# Patient Record
Sex: FEMALE | Race: WHITE | Employment: UNEMPLOYED | URBAN - METROPOLITAN AREA
[De-identification: names, ages, dates, MRNs, and addresses within clinical notes are randomized per-mention and may not be internally consistent; named-entity substitution may affect disease eponyms.]

---

## 2018-05-01 ENCOUNTER — HOSPITAL ENCOUNTER (OUTPATIENT)
Age: 58
Discharge: HOME | End: 2018-05-01
Payer: COMMERCIAL

## 2018-05-01 LAB
ALBUMIN SERPL-MCNC: 4.1 G/DL (ref 3.5–5.2)
ALP BLD-CCNC: 51 U/L (ref 35–104)
ALT SERPL-CCNC: 9 U/L (ref 0–32)
ANION GAP SERPL CALCULATED.3IONS-SCNC: 11 MMOL/L (ref 7–16)
AST SERPL-CCNC: 13 U/L (ref 0–31)
BASOPHILS ABSOLUTE: 0.05 E9/L (ref 0–0.2)
BASOPHILS RELATIVE PERCENT: 0.9 % (ref 0–2)
BILIRUB SERPL-MCNC: 0.4 MG/DL (ref 0–1.2)
BUN BLDV-MCNC: 16 MG/DL (ref 6–20)
C-REACTIVE PROTEIN: 0.1 MG/DL (ref 0–0.4)
CALCIUM SERPL-MCNC: 9.1 MG/DL (ref 8.6–10.2)
CHLORIDE BLD-SCNC: 103 MMOL/L (ref 98–107)
CHOLESTEROL, FASTING: 197 MG/DL (ref 0–199)
CO2: 27 MMOL/L (ref 22–29)
CREAT SERPL-MCNC: 0.8 MG/DL (ref 0.5–1)
EOSINOPHILS ABSOLUTE: 0.23 E9/L (ref 0.05–0.5)
EOSINOPHILS RELATIVE PERCENT: 4.1 % (ref 0–6)
GFR AFRICAN AMERICAN: >60
GFR NON-AFRICAN AMERICAN: >60 ML/MIN/1.73
GLUCOSE FASTING: 112 MG/DL (ref 74–109)
HBA1C MFR BLD: 5.9 % (ref 4.8–5.9)
HCT VFR BLD CALC: 39.9 % (ref 34–48)
HDLC SERPL-MCNC: 71 MG/DL
HEMOGLOBIN: 13.3 G/DL (ref 11.5–15.5)
IMMATURE GRANULOCYTES #: 0.02 E9/L
IMMATURE GRANULOCYTES %: 0.4 % (ref 0–5)
LDL CHOLESTEROL CALCULATED: 109 MG/DL (ref 0–99)
LYMPHOCYTES ABSOLUTE: 1.97 E9/L (ref 1.5–4)
LYMPHOCYTES RELATIVE PERCENT: 35.1 % (ref 20–42)
MCH RBC QN AUTO: 30.7 PG (ref 26–35)
MCHC RBC AUTO-ENTMCNC: 33.3 % (ref 32–34.5)
MCV RBC AUTO: 92.1 FL (ref 80–99.9)
MONOCYTES ABSOLUTE: 0.47 E9/L (ref 0.1–0.95)
MONOCYTES RELATIVE PERCENT: 8.4 % (ref 2–12)
NEUTROPHILS ABSOLUTE: 2.87 E9/L (ref 1.8–7.3)
NEUTROPHILS RELATIVE PERCENT: 51.1 % (ref 43–80)
PDW BLD-RTO: 11.5 FL (ref 11.5–15)
PLATELET # BLD: 337 E9/L (ref 130–450)
PMV BLD AUTO: 9.5 FL (ref 7–12)
POTASSIUM SERPL-SCNC: 4.3 MMOL/L (ref 3.5–5)
RBC # BLD: 4.33 E12/L (ref 3.5–5.5)
SODIUM BLD-SCNC: 141 MMOL/L (ref 132–146)
T3 TOTAL: 104 NG/DL (ref 80–200)
T4 TOTAL: 8.3 MCG/DL (ref 4.5–11.7)
TOTAL PROTEIN: 6.9 G/DL (ref 6.4–8.3)
TRIGLYCERIDE, FASTING: 86 MG/DL (ref 0–149)
TSH SERPL DL<=0.05 MIU/L-ACNC: 1.4 UIU/ML (ref 0.27–4.2)
VITAMIN D 25-HYDROXY: 29 NG/ML (ref 30–100)
VLDLC SERPL CALC-MCNC: 17 MG/DL
WBC # BLD: 5.6 E9/L (ref 4.5–11.5)

## 2018-05-01 PROCEDURE — 86140 C-REACTIVE PROTEIN: CPT

## 2018-05-01 PROCEDURE — 84480 ASSAY TRIIODOTHYRONINE (T3): CPT

## 2018-05-01 PROCEDURE — 80053 COMPREHEN METABOLIC PANEL: CPT

## 2018-05-01 PROCEDURE — 84443 ASSAY THYROID STIM HORMONE: CPT

## 2018-05-01 PROCEDURE — 84436 ASSAY OF TOTAL THYROXINE: CPT

## 2018-05-01 PROCEDURE — 36415 COLL VENOUS BLD VENIPUNCTURE: CPT

## 2018-05-01 PROCEDURE — 85025 COMPLETE CBC W/AUTO DIFF WBC: CPT

## 2018-05-01 PROCEDURE — 83036 HEMOGLOBIN GLYCOSYLATED A1C: CPT

## 2018-05-01 PROCEDURE — 80061 LIPID PANEL: CPT

## 2018-05-01 PROCEDURE — 82306 VITAMIN D 25 HYDROXY: CPT

## 2018-08-03 ENCOUNTER — OFFICE VISIT (OUTPATIENT)
Dept: FAMILY MEDICINE CLINIC | Age: 58
End: 2018-08-03
Payer: COMMERCIAL

## 2018-08-03 VITALS
BODY MASS INDEX: 24.33 KG/M2 | WEIGHT: 155 LBS | SYSTOLIC BLOOD PRESSURE: 118 MMHG | HEIGHT: 67 IN | HEART RATE: 62 BPM | OXYGEN SATURATION: 97 % | RESPIRATION RATE: 18 BRPM | DIASTOLIC BLOOD PRESSURE: 78 MMHG

## 2018-08-03 DIAGNOSIS — Z00.00 PHYSICAL EXAM: ICD-10-CM

## 2018-08-03 DIAGNOSIS — Z23 IMMUNIZATION DUE: ICD-10-CM

## 2018-08-03 DIAGNOSIS — N39.0 URINARY TRACT INFECTION WITHOUT HEMATURIA, SITE UNSPECIFIED: ICD-10-CM

## 2018-08-03 DIAGNOSIS — E11.9 TYPE 2 DIABETES MELLITUS WITHOUT COMPLICATION, WITHOUT LONG-TERM CURRENT USE OF INSULIN (HCC): Primary | ICD-10-CM

## 2018-08-03 DIAGNOSIS — E03.9 ACQUIRED HYPOTHYROIDISM: ICD-10-CM

## 2018-08-03 DIAGNOSIS — R53.83 FATIGUE, UNSPECIFIED TYPE: ICD-10-CM

## 2018-08-03 LAB
BILIRUBIN, POC: NEGATIVE
BLOOD URINE, POC: ABNORMAL
CLARITY, POC: ABNORMAL
COLOR, POC: YELLOW
CREATININE URINE POCT: ABNORMAL
GLUCOSE BLD-MCNC: 102 MG/DL
GLUCOSE URINE, POC: NEGATIVE
HBA1C MFR BLD: 5.6 %
KETONES, POC: NEGATIVE
LEUKOCYTE EST, POC: ABNORMAL
MICROALBUMIN/CREAT 24H UR: ABNORMAL MG/G{CREAT}
MICROALBUMIN/CREAT UR-RTO: ABNORMAL
NITRITE, POC: POSITIVE
PH, POC: 6
PROTEIN, POC: ABNORMAL
SPECIFIC GRAVITY, POC: 1.02
UROBILINOGEN, POC: 0.2

## 2018-08-03 PROCEDURE — 90471 IMMUNIZATION ADMIN: CPT | Performed by: FAMILY MEDICINE

## 2018-08-03 PROCEDURE — 82962 GLUCOSE BLOOD TEST: CPT | Performed by: FAMILY MEDICINE

## 2018-08-03 PROCEDURE — 83036 HEMOGLOBIN GLYCOSYLATED A1C: CPT | Performed by: FAMILY MEDICINE

## 2018-08-03 PROCEDURE — 90732 PPSV23 VACC 2 YRS+ SUBQ/IM: CPT | Performed by: FAMILY MEDICINE

## 2018-08-03 PROCEDURE — 81003 URINALYSIS AUTO W/O SCOPE: CPT | Performed by: FAMILY MEDICINE

## 2018-08-03 PROCEDURE — 82044 UR ALBUMIN SEMIQUANTITATIVE: CPT | Performed by: FAMILY MEDICINE

## 2018-08-03 PROCEDURE — 99204 OFFICE O/P NEW MOD 45 MIN: CPT | Performed by: FAMILY MEDICINE

## 2018-08-03 RX ORDER — NITROFURANTOIN 25; 75 MG/1; MG/1
100 CAPSULE ORAL 2 TIMES DAILY
Qty: 20 CAPSULE | Refills: 0 | Status: SHIPPED | OUTPATIENT
Start: 2018-08-03 | End: 2018-08-13

## 2018-08-03 RX ORDER — LEVOTHYROXINE SODIUM 0.05 MG/1
1 TABLET ORAL DAILY
Refills: 0 | COMMUNITY
Start: 2018-07-27 | End: 2018-09-04 | Stop reason: SDUPTHER

## 2018-08-03 RX ORDER — ALLOPURINOL 300 MG/1
1 TABLET ORAL DAILY
COMMUNITY
Start: 2003-02-17 | End: 2018-09-04 | Stop reason: SDUPTHER

## 2018-08-03 ASSESSMENT — PATIENT HEALTH QUESTIONNAIRE - PHQ9
1. LITTLE INTEREST OR PLEASURE IN DOING THINGS: 0
SUM OF ALL RESPONSES TO PHQ9 QUESTIONS 1 & 2: 0
2. FEELING DOWN, DEPRESSED OR HOPELESS: 0
SUM OF ALL RESPONSES TO PHQ QUESTIONS 1-9: 0

## 2018-08-07 PROBLEM — E11.9 TYPE 2 DIABETES MELLITUS WITHOUT COMPLICATION, WITHOUT LONG-TERM CURRENT USE OF INSULIN (HCC): Status: ACTIVE | Noted: 2018-08-07

## 2018-08-07 PROBLEM — R53.83 FATIGUE: Status: ACTIVE | Noted: 2018-08-07

## 2018-08-07 PROBLEM — N39.0 URINARY TRACT INFECTION WITHOUT HEMATURIA: Status: ACTIVE | Noted: 2018-08-07

## 2018-08-07 PROBLEM — E03.9 ACQUIRED HYPOTHYROIDISM: Status: ACTIVE | Noted: 2018-08-07

## 2018-08-07 ASSESSMENT — ENCOUNTER SYMPTOMS
RESPIRATORY NEGATIVE: 1
HEARTBURN: 0
VOMITING: 0
ABDOMINAL PAIN: 0
EYE PAIN: 0
WHEEZING: 0
SORE THROAT: 0
ORTHOPNEA: 0
PHOTOPHOBIA: 0
DOUBLE VISION: 0
SINUS PAIN: 0
BLOOD IN STOOL: 0
STRIDOR: 0
DIARRHEA: 0
SPUTUM PRODUCTION: 0
COUGH: 0
GASTROINTESTINAL NEGATIVE: 1
EYE REDNESS: 0
ABDOMINAL PAIN: 1
SHORTNESS OF BREATH: 0
NAUSEA: 0
BACK PAIN: 0
EYES NEGATIVE: 1
EYE DISCHARGE: 0
CONSTIPATION: 0
BLURRED VISION: 0
HEMOPTYSIS: 0

## 2018-08-08 NOTE — PROGRESS NOTES
SUBJECTIVE  Vasquez Mariano is a 59 y.o. female. HPI/Chief C/O:  Chief Complaint   Patient presents with    Diabetes     Pt here to establish care with PCP- Hx of diabetes    Urinary Tract Infection     Pt also c/o abd pain, dysuria, burning, itching    Discuss Medications     MA reviewed med list with pt- pharmacy added per pt, denies needing refills at this time   [id number] [facility] with pt-      Allergies   Allergen Reactions    Flagyl [Metronidazole] Rash   this 59year old female new pt presents with dysuria, urinary frequency, and abdominal pain. Pt has type 2 DM, A1C is 5.9, hypothyroid, and fatigue. ROS:  Review of Systems   Constitutional: Positive for malaise/fatigue. Negative for chills, diaphoresis, fever and weight loss. HENT: Negative. Negative for congestion, ear discharge, ear pain, hearing loss, nosebleeds, sinus pain, sore throat and tinnitus. Eyes: Negative. Negative for blurred vision, double vision, photophobia, pain, discharge and redness. Respiratory: Negative. Negative for cough, hemoptysis, sputum production, shortness of breath, wheezing and stridor. Cardiovascular: Negative. Negative for chest pain, palpitations, orthopnea, claudication, leg swelling and PND. Gastrointestinal: Positive for abdominal pain. Negative for blood in stool, constipation, diarrhea, heartburn, melena, nausea and vomiting. Genitourinary: Positive for dysuria, frequency and urgency. Negative for flank pain and hematuria. Musculoskeletal: Positive for myalgias. Negative for back pain, falls, joint pain and neck pain. Skin: Negative. Negative for itching and rash. Neurological: Negative. Negative for dizziness, tingling, tremors, sensory change, speech change, focal weakness, seizures, loss of consciousness, weakness and headaches. Endo/Heme/Allergies: Negative for environmental allergies and polydipsia. Does not bruise/bleed easily.         Pt has type 2 DM, and hypothyroid. Psychiatric/Behavioral: Negative. Negative for depression, hallucinations, memory loss, substance abuse and suicidal ideas. The patient is not nervous/anxious and does not have insomnia. Past Medical/Surgical Hx;  Reviewed with patient      Diagnosis Date    Diabetes mellitus ([address]     Hypertension     Kidney stones      Past Surgical History:   Procedure Laterality Date    ECHO COMPL W DOP COLOR FLOW  8/15/2013         HYSTERECTOMY      RECTAL PROLAPSE REPAIR         Past Family Hx:  Reviewed with patient  History reviewed. No pertinent family history. Social Hx:  Reviewed with patient  Social History   Substance Use Topics    Smoking status: Never Smoker    Smokeless tobacco: Never Used    Alcohol use No       OBJECTIVE  /78   Pulse 62   Resp 18   Ht 5' 7\" (1.702 m)   Wt 155 lb (70.3 kg)   LMP  (LMP Unknown)   SpO2 97%   Breastfeeding? No   BMI 24.28 kg/m²     Problem List:  [place]  does not have any pertinent problems on file. PHYS EX:  Physical Exam   Constitutional: She is oriented to person, place, and time. She appears well-developed and well-nourished. No distress. HENT:   Head: Normocephalic and atraumatic. Right Ear: External ear normal.   Left Ear: External ear normal.   Nose: Nose normal.   Mouth/Throat: Oropharynx is clear and moist. No oropharyngeal exudate. Eyes: Conjunctivae and EOM are normal. Pupils are equal, round, and reactive to light. Right eye exhibits no discharge. Left eye exhibits no discharge. No scleral icterus. Neck: Normal range of motion. Neck supple. No JVD present. No tracheal deviation present. No thyromegaly present. Cardiovascular: Normal rate, regular rhythm, normal heart sounds and intact distal pulses. Exam reveals no gallop and no friction rub. No murmur heard. Pulmonary/Chest: Effort normal and breath sounds normal. No stridor. No respiratory distress. She has no wheezes. She has no rales.  She exhibits no tenderness. Abdominal: Soft. Bowel sounds are normal. She exhibits no distension and no mass. There is no tenderness. There is no rebound and no guarding. No hernia. Musculoskeletal: She exhibits tenderness. She exhibits no edema or deformity. Lymphadenopathy:     She has no cervical adenopathy. Neurological: She is alert and oriented to person, place, and time. She has normal reflexes. She displays normal reflexes. No cranial nerve deficit or sensory deficit. She exhibits normal muscle tone. Coordination normal.   Skin: Skin is warm. No rash noted. She is not diaphoretic. No erythema. No pallor. Psychiatric: She has a normal mood and affect. Her behavior is normal. Judgment and thought content normal.   Nursing note and vitals reviewed. ASSESSMENT/PLAN  Lilia Gallardo was seen today for diabetes, urinary tract infection, discuss medications and health maintenance. Diagnoses and all orders for this visit:    Type 2 diabetes mellitus without complication, without long-term current use of insulin (HCC)  -     POCT Glucose  -     POCT glycosylated hemoglobin (Hb A1C)  -     POCT microalbumin  Stable. Metformin, aspirin, ADA diet. Urinary tract infection without hematuria, site unspecified  -     POCT Urinalysis No Micro (Auto)  -     nitrofurantoin, macrocrystal-monohydrate, (MACROBID) 100 MG capsule; Take 1 capsule by mouth 2 times daily for 10 days  Not controlled. UA, macrobid. Pt given instructions on treatment and prevention UTI. Physical exam  Physical exam, ffasting lab, in 3 months. Immunization due  -     Pneumococcal polysaccharide vaccine 23-valent greater than or equal to 1yo subcutaneous/IM  Instructed on immunization. Fatigue, unspecified type  Not controlled. Take medications. Acquired hypothyroidism  Controlled. Synthroid. Pt instructed if any worse go ED ASAP.       Outpatient Encounter Prescriptions as of 8/3/2018   Medication Sig Dispense Refill    levothyroxine (SYNTHROID) 50 MCG

## 2018-08-08 NOTE — PROGRESS NOTES
range of motion. Neck supple. No JVD present. No tracheal deviation present. No thyromegaly present. Cardiovascular: Normal rate and regular rhythm. Exam reveals no gallop and no friction rub. No murmur heard. Pulmonary/Chest: Effort normal and breath sounds normal. No stridor. No respiratory distress. She has no wheezes. She has no rales. She exhibits no tenderness. Abdominal: Soft. She exhibits no distension and no mass. There is no tenderness. There is no rebound and no guarding. Musculoskeletal: Normal range of motion. She exhibits tenderness. Lymphadenopathy:     She has no cervical adenopathy. Neurological: She is alert and oriented to person, place, and time. She has normal reflexes. No cranial nerve deficit. She exhibits abnormal muscle tone. Coordination normal.   Skin: Skin is warm. No rash noted. She is not diaphoretic. No erythema. No pallor. Psychiatric: She has a normal mood and affect. Her behavior is normal. Judgment and thought content normal.   Nursing note and vitals reviewed. ASSESSMENT/PLAN:  1. Type 2 diabetes mellitus without complication, without long-term current use of insulin (HCC)    - POCT Glucose  - POCT glycosylated hemoglobin (Hb A1C)  - POCT microalbumin  Stable. Metformin. 2. Urinary tract infection without hematuria, site unspecified  - POCT Urinalysis No Micro (Auto)  - nitrofurantoin, macrocrystal-monohydrate, (MACROBID) 100 MG capsule; Take 1 capsule by mouth 2 times daily for 10 days  Dispense: 20 capsule; Refill: 0  UA, macrobid. 3. Physical exam    Physical exam.  4. Immunization due    - Pneumococcal polysaccharide vaccine 23-valent greater than or equal to 1yo subcutaneous/IM  Instructed in immunization. 5. Fatigue, unspecified type    Controlled. 6. Acquired hypothyroidism    Controlled. synthroid    Return in about 3 months (around 11/3/2018) for recheck UA in 10 days only.     An  electronic signature was used to authenticate this note.    --Adolfo Du DO on 8/7/2018 at 11:18 PM

## 2018-08-14 ENCOUNTER — NURSE ONLY (OUTPATIENT)
Dept: FAMILY MEDICINE CLINIC | Age: 58
End: 2018-08-14
Payer: COMMERCIAL

## 2018-08-14 DIAGNOSIS — N39.0 URINARY TRACT INFECTION WITHOUT HEMATURIA, SITE UNSPECIFIED: Primary | ICD-10-CM

## 2018-08-14 LAB
BILIRUBIN, POC: NEGATIVE
BLOOD URINE, POC: NEGATIVE
CLARITY, POC: CLEAR
COLOR, POC: YELLOW
GLUCOSE URINE, POC: NEGATIVE
KETONES, POC: NEGATIVE
LEUKOCYTE EST, POC: NORMAL
NITRITE, POC: NEGATIVE
PH, POC: 5.5
PROTEIN, POC: NEGATIVE
SPECIFIC GRAVITY, POC: 1.02
UROBILINOGEN, POC: 0.2

## 2018-08-14 PROCEDURE — 81003 URINALYSIS AUTO W/O SCOPE: CPT | Performed by: FAMILY MEDICINE

## 2018-08-20 ENCOUNTER — TELEPHONE (OUTPATIENT)
Dept: FAMILY MEDICINE CLINIC | Age: 58
End: 2018-08-20
Payer: COMMERCIAL

## 2018-08-20 DIAGNOSIS — N39.0 URINARY TRACT INFECTION WITH HEMATURIA, SITE UNSPECIFIED: Primary | ICD-10-CM

## 2018-08-20 DIAGNOSIS — E11.9 TYPE 2 DIABETES MELLITUS WITHOUT COMPLICATION, WITHOUT LONG-TERM CURRENT USE OF INSULIN (HCC): ICD-10-CM

## 2018-08-20 DIAGNOSIS — R31.9 URINARY TRACT INFECTION WITH HEMATURIA, SITE UNSPECIFIED: Primary | ICD-10-CM

## 2018-08-20 PROCEDURE — 81003 URINALYSIS AUTO W/O SCOPE: CPT | Performed by: FAMILY MEDICINE

## 2018-08-20 NOTE — TELEPHONE ENCOUNTER
Set up a urine for C and S  This may not be a UTI but rather a GYN issue  When did she last see her GYN ?   May I set up a urologist ?

## 2018-08-21 NOTE — TELEPHONE ENCOUNTER
MA spoke with pt regarding Dr. Felix Marin advisement. Pt agreed to have C&S done, and will come to the office today to leave sample. Pt states that she is continuing to experience burning and pain with urination at this time. Pt also states that she has not had a GYN in a \"few years\" due to her's retiring. Pt also states that she had previously seen Dr. Selina Adhikari regarding the recurrent UTI's. Per pt, Dr. Selina Adhikari told pt that four to five UTI's per year is normal for her due to her DM and elevated sugars. Pt was agreeable to seeing GYN or Urology depending what Dr. Felix Marin advisement is.

## 2018-08-21 NOTE — TELEPHONE ENCOUNTER
Pt unable to void at office. Pt given clean catch urine kit and will bring in sample first thing tomorrow. Electronically signed by Mychal Moyer MA on 8/21/18 at 1:49 PM    Pt dropped off clean catch urine to office. POCT urinalysis ran in office. Results in Epic. Urine C&S ordered as well.     Electronically signed by Mychal Moyer MA on 8/22/18 at 10:11 AM

## 2018-08-22 ENCOUNTER — HOSPITAL ENCOUNTER (OUTPATIENT)
Age: 58
Discharge: HOME | End: 2018-08-24
Payer: COMMERCIAL

## 2018-08-22 DIAGNOSIS — E11.9 TYPE 2 DIABETES MELLITUS WITHOUT COMPLICATION, WITHOUT LONG-TERM CURRENT USE OF INSULIN (HCC): ICD-10-CM

## 2018-08-22 DIAGNOSIS — N39.0 URINARY TRACT INFECTION WITH HEMATURIA, SITE UNSPECIFIED: ICD-10-CM

## 2018-08-22 DIAGNOSIS — R31.9 URINARY TRACT INFECTION WITH HEMATURIA, SITE UNSPECIFIED: ICD-10-CM

## 2018-08-22 LAB
BILIRUBIN, POC: NEGATIVE
BLOOD URINE, POC: NORMAL
CLARITY, POC: CLEAR
COLOR, POC: YELLOW
GLUCOSE URINE, POC: NEGATIVE
KETONES, POC: NEGATIVE
LEUKOCYTE EST, POC: NORMAL
NITRITE, POC: NEGATIVE
PH, POC: 6
PROTEIN, POC: NEGATIVE
SPECIFIC GRAVITY, POC: 1.01
UROBILINOGEN, POC: NORMAL

## 2018-08-22 PROCEDURE — 87088 URINE BACTERIA CULTURE: CPT

## 2018-08-25 LAB — URINE CULTURE, ROUTINE: NORMAL

## 2018-08-29 ENCOUNTER — HOSPITAL ENCOUNTER (EMERGENCY)
Age: 58
Discharge: HOME | End: 2018-08-29
Payer: COMMERCIAL

## 2018-08-29 VITALS
RESPIRATION RATE: 16 BRPM | WEIGHT: 154 LBS | TEMPERATURE: 97.7 F | DIASTOLIC BLOOD PRESSURE: 77 MMHG | OXYGEN SATURATION: 98 % | BODY MASS INDEX: 24.12 KG/M2 | SYSTOLIC BLOOD PRESSURE: 126 MMHG | HEART RATE: 82 BPM

## 2018-08-29 DIAGNOSIS — N30.00 ACUTE CYSTITIS WITHOUT HEMATURIA: Primary | ICD-10-CM

## 2018-08-29 LAB
BACTERIA: ABNORMAL /HPF
BILIRUBIN URINE: NEGATIVE
BLOOD, URINE: ABNORMAL
CLARITY: ABNORMAL
COLOR: YELLOW
GLUCOSE URINE: NEGATIVE MG/DL
KETONES, URINE: NEGATIVE MG/DL
LEUKOCYTE ESTERASE, URINE: ABNORMAL
NITRITE, URINE: NEGATIVE
PH UA: 7 (ref 5–9)
PROTEIN UA: ABNORMAL MG/DL
RBC UA: ABNORMAL /HPF (ref 0–2)
SPECIFIC GRAVITY UA: 1.02 (ref 1–1.03)
UROBILINOGEN, URINE: 0.2 E.U./DL
WBC UA: ABNORMAL /HPF (ref 0–5)

## 2018-08-29 PROCEDURE — 87088 URINE BACTERIA CULTURE: CPT

## 2018-08-29 PROCEDURE — 81001 URINALYSIS AUTO W/SCOPE: CPT

## 2018-08-29 PROCEDURE — 87186 SC STD MICRODIL/AGAR DIL: CPT

## 2018-08-29 PROCEDURE — 99212 OFFICE O/P EST SF 10 MIN: CPT

## 2018-08-29 RX ORDER — ONDANSETRON 4 MG/1
4 TABLET, FILM COATED ORAL EVERY 8 HOURS PRN
Qty: 12 TABLET | Refills: 0 | Status: SHIPPED | OUTPATIENT
Start: 2018-08-29 | End: 2018-09-03

## 2018-08-29 RX ORDER — SULFAMETHOXAZOLE AND TRIMETHOPRIM 800; 160 MG/1; MG/1
1 TABLET ORAL 2 TIMES DAILY
Qty: 14 TABLET | Refills: 0 | Status: SHIPPED | OUTPATIENT
Start: 2018-08-29 | End: 2018-09-05

## 2018-08-29 RX ORDER — PHENAZOPYRIDINE HYDROCHLORIDE 100 MG/1
100 TABLET, FILM COATED ORAL 3 TIMES DAILY PRN
Qty: 9 TABLET | Refills: 0 | Status: SHIPPED | OUTPATIENT
Start: 2018-08-29 | End: 2018-09-01

## 2018-08-29 ASSESSMENT — PAIN SCALES - GENERAL: PAINLEVEL_OUTOF10: 5

## 2018-08-29 ASSESSMENT — PAIN DESCRIPTION - LOCATION: LOCATION: PERINEUM

## 2018-08-29 ASSESSMENT — PAIN DESCRIPTION - PAIN TYPE: TYPE: ACUTE PAIN

## 2018-08-29 ASSESSMENT — PAIN DESCRIPTION - DESCRIPTORS: DESCRIPTORS: ACHING

## 2018-08-29 NOTE — ED PROVIDER NOTES
syndrome. Counseling: I discussed the differential, results and discharge plan with the patient and/or family/friend/caregiver if present. I emphasized the importance of follow-up with the physician I referred them to in the timeframe recommended. I explained reasons for the patient to return to the Emergency Department. Additional verbal discharge instructions were also given and discussed with the patient to supplement those generated by the EMR. We also discussed medications that were prescribed (if any) including common side effects and interactions. The patient was advised to abstain from driving, operating heavy machinery or making significant decisions while taking medications such as opiates and muscle relaxers that may impair this. All questions were addressed. They understand return precautions and discharge instructions. The patient and/or family/friend/caregiver expressed understanding. Assessment      1. Acute cystitis without hematuria      Plan   Discharge to home and advised to contact Zora Cabezas DO  [address] [place] [address] Drive  [phone]    In 2 days  As needed   Patient condition is good    New Medications     New Prescriptions    ONDANSETRON (ZOFRAN) 4 MG TABLET    Take 1 tablet by mouth every 8 hours as needed for Nausea or Vomiting    PHENAZOPYRIDINE (PYRIDIUM) 100 MG TABLET    Take 1 tablet by mouth 3 times daily as needed for Pain    SULFAMETHOXAZOLE-TRIMETHOPRIM (BACTRIM DS) 800-160 MG PER TABLET    Take 1 tablet by mouth 2 times daily for 7 days     Electronically signed by FLORENTINO Stephens   DD: 8/29/18  **This report was transcribed using voice recognition software. Every effort was made to ensure accuracy; however, inadvertent computerized transcription errors may be present.   END OF ED PROVIDER NOTE         Merlin Codding [address] [place], [place]  08/29/18 [address] MOSS [address] [place], [place]  08/29/18 1052

## 2018-08-31 LAB
ORGANISM: ABNORMAL
URINE CULTURE, ROUTINE: ABNORMAL
URINE CULTURE, ROUTINE: ABNORMAL

## 2018-09-04 RX ORDER — ALLOPURINOL 300 MG/1
300 TABLET ORAL DAILY
Qty: 90 TABLET | Refills: 1 | Status: SHIPPED | OUTPATIENT
Start: 2018-09-04 | End: 2019-10-14 | Stop reason: SDUPTHER

## 2018-09-04 RX ORDER — LEVOTHYROXINE SODIUM 0.05 MG/1
50 TABLET ORAL DAILY
Qty: 30 TABLET | Refills: 3 | Status: SHIPPED | OUTPATIENT
Start: 2018-09-04 | End: 2019-01-25 | Stop reason: SDUPTHER

## 2018-09-11 ENCOUNTER — TELEPHONE (OUTPATIENT)
Dept: FAMILY MEDICINE CLINIC | Age: 58
End: 2018-09-11

## 2018-09-11 DIAGNOSIS — N39.0 URINARY TRACT INFECTION WITHOUT HEMATURIA, SITE UNSPECIFIED: Primary | ICD-10-CM

## 2018-09-11 RX ORDER — PHENAZOPYRIDINE HYDROCHLORIDE 200 MG/1
200 TABLET, FILM COATED ORAL 3 TIMES DAILY PRN
Qty: 15 TABLET | Refills: 0 | Status: SHIPPED | OUTPATIENT
Start: 2018-09-11 | End: 2018-09-14

## 2018-09-11 NOTE — TELEPHONE ENCOUNTER
Patient contacted office stating she was in Urgent care 8/29 for bladder infection and was given 7 days of Amoxicillin Clavulanate 875/125 which she finished.  Patient states she still has bladder infection patient wants to know what else can be prescribed

## 2018-09-19 ENCOUNTER — OFFICE VISIT (OUTPATIENT)
Dept: FAMILY MEDICINE CLINIC | Age: 58
End: 2018-09-19
Payer: COMMERCIAL

## 2018-09-19 VITALS
WEIGHT: 158 LBS | DIASTOLIC BLOOD PRESSURE: 80 MMHG | OXYGEN SATURATION: 98 % | RESPIRATION RATE: 18 BRPM | HEART RATE: 66 BPM | SYSTOLIC BLOOD PRESSURE: 122 MMHG | HEIGHT: 67 IN | BODY MASS INDEX: 24.8 KG/M2

## 2018-09-19 DIAGNOSIS — R53.83 FATIGUE, UNSPECIFIED TYPE: ICD-10-CM

## 2018-09-19 DIAGNOSIS — K21.9 GASTROESOPHAGEAL REFLUX DISEASE WITHOUT ESOPHAGITIS: ICD-10-CM

## 2018-09-19 DIAGNOSIS — K58.9 IRRITABLE BOWEL SYNDROME WITHOUT DIARRHEA: ICD-10-CM

## 2018-09-19 DIAGNOSIS — N39.0 RECURRENT UTI: ICD-10-CM

## 2018-09-19 DIAGNOSIS — E03.9 ACQUIRED HYPOTHYROIDISM: ICD-10-CM

## 2018-09-19 DIAGNOSIS — E11.9 TYPE 2 DIABETES MELLITUS WITHOUT COMPLICATION, WITHOUT LONG-TERM CURRENT USE OF INSULIN (HCC): Primary | ICD-10-CM

## 2018-09-19 DIAGNOSIS — R10.84 GENERALIZED ABDOMINAL PAIN: ICD-10-CM

## 2018-09-19 DIAGNOSIS — E78.5 DYSLIPIDEMIA: ICD-10-CM

## 2018-09-19 DIAGNOSIS — Z90.710 H/O: HYSTERECTOMY: ICD-10-CM

## 2018-09-19 PROCEDURE — 99214 OFFICE O/P EST MOD 30 MIN: CPT | Performed by: FAMILY MEDICINE

## 2018-09-19 ASSESSMENT — ENCOUNTER SYMPTOMS
RESPIRATORY NEGATIVE: 1
WHEEZING: 0
ABDOMINAL PAIN: 1
STRIDOR: 0
BLOOD IN STOOL: 0
HEMOPTYSIS: 0
PHOTOPHOBIA: 0
NAUSEA: 0
DIARRHEA: 0
DOUBLE VISION: 0
BLURRED VISION: 0
EYE REDNESS: 0
EYES NEGATIVE: 1
EYE PAIN: 0
SORE THROAT: 0
CONSTIPATION: 0
COUGH: 0
SHORTNESS OF BREATH: 0
ORTHOPNEA: 0
SINUS PAIN: 0
SPUTUM PRODUCTION: 0
BACK PAIN: 0
VOMITING: 0
HEARTBURN: 0
EYE DISCHARGE: 0

## 2018-09-19 NOTE — PROGRESS NOTES
SUBJECTIVE  Desiree Kirkpatrick is a 59 y.o. female. HPI/Chief C/O:  Chief Complaint   Patient presents with    Referral - General     Pt here to discuss referral to Urology, frequent UTI's, was seen at Urgent Care     Allergies   Allergen Reactions    Flagyl [Metronidazole] Rash   She is here with recurring UTI's       Diabetes   She presents for her follow-up diabetic visit. She has type 2 diabetes mellitus. Pertinent negatives for hypoglycemia include no dizziness, headaches, seizures, sweats or tremors. Associated symptoms include fatigue and weakness. Pertinent negatives for diabetes include no blurred vision, no chest pain, no foot paresthesias, no foot ulcerations, no polydipsia, no polyphagia, no polyuria, no visual change and no weight loss. There are no hypoglycemic complications. Pertinent negatives for diabetic complications include no autonomic neuropathy, CVA, heart disease, nephropathy, peripheral neuropathy, PVD or retinopathy. Risk factors for coronary artery disease include obesity, dyslipidemia, diabetes mellitus and family history. Current diabetic treatment includes diet and oral agent (monotherapy). She is compliant with treatment some of the time. An ACE inhibitor/angiotensin II receptor blocker is not being taken. Hypertension   Associated symptoms include malaise/fatigue. Pertinent negatives include no anxiety, blurred vision, chest pain, headaches, neck pain, orthopnea, palpitations, peripheral edema, PND, shortness of breath or sweats. Risk factors for coronary artery disease include diabetes mellitus, dyslipidemia, family history and post-menopausal state. Past treatments include lifestyle changes. The current treatment provides significant improvement. Compliance problems include exercise and diet. There is no history of angina, kidney disease, CAD/MI, CVA, heart failure, left ventricular hypertrophy, PVD or retinopathy. Identifiable causes of hypertension include a thyroid problem. There is no history of chronic renal disease, coarctation of the aorta, hyperaldosteronism, hypercortisolism, hyperparathyroidism, a hypertension causing med, pheochromocytoma, renovascular disease or sleep apnea. ROS:  Review of Systems   Constitutional: Positive for fatigue and malaise/fatigue. Negative for chills, diaphoresis, fever and weight loss. HENT: Negative. Negative for congestion, ear discharge, ear pain, hearing loss, nosebleeds, sinus pain, sore throat and tinnitus. Eyes: Negative. Negative for blurred vision, double vision, photophobia, pain, discharge and redness. Respiratory: Negative. Negative for cough, hemoptysis, sputum production, shortness of breath, wheezing and stridor. Cardiovascular: Negative. Negative for chest pain, palpitations, orthopnea, claudication, leg swelling and PND. Gastrointestinal: Positive for abdominal pain. Negative for blood in stool, constipation, diarrhea, heartburn, melena, nausea and vomiting. Genitourinary: Positive for dysuria, frequency and urgency. Negative for flank pain and hematuria. Musculoskeletal: Positive for myalgias. Negative for back pain, falls, joint pain and neck pain. Skin: Negative. Negative for itching and rash. Neurological: Positive for weakness. Negative for dizziness, tingling, tremors, sensory change, speech change, focal weakness, seizures, loss of consciousness and headaches. Endo/Heme/Allergies: Negative for environmental allergies, polydipsia and polyphagia. Does not bruise/bleed easily. Pt has type 2 DM, and hypothyroid. Psychiatric/Behavioral: Negative.       Past Medical/Surgical Hx;  Reviewed with patient      Diagnosis Date    Diabetes mellitus ([facility] [address]     Hypertension     Kidney stones      Past Surgical History:   Procedure Laterality Date    ECHO COMPL W DOP COLOR FLOW  8/15/2013         HYSTERECTOMY      RECTAL PROLAPSE REPAIR         Past Family Hx:  Reviewed with patient  No family Hepatitis C screen     Diabetic foot exam     Diabetic retinal exam     HIV screen     DTaP/Tdap/Td vaccine (1 - Tdap)    Shingles Vaccine (1 of 2 - 2 Dose Series)    Colon cancer screen colonoscopy     Flu vaccine (1)    Lipid screen     TSH testing     A1C test (Diabetic or Prediabetic)     Diabetic microalbuminuria test     Breast cancer screen     Pneumococcal med risk

## 2018-09-20 ASSESSMENT — ENCOUNTER SYMPTOMS: VISUAL CHANGE: 0

## 2018-09-26 ENCOUNTER — TELEPHONE (OUTPATIENT)
Dept: FAMILY MEDICINE CLINIC | Age: 58
End: 2018-09-26

## 2018-09-26 DIAGNOSIS — N39.0 URINARY TRACT INFECTION WITHOUT HEMATURIA, SITE UNSPECIFIED: Primary | ICD-10-CM

## 2018-09-26 RX ORDER — NITROFURANTOIN 25; 75 MG/1; MG/1
100 CAPSULE ORAL 2 TIMES DAILY
Qty: 20 CAPSULE | Refills: 0 | Status: SHIPPED | OUTPATIENT
Start: 2018-09-26 | End: 2018-10-06

## 2018-10-29 ENCOUNTER — TELEPHONE (OUTPATIENT)
Dept: FAMILY MEDICINE CLINIC | Age: 58
End: 2018-10-29

## 2018-12-04 ENCOUNTER — HOSPITAL ENCOUNTER (OUTPATIENT)
Age: 58
Discharge: HOME | End: 2018-12-04
Payer: COMMERCIAL

## 2018-12-04 ENCOUNTER — HOSPITAL ENCOUNTER (OUTPATIENT)
Dept: CT IMAGING | Age: 58
Discharge: HOME | End: 2018-12-06
Payer: COMMERCIAL

## 2018-12-04 DIAGNOSIS — R53.83 FATIGUE, UNSPECIFIED TYPE: ICD-10-CM

## 2018-12-04 DIAGNOSIS — E78.5 DYSLIPIDEMIA: ICD-10-CM

## 2018-12-04 DIAGNOSIS — E03.9 ACQUIRED HYPOTHYROIDISM: ICD-10-CM

## 2018-12-04 DIAGNOSIS — E11.9 TYPE 2 DIABETES MELLITUS WITHOUT COMPLICATION, WITHOUT LONG-TERM CURRENT USE OF INSULIN (HCC): ICD-10-CM

## 2018-12-04 DIAGNOSIS — R10.84 GENERALIZED ABDOMINAL PAIN: ICD-10-CM

## 2018-12-04 LAB
ALBUMIN SERPL-MCNC: 4 G/DL (ref 3.5–5.2)
ALP BLD-CCNC: 62 U/L (ref 35–104)
ALT SERPL-CCNC: 10 U/L (ref 0–32)
ANION GAP SERPL CALCULATED.3IONS-SCNC: 8 MMOL/L (ref 7–16)
AST SERPL-CCNC: 11 U/L (ref 0–31)
BASOPHILS ABSOLUTE: 0.05 E9/L (ref 0–0.2)
BASOPHILS RELATIVE PERCENT: 0.8 % (ref 0–2)
BILIRUB SERPL-MCNC: 0.3 MG/DL (ref 0–1.2)
BUN BLDV-MCNC: 20 MG/DL (ref 8–23)
CALCIUM SERPL-MCNC: 8.8 MG/DL (ref 8.6–10.2)
CHLORIDE BLD-SCNC: 106 MMOL/L (ref 98–107)
CHOLESTEROL, TOTAL: 175 MG/DL (ref 0–199)
CO2: 28 MMOL/L (ref 22–29)
CREAT SERPL-MCNC: 0.8 MG/DL (ref 0.5–1)
EOSINOPHILS ABSOLUTE: 0.23 E9/L (ref 0.05–0.5)
EOSINOPHILS RELATIVE PERCENT: 3.8 % (ref 0–6)
GFR AFRICAN AMERICAN: >60
GFR NON-AFRICAN AMERICAN: >60 ML/MIN/1.73
GLUCOSE BLD-MCNC: 124 MG/DL (ref 74–99)
HBA1C MFR BLD: 5.4 % (ref 4–5.6)
HCT VFR BLD CALC: 38.9 % (ref 34–48)
HDLC SERPL-MCNC: 61 MG/DL
HEMOGLOBIN: 12.5 G/DL (ref 11.5–15.5)
IMMATURE GRANULOCYTES #: 0.02 E9/L
IMMATURE GRANULOCYTES %: 0.3 % (ref 0–5)
LDL CHOLESTEROL CALCULATED: 97 MG/DL (ref 0–99)
LYMPHOCYTES ABSOLUTE: 2.17 E9/L (ref 1.5–4)
LYMPHOCYTES RELATIVE PERCENT: 35.4 % (ref 20–42)
MCH RBC QN AUTO: 30 PG (ref 26–35)
MCHC RBC AUTO-ENTMCNC: 32.1 % (ref 32–34.5)
MCV RBC AUTO: 93.5 FL (ref 80–99.9)
MONOCYTES ABSOLUTE: 0.51 E9/L (ref 0.1–0.95)
MONOCYTES RELATIVE PERCENT: 8.3 % (ref 2–12)
NEUTROPHILS ABSOLUTE: 3.15 E9/L (ref 1.8–7.3)
NEUTROPHILS RELATIVE PERCENT: 51.4 % (ref 43–80)
PDW BLD-RTO: 11.1 FL (ref 11.5–15)
PLATELET # BLD: 303 E9/L (ref 130–450)
PMV BLD AUTO: 9.5 FL (ref 7–12)
POTASSIUM SERPL-SCNC: 4.3 MMOL/L (ref 3.5–5)
RBC # BLD: 4.16 E12/L (ref 3.5–5.5)
SODIUM BLD-SCNC: 142 MMOL/L (ref 132–146)
TOTAL PROTEIN: 6.5 G/DL (ref 6.4–8.3)
TRIGL SERPL-MCNC: 87 MG/DL (ref 0–149)
TSH SERPL DL<=0.05 MIU/L-ACNC: 1.71 UIU/ML (ref 0.27–4.2)
VLDLC SERPL CALC-MCNC: 17 MG/DL
WBC # BLD: 6.1 E9/L (ref 4.5–11.5)

## 2018-12-04 PROCEDURE — 74178 CT ABD&PLV WO CNTR FLWD CNTR: CPT

## 2018-12-04 PROCEDURE — 2580000003 HC RX 258: Performed by: FAMILY MEDICINE

## 2018-12-04 PROCEDURE — 80053 COMPREHEN METABOLIC PANEL: CPT

## 2018-12-04 PROCEDURE — 85025 COMPLETE CBC W/AUTO DIFF WBC: CPT

## 2018-12-04 PROCEDURE — 36415 COLL VENOUS BLD VENIPUNCTURE: CPT

## 2018-12-04 PROCEDURE — 80061 LIPID PANEL: CPT

## 2018-12-04 PROCEDURE — 84443 ASSAY THYROID STIM HORMONE: CPT

## 2018-12-04 PROCEDURE — 83036 HEMOGLOBIN GLYCOSYLATED A1C: CPT

## 2018-12-04 PROCEDURE — 6360000004 HC RX CONTRAST MEDICATION: Performed by: PHYSICIAN ASSISTANT

## 2018-12-04 RX ORDER — SODIUM CHLORIDE 0.9 % (FLUSH) 0.9 %
10 SYRINGE (ML) INJECTION PRN
Status: DISCONTINUED | OUTPATIENT
Start: 2018-12-04 | End: 2018-12-07 | Stop reason: HOSPADM

## 2018-12-04 RX ADMIN — IOPAMIDOL 100 ML: 755 INJECTION, SOLUTION INTRAVENOUS at 09:48

## 2018-12-04 RX ADMIN — Medication 10 ML: at 09:48

## 2018-12-07 ENCOUNTER — TELEPHONE (OUTPATIENT)
Dept: FAMILY MEDICINE CLINIC | Age: 58
End: 2018-12-07

## 2018-12-07 DIAGNOSIS — T78.40XA ALLERGIC REACTION, INITIAL ENCOUNTER: Primary | ICD-10-CM

## 2018-12-07 RX ORDER — DIPHENHYDRAMINE HCL 25 MG
25 TABLET ORAL EVERY 6 HOURS PRN
Qty: 90 TABLET | Refills: 0 | Status: SHIPPED | OUTPATIENT
Start: 2018-12-07 | End: 2018-12-17 | Stop reason: CLARIF

## 2018-12-07 RX ORDER — METHYLPREDNISOLONE 4 MG/1
TABLET ORAL
Qty: 1 KIT | Refills: 0 | Status: SHIPPED | OUTPATIENT
Start: 2018-12-07 | End: 2018-12-10

## 2018-12-10 ENCOUNTER — TELEPHONE (OUTPATIENT)
Dept: FAMILY MEDICINE CLINIC | Age: 58
End: 2018-12-10

## 2018-12-10 ENCOUNTER — OFFICE VISIT (OUTPATIENT)
Dept: FAMILY MEDICINE CLINIC | Age: 58
End: 2018-12-10
Payer: COMMERCIAL

## 2018-12-10 VITALS
OXYGEN SATURATION: 97 % | WEIGHT: 153.2 LBS | TEMPERATURE: 98.3 F | RESPIRATION RATE: 18 BRPM | HEIGHT: 67 IN | DIASTOLIC BLOOD PRESSURE: 68 MMHG | SYSTOLIC BLOOD PRESSURE: 126 MMHG | HEART RATE: 77 BPM | BODY MASS INDEX: 24.04 KG/M2

## 2018-12-10 DIAGNOSIS — J34.89 SINUS PAIN: ICD-10-CM

## 2018-12-10 DIAGNOSIS — J01.90 ACUTE BACTERIAL SINUSITIS: Primary | ICD-10-CM

## 2018-12-10 DIAGNOSIS — B96.89 ACUTE BACTERIAL SINUSITIS: Primary | ICD-10-CM

## 2018-12-10 DIAGNOSIS — R05.9 COUGH: ICD-10-CM

## 2018-12-10 LAB — S PYO AG THROAT QL: NORMAL

## 2018-12-10 PROCEDURE — 87880 STREP A ASSAY W/OPTIC: CPT | Performed by: FAMILY MEDICINE

## 2018-12-10 PROCEDURE — 99213 OFFICE O/P EST LOW 20 MIN: CPT | Performed by: FAMILY MEDICINE

## 2018-12-10 RX ORDER — CEFDINIR 300 MG/1
300 CAPSULE ORAL 2 TIMES DAILY
Qty: 14 CAPSULE | Refills: 0 | Status: SHIPPED | OUTPATIENT
Start: 2018-12-10 | End: 2018-12-17 | Stop reason: CLARIF

## 2018-12-10 RX ORDER — PANTOPRAZOLE SODIUM 40 MG/1
TABLET, DELAYED RELEASE ORAL
Refills: 2 | COMMUNITY
Start: 2018-11-14 | End: 2019-07-01 | Stop reason: ALTCHOICE

## 2018-12-10 RX ORDER — AZITHROMYCIN 250 MG/1
250 TABLET, FILM COATED ORAL SEE ADMIN INSTRUCTIONS
Qty: 6 TABLET | Refills: 0 | Status: SHIPPED | OUTPATIENT
Start: 2018-12-10 | End: 2018-12-10

## 2018-12-11 ENCOUNTER — TELEPHONE (OUTPATIENT)
Dept: FAMILY MEDICINE CLINIC | Age: 58
End: 2018-12-11

## 2018-12-11 NOTE — TELEPHONE ENCOUNTER
Debbrah Goltz  See her in January for recheck  We will repeat the 2D echo on her heart at that time  We will go over urology consult as well

## 2018-12-13 PROBLEM — B96.89 ACUTE BACTERIAL SINUSITIS: Status: ACTIVE | Noted: 2018-12-13

## 2018-12-13 PROBLEM — J01.90 ACUTE BACTERIAL SINUSITIS: Status: ACTIVE | Noted: 2018-12-13

## 2018-12-13 ASSESSMENT — ENCOUNTER SYMPTOMS
SORE THROAT: 1
NAUSEA: 0
RHINORRHEA: 0
FACIAL SWELLING: 0
VOMITING: 0
TROUBLE SWALLOWING: 0
EYE ITCHING: 0
VOICE CHANGE: 0
PHOTOPHOBIA: 0
BLOOD IN STOOL: 0
ANAL BLEEDING: 0
ALLERGIC/IMMUNOLOGIC NEGATIVE: 1
EYES NEGATIVE: 1
SINUS PRESSURE: 1
CHEST TIGHTNESS: 0
CHOKING: 1
CONSTIPATION: 0
COLOR CHANGE: 0
WHEEZING: 0
EYE PAIN: 0
RECTAL PAIN: 0
ABDOMINAL PAIN: 0
COUGH: 0
DIARRHEA: 0
STRIDOR: 0
SHORTNESS OF BREATH: 0
SINUS PAIN: 1
ABDOMINAL DISTENTION: 0
EYE REDNESS: 0
BACK PAIN: 0
EYE DISCHARGE: 0

## 2018-12-13 NOTE — PROGRESS NOTES
facial asymmetry, speech difficulty, weakness, light-headedness, numbness and headaches. Hematological: Negative. Negative for adenopathy. Does not bruise/bleed easily. Psychiatric/Behavioral: Negative. Negative for agitation, behavioral problems, confusion, decreased concentration, dysphoric mood, hallucinations, self-injury, sleep disturbance and suicidal ideas. The patient is not nervous/anxious and is not hyperactive. Past Medical/Surgical Hx;  Reviewed with patient      Diagnosis Date    Diabetes mellitus ([address]     Hypertension     Kidney stones      Past Surgical History:   Procedure Laterality Date    ECHO COMPL W DOP COLOR FLOW  8/15/2013         HYSTERECTOMY      RECTAL PROLAPSE REPAIR         Past Family Hx:  Reviewed with patient  History reviewed. No pertinent family history. Social Hx:  Reviewed with patient  Social History   Substance Use Topics    Smoking status: Never Smoker    Smokeless tobacco: Never Used    Alcohol use No       OBJECTIVE  /68   Pulse 77   Temp 98.3 °F (36.8 °C)   Resp 18   Ht 5' 7.01\" (1.702 m)   Wt 153 lb 3.2 oz (69.5 kg)   LMP  (LMP Unknown)   SpO2 97%   Breastfeeding? No   BMI 23.99 kg/m²     Problem List:  [place]  does not have any pertinent problems on file. PHYS EX:  Physical Exam   Constitutional: She is oriented to person, place, and time. She appears well-developed and well-nourished. No distress. HENT:   Head: Normocephalic and atraumatic. Right Ear: External ear normal.   Left Ear: External ear normal.   Nose: Nose normal.   Mouth/Throat: No oropharyngeal exudate. Pt has nasal congestion. Pharynx- erythema. Lesion on upper gum. Eyes: Pupils are equal, round, and reactive to light. Conjunctivae and EOM are normal. Right eye exhibits no discharge. Left eye exhibits no discharge. No scleral icterus. Neck: Normal range of motion. Neck supple. No JVD present. No tracheal deviation present. No thyromegaly present.

## 2018-12-17 ENCOUNTER — OFFICE VISIT (OUTPATIENT)
Dept: FAMILY MEDICINE CLINIC | Age: 58
End: 2018-12-17
Payer: COMMERCIAL

## 2018-12-17 VITALS
RESPIRATION RATE: 18 BRPM | TEMPERATURE: 98.4 F | BODY MASS INDEX: 24.48 KG/M2 | DIASTOLIC BLOOD PRESSURE: 78 MMHG | WEIGHT: 156 LBS | HEART RATE: 63 BPM | OXYGEN SATURATION: 97 % | SYSTOLIC BLOOD PRESSURE: 118 MMHG | HEIGHT: 67 IN

## 2018-12-17 DIAGNOSIS — R10.32 LEFT LOWER QUADRANT PAIN: Primary | ICD-10-CM

## 2018-12-17 DIAGNOSIS — I31.39 PERICARDIAL EFFUSION: ICD-10-CM

## 2018-12-17 DIAGNOSIS — R19.7 DIARRHEA, UNSPECIFIED TYPE: ICD-10-CM

## 2018-12-17 DIAGNOSIS — N28.1 RENAL CYSTS, ACQUIRED, BILATERAL: ICD-10-CM

## 2018-12-17 DIAGNOSIS — E11.9 TYPE 2 DIABETES MELLITUS WITHOUT COMPLICATION, WITHOUT LONG-TERM CURRENT USE OF INSULIN (HCC): ICD-10-CM

## 2018-12-17 DIAGNOSIS — K57.92 DIVERTICULITIS: ICD-10-CM

## 2018-12-17 PROCEDURE — 99214 OFFICE O/P EST MOD 30 MIN: CPT | Performed by: FAMILY MEDICINE

## 2018-12-17 RX ORDER — CIPROFLOXACIN 500 MG/1
500 TABLET, FILM COATED ORAL 2 TIMES DAILY
Qty: 20 TABLET | Refills: 0 | Status: SHIPPED | OUTPATIENT
Start: 2018-12-17 | End: 2018-12-27

## 2018-12-23 PROBLEM — R10.32 LEFT LOWER QUADRANT PAIN: Status: ACTIVE | Noted: 2018-12-23

## 2018-12-23 PROBLEM — N28.1 RENAL CYSTS, ACQUIRED, BILATERAL: Status: ACTIVE | Noted: 2018-12-23

## 2018-12-23 PROBLEM — I31.39 PERICARDIAL EFFUSION: Status: ACTIVE | Noted: 2018-12-23

## 2018-12-23 PROBLEM — K57.92 DIVERTICULITIS: Status: ACTIVE | Noted: 2018-12-23

## 2018-12-23 ASSESSMENT — ENCOUNTER SYMPTOMS
FACIAL SWELLING: 0
STRIDOR: 0
ALLERGIC/IMMUNOLOGIC NEGATIVE: 1
VOMITING: 0
ABDOMINAL DISTENTION: 0
EYE REDNESS: 0
COLOR CHANGE: 0
SINUS PAIN: 1
EYE DISCHARGE: 0
RECTAL PAIN: 0
WHEEZING: 0
NAUSEA: 0
VOICE CHANGE: 0
COUGH: 1
DIARRHEA: 1
BACK PAIN: 0
EYE PAIN: 0
BLOOD IN STOOL: 0
RHINORRHEA: 0
ABDOMINAL PAIN: 1
ANAL BLEEDING: 0
SHORTNESS OF BREATH: 0
EYE ITCHING: 0
EYES NEGATIVE: 1
SINUS PRESSURE: 1
TROUBLE SWALLOWING: 0
CHOKING: 0
PHOTOPHOBIA: 0
CHEST TIGHTNESS: 0
CONSTIPATION: 0
SORE THROAT: 1

## 2018-12-23 NOTE — PROGRESS NOTES
normal. Right eye exhibits no discharge. Left eye exhibits no discharge. No scleral icterus. Neck: Normal range of motion. Neck supple. No JVD present. No tracheal deviation present. No thyromegaly present. Cardiovascular: Normal rate, regular rhythm, normal heart sounds and intact distal pulses. Exam reveals no gallop and no friction rub. No murmur heard. Pulmonary/Chest: Effort normal and breath sounds normal. No stridor. No respiratory distress. She has no wheezes. She has no rales. She exhibits no tenderness. Abdominal: Soft. Bowel sounds are normal. She exhibits no distension and no mass. There is tenderness. There is no rebound and no guarding. No hernia. Pt has slight LLQ pain. Musculoskeletal: Normal range of motion. She exhibits no edema, tenderness or deformity. Lymphadenopathy:     She has no cervical adenopathy. Neurological: She is alert and oriented to person, place, and time. She has normal reflexes. She displays normal reflexes. No cranial nerve deficit or sensory deficit. She exhibits normal muscle tone. Coordination normal.   Skin: Skin is warm. No rash noted. She is not diaphoretic. No erythema. No pallor. Psychiatric: She has a normal mood and affect. Her behavior is normal. Judgment and thought content normal.   Nursing note and vitals reviewed. ASSESSMENT/PLAN  Khoi Brandon was seen today for uri and diarrhea. Diagnoses and all orders for this visit:    Left lower quadrant pain  -     [address] [facility] [address] Trav Snider MD  Stable. General surgeon. Diarrhea, unspecified type  -     [address] [facility] Waldemar Smith MD  Stable. General surgeon. Pericardial effusion  -     External Referral To Cardiology  Stable. Cardiology. Renal cysts, acquired, bilateral  Stable. Urology. Diverticulitis  -     [address] [facility] [address], MD  -     ciprofloxacin (CIPRO) 500 MG tablet;  Take 1 tablet by mouth 2 times daily for 10 days  Stable. General surgeon, cipro. Type 2 diabetes mellitus without complication, without long-term current use of insulin (HCC)  Controlled. Metformin, ADA diet. Pt instructed if any worse go ED ASAP. Outpatient Encounter Prescriptions as of 12/17/2018   Medication Sig Dispense Refill    ciprofloxacin (CIPRO) 500 MG tablet Take 1 tablet by mouth 2 times daily for 10 days 20 tablet 0    pantoprazole (PROTONIX) 40 MG tablet TAKE ONE TABLET BY MOUTH EVERY DAY  2    metFORMIN (GLUCOPHAGE) 1000 MG tablet Take 1 tablet by mouth 2 times daily (with meals) 180 tablet 1    levothyroxine (SYNTHROID) 50 MCG tablet Take 1 tablet by mouth daily 30 tablet 3    allopurinol (ZYLOPRIM) 300 MG tablet Take 1 tablet by mouth daily 90 tablet 1    aspirin 81 MG tablet Take 81 mg by mouth daily      [DISCONTINUED] cefdinir (OMNICEF) 300 MG capsule Take 1 capsule by mouth 2 times daily for 7 days 14 capsule 0    [DISCONTINUED] diphenhydrAMINE (BENADRYL ALLERGY) 25 MG tablet Take 1 tablet by mouth every 6 hours as needed for Itching 90 tablet 0     No facility-administered encounter medications on file as of 12/17/2018. Return in about 4 weeks (around 1/14/2019) for if any worse go ED ASAP.         Reviewed recent labs related to Yahir's current problems      Discussed importance of regular Health Maintenance follow up  Health Maintenance   Topic    Hepatitis C screen     Diabetic foot exam     Diabetic retinal exam     HIV screen     DTaP/Tdap/Td vaccine (1 - Tdap)    Shingles Vaccine (1 of 2 - 2 Dose Series)    Colon cancer screen colonoscopy     Flu vaccine (1)    Diabetic microalbuminuria test     Breast cancer screen     A1C test (Diabetic or Prediabetic)     Lipid screen     TSH testing     Pneumococcal med risk

## 2019-01-02 ENCOUNTER — INITIAL CONSULT (OUTPATIENT)
Dept: SURGERY | Age: 59
End: 2019-01-02
Payer: COMMERCIAL

## 2019-01-02 VITALS
SYSTOLIC BLOOD PRESSURE: 130 MMHG | HEIGHT: 67 IN | BODY MASS INDEX: 24.33 KG/M2 | HEART RATE: 64 BPM | RESPIRATION RATE: 18 BRPM | TEMPERATURE: 98.7 F | WEIGHT: 155 LBS | DIASTOLIC BLOOD PRESSURE: 72 MMHG

## 2019-01-02 DIAGNOSIS — R10.13 EPIGASTRIC PAIN: Primary | ICD-10-CM

## 2019-01-02 DIAGNOSIS — R11.0 NAUSEA: ICD-10-CM

## 2019-01-02 PROCEDURE — 99204 OFFICE O/P NEW MOD 45 MIN: CPT | Performed by: SURGERY

## 2019-01-02 RX ORDER — GREEN TEA/HOODIA GORDONII 315-12.5MG
1 CAPSULE ORAL DAILY
Qty: 30 TABLET | Refills: 3 | Status: SHIPPED | OUTPATIENT
Start: 2019-01-02 | End: 2019-02-01

## 2019-01-04 ENCOUNTER — TELEPHONE (OUTPATIENT)
Dept: SURGERY | Age: 59
End: 2019-01-04

## 2019-01-07 ENCOUNTER — HOSPITAL ENCOUNTER (OUTPATIENT)
Age: 59
Discharge: HOME | End: 2019-01-09
Payer: COMMERCIAL

## 2019-01-07 PROCEDURE — 87186 SC STD MICRODIL/AGAR DIL: CPT

## 2019-01-07 PROCEDURE — 87088 URINE BACTERIA CULTURE: CPT

## 2019-01-09 LAB
ORGANISM: ABNORMAL
URINE CULTURE, ROUTINE: ABNORMAL
URINE CULTURE, ROUTINE: ABNORMAL

## 2019-01-14 ENCOUNTER — HOSPITAL ENCOUNTER (OUTPATIENT)
Dept: ULTRASOUND IMAGING | Age: 59
Discharge: HOME | End: 2019-01-14
Payer: COMMERCIAL

## 2019-01-14 DIAGNOSIS — R11.0 NAUSEA: ICD-10-CM

## 2019-01-14 DIAGNOSIS — R10.13 EPIGASTRIC PAIN: ICD-10-CM

## 2019-01-14 DIAGNOSIS — N28.1 BILATERAL RENAL CYSTS: ICD-10-CM

## 2019-01-14 PROCEDURE — 76775 US EXAM ABDO BACK WALL LIM: CPT

## 2019-01-14 PROCEDURE — 76705 ECHO EXAM OF ABDOMEN: CPT

## 2019-01-25 RX ORDER — LEVOTHYROXINE SODIUM 0.05 MG/1
50 TABLET ORAL DAILY
Qty: 90 TABLET | Refills: 1 | Status: SHIPPED | OUTPATIENT
Start: 2019-01-25 | End: 2019-08-16 | Stop reason: SDUPTHER

## 2019-01-29 ENCOUNTER — HOSPITAL ENCOUNTER (OUTPATIENT)
Age: 59
Discharge: HOME | End: 2019-01-31
Payer: COMMERCIAL

## 2019-01-29 PROCEDURE — 87088 URINE BACTERIA CULTURE: CPT

## 2019-01-31 LAB — URINE CULTURE, ROUTINE: NORMAL

## 2019-02-25 ENCOUNTER — OFFICE VISIT (OUTPATIENT)
Dept: FAMILY MEDICINE CLINIC | Age: 59
End: 2019-02-25
Payer: COMMERCIAL

## 2019-02-25 VITALS
OXYGEN SATURATION: 97 % | HEART RATE: 62 BPM | SYSTOLIC BLOOD PRESSURE: 118 MMHG | HEIGHT: 67 IN | TEMPERATURE: 98.4 F | RESPIRATION RATE: 18 BRPM | DIASTOLIC BLOOD PRESSURE: 80 MMHG | BODY MASS INDEX: 24.33 KG/M2 | WEIGHT: 155 LBS

## 2019-02-25 DIAGNOSIS — H66.91 RIGHT OTITIS MEDIA, UNSPECIFIED OTITIS MEDIA TYPE: Primary | ICD-10-CM

## 2019-02-25 DIAGNOSIS — J02.9 ACUTE PHARYNGITIS, UNSPECIFIED ETIOLOGY: ICD-10-CM

## 2019-02-25 LAB — S PYO AG THROAT QL: NORMAL

## 2019-02-25 PROCEDURE — 99213 OFFICE O/P EST LOW 20 MIN: CPT | Performed by: FAMILY MEDICINE

## 2019-02-25 PROCEDURE — 96372 THER/PROPH/DIAG INJ SC/IM: CPT | Performed by: FAMILY MEDICINE

## 2019-02-25 PROCEDURE — 87880 STREP A ASSAY W/OPTIC: CPT | Performed by: FAMILY MEDICINE

## 2019-02-25 RX ORDER — METHYLPREDNISOLONE 4 MG/1
TABLET ORAL
Qty: 1 KIT | Refills: 0 | Status: SHIPPED | OUTPATIENT
Start: 2019-02-25 | End: 2019-03-03

## 2019-02-25 RX ORDER — CEFDINIR 300 MG/1
300 CAPSULE ORAL 2 TIMES DAILY
Qty: 14 CAPSULE | Refills: 0 | Status: SHIPPED | OUTPATIENT
Start: 2019-02-25 | End: 2019-03-04

## 2019-02-25 RX ORDER — DEXAMETHASONE SODIUM PHOSPHATE 4 MG/ML
4 INJECTION, SOLUTION INTRA-ARTICULAR; INTRALESIONAL; INTRAMUSCULAR; INTRAVENOUS; SOFT TISSUE ONCE
Status: COMPLETED | OUTPATIENT
Start: 2019-02-25 | End: 2019-02-25

## 2019-02-25 RX ADMIN — DEXAMETHASONE SODIUM PHOSPHATE 4 MG: 4 INJECTION, SOLUTION INTRA-ARTICULAR; INTRALESIONAL; INTRAMUSCULAR; INTRAVENOUS; SOFT TISSUE at 12:57

## 2019-02-25 ASSESSMENT — PATIENT HEALTH QUESTIONNAIRE - PHQ9
1. LITTLE INTEREST OR PLEASURE IN DOING THINGS: 0
SUM OF ALL RESPONSES TO PHQ QUESTIONS 1-9: 0
SUM OF ALL RESPONSES TO PHQ9 QUESTIONS 1 & 2: 0
2. FEELING DOWN, DEPRESSED OR HOPELESS: 0
SUM OF ALL RESPONSES TO PHQ QUESTIONS 1-9: 0

## 2019-02-28 PROBLEM — H66.91 RIGHT OTITIS MEDIA: Status: ACTIVE | Noted: 2019-02-28

## 2019-02-28 ASSESSMENT — ENCOUNTER SYMPTOMS
VOICE CHANGE: 0
SORE THROAT: 1
TROUBLE SWALLOWING: 0
DIARRHEA: 0
EYE DISCHARGE: 0
FACIAL SWELLING: 0
COUGH: 0
EYE REDNESS: 0
BLOOD IN STOOL: 0
ABDOMINAL PAIN: 0
WHEEZING: 0
COLOR CHANGE: 0
NAUSEA: 1
ALLERGIC/IMMUNOLOGIC NEGATIVE: 1
SINUS PRESSURE: 1
EYE ITCHING: 0
EYES NEGATIVE: 1
RESPIRATORY NEGATIVE: 1
ABDOMINAL DISTENTION: 0
SINUS PAIN: 1
CHOKING: 0
RECTAL PAIN: 0
ANAL BLEEDING: 0
EYE PAIN: 0
PHOTOPHOBIA: 0
VOMITING: 0
CONSTIPATION: 0
RHINORRHEA: 0
CHEST TIGHTNESS: 0
BACK PAIN: 0
SHORTNESS OF BREATH: 0
STRIDOR: 0

## 2019-03-25 ENCOUNTER — OFFICE VISIT (OUTPATIENT)
Dept: FAMILY MEDICINE CLINIC | Age: 59
End: 2019-03-25
Payer: COMMERCIAL

## 2019-03-25 VITALS
SYSTOLIC BLOOD PRESSURE: 120 MMHG | HEART RATE: 89 BPM | RESPIRATION RATE: 18 BRPM | TEMPERATURE: 98.4 F | WEIGHT: 159 LBS | DIASTOLIC BLOOD PRESSURE: 78 MMHG | HEIGHT: 67 IN | BODY MASS INDEX: 24.96 KG/M2 | OXYGEN SATURATION: 96 %

## 2019-03-25 DIAGNOSIS — B96.89 ACUTE BACTERIAL SINUSITIS: ICD-10-CM

## 2019-03-25 DIAGNOSIS — J01.90 ACUTE BACTERIAL SINUSITIS: ICD-10-CM

## 2019-03-25 DIAGNOSIS — K08.89 TOOTH PAIN: ICD-10-CM

## 2019-03-25 DIAGNOSIS — R51.9 FACIAL PAIN: Primary | ICD-10-CM

## 2019-03-25 PROCEDURE — 99213 OFFICE O/P EST LOW 20 MIN: CPT | Performed by: FAMILY MEDICINE

## 2019-03-25 RX ORDER — AMOXICILLIN AND CLAVULANATE POTASSIUM 875; 125 MG/1; MG/1
1 TABLET, FILM COATED ORAL 2 TIMES DAILY
Qty: 14 TABLET | Refills: 0 | Status: SHIPPED | OUTPATIENT
Start: 2019-03-25 | End: 2019-04-01

## 2019-03-27 ASSESSMENT — ENCOUNTER SYMPTOMS
WHEEZING: 0
EYE REDNESS: 0
BACK PAIN: 0
STRIDOR: 0
EYE DISCHARGE: 0
ANAL BLEEDING: 0
SINUS PAIN: 1
NAUSEA: 1
TROUBLE SWALLOWING: 0
SORE THROAT: 0
RECTAL PAIN: 0
ALLERGIC/IMMUNOLOGIC NEGATIVE: 1
BLOOD IN STOOL: 0
CONSTIPATION: 0
CHOKING: 0
SHORTNESS OF BREATH: 0
SINUS PRESSURE: 1
RHINORRHEA: 0
CHEST TIGHTNESS: 0
COLOR CHANGE: 0
EYE PAIN: 0
PHOTOPHOBIA: 0
RESPIRATORY NEGATIVE: 1
VOICE CHANGE: 0
ABDOMINAL PAIN: 0
COUGH: 0
EYES NEGATIVE: 1
FACIAL SWELLING: 1
ABDOMINAL DISTENTION: 0
EYE ITCHING: 0
VOMITING: 0
DIARRHEA: 0

## 2019-04-22 ENCOUNTER — HOSPITAL ENCOUNTER (OUTPATIENT)
Dept: NUCLEAR MEDICINE | Age: 59
Discharge: HOME | End: 2019-04-22
Payer: COMMERCIAL

## 2019-04-22 VITALS — BODY MASS INDEX: 23.96 KG/M2 | WEIGHT: 153 LBS

## 2019-04-22 DIAGNOSIS — R10.84 ABDOMINAL PAIN, GENERALIZED: ICD-10-CM

## 2019-04-22 PROCEDURE — 3430000000 HC RX DIAGNOSTIC RADIOPHARMACEUTICAL: Performed by: RADIOLOGY

## 2019-04-22 PROCEDURE — 78226 HEPATOBILIARY SYSTEM IMAGING: CPT

## 2019-04-22 PROCEDURE — A9537 TC99M MEBROFENIN: HCPCS | Performed by: RADIOLOGY

## 2019-04-22 RX ADMIN — Medication 6 MILLICURIE: at 10:00

## 2019-05-02 ENCOUNTER — HOSPITAL ENCOUNTER (OUTPATIENT)
Age: 59
Discharge: HOME | End: 2019-05-04
Payer: COMMERCIAL

## 2019-05-02 ENCOUNTER — OFFICE VISIT (OUTPATIENT)
Dept: FAMILY MEDICINE CLINIC | Age: 59
End: 2019-05-02
Payer: COMMERCIAL

## 2019-05-02 VITALS
HEIGHT: 67 IN | DIASTOLIC BLOOD PRESSURE: 60 MMHG | SYSTOLIC BLOOD PRESSURE: 118 MMHG | HEART RATE: 76 BPM | BODY MASS INDEX: 25.49 KG/M2 | WEIGHT: 162.4 LBS | RESPIRATION RATE: 18 BRPM | TEMPERATURE: 97.6 F | OXYGEN SATURATION: 97 %

## 2019-05-02 DIAGNOSIS — N30.01 ACUTE CYSTITIS WITH HEMATURIA: Primary | ICD-10-CM

## 2019-05-02 DIAGNOSIS — B96.89 ACUTE BACTERIAL SINUSITIS: ICD-10-CM

## 2019-05-02 DIAGNOSIS — J01.90 ACUTE BACTERIAL SINUSITIS: ICD-10-CM

## 2019-05-02 LAB
BILIRUBIN, POC: NEGATIVE
BLOOD URINE, POC: NORMAL
CLARITY, POC: NORMAL
COLOR, POC: YELLOW
GLUCOSE URINE, POC: NEGATIVE
KETONES, POC: NEGATIVE
LEUKOCYTE EST, POC: NORMAL
NITRITE, POC: POSITIVE
PH, POC: 6
PROTEIN, POC: 30
SPECIFIC GRAVITY, POC: 1.02
UROBILINOGEN, POC: 0.2

## 2019-05-02 PROCEDURE — 87077 CULTURE AEROBIC IDENTIFY: CPT

## 2019-05-02 PROCEDURE — 99213 OFFICE O/P EST LOW 20 MIN: CPT | Performed by: FAMILY MEDICINE

## 2019-05-02 PROCEDURE — 81003 URINALYSIS AUTO W/O SCOPE: CPT | Performed by: FAMILY MEDICINE

## 2019-05-02 PROCEDURE — 87186 SC STD MICRODIL/AGAR DIL: CPT

## 2019-05-02 PROCEDURE — 87088 URINE BACTERIA CULTURE: CPT

## 2019-05-02 RX ORDER — RANITIDINE 300 MG/1
TABLET ORAL
COMMUNITY
Start: 2019-04-10 | End: 2019-12-27 | Stop reason: ALTCHOICE

## 2019-05-02 RX ORDER — SULFAMETHOXAZOLE AND TRIMETHOPRIM 800; 160 MG/1; MG/1
1 TABLET ORAL 2 TIMES DAILY
Qty: 6 TABLET | Refills: 0 | Status: SHIPPED | OUTPATIENT
Start: 2019-05-02 | End: 2019-05-02

## 2019-05-02 RX ORDER — AMOXICILLIN AND CLAVULANATE POTASSIUM 875; 125 MG/1; MG/1
1 TABLET, FILM COATED ORAL 2 TIMES DAILY
Qty: 14 TABLET | Refills: 0 | Status: SHIPPED | OUTPATIENT
Start: 2019-05-02 | End: 2019-05-09

## 2019-05-02 NOTE — PROGRESS NOTES
[facility])  Family Medicine Outpatient        SUBJECTIVE:  CC: had concerns including Urinary Tract Infection (Pt c/o burning with urination, abdominal pain, lower back pain, bladder spasms). HPI:Yahir Abbasi presented to the clinic for a routine visit. Juli Hughes is a 59year old female presenting to the office today reporting lower abdominal pain associated with dysuria and frequency. She denies any fever or chills. In addition the patient reports cough and congestion which has been going on for the past couple of weeks. She has trialed a few otc medications without any relief. Review of Systems  See hpi  Outpatient Medications Marked as Taking for the 5/2/19 encounter (Office Visit) with Negin Garcia MD   Medication Sig Dispense Refill    ranitidine (ZANTAC) 300 MG tablet       amoxicillin-clavulanate (AUGMENTIN) 875-125 MG per tablet Take 1 tablet by mouth 2 times daily for 7 days 14 tablet 0    levothyroxine (SYNTHROID) 50 MCG tablet Take 1 tablet by mouth daily 90 tablet 1    metFORMIN (GLUCOPHAGE) 1000 MG tablet Take 1 tablet by mouth 2 times daily (with meals) 180 tablet 1    allopurinol (ZYLOPRIM) 300 MG tablet Take 1 tablet by mouth daily 90 tablet 1    aspirin 81 MG tablet Take 81 mg by mouth daily         I have reviewed all pertinent PMHx, PSHx, FamHx, SocialHx, medications, and allergies and updated history as appropriate. OBJECTIVE    VS: /60   Pulse 76   Temp 97.6 °F (36.4 °C)   Resp 18   Ht 5' 7.01\" (1.702 m)   Wt 162 lb 6.4 oz (73.7 kg)   LMP  (LMP Unknown)   SpO2 97%   BMI 25.43 kg/m²   Physical Exam   Constitutional: She appears well-developed. No distress. HENT:   Head: Normocephalic and atraumatic. Right Ear: External ear normal.   Left Ear: External ear normal.   Mouth/Throat: Oropharynx is clear and moist. No oropharyngeal exudate. Frontal sinus tenderness   Eyes: Pupils are equal, round, and reactive to light.  EOM are normal.   Neck: Normal range of

## 2019-05-04 LAB
ORGANISM: ABNORMAL
URINE CULTURE, ROUTINE: ABNORMAL
URINE CULTURE, ROUTINE: ABNORMAL

## 2019-05-05 ENCOUNTER — HOSPITAL ENCOUNTER (EMERGENCY)
Age: 59
Discharge: HOME | End: 2019-05-06
Attending: EMERGENCY MEDICINE
Payer: COMMERCIAL

## 2019-05-05 DIAGNOSIS — R10.11 RIGHT UPPER QUADRANT ABDOMINAL PAIN: ICD-10-CM

## 2019-05-05 DIAGNOSIS — R31.9 URINARY TRACT INFECTION WITH HEMATURIA, SITE UNSPECIFIED: ICD-10-CM

## 2019-05-05 DIAGNOSIS — N39.0 URINARY TRACT INFECTION WITH HEMATURIA, SITE UNSPECIFIED: ICD-10-CM

## 2019-05-05 DIAGNOSIS — R11.15 NON-INTRACTABLE CYCLICAL VOMITING WITH NAUSEA: Primary | ICD-10-CM

## 2019-05-05 LAB
BASOPHILS ABSOLUTE: 0.04 E9/L (ref 0–0.2)
BASOPHILS RELATIVE PERCENT: 0.5 % (ref 0–2)
BILIRUBIN URINE: NEGATIVE
BLOOD, URINE: ABNORMAL
CLARITY: ABNORMAL
COLOR: YELLOW
EOSINOPHILS ABSOLUTE: 0.26 E9/L (ref 0.05–0.5)
EOSINOPHILS RELATIVE PERCENT: 2.9 % (ref 0–6)
GLUCOSE URINE: NEGATIVE MG/DL
HCT VFR BLD CALC: 38.2 % (ref 34–48)
HEMOGLOBIN: 12.7 G/DL (ref 11.5–15.5)
IMMATURE GRANULOCYTES #: 0.03 E9/L
IMMATURE GRANULOCYTES %: 0.3 % (ref 0–5)
KETONES, URINE: NEGATIVE MG/DL
LEUKOCYTE ESTERASE, URINE: ABNORMAL
LYMPHOCYTES ABSOLUTE: 2.58 E9/L (ref 1.5–4)
LYMPHOCYTES RELATIVE PERCENT: 29.2 % (ref 20–42)
MCH RBC QN AUTO: 30.2 PG (ref 26–35)
MCHC RBC AUTO-ENTMCNC: 33.2 % (ref 32–34.5)
MCV RBC AUTO: 91 FL (ref 80–99.9)
MONOCYTES ABSOLUTE: 0.68 E9/L (ref 0.1–0.95)
MONOCYTES RELATIVE PERCENT: 7.7 % (ref 2–12)
NEUTROPHILS ABSOLUTE: 5.26 E9/L (ref 1.8–7.3)
NEUTROPHILS RELATIVE PERCENT: 59.4 % (ref 43–80)
NITRITE, URINE: NEGATIVE
PDW BLD-RTO: 11.6 FL (ref 11.5–15)
PH UA: 6 (ref 5–9)
PLATELET # BLD: 339 E9/L (ref 130–450)
PMV BLD AUTO: 9.4 FL (ref 7–12)
PROTEIN UA: NEGATIVE MG/DL
RBC # BLD: 4.2 E12/L (ref 3.5–5.5)
SPECIFIC GRAVITY UA: 1.02 (ref 1–1.03)
UROBILINOGEN, URINE: 0.2 E.U./DL
WBC # BLD: 8.9 E9/L (ref 4.5–11.5)

## 2019-05-05 PROCEDURE — 83690 ASSAY OF LIPASE: CPT

## 2019-05-05 PROCEDURE — 93005 ELECTROCARDIOGRAM TRACING: CPT | Performed by: PHYSICIAN ASSISTANT

## 2019-05-05 PROCEDURE — 6360000002 HC RX W HCPCS: Performed by: PHYSICIAN ASSISTANT

## 2019-05-05 PROCEDURE — 81001 URINALYSIS AUTO W/SCOPE: CPT

## 2019-05-05 PROCEDURE — 85025 COMPLETE CBC W/AUTO DIFF WBC: CPT

## 2019-05-05 PROCEDURE — 2580000003 HC RX 258: Performed by: PHYSICIAN ASSISTANT

## 2019-05-05 PROCEDURE — 84484 ASSAY OF TROPONIN QUANT: CPT

## 2019-05-05 PROCEDURE — 99284 EMERGENCY DEPT VISIT MOD MDM: CPT

## 2019-05-05 PROCEDURE — 83605 ASSAY OF LACTIC ACID: CPT

## 2019-05-05 PROCEDURE — 96375 TX/PRO/DX INJ NEW DRUG ADDON: CPT

## 2019-05-05 PROCEDURE — 96374 THER/PROPH/DIAG INJ IV PUSH: CPT

## 2019-05-05 PROCEDURE — 80053 COMPREHEN METABOLIC PANEL: CPT

## 2019-05-05 PROCEDURE — 36415 COLL VENOUS BLD VENIPUNCTURE: CPT

## 2019-05-05 RX ORDER — 0.9 % SODIUM CHLORIDE 0.9 %
1000 INTRAVENOUS SOLUTION INTRAVENOUS ONCE
Status: COMPLETED | OUTPATIENT
Start: 2019-05-05 | End: 2019-05-06

## 2019-05-05 RX ORDER — ONDANSETRON 2 MG/ML
4 INJECTION INTRAMUSCULAR; INTRAVENOUS ONCE
Status: COMPLETED | OUTPATIENT
Start: 2019-05-05 | End: 2019-05-05

## 2019-05-05 RX ORDER — KETOROLAC TROMETHAMINE 30 MG/ML
30 INJECTION, SOLUTION INTRAMUSCULAR; INTRAVENOUS ONCE
Status: COMPLETED | OUTPATIENT
Start: 2019-05-05 | End: 2019-05-05

## 2019-05-05 RX ADMIN — KETOROLAC TROMETHAMINE 30 MG: 30 INJECTION, SOLUTION INTRAMUSCULAR; INTRAVENOUS at 23:42

## 2019-05-05 RX ADMIN — SODIUM CHLORIDE 1000 ML: 9 INJECTION, SOLUTION INTRAVENOUS at 23:42

## 2019-05-05 RX ADMIN — ONDANSETRON 4 MG: 2 INJECTION INTRAMUSCULAR; INTRAVENOUS at 23:41

## 2019-05-05 ASSESSMENT — PAIN SCALES - GENERAL
PAINLEVEL_OUTOF10: 5
PAINLEVEL_OUTOF10: 6

## 2019-05-05 ASSESSMENT — PAIN DESCRIPTION - ORIENTATION: ORIENTATION: RIGHT

## 2019-05-05 ASSESSMENT — PAIN DESCRIPTION - PAIN TYPE: TYPE: ACUTE PAIN

## 2019-05-05 ASSESSMENT — PAIN DESCRIPTION - LOCATION: LOCATION: ABDOMEN

## 2019-05-06 ENCOUNTER — APPOINTMENT (OUTPATIENT)
Dept: ULTRASOUND IMAGING | Age: 59
End: 2019-05-06
Payer: COMMERCIAL

## 2019-05-06 VITALS
RESPIRATION RATE: 16 BRPM | DIASTOLIC BLOOD PRESSURE: 76 MMHG | HEIGHT: 68 IN | BODY MASS INDEX: 23.04 KG/M2 | SYSTOLIC BLOOD PRESSURE: 127 MMHG | WEIGHT: 152 LBS | TEMPERATURE: 98.5 F | HEART RATE: 78 BPM | OXYGEN SATURATION: 97 %

## 2019-05-06 LAB
ALBUMIN SERPL-MCNC: 4.7 G/DL (ref 3.5–5.2)
ALP BLD-CCNC: 61 U/L (ref 35–104)
ALT SERPL-CCNC: 11 U/L (ref 0–32)
ANION GAP SERPL CALCULATED.3IONS-SCNC: 11 MMOL/L (ref 7–16)
AST SERPL-CCNC: 16 U/L (ref 0–31)
BACTERIA: ABNORMAL /HPF
BILIRUB SERPL-MCNC: <0.2 MG/DL (ref 0–1.2)
BUN BLDV-MCNC: 23 MG/DL (ref 8–23)
CALCIUM SERPL-MCNC: 9.4 MG/DL (ref 8.6–10.2)
CHLORIDE BLD-SCNC: 101 MMOL/L (ref 98–107)
CO2: 24 MMOL/L (ref 22–29)
CREAT SERPL-MCNC: 1.1 MG/DL (ref 0.5–1)
EKG ATRIAL RATE: 67 BPM
EKG P AXIS: 56 DEGREES
EKG P-R INTERVAL: 160 MS
EKG Q-T INTERVAL: 438 MS
EKG QRS DURATION: 86 MS
EKG QTC CALCULATION (BAZETT): 462 MS
EKG R AXIS: 5 DEGREES
EKG T AXIS: 47 DEGREES
EKG VENTRICULAR RATE: 67 BPM
EPITHELIAL CELLS, UA: ABNORMAL /HPF
GFR AFRICAN AMERICAN: >60
GFR NON-AFRICAN AMERICAN: 51 ML/MIN/1.73
GLUCOSE BLD-MCNC: 110 MG/DL (ref 74–99)
LACTIC ACID: 0.7 MMOL/L (ref 0.5–2.2)
LIPASE: 51 U/L (ref 13–60)
POTASSIUM SERPL-SCNC: 4.2 MMOL/L (ref 3.5–5)
RBC UA: ABNORMAL /HPF (ref 0–2)
SODIUM BLD-SCNC: 136 MMOL/L (ref 132–146)
TOTAL PROTEIN: 7.6 G/DL (ref 6.4–8.3)
TROPONIN: <0.01 NG/ML (ref 0–0.03)
WBC UA: ABNORMAL /HPF (ref 0–5)

## 2019-05-06 PROCEDURE — 93010 ELECTROCARDIOGRAM REPORT: CPT | Performed by: INTERNAL MEDICINE

## 2019-05-06 PROCEDURE — 76705 ECHO EXAM OF ABDOMEN: CPT

## 2019-05-06 PROCEDURE — 96376 TX/PRO/DX INJ SAME DRUG ADON: CPT

## 2019-05-06 PROCEDURE — 6360000002 HC RX W HCPCS: Performed by: PHYSICIAN ASSISTANT

## 2019-05-06 PROCEDURE — 6370000000 HC RX 637 (ALT 250 FOR IP): Performed by: PHYSICIAN ASSISTANT

## 2019-05-06 RX ORDER — METOCLOPRAMIDE 10 MG/1
10 TABLET ORAL 4 TIMES DAILY
Qty: 120 TABLET | Refills: 0 | Status: SHIPPED | OUTPATIENT
Start: 2019-05-06 | End: 2019-09-19 | Stop reason: CLARIF

## 2019-05-06 RX ORDER — MORPHINE SULFATE 4 MG/ML
4 INJECTION, SOLUTION INTRAMUSCULAR; INTRAVENOUS ONCE
Status: DISCONTINUED | OUTPATIENT
Start: 2019-05-06 | End: 2019-05-06 | Stop reason: HOSPADM

## 2019-05-06 RX ORDER — HYDROCODONE BITARTRATE AND ACETAMINOPHEN 5; 325 MG/1; MG/1
1 TABLET ORAL EVERY 6 HOURS PRN
Qty: 12 TABLET | Refills: 0 | Status: SHIPPED | OUTPATIENT
Start: 2019-05-06 | End: 2019-05-09

## 2019-05-06 RX ORDER — HYDROMORPHONE HYDROCHLORIDE 1 MG/ML
0.5 INJECTION, SOLUTION INTRAMUSCULAR; INTRAVENOUS; SUBCUTANEOUS ONCE
Status: DISCONTINUED | OUTPATIENT
Start: 2019-05-06 | End: 2019-05-06 | Stop reason: HOSPADM

## 2019-05-06 RX ORDER — ONDANSETRON 2 MG/ML
4 INJECTION INTRAMUSCULAR; INTRAVENOUS ONCE
Status: COMPLETED | OUTPATIENT
Start: 2019-05-06 | End: 2019-05-06

## 2019-05-06 RX ORDER — SUCRALFATE 1 G/1
1 TABLET ORAL 4 TIMES DAILY
Qty: 120 TABLET | Refills: 3 | Status: SHIPPED | OUTPATIENT
Start: 2019-05-06 | End: 2019-09-19 | Stop reason: CLARIF

## 2019-05-06 RX ORDER — SUCRALFATE 1 G/1
1 TABLET ORAL ONCE
Status: COMPLETED | OUTPATIENT
Start: 2019-05-06 | End: 2019-05-06

## 2019-05-06 RX ADMIN — SUCRALFATE 1 G: 1 TABLET ORAL at 01:29

## 2019-05-06 RX ADMIN — ONDANSETRON 4 MG: 2 INJECTION INTRAMUSCULAR; INTRAVENOUS at 00:36

## 2019-05-06 ASSESSMENT — PAIN SCALES - GENERAL: PAINLEVEL_OUTOF10: 3

## 2019-05-06 ASSESSMENT — PAIN DESCRIPTION - PAIN TYPE: TYPE: ACUTE PAIN

## 2019-05-06 NOTE — ED NOTES
Discharge instructions, prescriptions and follow up explained, patient verbalizes understanding      Wei Nugent RN  05/06/19 [id number]

## 2019-05-06 NOTE — ED PROVIDER NOTES
ED Attending  CC: No  HPI:  5/5/19, Time: 11:06 PM         Susannah Anaya is a 59 y.o. female presenting to the ED for  Abdominal pain beginning chronic worse this evening  ago. The complaint has been persistent, moderate in severity, and worsened by eating . ** patient comes in with complaint of upper abdominal pain. She states she's been having chronic pain following with GI, Dr. Hortencia Burgess and [place]  She had attempted outpatient HIDA scan which she states they had to discontinue when she became ill during the procedure. She states this evening she has worsening pain and she's been only able to eat yogurt and  Pudding. She has lost 6 pounds due to the inability to eat. Pain does radiate towards the back is intermittently sharp. She denies any fever. She's had nausea with dry heaves denies any hematemesis no constipation states she's had intermittent small amount of diarrhea. Patient is presently being treated for a UTI, she is presently taking amoxicillin. uti symptoms improving Chest pain palpitations diaphoresis. Review of Systems:   Pertinent positives and negatives are stated within HPI, all other systems reviewed and are negative.          --------------------------------------------- PAST HISTORY ---------------------------------------------  Past Medical History:  has a past medical history of Diabetes mellitus ([facility] [address] Hypertension, and Kidney stones. Past Surgical History:  has a past surgical history that includes Hysterectomy; Rectal prolapse repair; and ECHO Compl W Dop Color Flow (8/15/2013). Social History:  reports that she has never smoked. She has never used smokeless tobacco. She reports that she does not drink alcohol. Family History: family history is not on file. The patients home medications have been reviewed.     Allergies: Flagyl [metronidazole]    -------------------------------------------------- RESULTS -------------------------------------------------  All laboratory and radiology results have been personally reviewed by myself   LABS:  Results for orders placed or performed during the hospital encounter of 05/05/19   CBC Auto Differential   Result Value Ref Range    WBC 8.9 4.5 - 11.5 E9/L    RBC 4.20 3.50 - 5.50 E12/L    Hemoglobin 12.7 11.5 - 15.5 g/dL    Hematocrit 38.2 34.0 - 48.0 %    MCV 91.0 80.0 - 99.9 fL    MCH 30.2 26.0 - 35.0 pg    MCHC 33.2 32.0 - 34.5 %    RDW 11.6 11.5 - 15.0 fL    Platelets 003 412 - 801 E9/L    MPV 9.4 7.0 - 12.0 fL    Neutrophils % 59.4 43.0 - 80.0 %    Immature Granulocytes % 0.3 0.0 - 5.0 %    Lymphocytes % 29.2 20.0 - 42.0 %    Monocytes % 7.7 2.0 - 12.0 %    Eosinophils % 2.9 0.0 - 6.0 %    Basophils % 0.5 0.0 - 2.0 %    Neutrophils # 5.26 1.80 - 7.30 E9/L    Immature Granulocytes # 0.03 E9/L    Lymphocytes # 2.58 1.50 - 4.00 E9/L    Monocytes # 0.68 0.10 - 0.95 E9/L    Eosinophils # 0.26 0.05 - 0.50 E9/L    Basophils # 0.04 0.00 - 0.20 E9/L   Comprehensive Metabolic Panel   Result Value Ref Range    Sodium 136 132 - 146 mmol/L    Potassium 4.2 3.5 - 5.0 mmol/L    Chloride 101 98 - 107 mmol/L    CO2 24 22 - 29 mmol/L    Anion Gap 11 7 - 16 mmol/L    Glucose 110 (H) 74 - 99 mg/dL    BUN 23 8 - 23 mg/dL    CREATININE 1.1 (H) 0.5 - 1.0 mg/dL    GFR Non-African American 51 >=60 mL/min/1.73    GFR African American >60     Calcium 9.4 8.6 - 10.2 mg/dL    Total Protein 7.6 6.4 - 8.3 g/dL    Alb 4.7 3.5 - 5.2 g/dL    Total Bilirubin <0.2 0.0 - 1.2 mg/dL    Alkaline Phosphatase 61 35 - 104 U/L    ALT 11 0 - 32 U/L    AST 16 0 - 31 U/L   Lactic Acid, Plasma   Result Value Ref Range    Lactic Acid 0.7 0.5 - 2.2 mmol/L   Lipase   Result Value Ref Range    Lipase 51 13 - 60 U/L   Urinalysis   Result Value Ref Range    Color, UA Yellow Straw/Yellow    Clarity, UA SLCLOUDY Clear    Glucose, Ur Negative Negative mg/dL    Bilirubin Urine Negative Negative    Ketones, Urine Negative Negative mg/dL    Specific Gravity, UA 1.020 1.005 - 1.030    Blood, Urine TRACE (A) Negative    pH, UA 6.0 5.0 - 9.0    Protein, UA Negative Negative mg/dL    Urobilinogen, Urine 0.2 <2.0 E.U./dL    Nitrite, Urine Negative Negative    Leukocyte Esterase, Urine SMALL (A) Negative   Troponin   Result Value Ref Range    Troponin <0.01 0.00 - 0.03 ng/mL   Microscopic Urinalysis   Result Value Ref Range    WBC, UA 5-10 0 - 5 /HPF    RBC, UA NONE 0 - 2 /HPF    Epi Cells FEW /HPF    Bacteria, UA RARE (A) /HPF   EKG 12 Lead   Result Value Ref Range    Ventricular Rate 67 BPM    Atrial Rate 67 BPM    P-R Interval 160 ms    QRS Duration 86 ms    Q-T Interval 438 ms    QTc Calculation (Bazett) 462 ms    P Axis 56 degrees    R Axis 5 degrees    T Axis 47 degrees       RADIOLOGY:  Interpreted by Radiologist.  US GALLBLADDER RUQ    (Results Pending)       ------------------------- NURSING NOTES AND VITALS REVIEWED ---------------------------   The nursing notes within the ED encounter and vital signs as below have been reviewed. /76   Pulse 78   Temp 98.5 °F (36.9 °C) (Oral)   Resp 16   Ht 5' 8\" (1.727 m)   Wt 152 lb (68.9 kg)   LMP  (LMP Unknown)   SpO2 97%   BMI 23.11 kg/m²   Oxygen Saturation Interpretation: Normal      ---------------------------------------------------PHYSICAL EXAM--------------------------------------      Constitutional/General: Alert and oriented x3, well appearing, non toxic in NAD  Head: Normocephalic and atraumatic  Eyes: PERRL, EOMI  Mouth: Oropharynx clear, handling secretions, no trismus  Neck: Supple, full ROM,   Pulmonary: Lungs clear to auscultation bilaterally, no wheezes, rales, or rhonchi. Not in respiratory distress  Cardiovascular:  Regular rate and rhythm, no murmurs, gallops, or rubs. 2+ distal pulses  Abdomen: Soft, tender right upper quadrant midepigastric mild left upper quadrant tenderness non distended, guarding to right upper quadrant palpitation  Extremities: Moves all extremities x 4.  Warm and well perfused  Skin: warm and dry without rash  Neurologic: GCS 15,  Psych: Normal Affect      ------------------------------ ED COURSE/MEDICAL DECISION MAKING----------------------  Medications   morphine injection 4 mg (4 mg Intravenous Not Given 5/6/19 0036)   HYDROmorphone HCl PF (DILAUDID) injection 0.5 mg (0.5 mg Intravenous Not Given 5/6/19 0129)   0.9 % sodium chloride bolus (0 mLs Intravenous Stopped 5/6/19 0036)   ketorolac (TORADOL) injection 30 mg (30 mg Intravenous Given 5/5/19 2342)   ondansetron (ZOFRAN) injection 4 mg (4 mg Intravenous Given 5/5/19 2341)   ondansetron (ZOFRAN) injection 4 mg (4 mg Intravenous Given 5/6/19 0036)   sucralfate (CARAFATE) tablet 1 g (1 g Oral Given 5/6/19 0129)         ED COURSE:    EKG # 1 Interpreted by emergency department physician unless otherwise noted. Time:  2350  Rate: 67  Rhythm: Sinus. Interpretation: normal sinus rhythm. Ultrasounds there is reported positive sonographic Bolaños's sign. Gallbladder is otherwise unremarkable. There are 2 right renal cyst.  0030 Patient continues with pain and nausea     0105 discussed lab work and ultrasound findings. Patient states she's not having any improvement in her pain. Patient declines CT of the abdomen at this time. She states she is scheduled for outpatient endoscopy on Tuesday. Patient has had previous referral to general surgery as well as nephrology. Medical Decision Making:    Patient comes in with complaint of upper abdominal pain over the last month. She is been worked up outpatient following with GI and has seen Gen. surgery as well as nephrology. She is scheduled outpatient endoscopy on Tuesday with Dr. Sim Handy. Lab works normal ultrasound of the gallbladder findings to account Outpatient HIDA scan was unable to complete the testing. Patient declined CT abdomen at this time. She is placed on Carafate Reglan Norco. Continue with Zofran and Protonix as previously. Patient to continue with Augmentin for UTI. Counseling:    The emergency provider has spoken with the patient and discussed todays results, in addition to providing specific details for the plan of care and counseling regarding the diagnosis and prognosis. Questions are answered at this time and they are agreeable with the plan.      --------------------------------- IMPRESSION AND DISPOSITION ---------------------------------    IMPRESSION  1. Non-intractable cyclical vomiting with nausea    2. Right upper quadrant abdominal pain    3. Urinary tract infection with hematuria, site unspecified        DISPOSITION  Disposition: Discharge to home  Patient condition is good      NOTE: This report was transcribed using voice recognition software. Every effort was made to ensure accuracy; however, inadvertent computerized transcription errors may be present     Stacy Santizoma  05/06/19 0141      ATTENDING PROVIDER ATTESTATION:     Supervising Physician, on-site, available for consultation, non-participatory in the evaluation or care of this patient.          Chantel Torres DO  05/06/19 [id number]

## 2019-05-09 DIAGNOSIS — N30.01 ACUTE CYSTITIS WITH HEMATURIA: Primary | ICD-10-CM

## 2019-05-09 RX ORDER — NITROFURANTOIN 25; 75 MG/1; MG/1
100 CAPSULE ORAL 2 TIMES DAILY
Qty: 10 CAPSULE | Refills: 0 | Status: SHIPPED | OUTPATIENT
Start: 2019-05-09 | End: 2019-05-14

## 2019-05-30 ENCOUNTER — TELEPHONE (OUTPATIENT)
Dept: SURGERY | Age: 59
End: 2019-05-30

## 2019-05-30 NOTE — TELEPHONE ENCOUNTER
VM left with call back information to scheduled patient for consultation with Dr. Bassam Zhu. Referral from Dr. Valdovinos Seen for RUQ pain. Will follow up.     Electronically signed by Kacie Costa RN on 5/30/2019 at 10:14 AM

## 2019-06-10 ENCOUNTER — OFFICE VISIT (OUTPATIENT)
Dept: SURGERY | Age: 59
End: 2019-06-10
Payer: COMMERCIAL

## 2019-06-10 ENCOUNTER — TELEPHONE (OUTPATIENT)
Dept: SURGERY | Age: 59
End: 2019-06-10

## 2019-06-10 VITALS
WEIGHT: 152 LBS | SYSTOLIC BLOOD PRESSURE: 120 MMHG | TEMPERATURE: 97.5 F | HEART RATE: 67 BPM | HEIGHT: 68 IN | BODY MASS INDEX: 23.04 KG/M2 | DIASTOLIC BLOOD PRESSURE: 77 MMHG

## 2019-06-10 DIAGNOSIS — K82.8 BILIARY DYSKINESIA: Primary | ICD-10-CM

## 2019-06-10 PROCEDURE — 99214 OFFICE O/P EST MOD 30 MIN: CPT | Performed by: SURGERY

## 2019-06-10 NOTE — PROGRESS NOTES
General Surgery History and Physical  Mervin Angelo MD    Patient's Name/Date of Birth: Temitope Morales / 1960    Date: Beronica 10, 2019     Surgeon: Suri Lopez M.D., M.S.    PCP: Glory Cooks, DO     Chief Complaint: Right upper quadrant pain    HPI:   Temitope Morales is a 62 y.o. female who presents for evaluation of right upper quadrant pain, symptomatic gallstones. Timing is intermittent, radiation to back, alleviated by npo and started several weeks ago. Denies SOB, chest pain, fever, chills, diarrhea, constipation. Had EGD and cscope with hiatal hernia, and polyps. RUQ US was unremarkable. HIDA had pain with CCK injection and aborted the remainder of the procedure. Still with RUQ pain now. Past Medical History:   Diagnosis Date    Diabetes mellitus ([address]     Hypertension     Kidney stones        Past Surgical History:   Procedure Laterality Date    ECHO COMPL W DOP COLOR FLOW  8/15/2013         HYSTERECTOMY      RECTAL PROLAPSE REPAIR         Current Outpatient Medications   Medication Sig Dispense Refill    sucralfate (CARAFATE) 1 GM tablet Take 1 tablet by mouth 4 times daily 120 tablet 3    metoclopramide (REGLAN) 10 MG tablet Take 1 tablet by mouth 4 times daily 120 tablet 0    ranitidine (ZANTAC) 300 MG tablet       levothyroxine (SYNTHROID) 50 MCG tablet Take 1 tablet by mouth daily 90 tablet 1    pantoprazole (PROTONIX) 40 MG tablet TAKE ONE TABLET BY MOUTH EVERY DAY  2    metFORMIN (GLUCOPHAGE) 1000 MG tablet Take 1 tablet by mouth 2 times daily (with meals) 180 tablet 1    allopurinol (ZYLOPRIM) 300 MG tablet Take 1 tablet by mouth daily 90 tablet 1    aspirin 81 MG tablet Take 81 mg by mouth daily       No current facility-administered medications for this visit.         Allergies   Allergen Reactions    Flagyl [Metronidazole] Rash       The patient has a family history that is negative for severe cardiovascular or respiratory issues, negative for reaction to anesthesia. Social History     Socioeconomic History    Marital status:      Spouse name: Not on file    Number of children: Not on file    Years of education: Not on file    Highest education level: Not on file   Occupational History    Not on file   Social Needs    Financial resource strain: Not on file    Food insecurity:     Worry: Not on file     Inability: Not on file    Transportation needs:     Medical: Not on file     Non-medical: Not on file   Tobacco Use    Smoking status: Never Smoker    Smokeless tobacco: Never Used   Substance and Sexual Activity    Alcohol use: No    Drug use: Not on file    Sexual activity: Not on file   Lifestyle    Physical activity:     Days per week: Not on file     Minutes per session: Not on file    Stress: Not on file   Relationships    Social connections:     Talks on phone: Not on file     Gets together: Not on file     Attends [employer] service: Not on file     Active member of club or organization: Not on file     Attends meetings of clubs or organizations: Not on file     Relationship status: Not on file    Intimate partner violence:     Fear of current or ex partner: Not on file     Emotionally abused: Not on file     Physically abused: Not on file     Forced sexual activity: Not on file   Other Topics Concern    Not on file   Social History Narrative    Not on file         Review of Systems    A complete 10 system review was performed and are otherwise negative unless mentioned in the above HPI. Specific negatives are listed below but may not include all those reviewed.     General ROS: negative obtundation, AMS  ENT ROS: negative rhinorrhea, epistaxis  Allergy and Immunology ROS: negative itchy/watery eyes or nasal congestion  Hematological and Lymphatic ROS: negative spontaneous bleeding or bruising  Endocrine ROS: negative  lethargy, mood swings, palpitations or polydipsia/polyuria  Respiratory ROS: negative sputum changes, stridor, tachypnea or wheezing  Cardiovascular ROS: negative for - loss of consciousness, murmur or orthopnea  Gastrointestinal ROS: negative for - hematochezia or hematemesis  Genito-Urinary ROS: negative for -  genital discharge or hematuria  Musculoskeletal ROS: negative for - focal weakness, gangrene  Psych/Neuro ROS: negative for - visual or auditory hallucinations, suicidal ideation    Physical exam:   /77 (Site: Right Upper Arm, Position: Sitting, Cuff Size: Large Adult)   Pulse 67   Temp 97.5 °F (36.4 °C) (Oral)   Ht 5' 8\" (1.727 m)   Wt 152 lb (68.9 kg)   LMP  (LMP Unknown)   BMI 23.11 kg/m²   General appearance:  NAD, appears stated age  Head: NCAT, PERRLA, EOMI, red conjunctiva  Neck: supple, no masses, trachea midline  Lungs: Equal chest rise bilateral, no retractions, no wheezing  Heart: Reg rate  Abdomen: soft, tender RUQ  Skin; warm and dry, no cyanosis  Gu: no cva tenderness  Extremities: atraumatic, no focal motor deficits, no open wounds  Psych: No tremor, visual hallucinations        Radiology: I reviewed relevant abdominal imaging from this admission and that available in the EMR including RUQ US and HIDA. HIDA with pain with CCK injection      Assessment:  62 y.o. female with biliary dyskinesia, hiatal hernia    Plan: To OR for laparoscopic possible robotic cholecystectomy with intraoperative cholangiogram  Discussed risks of injury to liver, common bile duct, hepatic duct, surrounding vascular structures, small bowel, stomach. Risk for further surgery to correct complications.   Plan for laparoscopic, possible open cholecystectomy with possible intraoperative cholangiogram. Patient agrees and all questions answered to their and family's satisfaction      Huyen Gutierrez MD  10:57 AM  6/10/2019

## 2019-06-10 NOTE — TELEPHONE ENCOUNTER
Per the order of Dr. Dayton Pimentel, patient has been scheduled for Lap john on 6.20.19. Patient provided with surgery information during office visit and scheduled for post op follow up appointment on 7. 1. 19. Patient instructed to please contact our office with any questions. Surgery scheduling form faxed to [address] [place] [address] surgery scheduling and fax confirmation received. Dr. Dayton Pimentel to enter orders. Chart forwarded to Rn to check if pre cert is required .

## 2019-06-11 NOTE — TELEPHONE ENCOUNTER
Per Jeannette Castro rep, no prior authorization is required for scheduled outpatient procedure - cpt 65392. Call ref# [id number].      Electronically signed by Wm Arora RN on 6/11/2019 at 8:57 AM

## 2019-06-19 NOTE — PROGRESS NOTES
[address] [facility]                                                                                                                    PRE OP INSTRUCTIONS FOR  Janice Harrington        Date: 6/19/2019    Date of surgery: 6/20/2019  Arrival Time: Hospital will call you between 5pm and 7pm with your final arrival time for surgery    1. Do not eat or drink anything after midnight prior to surgery. This includes no water, chewing gum, mints or ice chips. 2. Take the following medications with a small sip of water on the morning of Surgery: levothyroxine, protonix, carafate, reglan     3. Diabetics may take evening dose of insulin but none after midnight. If you feel symptomatic or low blood sugar morning of surgery drink 1-2 ounces of apple juice only. 4. Aspirin, Ibuprofen, Advil, Naproxen, Vitamin E and other Anti-inflammatory products should be stopped  before surgery  as directed by your physician. Take Tylenol only unless instructed otherwise by your surgeon. 5. Check with your Doctor regarding stopping Plavix, Coumadin, Lovenox, Eliquis, Effient, or other blood thinners. 6. Do not smoke,use illicit drugs and do not drink any alcoholic beverages 24 hours prior to surgery. 7. You may brush your teeth the morning of surgery. DO NOT SWALLOW WATER    8. You MUST make arrangements for a responsible adult to take you home after your surgery. You will not be allowed to leave alone or drive yourself home. It is strongly suggested someone stay with you the first 24 hrs. Your surgery will be cancelled if you do not have a ride home. 9. PEDIATRIC PATIENTS ONLY:  A parent/legal guardian must accompany a child scheduled for surgery and plan to stay at the hospital until the child is discharged. Please do not bring other children with you.     10. Please wear simple, loose fitting clothing to the hospital.  Do not bring valuables (money, credit cards, checkbooks, etc.) Do not wear any makeup (including no eye makeup) or nail polish on your fingers or toes. 11. DO NOT wear any jewelry or piercings on day of surgery. All body piercing jewelry must be removed. 12. Shower the night before surgery with ___xAntibacterial soap. 13. TOTAL JOINT REPLACEMENT/HYSTERECTOMY PATIENTS ONLY---Remember to bring Blood Bank bracelet to the hospital on the day of surgery. 14. If you have a Living Will and Durable Power of  for Healthcare, please bring in a copy. 15. If appropriate bring crutches, inspirex, WALKER, CANE etc... 12. Notify your Surgeon if you develop any illness between now and surgery time, cough, cold, fever, sore throat, nausea, vomiting, etc.  Please notify your surgeon if you experience dizziness, shortness of breath or blurred vision between now & the time of your surgery. 17. If you have ___dentures, they will be removed before going to the OR; we will provide you a container. If you wear ___contact lenses or ___glasses, they will be removed; please bring a case for them. 18. To provide excellent care visitors will be limited to 2 in the room at any given time. 19. Please bring picture ID and insurance card. 20. Sleep apnea patients need to bring CPAP AND SETTINGS to hospital on day of surgery. 21. During flu season no children under the age of 15 are permitted in the hospital for the safety of all patients. 22. Other check in at main lobby/information desk. Please call AMBULATORY CARE if you have any further questions.    [address] [facility]     [address]

## 2019-06-20 ENCOUNTER — HOSPITAL ENCOUNTER (OUTPATIENT)
Dept: GENERAL RADIOLOGY | Age: 59
Discharge: HOME | End: 2019-06-22
Attending: SURGERY
Payer: COMMERCIAL

## 2019-06-20 ENCOUNTER — ANESTHESIA EVENT (OUTPATIENT)
Dept: OPERATING ROOM | Age: 59
End: 2019-06-20
Payer: COMMERCIAL

## 2019-06-20 ENCOUNTER — ANESTHESIA (OUTPATIENT)
Dept: OPERATING ROOM | Age: 59
End: 2019-06-20
Payer: COMMERCIAL

## 2019-06-20 ENCOUNTER — HOSPITAL ENCOUNTER (OUTPATIENT)
Age: 59
Setting detail: OUTPATIENT SURGERY
Discharge: HOME | End: 2019-06-20
Attending: SURGERY | Admitting: SURGERY
Payer: COMMERCIAL

## 2019-06-20 VITALS
BODY MASS INDEX: 22.43 KG/M2 | RESPIRATION RATE: 16 BRPM | DIASTOLIC BLOOD PRESSURE: 67 MMHG | OXYGEN SATURATION: 98 % | SYSTOLIC BLOOD PRESSURE: 138 MMHG | WEIGHT: 148 LBS | HEIGHT: 68 IN | TEMPERATURE: 97.6 F | HEART RATE: 64 BPM

## 2019-06-20 VITALS
RESPIRATION RATE: 8 BRPM | DIASTOLIC BLOOD PRESSURE: 70 MMHG | OXYGEN SATURATION: 99 % | SYSTOLIC BLOOD PRESSURE: 107 MMHG

## 2019-06-20 DIAGNOSIS — G89.18 POSTOPERATIVE PAIN: Primary | ICD-10-CM

## 2019-06-20 DIAGNOSIS — K82.9 GALLBLADDER DISEASE: ICD-10-CM

## 2019-06-20 LAB
ANION GAP SERPL CALCULATED.3IONS-SCNC: 11 MMOL/L (ref 7–16)
BUN BLDV-MCNC: 15 MG/DL (ref 8–23)
CALCIUM SERPL-MCNC: 9.5 MG/DL (ref 8.6–10.2)
CHLORIDE BLD-SCNC: 103 MMOL/L (ref 98–107)
CO2: 27 MMOL/L (ref 22–29)
CREAT SERPL-MCNC: 0.8 MG/DL (ref 0.5–1)
GFR AFRICAN AMERICAN: >60
GFR NON-AFRICAN AMERICAN: >60 ML/MIN/1.73
GLUCOSE BLD-MCNC: 123 MG/DL (ref 74–99)
HCT VFR BLD CALC: 41.6 % (ref 34–48)
HEMOGLOBIN: 14 G/DL (ref 11.5–15.5)
MCH RBC QN AUTO: 30.7 PG (ref 26–35)
MCHC RBC AUTO-ENTMCNC: 33.7 % (ref 32–34.5)
MCV RBC AUTO: 91.2 FL (ref 80–99.9)
PDW BLD-RTO: 11.4 FL (ref 11.5–15)
PLATELET # BLD: 350 E9/L (ref 130–450)
PMV BLD AUTO: 9.2 FL (ref 7–12)
POTASSIUM REFLEX MAGNESIUM: 4.5 MMOL/L (ref 3.5–5)
RBC # BLD: 4.56 E12/L (ref 3.5–5.5)
SODIUM BLD-SCNC: 141 MMOL/L (ref 132–146)
WBC # BLD: 6.4 E9/L (ref 4.5–11.5)

## 2019-06-20 PROCEDURE — 85027 COMPLETE CBC AUTOMATED: CPT

## 2019-06-20 PROCEDURE — 47562 LAPAROSCOPIC CHOLECYSTECTOMY: CPT | Performed by: SURGERY

## 2019-06-20 PROCEDURE — 2500000003 HC RX 250 WO HCPCS: Performed by: NURSE ANESTHETIST, CERTIFIED REGISTERED

## 2019-06-20 PROCEDURE — 2580000003 HC RX 258: Performed by: NURSE ANESTHETIST, CERTIFIED REGISTERED

## 2019-06-20 PROCEDURE — 3700000001 HC ADD 15 MINUTES (ANESTHESIA): Performed by: SURGERY

## 2019-06-20 PROCEDURE — 7100000010 HC PHASE II RECOVERY - FIRST 15 MIN: Performed by: SURGERY

## 2019-06-20 PROCEDURE — 6360000002 HC RX W HCPCS: Performed by: SURGERY

## 2019-06-20 PROCEDURE — 6360000002 HC RX W HCPCS

## 2019-06-20 PROCEDURE — C1894 INTRO/SHEATH, NON-LASER: HCPCS | Performed by: SURGERY

## 2019-06-20 PROCEDURE — 3600000014 HC SURGERY LEVEL 4 ADDTL 15MIN: Performed by: SURGERY

## 2019-06-20 PROCEDURE — 36415 COLL VENOUS BLD VENIPUNCTURE: CPT

## 2019-06-20 PROCEDURE — 3700000000 HC ANESTHESIA ATTENDED CARE: Performed by: SURGERY

## 2019-06-20 PROCEDURE — 2580000003 HC RX 258: Performed by: SURGERY

## 2019-06-20 PROCEDURE — 7100000011 HC PHASE II RECOVERY - ADDTL 15 MIN: Performed by: SURGERY

## 2019-06-20 PROCEDURE — 88304 TISSUE EXAM BY PATHOLOGIST: CPT

## 2019-06-20 PROCEDURE — 2709999900 HC NON-CHARGEABLE SUPPLY: Performed by: SURGERY

## 2019-06-20 PROCEDURE — 2500000003 HC RX 250 WO HCPCS: Performed by: SURGERY

## 2019-06-20 PROCEDURE — 7100000000 HC PACU RECOVERY - FIRST 15 MIN: Performed by: SURGERY

## 2019-06-20 PROCEDURE — 6360000002 HC RX W HCPCS: Performed by: NURSE ANESTHETIST, CERTIFIED REGISTERED

## 2019-06-20 PROCEDURE — 7100000001 HC PACU RECOVERY - ADDTL 15 MIN: Performed by: SURGERY

## 2019-06-20 PROCEDURE — 3600000004 HC SURGERY LEVEL 4 BASE: Performed by: SURGERY

## 2019-06-20 PROCEDURE — 80048 BASIC METABOLIC PNL TOTAL CA: CPT

## 2019-06-20 RX ORDER — DEXAMETHASONE SODIUM PHOSPHATE 10 MG/ML
INJECTION, SOLUTION INTRAMUSCULAR; INTRAVENOUS PRN
Status: DISCONTINUED | OUTPATIENT
Start: 2019-06-20 | End: 2019-06-20 | Stop reason: SDUPTHER

## 2019-06-20 RX ORDER — ONDANSETRON 4 MG/1
4 TABLET, ORALLY DISINTEGRATING ORAL EVERY 8 HOURS PRN
Qty: 20 TABLET | Refills: 1 | Status: SHIPPED | OUTPATIENT
Start: 2019-06-20 | End: 2019-09-19

## 2019-06-20 RX ORDER — MIDAZOLAM HYDROCHLORIDE 1 MG/ML
INJECTION INTRAMUSCULAR; INTRAVENOUS PRN
Status: DISCONTINUED | OUTPATIENT
Start: 2019-06-20 | End: 2019-06-20 | Stop reason: SDUPTHER

## 2019-06-20 RX ORDER — SODIUM CHLORIDE 0.9 % (FLUSH) 0.9 %
10 SYRINGE (ML) INJECTION PRN
Status: DISCONTINUED | OUTPATIENT
Start: 2019-06-20 | End: 2019-06-20 | Stop reason: HOSPADM

## 2019-06-20 RX ORDER — SODIUM CHLORIDE 9 MG/ML
INJECTION, SOLUTION INTRAVENOUS CONTINUOUS PRN
Status: DISCONTINUED | OUTPATIENT
Start: 2019-06-20 | End: 2019-06-20 | Stop reason: SDUPTHER

## 2019-06-20 RX ORDER — DOCUSATE SODIUM 100 MG/1
100 CAPSULE, LIQUID FILLED ORAL 2 TIMES DAILY
Qty: 30 CAPSULE | Refills: 0 | Status: SHIPPED | OUTPATIENT
Start: 2019-06-20 | End: 2019-07-05

## 2019-06-20 RX ORDER — ROCURONIUM BROMIDE 10 MG/ML
INJECTION, SOLUTION INTRAVENOUS PRN
Status: DISCONTINUED | OUTPATIENT
Start: 2019-06-20 | End: 2019-06-20 | Stop reason: SDUPTHER

## 2019-06-20 RX ORDER — HYDROMORPHONE HYDROCHLORIDE 1 MG/ML
0.5 INJECTION, SOLUTION INTRAMUSCULAR; INTRAVENOUS; SUBCUTANEOUS EVERY 5 MIN PRN
Status: DISCONTINUED | OUTPATIENT
Start: 2019-06-20 | End: 2019-06-20 | Stop reason: HOSPADM

## 2019-06-20 RX ORDER — KETOROLAC TROMETHAMINE 30 MG/ML
INJECTION, SOLUTION INTRAMUSCULAR; INTRAVENOUS PRN
Status: DISCONTINUED | OUTPATIENT
Start: 2019-06-20 | End: 2019-06-20 | Stop reason: SDUPTHER

## 2019-06-20 RX ORDER — PROMETHAZINE HYDROCHLORIDE 25 MG/ML
25 INJECTION, SOLUTION INTRAMUSCULAR; INTRAVENOUS PRN
Status: DISCONTINUED | OUTPATIENT
Start: 2019-06-20 | End: 2019-06-20 | Stop reason: HOSPADM

## 2019-06-20 RX ORDER — HYDROMORPHONE HYDROCHLORIDE 1 MG/ML
0.25 INJECTION, SOLUTION INTRAMUSCULAR; INTRAVENOUS; SUBCUTANEOUS EVERY 5 MIN PRN
Status: DISCONTINUED | OUTPATIENT
Start: 2019-06-20 | End: 2019-06-20 | Stop reason: HOSPADM

## 2019-06-20 RX ORDER — SODIUM CHLORIDE 0.9 % (FLUSH) 0.9 %
10 SYRINGE (ML) INJECTION EVERY 12 HOURS SCHEDULED
Status: DISCONTINUED | OUTPATIENT
Start: 2019-06-20 | End: 2019-06-20 | Stop reason: HOSPADM

## 2019-06-20 RX ORDER — ONDANSETRON 2 MG/ML
INJECTION INTRAMUSCULAR; INTRAVENOUS PRN
Status: DISCONTINUED | OUTPATIENT
Start: 2019-06-20 | End: 2019-06-20 | Stop reason: SDUPTHER

## 2019-06-20 RX ORDER — NEOSTIGMINE METHYLSULFATE 1 MG/ML
INJECTION, SOLUTION INTRAVENOUS PRN
Status: DISCONTINUED | OUTPATIENT
Start: 2019-06-20 | End: 2019-06-20 | Stop reason: SDUPTHER

## 2019-06-20 RX ORDER — MEPERIDINE HYDROCHLORIDE 25 MG/ML
INJECTION INTRAMUSCULAR; INTRAVENOUS; SUBCUTANEOUS
Status: COMPLETED
Start: 2019-06-20 | End: 2019-06-20

## 2019-06-20 RX ORDER — IBUPROFEN 800 MG/1
800 TABLET ORAL EVERY 6 HOURS PRN
Qty: 90 TABLET | Refills: 1 | Status: SHIPPED | OUTPATIENT
Start: 2019-06-20 | End: 2019-09-19

## 2019-06-20 RX ORDER — PROPOFOL 10 MG/ML
INJECTION, EMULSION INTRAVENOUS PRN
Status: DISCONTINUED | OUTPATIENT
Start: 2019-06-20 | End: 2019-06-20 | Stop reason: SDUPTHER

## 2019-06-20 RX ORDER — LABETALOL HYDROCHLORIDE 5 MG/ML
5 INJECTION, SOLUTION INTRAVENOUS EVERY 10 MIN PRN
Status: DISCONTINUED | OUTPATIENT
Start: 2019-06-20 | End: 2019-06-20 | Stop reason: HOSPADM

## 2019-06-20 RX ORDER — OXYCODONE HYDROCHLORIDE AND ACETAMINOPHEN 5; 325 MG/1; MG/1
1 TABLET ORAL EVERY 6 HOURS PRN
Qty: 20 TABLET | Refills: 0 | Status: SHIPPED | OUTPATIENT
Start: 2019-06-20 | End: 2019-06-25

## 2019-06-20 RX ORDER — BUPIVACAINE HYDROCHLORIDE AND EPINEPHRINE 2.5; 5 MG/ML; UG/ML
INJECTION, SOLUTION EPIDURAL; INFILTRATION; INTRACAUDAL; PERINEURAL PRN
Status: DISCONTINUED | OUTPATIENT
Start: 2019-06-20 | End: 2019-06-20 | Stop reason: ALTCHOICE

## 2019-06-20 RX ORDER — FENTANYL CITRATE 50 UG/ML
INJECTION, SOLUTION INTRAMUSCULAR; INTRAVENOUS PRN
Status: DISCONTINUED | OUTPATIENT
Start: 2019-06-20 | End: 2019-06-20 | Stop reason: SDUPTHER

## 2019-06-20 RX ORDER — SODIUM CHLORIDE 9 MG/ML
INJECTION, SOLUTION INTRAVENOUS CONTINUOUS
Status: DISCONTINUED | OUTPATIENT
Start: 2019-06-20 | End: 2019-06-20 | Stop reason: HOSPADM

## 2019-06-20 RX ORDER — MEPERIDINE HYDROCHLORIDE 25 MG/ML
12.5 INJECTION INTRAMUSCULAR; INTRAVENOUS; SUBCUTANEOUS EVERY 5 MIN PRN
Status: DISCONTINUED | OUTPATIENT
Start: 2019-06-20 | End: 2019-06-20 | Stop reason: HOSPADM

## 2019-06-20 RX ORDER — LIDOCAINE HYDROCHLORIDE 20 MG/ML
INJECTION, SOLUTION INTRAVENOUS PRN
Status: DISCONTINUED | OUTPATIENT
Start: 2019-06-20 | End: 2019-06-20 | Stop reason: SDUPTHER

## 2019-06-20 RX ORDER — GLYCOPYRROLATE 1 MG/5 ML
SYRINGE (ML) INTRAVENOUS PRN
Status: DISCONTINUED | OUTPATIENT
Start: 2019-06-20 | End: 2019-06-20 | Stop reason: SDUPTHER

## 2019-06-20 RX ADMIN — ONDANSETRON HYDROCHLORIDE 4 MG: 2 INJECTION, SOLUTION INTRAMUSCULAR; INTRAVENOUS at 13:13

## 2019-06-20 RX ADMIN — Medication 0.2 MG: at 13:20

## 2019-06-20 RX ADMIN — FENTANYL CITRATE 50 MCG: 50 INJECTION, SOLUTION INTRAMUSCULAR; INTRAVENOUS at 12:47

## 2019-06-20 RX ADMIN — MIDAZOLAM 2 MG: 1 INJECTION INTRAMUSCULAR; INTRAVENOUS at 12:45

## 2019-06-20 RX ADMIN — LIDOCAINE HYDROCHLORIDE 100 MG: 20 INJECTION, SOLUTION INTRAVENOUS at 12:47

## 2019-06-20 RX ADMIN — Medication 2 G: at 12:56

## 2019-06-20 RX ADMIN — Medication 3 MG: at 13:20

## 2019-06-20 RX ADMIN — MEPERIDINE HYDROCHLORIDE 12.5 MG: 25 INJECTION INTRAMUSCULAR; INTRAVENOUS; SUBCUTANEOUS at 13:53

## 2019-06-20 RX ADMIN — SODIUM CHLORIDE: 9 INJECTION, SOLUTION INTRAVENOUS at 13:20

## 2019-06-20 RX ADMIN — PROPOFOL 180 MG: 10 INJECTION, EMULSION INTRAVENOUS at 12:47

## 2019-06-20 RX ADMIN — SODIUM CHLORIDE: 9 INJECTION, SOLUTION INTRAVENOUS at 12:45

## 2019-06-20 RX ADMIN — SODIUM CHLORIDE: 9 INJECTION, SOLUTION INTRAVENOUS at 11:02

## 2019-06-20 RX ADMIN — DEXAMETHASONE SODIUM PHOSPHATE 10 MG: 10 INJECTION, SOLUTION INTRAMUSCULAR; INTRAVENOUS at 12:59

## 2019-06-20 RX ADMIN — Medication 40 MG: at 12:47

## 2019-06-20 RX ADMIN — KETOROLAC TROMETHAMINE 30 MG: 30 INJECTION, SOLUTION INTRAMUSCULAR; INTRAVENOUS at 13:19

## 2019-06-20 RX ADMIN — FENTANYL CITRATE 50 MCG: 50 INJECTION, SOLUTION INTRAMUSCULAR; INTRAVENOUS at 13:12

## 2019-06-20 ASSESSMENT — PULMONARY FUNCTION TESTS
PIF_VALUE: 15
PIF_VALUE: 14
PIF_VALUE: 15
PIF_VALUE: 14
PIF_VALUE: 1
PIF_VALUE: 14
PIF_VALUE: 12
PIF_VALUE: 18
PIF_VALUE: 14
PIF_VALUE: 15
PIF_VALUE: 12
PIF_VALUE: 20
PIF_VALUE: 20
PIF_VALUE: 14
PIF_VALUE: 13
PIF_VALUE: 4
PIF_VALUE: 18
PIF_VALUE: 14
PIF_VALUE: 17
PIF_VALUE: 13
PIF_VALUE: 14
PIF_VALUE: 19
PIF_VALUE: 14
PIF_VALUE: 22
PIF_VALUE: 13
PIF_VALUE: 15
PIF_VALUE: 14
PIF_VALUE: 14
PIF_VALUE: 12
PIF_VALUE: 14
PIF_VALUE: 18
PIF_VALUE: 13
PIF_VALUE: 13
PIF_VALUE: 21
PIF_VALUE: 15
PIF_VALUE: 18
PIF_VALUE: 14
PIF_VALUE: 12
PIF_VALUE: 17
PIF_VALUE: 14
PIF_VALUE: 14
PIF_VALUE: 15
PIF_VALUE: 17
PIF_VALUE: 25
PIF_VALUE: 24
PIF_VALUE: 14
PIF_VALUE: 0

## 2019-06-20 ASSESSMENT — PAIN DESCRIPTION - PAIN TYPE
TYPE: SURGICAL PAIN

## 2019-06-20 ASSESSMENT — PAIN DESCRIPTION - DESCRIPTORS
DESCRIPTORS: BURNING
DESCRIPTORS: BURNING;DISCOMFORT
DESCRIPTORS: BURNING;THROBBING
DESCRIPTORS: DISCOMFORT

## 2019-06-20 ASSESSMENT — PAIN DESCRIPTION - LOCATION
LOCATION: ABDOMEN

## 2019-06-20 ASSESSMENT — PAIN SCALES - GENERAL
PAINLEVEL_OUTOF10: 6
PAINLEVEL_OUTOF10: 2
PAINLEVEL_OUTOF10: 4
PAINLEVEL_OUTOF10: 3
PAINLEVEL_OUTOF10: 0

## 2019-06-20 ASSESSMENT — PAIN - FUNCTIONAL ASSESSMENT
PAIN_FUNCTIONAL_ASSESSMENT: 0-10
PAIN_FUNCTIONAL_ASSESSMENT: PREVENTS OR INTERFERES SOME ACTIVE ACTIVITIES AND ADLS

## 2019-06-20 ASSESSMENT — PAIN DESCRIPTION - ONSET: ONSET: ON-GOING

## 2019-06-20 ASSESSMENT — PAIN DESCRIPTION - FREQUENCY
FREQUENCY: CONTINUOUS
FREQUENCY: CONTINUOUS

## 2019-06-20 ASSESSMENT — PAIN DESCRIPTION - PROGRESSION
CLINICAL_PROGRESSION: GRADUALLY IMPROVING
CLINICAL_PROGRESSION: NOT CHANGED
CLINICAL_PROGRESSION: NOT CHANGED

## 2019-06-20 ASSESSMENT — PAIN DESCRIPTION - ORIENTATION
ORIENTATION: MID
ORIENTATION: MID

## 2019-06-20 NOTE — OP NOTE
[facility] [address] [facility]  Operative Note      DATE OF PROCEDURE: 6/20/2019    SURGEON: Breezy Watson MD    ASSISTANT: None    PREOPERATIVE DIAGNOSIS: Biliary dyskinesia    POSTOPERATIVE DIAGNOSIS: Biliary dyskinesia    OPERATION: Laparoscopic cholecystectomy   ANESTHESIA: General endotracheal.    ESTIMATED BLOOD LOSS: Less than 10 mL. COMPLICATIONS: None. FLUIDS: Crystalloid. SPECIMEN: Gallbladder. DESCRIPTION OF PROCEDURE: This is a 62 y.o. female increasingly symptomatic right upper quadrant epigastric pain. After being explained the risks, benefits, and alternatives of the procedure, the patient agreed to proceed. The patient was taken to the operating room and placed supine on the operating room table, administered general anesthesia and intubated. Once the airway was secured and the patient was adequately sedated a time-out was performed to confirm the surgical site and the patient's name. We initially made a 5-mm incision superior to the umbilicus, inserted a Veress needle, confirmed needle placement with a saline drip test, and insufflated to 15 mmHg. We then removed the Veress needle and inserted a 5-mm trocar and inserted a camera to follow, and inspected the abdomen. Upon inspection of the abdomen, there was some inflammation around the right upper quadrant near the gallbladder. A 12-mm trocar was inserted subxiphoid just right and lateral to the falciform ligament and two more 5-mm trocars in the right upper quadrant. Atraumatic graspers were used grasp the gallbladder and retract cephalad. We then again retracted the gallbladder cephalad and exposed the infundibulum identifying the infundibulum and we dissected the peritoneal off the infundibulum identifying the cystic duct. The cystic duct was skeletonized. The critical view was obtained. The titanium clip applier was used to place a single clip on the gallbladder side of the cystic duct.   2 clips proximally on the patient side and one distal on the gallbladder side of the cystic artery. Two clips were placed on the patient side, 1 distal on the gallbladder side of the cystic duct and it was transected with laparoscopic scissors and electrocautery. Electrocautery was then used to dissect the gallbladder from the gallbladder fossa. Traction-countertraction technique was used to expose the medial and lateral aspects of the gallbladder. Gallbladder was completely dissected off the gallbladder fossa,  and retracted through the 12-mm port site. We then inspected our clip sites which were intact. We then removed the trocars under direct visualization. The abdomen was decompressed. The subxiphoid fascial incision was closed using 0 Vicryl suture and the suture closure device. Then we used 4-0 Monocryl suture to reapproximate the skin. Surgical glue was placed over the skin. The patient was awoken and extubated in the operating room without any difficulty, and transferred to the postoperative care unit in stable condition. All instrument counts, lap counts, and needle counts were correct at the completion of the procedure.     Thony Lord MD  Minimally Invasive and Bariatric Surgery  6/20/2019  1:19 PM

## 2019-06-20 NOTE — ANESTHESIA PRE PROCEDURE
PROTIME 12.3 07/07/2011    INR 1.0 07/07/2011    APTT 29.2 07/07/2011       HCG (If Applicable): No results found for: PREGTESTUR, PREGSERUM, HCG, HCGQUANT     ABGs: No results found for: PHART, PO2ART, NXA0ELR, GUZ7TLG, BEART, G1QUZEZC     Type & Screen (If Applicable):  No results found for: LABABO, LABRH       ECHO  Summary   There is a small circumferential pericardial effusion noted. clinical   correlation rec and repeat echo 6 mos to assess if better or worse. Measured ejection fraction is 64 %. Right ventricular systolic pressure of 99.14 mm Hg consistent with mild   pulmonary hypertension. Physiologic and/or trace mitral regurgitation is present. Physiologic and/or trace pulmonic regurgitation present. Physiologic and/or trace tricuspid regurgitation      Signature      ----------------------------------------------------------------   Electronically signed by Ziyad Velez DO(Interpreting   physician) on 08/15/2013 04:05 PM      Anesthesia Evaluation  Patient summary reviewed   history of anesthetic complications: PONV. Airway: Mallampati: II  TM distance: >3 FB   Neck ROM: full  Mouth opening: > = 3 FB Dental: normal exam         Pulmonary:Negative Pulmonary ROS breath sounds clear to auscultation                             Cardiovascular:          ECG reviewed  Rhythm: regular  Rate: normal  Echocardiogram reviewed                  Neuro/Psych:   Negative Neuro/Psych ROS              GI/Hepatic/Renal:   (+) renal disease: kidney stones,           Endo/Other:    (+) DiabetesType II DM, , hypothyroidism::., .                 Abdominal:           Vascular: negative vascular ROS. Anesthesia Plan      general     ASA 3       Induction: intravenous. MIPS: Postoperative opioids intended and Prophylactic antiemetics administered. Anesthetic plan and risks discussed with patient. Plan discussed with CRNA.                   Leti Herrera MD 6/20/2019

## 2019-06-20 NOTE — H&P
General Surgery History and Physical  Annella Goltz, MD    Patient's Name/Date of Birth: Omar Bang / 1960    Date: June 20, 2019     Surgeon: Karina Chris M.D., M.S.    PCP: Roberto Braden DO     Chief Complaint: Right upper quadrant pain    HPI:   Omar Bang is a 62 y.o. female who presents for evaluation of right upper quadrant pain, symptomatic gallstones. Timing is intermittent, radiation to back, alleviated by npo and started several weeks ago. Denies SOB, chest pain, fever, chills, diarrhea, constipation. Had EGD and cscope with hiatal hernia, and polyps. RUQ US was unremarkable. HIDA had pain with CCK injection and aborted the remainder of the procedure. Still with RUQ pain now.       Past Medical History:   Diagnosis Date    Diabetes mellitus ([address]     Frequent UTI     Hypertension     Kidney stones     PONV (postoperative nausea and vomiting)        Past Surgical History:   Procedure Laterality Date    COLONOSCOPY      ECHO COMPL W DOP COLOR FLOW  8/15/2013         ENDOSCOPY, COLON, DIAGNOSTIC      HYSTERECTOMY      RECTAL PROLAPSE REPAIR         Current Facility-Administered Medications   Medication Dose Route Frequency Provider Last Rate Last Dose    0.9 % sodium chloride infusion   Intravenous Continuous Karina Chris MD        sodium chloride flush 0.9 % injection 10 mL  10 mL Intravenous 2 times per day Karina Chris MD        sodium chloride flush 0.9 % injection 10 mL  10 mL Intravenous PRN Karina Chris MD        ceFAZolin (ANCEF) 2 g in dextrose 5 % 100 mL IVPB  2 g Intravenous On Call to Tacho Ortiz MD        promethazine (PHENERGAN) injection 25 mg  25 mg Intravenous PRN Mckenzie Springer MD        labetalol (NORMODYNE;TRANDATE) injection 5 mg  5 mg Intravenous Q10 Min PRN Mckenzie Springer MD        meperidine (DEMEROL) injection 12.5 mg  12.5 mg Intravenous Q5 Min PRN MD Bryan Denney HYDROmorphone HCl PF (DILAUDID) injection 0.25 mg  0.25 mg Intravenous Q5 Min PRN Venkat Shaver MD        HYDROmorphone HCl PF (DILAUDID) injection 0.5 mg  0.5 mg Intravenous Q5 Min PRN Venkat Shaver MD           Allergies   Allergen Reactions    Flagyl [Metronidazole] Rash    Vicodin [Hydrocodone-Acetaminophen] Nausea And Vomiting       The patient has a family history that is negative for severe cardiovascular or respiratory issues, negative for reaction to anesthesia.     Social History     Socioeconomic History    Marital status:      Spouse name: Not on file    Number of children: Not on file    Years of education: Not on file    Highest education level: Not on file   Occupational History    Not on file   Social Needs    Financial resource strain: Not on file    Food insecurity:     Worry: Not on file     Inability: Not on file    Transportation needs:     Medical: Not on file     Non-medical: Not on file   Tobacco Use    Smoking status: Never Smoker    Smokeless tobacco: Never Used   Substance and Sexual Activity    Alcohol use: No    Drug use: Not on file    Sexual activity: Not on file   Lifestyle    Physical activity:     Days per week: Not on file     Minutes per session: Not on file    Stress: Not on file   Relationships    Social connections:     Talks on phone: Not on file     Gets together: Not on file     Attends Yarsani service: Not on file     Active member of club or organization: Not on file     Attends meetings of clubs or organizations: Not on file     Relationship status: Not on file    Intimate partner violence:     Fear of current or ex partner: Not on file     Emotionally abused: Not on file     Physically abused: Not on file     Forced sexual activity: Not on file   Other Topics Concern    Not on file   Social History Narrative    Not on file         Review of Systems    A complete 10 system review was performed and are otherwise negative unless mentioned in the above HPI. Specific negatives are listed below but may not include all those reviewed. General ROS: negative obtundation, AMS  ENT ROS: negative rhinorrhea, epistaxis  Allergy and Immunology ROS: negative itchy/watery eyes or nasal congestion  Hematological and Lymphatic ROS: negative spontaneous bleeding or bruising  Endocrine ROS: negative  lethargy, mood swings, palpitations or polydipsia/polyuria  Respiratory ROS: negative sputum changes, stridor, tachypnea or wheezing  Cardiovascular ROS: negative for - loss of consciousness, murmur or orthopnea  Gastrointestinal ROS: negative for - hematochezia or hematemesis  Genito-Urinary ROS: negative for -  genital discharge or hematuria  Musculoskeletal ROS: negative for - focal weakness, gangrene  Psych/Neuro ROS: negative for - visual or auditory hallucinations, suicidal ideation    Physical exam:   LMP  (LMP Unknown)   General appearance:  NAD, appears stated age  Head: NCAT, PERRLA, EOMI, red conjunctiva  Neck: supple, no masses, trachea midline  Lungs: Equal chest rise bilateral, no retractions, no wheezing  Heart: Reg rate  Abdomen: soft, tender RUQ  Skin; warm and dry, no cyanosis  Gu: no cva tenderness  Extremities: atraumatic, no focal motor deficits, no open wounds  Psych: No tremor, visual hallucinations        Radiology: I reviewed relevant abdominal imaging from this admission and that available in the EMR including RUQ US and HIDA. HIDA with pain with CCK injection      Assessment:  62 y.o. female with biliary dyskinesia, hiatal hernia    Plan: To OR for laparoscopic possible robotic cholecystectomy with intraoperative cholangiogram  Discussed risks of injury to liver, common bile duct, hepatic duct, surrounding vascular structures, small bowel, stomach. Risk for further surgery to correct complications.   Plan for laparoscopic, possible open cholecystectomy with possible intraoperative cholangiogram. Patient agrees and all questions answered to their and family's satisfaction      cT Garcia MD  10:41 AM  6/20/2019

## 2019-07-01 ENCOUNTER — OFFICE VISIT (OUTPATIENT)
Dept: SURGERY | Age: 59
End: 2019-07-01

## 2019-07-01 VITALS
SYSTOLIC BLOOD PRESSURE: 135 MMHG | WEIGHT: 140 LBS | HEART RATE: 80 BPM | HEIGHT: 68 IN | DIASTOLIC BLOOD PRESSURE: 86 MMHG | BODY MASS INDEX: 21.22 KG/M2 | TEMPERATURE: 97.7 F

## 2019-07-01 DIAGNOSIS — K82.8 BILIARY DYSKINESIA: Primary | ICD-10-CM

## 2019-07-01 PROCEDURE — 99024 POSTOP FOLLOW-UP VISIT: CPT | Performed by: SURGERY

## 2019-07-01 RX ORDER — CEPHALEXIN 250 MG/1
CAPSULE ORAL
Refills: 1 | COMMUNITY
Start: 2019-06-12 | End: 2019-09-19 | Stop reason: CLARIF

## 2019-07-01 RX ORDER — SULFAMETHOXAZOLE AND TRIMETHOPRIM 800; 160 MG/1; MG/1
TABLET ORAL
Refills: 0 | COMMUNITY
Start: 2019-05-02 | End: 2019-07-01 | Stop reason: ALTCHOICE

## 2019-07-10 ENCOUNTER — TELEPHONE (OUTPATIENT)
Dept: FAMILY MEDICINE CLINIC | Age: 59
End: 2019-07-10

## 2019-07-10 RX ORDER — OMEPRAZOLE 20 MG/1
20 CAPSULE, DELAYED RELEASE ORAL
Qty: 30 CAPSULE | Refills: 5 | Status: SHIPPED | OUTPATIENT
Start: 2019-07-10 | End: 2019-09-19 | Stop reason: SINTOL

## 2019-08-16 RX ORDER — LEVOTHYROXINE SODIUM 0.05 MG/1
50 TABLET ORAL DAILY
Qty: 90 TABLET | Refills: 0 | Status: SHIPPED | OUTPATIENT
Start: 2019-08-16 | End: 2019-11-26 | Stop reason: SDUPTHER

## 2019-09-19 ENCOUNTER — OFFICE VISIT (OUTPATIENT)
Dept: FAMILY MEDICINE CLINIC | Age: 59
End: 2019-09-19
Payer: COMMERCIAL

## 2019-09-19 ENCOUNTER — HOSPITAL ENCOUNTER (OUTPATIENT)
Age: 59
Discharge: HOME | End: 2019-09-21
Payer: COMMERCIAL

## 2019-09-19 ENCOUNTER — TELEPHONE (OUTPATIENT)
Dept: FAMILY MEDICINE CLINIC | Age: 59
End: 2019-09-19

## 2019-09-19 VITALS
RESPIRATION RATE: 18 BRPM | BODY MASS INDEX: 22.13 KG/M2 | OXYGEN SATURATION: 98 % | HEIGHT: 68 IN | HEART RATE: 81 BPM | TEMPERATURE: 98.3 F | DIASTOLIC BLOOD PRESSURE: 74 MMHG | SYSTOLIC BLOOD PRESSURE: 120 MMHG | WEIGHT: 146 LBS

## 2019-09-19 DIAGNOSIS — Z90.49 HISTORY OF CHOLECYSTECTOMY: ICD-10-CM

## 2019-09-19 DIAGNOSIS — E11.9 TYPE 2 DIABETES MELLITUS WITHOUT COMPLICATION, WITHOUT LONG-TERM CURRENT USE OF INSULIN (HCC): ICD-10-CM

## 2019-09-19 DIAGNOSIS — K52.9 CHRONIC DIARRHEA: ICD-10-CM

## 2019-09-19 DIAGNOSIS — Z87.442 PERSONAL HISTORY OF KIDNEY STONES: ICD-10-CM

## 2019-09-19 DIAGNOSIS — K21.9 GASTROESOPHAGEAL REFLUX DISEASE WITHOUT ESOPHAGITIS: ICD-10-CM

## 2019-09-19 DIAGNOSIS — N30.00 ACUTE CYSTITIS WITHOUT HEMATURIA: Primary | ICD-10-CM

## 2019-09-19 DIAGNOSIS — N30.00 ACUTE CYSTITIS WITHOUT HEMATURIA: ICD-10-CM

## 2019-09-19 LAB
BASOPHILS ABSOLUTE: 0.05 E9/L (ref 0–0.2)
BASOPHILS RELATIVE PERCENT: 0.5 % (ref 0–2)
BILIRUBIN, POC: NEGATIVE
BLOOD URINE, POC: NORMAL
CLARITY, POC: NORMAL
COLOR, POC: YELLOW
EOSINOPHILS ABSOLUTE: 0.11 E9/L (ref 0.05–0.5)
EOSINOPHILS RELATIVE PERCENT: 1 % (ref 0–6)
GLUCOSE URINE, POC: NEGATIVE
HBA1C MFR BLD: 5.3 %
HCT VFR BLD CALC: 40.8 % (ref 34–48)
HEMOGLOBIN: 13 G/DL (ref 11.5–15.5)
IMMATURE GRANULOCYTES #: 0.04 E9/L
IMMATURE GRANULOCYTES %: 0.4 % (ref 0–5)
KETONES, POC: NEGATIVE
LEUKOCYTE EST, POC: NORMAL
LYMPHOCYTES ABSOLUTE: 2.01 E9/L (ref 1.5–4)
LYMPHOCYTES RELATIVE PERCENT: 18.6 % (ref 20–42)
MCH RBC QN AUTO: 30.6 PG (ref 26–35)
MCHC RBC AUTO-ENTMCNC: 31.9 % (ref 32–34.5)
MCV RBC AUTO: 96 FL (ref 80–99.9)
MICROALBUMIN UR-MCNC: 64.5 MG/L
MONOCYTES ABSOLUTE: 0.64 E9/L (ref 0.1–0.95)
MONOCYTES RELATIVE PERCENT: 5.9 % (ref 2–12)
NEUTROPHILS ABSOLUTE: 7.94 E9/L (ref 1.8–7.3)
NEUTROPHILS RELATIVE PERCENT: 73.6 % (ref 43–80)
NITRITE, POC: POSITIVE
PDW BLD-RTO: 11.7 FL (ref 11.5–15)
PH, POC: 5.5
PLATELET # BLD: 350 E9/L (ref 130–450)
PMV BLD AUTO: 10 FL (ref 7–12)
PROTEIN, POC: NORMAL
RBC # BLD: 4.25 E12/L (ref 3.5–5.5)
SPECIFIC GRAVITY, POC: 1.02
UROBILINOGEN, POC: 0.2
WBC # BLD: 10.8 E9/L (ref 4.5–11.5)

## 2019-09-19 PROCEDURE — 84550 ASSAY OF BLOOD/URIC ACID: CPT

## 2019-09-19 PROCEDURE — 84443 ASSAY THYROID STIM HORMONE: CPT

## 2019-09-19 PROCEDURE — 82044 UR ALBUMIN SEMIQUANTITATIVE: CPT

## 2019-09-19 PROCEDURE — 82150 ASSAY OF AMYLASE: CPT

## 2019-09-19 PROCEDURE — 87186 SC STD MICRODIL/AGAR DIL: CPT

## 2019-09-19 PROCEDURE — 81003 URINALYSIS AUTO W/O SCOPE: CPT | Performed by: FAMILY MEDICINE

## 2019-09-19 PROCEDURE — 83036 HEMOGLOBIN GLYCOSYLATED A1C: CPT | Performed by: FAMILY MEDICINE

## 2019-09-19 PROCEDURE — 83690 ASSAY OF LIPASE: CPT

## 2019-09-19 PROCEDURE — 80053 COMPREHEN METABOLIC PANEL: CPT

## 2019-09-19 PROCEDURE — 85025 COMPLETE CBC W/AUTO DIFF WBC: CPT

## 2019-09-19 PROCEDURE — 99214 OFFICE O/P EST MOD 30 MIN: CPT | Performed by: FAMILY MEDICINE

## 2019-09-19 PROCEDURE — 87077 CULTURE AEROBIC IDENTIFY: CPT

## 2019-09-19 PROCEDURE — 80061 LIPID PANEL: CPT

## 2019-09-19 PROCEDURE — 87088 URINE BACTERIA CULTURE: CPT

## 2019-09-19 RX ORDER — SULFAMETHOXAZOLE AND TRIMETHOPRIM 800; 160 MG/1; MG/1
1 TABLET ORAL 2 TIMES DAILY
Qty: 6 TABLET | Refills: 0 | Status: SHIPPED | OUTPATIENT
Start: 2019-09-19 | End: 2019-09-22

## 2019-09-19 RX ORDER — LANSOPRAZOLE 30 MG/1
30 CAPSULE, DELAYED RELEASE ORAL DAILY
Qty: 30 CAPSULE | Refills: 1 | Status: SHIPPED | OUTPATIENT
Start: 2019-09-19 | End: 2019-11-26 | Stop reason: SDUPTHER

## 2019-09-19 RX ORDER — FAMOTIDINE 20 MG/1
20 TABLET, FILM COATED ORAL 2 TIMES DAILY
Qty: 180 TABLET | Refills: 0 | Status: SHIPPED | OUTPATIENT
Start: 2019-09-19 | End: 2019-12-27 | Stop reason: ALTCHOICE

## 2019-09-19 NOTE — PROGRESS NOTES
[facility])  Family Medicine Outpatient        SUBJECTIVE:  CC: had concerns including Abdominal Pain (Pt is post lap john (19) and is still experiencing abdominal pain, nausea, and diarrhea - no appetite, has followed up with surgeon and GI - was told by GI to change diet and symptoms will improve, however she is not able to eat, and when she does it goes right through her, also having pain with urination and BM's). HPI:Yahir Abbasi presented to the clinic for a routine visit. Rula Braden is a 62year old female presenting to the office with concerns of chronic diarrhea with decreased appetite with acid reflux. She had a lap cholecystomy 2019 and reports seeing the surgeon and GI for follow up for this. She notes Protonix rx was not covered, but Lanoprazole would be. Vitals show that the patient has gained 6lb since 2019. She also describes Dysuria, but denies any f/c. Review of Systems   Constitutional: Negative for appetite change, fatigue and fever. Respiratory: Negative for cough, shortness of breath and wheezing. Cardiovascular: Negative for chest pain and palpitations. Gastrointestinal: Positive for diarrhea. Negative for abdominal pain, blood in stool, constipation, nausea and vomiting. Gerd   Genitourinary: Positive for dysuria. Negative for flank pain. Musculoskeletal: Negative for arthralgias and back pain.        Outpatient Medications Marked as Taking for the 19 encounter (Office Visit) with Crow Keyes MD   Medication Sig Dispense Refill    metFORMIN (GLUCOPHAGE) 500 MG tablet Take 1 tablet by mouth 2 times daily (with meals) 60 tablet 1    [] sulfamethoxazole-trimethoprim (BACTRIM DS;SEPTRA DS) 800-160 MG per tablet Take 1 tablet by mouth 2 times daily for 3 days 6 tablet 0    lansoprazole (PREVACID) 30 MG delayed release capsule Take 1 capsule by mouth daily 30 capsule 1    famotidine (PEPCID) 20 MG tablet Take 1 tablet by mouth 2 times

## 2019-09-20 LAB
ALBUMIN SERPL-MCNC: 4.2 G/DL (ref 3.5–5.2)
ALP BLD-CCNC: 54 U/L (ref 35–104)
ALT SERPL-CCNC: <5 U/L (ref 0–32)
AMYLASE: 72 U/L (ref 20–100)
ANION GAP SERPL CALCULATED.3IONS-SCNC: 15 MMOL/L (ref 7–16)
AST SERPL-CCNC: 12 U/L (ref 0–31)
BILIRUB SERPL-MCNC: 0.7 MG/DL (ref 0–1.2)
BUN BLDV-MCNC: 13 MG/DL (ref 8–23)
CALCIUM SERPL-MCNC: 9.6 MG/DL (ref 8.6–10.2)
CHLORIDE BLD-SCNC: 103 MMOL/L (ref 98–107)
CHOLESTEROL, TOTAL: 175 MG/DL (ref 0–199)
CO2: 23 MMOL/L (ref 22–29)
CREAT SERPL-MCNC: 0.8 MG/DL (ref 0.5–1)
GFR AFRICAN AMERICAN: >60
GFR NON-AFRICAN AMERICAN: >60 ML/MIN/1.73
GLUCOSE BLD-MCNC: 97 MG/DL (ref 74–99)
HDLC SERPL-MCNC: 66 MG/DL
LDL CHOLESTEROL CALCULATED: 92 MG/DL (ref 0–99)
LIPASE: 38 U/L (ref 13–60)
POTASSIUM SERPL-SCNC: 4.6 MMOL/L (ref 3.5–5)
SODIUM BLD-SCNC: 141 MMOL/L (ref 132–146)
TOTAL PROTEIN: 7.5 G/DL (ref 6.4–8.3)
TRIGL SERPL-MCNC: 84 MG/DL (ref 0–149)
TSH SERPL DL<=0.05 MIU/L-ACNC: 1.22 UIU/ML (ref 0.27–4.2)
URIC ACID, SERUM: 3.9 MG/DL (ref 2.4–5.7)
VLDLC SERPL CALC-MCNC: 17 MG/DL

## 2019-09-21 ENCOUNTER — HOSPITAL ENCOUNTER (EMERGENCY)
Age: 59
Discharge: HOME | End: 2019-09-21
Attending: EMERGENCY MEDICINE
Payer: COMMERCIAL

## 2019-09-21 ENCOUNTER — APPOINTMENT (OUTPATIENT)
Dept: CT IMAGING | Age: 59
End: 2019-09-21
Payer: COMMERCIAL

## 2019-09-21 VITALS
RESPIRATION RATE: 16 BRPM | WEIGHT: 146 LBS | HEIGHT: 68 IN | DIASTOLIC BLOOD PRESSURE: 59 MMHG | SYSTOLIC BLOOD PRESSURE: 107 MMHG | HEART RATE: 70 BPM | BODY MASS INDEX: 22.13 KG/M2 | OXYGEN SATURATION: 99 % | TEMPERATURE: 97.6 F

## 2019-09-21 DIAGNOSIS — K57.32 DIVERTICULITIS OF COLON: Primary | ICD-10-CM

## 2019-09-21 LAB
ALBUMIN SERPL-MCNC: 4.6 G/DL (ref 3.5–5.2)
ALP BLD-CCNC: 60 U/L (ref 35–104)
ALT SERPL-CCNC: 7 U/L (ref 0–32)
ANION GAP SERPL CALCULATED.3IONS-SCNC: 13 MMOL/L (ref 7–16)
AST SERPL-CCNC: 11 U/L (ref 0–31)
BACTERIA: ABNORMAL /HPF
BASOPHILS ABSOLUTE: 0.04 E9/L (ref 0–0.2)
BASOPHILS RELATIVE PERCENT: 0.3 % (ref 0–2)
BILIRUB SERPL-MCNC: 0.6 MG/DL (ref 0–1.2)
BILIRUBIN URINE: NEGATIVE
BLOOD, URINE: ABNORMAL
BUN BLDV-MCNC: 10 MG/DL (ref 8–23)
CALCIUM SERPL-MCNC: 9.4 MG/DL (ref 8.6–10.2)
CHLORIDE BLD-SCNC: 101 MMOL/L (ref 98–107)
CLARITY: CLEAR
CO2: 25 MMOL/L (ref 22–29)
COLOR: YELLOW
CREAT SERPL-MCNC: 1 MG/DL (ref 0.5–1)
EOSINOPHILS ABSOLUTE: 0.08 E9/L (ref 0.05–0.5)
EOSINOPHILS RELATIVE PERCENT: 0.6 % (ref 0–6)
EPITHELIAL CELLS, UA: ABNORMAL /HPF
GFR AFRICAN AMERICAN: >60
GFR NON-AFRICAN AMERICAN: 56 ML/MIN/1.73
GLUCOSE BLD-MCNC: 103 MG/DL (ref 74–99)
GLUCOSE URINE: NEGATIVE MG/DL
HCT VFR BLD CALC: 39.1 % (ref 34–48)
HEMOGLOBIN: 13.1 G/DL (ref 11.5–15.5)
IMMATURE GRANULOCYTES #: 0.03 E9/L
IMMATURE GRANULOCYTES %: 0.2 % (ref 0–5)
KETONES, URINE: NEGATIVE MG/DL
LEUKOCYTE ESTERASE, URINE: ABNORMAL
LIPASE: 37 U/L (ref 13–60)
LYMPHOCYTES ABSOLUTE: 1.46 E9/L (ref 1.5–4)
LYMPHOCYTES RELATIVE PERCENT: 11.7 % (ref 20–42)
MCH RBC QN AUTO: 31.1 PG (ref 26–35)
MCHC RBC AUTO-ENTMCNC: 33.5 % (ref 32–34.5)
MCV RBC AUTO: 92.9 FL (ref 80–99.9)
MONOCYTES ABSOLUTE: 0.81 E9/L (ref 0.1–0.95)
MONOCYTES RELATIVE PERCENT: 6.5 % (ref 2–12)
NEUTROPHILS ABSOLUTE: 10.11 E9/L (ref 1.8–7.3)
NEUTROPHILS RELATIVE PERCENT: 80.7 % (ref 43–80)
NITRITE, URINE: NEGATIVE
PDW BLD-RTO: 11.3 FL (ref 11.5–15)
PH UA: 6 (ref 5–9)
PLATELET # BLD: 313 E9/L (ref 130–450)
PMV BLD AUTO: 9.4 FL (ref 7–12)
POTASSIUM REFLEX MAGNESIUM: 4.4 MMOL/L (ref 3.5–5)
PROTEIN UA: NEGATIVE MG/DL
RBC # BLD: 4.21 E12/L (ref 3.5–5.5)
RBC UA: ABNORMAL /HPF (ref 0–2)
SODIUM BLD-SCNC: 139 MMOL/L (ref 132–146)
SPECIFIC GRAVITY UA: 1.01 (ref 1–1.03)
TOTAL PROTEIN: 7.5 G/DL (ref 6.4–8.3)
UROBILINOGEN, URINE: 0.2 E.U./DL
WBC # BLD: 12.5 E9/L (ref 4.5–11.5)
WBC UA: ABNORMAL /HPF (ref 0–5)

## 2019-09-21 PROCEDURE — 80053 COMPREHEN METABOLIC PANEL: CPT

## 2019-09-21 PROCEDURE — 85025 COMPLETE CBC W/AUTO DIFF WBC: CPT

## 2019-09-21 PROCEDURE — 6360000002 HC RX W HCPCS: Performed by: EMERGENCY MEDICINE

## 2019-09-21 PROCEDURE — 6370000000 HC RX 637 (ALT 250 FOR IP): Performed by: EMERGENCY MEDICINE

## 2019-09-21 PROCEDURE — 87088 URINE BACTERIA CULTURE: CPT

## 2019-09-21 PROCEDURE — 6360000002 HC RX W HCPCS

## 2019-09-21 PROCEDURE — 2580000003 HC RX 258: Performed by: EMERGENCY MEDICINE

## 2019-09-21 PROCEDURE — 6360000004 HC RX CONTRAST MEDICATION: Performed by: RADIOLOGY

## 2019-09-21 PROCEDURE — 74177 CT ABD & PELVIS W/CONTRAST: CPT

## 2019-09-21 PROCEDURE — 99284 EMERGENCY DEPT VISIT MOD MDM: CPT

## 2019-09-21 PROCEDURE — 83690 ASSAY OF LIPASE: CPT

## 2019-09-21 PROCEDURE — 81001 URINALYSIS AUTO W/SCOPE: CPT

## 2019-09-21 RX ORDER — MORPHINE SULFATE 8 MG/ML
6 INJECTION, SOLUTION INTRAMUSCULAR; INTRAVENOUS ONCE
Status: DISCONTINUED | OUTPATIENT
Start: 2019-09-21 | End: 2019-09-21 | Stop reason: HOSPADM

## 2019-09-21 RX ORDER — AMOXICILLIN AND CLAVULANATE POTASSIUM 875; 125 MG/1; MG/1
1 TABLET, FILM COATED ORAL ONCE
Status: COMPLETED | OUTPATIENT
Start: 2019-09-21 | End: 2019-09-21

## 2019-09-21 RX ORDER — MORPHINE SULFATE 2 MG/ML
INJECTION, SOLUTION INTRAMUSCULAR; INTRAVENOUS
Status: COMPLETED
Start: 2019-09-21 | End: 2019-09-21

## 2019-09-21 RX ORDER — ONDANSETRON 2 MG/ML
4 INJECTION INTRAMUSCULAR; INTRAVENOUS ONCE
Status: COMPLETED | OUTPATIENT
Start: 2019-09-21 | End: 2019-09-21

## 2019-09-21 RX ORDER — SODIUM CHLORIDE 0.9 % (FLUSH) 0.9 %
10 SYRINGE (ML) INJECTION ONCE
Status: DISCONTINUED | OUTPATIENT
Start: 2019-09-21 | End: 2019-09-21 | Stop reason: HOSPADM

## 2019-09-21 RX ORDER — 0.9 % SODIUM CHLORIDE 0.9 %
1000 INTRAVENOUS SOLUTION INTRAVENOUS ONCE
Status: COMPLETED | OUTPATIENT
Start: 2019-09-21 | End: 2019-09-21

## 2019-09-21 RX ORDER — AMOXICILLIN AND CLAVULANATE POTASSIUM 875; 125 MG/1; MG/1
1 TABLET, FILM COATED ORAL 3 TIMES DAILY
Qty: 14 TABLET | Refills: 0 | Status: SHIPPED | OUTPATIENT
Start: 2019-09-21 | End: 2019-09-27 | Stop reason: SDUPTHER

## 2019-09-21 RX ORDER — GREEN TEA/HOODIA GORDONII 315-12.5MG
1 CAPSULE ORAL 2 TIMES DAILY
Qty: 60 TABLET | Refills: 0 | Status: SHIPPED | OUTPATIENT
Start: 2019-09-21 | End: 2019-10-21

## 2019-09-21 RX ORDER — AMOXICILLIN AND CLAVULANATE POTASSIUM 875; 125 MG/1; MG/1
1 TABLET, FILM COATED ORAL 2 TIMES DAILY
Qty: 14 TABLET | Refills: 0 | Status: SHIPPED | OUTPATIENT
Start: 2019-09-21 | End: 2019-09-21 | Stop reason: SDUPTHER

## 2019-09-21 RX ORDER — ONDANSETRON 4 MG/1
8 TABLET, ORALLY DISINTEGRATING ORAL EVERY 8 HOURS PRN
Qty: 20 TABLET | Refills: 0 | Status: SHIPPED | OUTPATIENT
Start: 2019-09-21 | End: 2020-02-10

## 2019-09-21 RX ADMIN — ONDANSETRON 4 MG: 2 INJECTION INTRAMUSCULAR; INTRAVENOUS at 13:48

## 2019-09-21 RX ADMIN — IOPAMIDOL 110 ML: 755 INJECTION, SOLUTION INTRAVENOUS at 15:03

## 2019-09-21 RX ADMIN — AMOXICILLIN AND CLAVULANATE POTASSIUM 1 TABLET: 875; 125 TABLET, FILM COATED ORAL at 16:01

## 2019-09-21 RX ADMIN — MORPHINE SULFATE 6 MG: 2 INJECTION, SOLUTION INTRAMUSCULAR; INTRAVENOUS at 13:48

## 2019-09-21 RX ADMIN — SODIUM CHLORIDE 1000 ML: 9 INJECTION, SOLUTION INTRAVENOUS at 13:49

## 2019-09-21 ASSESSMENT — PAIN SCALES - GENERAL: PAINLEVEL_OUTOF10: 6

## 2019-09-21 NOTE — ED PROVIDER NOTES
34.5 %    RDW 11.3 (L) 11.5 - 15.0 fL    Platelets 237 865 - 542 E9/L    MPV 9.4 7.0 - 12.0 fL    Neutrophils % 80.7 (H) 43.0 - 80.0 %    Immature Granulocytes % 0.2 0.0 - 5.0 %    Lymphocytes % 11.7 (L) 20.0 - 42.0 %    Monocytes % 6.5 2.0 - 12.0 %    Eosinophils % 0.6 0.0 - 6.0 %    Basophils % 0.3 0.0 - 2.0 %    Neutrophils Absolute 10.11 (H) 1.80 - 7.30 E9/L    Immature Granulocytes # 0.03 E9/L    Lymphocytes Absolute 1.46 (L) 1.50 - 4.00 E9/L    Monocytes Absolute 0.81 0.10 - 0.95 E9/L    Eosinophils Absolute 0.08 0.05 - 0.50 E9/L    Basophils Absolute 0.04 0.00 - 0.20 E9/L   Comprehensive Metabolic Panel w/ Reflex to MG   Result Value Ref Range    Sodium 139 132 - 146 mmol/L    Potassium reflex Magnesium 4.4 3.5 - 5.0 mmol/L    Chloride 101 98 - 107 mmol/L    CO2 25 22 - 29 mmol/L    Anion Gap 13 7 - 16 mmol/L    Glucose 103 (H) 74 - 99 mg/dL    BUN 10 8 - 23 mg/dL    CREATININE 1.0 0.5 - 1.0 mg/dL    GFR Non-African American 56 >=60 mL/min/1.73    GFR African American >60     Calcium 9.4 8.6 - 10.2 mg/dL    Total Protein 7.5 6.4 - 8.3 g/dL    Alb 4.6 3.5 - 5.2 g/dL    Total Bilirubin 0.6 0.0 - 1.2 mg/dL    Alkaline Phosphatase 60 35 - 104 U/L    ALT 7 0 - 32 U/L    AST 11 0 - 31 U/L   Lipase   Result Value Ref Range    Lipase 37 13 - 60 U/L   Urinalysis, reflex to microscopic   Result Value Ref Range    Color, UA Yellow Straw/Yellow    Clarity, UA Clear Clear    Glucose, Ur Negative Negative mg/dL    Bilirubin Urine Negative Negative    Ketones, Urine Negative Negative mg/dL    Specific Gravity, UA 1.010 1.005 - 1.030    Blood, Urine TRACE-INTACT Negative    pH, UA 6.0 5.0 - 9.0    Protein, UA Negative Negative mg/dL    Urobilinogen, Urine 0.2 <2.0 E.U./dL    Nitrite, Urine Negative Negative    Leukocyte Esterase, Urine SMALL (A) Negative   Microscopic Urinalysis   Result Value Ref Range    WBC, UA 5-10 0 - 5 /HPF    RBC, UA 0-1 0 - 2 /HPF    Epi Cells FEW /HPF    Bacteria, UA RARE (A) /HPF RADIOLOGY:  Interpreted by Radiologist unless otherwise specified  CT ABDOMEN PELVIS W IV CONTRAST Additional Contrast? None   Final Result   1. Findings compatible with acute diverticulitis at the distal   descending colon/proximal sigmoid colon as above commented. 2. A short segment of the bowel demonstrate edema of the bowel wall   where a inflamed diverticula seen with the stranding of fat planes   towards the root of the mesentery of the proximal sigmoid colon as   commented. 3. There is no free intraperitoneal air. 4. There is no bowel obstruction.                          ------------------------- NURSING NOTES AND VITALS REVIEWED ---------------------------   The nursing notes within the ED encounter and vital signs as below have been reviewed by myself. BP (!) 107/59   Pulse 70   Temp 97.6 °F (36.4 °C) (Oral)   Resp 16   Ht 5' 8\" (1.727 m)   Wt 146 lb (66.2 kg)   LMP  (LMP Unknown)   SpO2 99%   BMI 22.20 kg/m²   Oxygen Saturation Interpretation: Normal    The patients available past medical records and past encounters were reviewed.         ------------------------------ ED COURSE/MEDICAL DECISION MAKING----------------------  Medications   morphine sulfate (PF) injection 6 mg (has no administration in time range)   sodium chloride flush 0.9 % injection 10 mL (has no administration in time range)   amoxicillin-clavulanate (AUGMENTIN) 875-125 MG per tablet 1 tablet (has no administration in time range)   0.9 % sodium chloride bolus (0 mLs Intravenous Stopped 19 1528)   ondansetron (ZOFRAN) injection 4 mg (4 mg Intravenous Given 19 1348)   morphine 2 MG/ML injection (6 mg  Given 19 1348)   iopamidol (ISOVUE-370) 76 % injection 110 mL (110 mLs Intravenous Given 19 1503)              Medical Decision Makin-year-old female type II diabetic presenting to the emergency department of 1 year of progressively worsening, sharp waxing and waning abdominal pain,

## 2019-09-22 LAB
ORGANISM: ABNORMAL
URINE CULTURE, ROUTINE: ABNORMAL

## 2019-09-23 LAB — URINE CULTURE, ROUTINE: NORMAL

## 2019-09-27 ENCOUNTER — TELEPHONE (OUTPATIENT)
Dept: FAMILY MEDICINE CLINIC | Age: 59
End: 2019-09-27

## 2019-09-27 RX ORDER — AMOXICILLIN AND CLAVULANATE POTASSIUM 875; 125 MG/1; MG/1
1 TABLET, FILM COATED ORAL 3 TIMES DAILY
Qty: 21 TABLET | Refills: 0 | Status: SHIPPED | OUTPATIENT
Start: 2019-09-27 | End: 2019-10-04

## 2019-09-29 ASSESSMENT — ENCOUNTER SYMPTOMS
SHORTNESS OF BREATH: 0
BACK PAIN: 0
DIARRHEA: 1
ABDOMINAL PAIN: 0
BLOOD IN STOOL: 0
COUGH: 0
WHEEZING: 0
NAUSEA: 0
VOMITING: 0
CONSTIPATION: 0

## 2019-10-14 RX ORDER — ALLOPURINOL 300 MG/1
300 TABLET ORAL DAILY
Qty: 90 TABLET | Refills: 1 | Status: SHIPPED
Start: 2019-10-14 | End: 2020-09-30 | Stop reason: SDUPTHER

## 2019-10-29 ENCOUNTER — HOSPITAL ENCOUNTER (OUTPATIENT)
Dept: MAMMOGRAPHY | Age: 59
Discharge: HOME | End: 2019-10-31
Payer: COMMERCIAL

## 2019-10-29 DIAGNOSIS — Z12.39 SCREENING FOR BREAST CANCER: ICD-10-CM

## 2019-10-29 PROCEDURE — 77067 SCR MAMMO BI INCL CAD: CPT

## 2019-11-26 DIAGNOSIS — K21.9 GASTROESOPHAGEAL REFLUX DISEASE WITHOUT ESOPHAGITIS: ICD-10-CM

## 2019-11-26 RX ORDER — LANSOPRAZOLE 30 MG/1
30 CAPSULE, DELAYED RELEASE ORAL DAILY
Qty: 90 CAPSULE | Refills: 1 | Status: SHIPPED
Start: 2019-11-26 | End: 2020-02-10

## 2019-11-26 RX ORDER — LEVOTHYROXINE SODIUM 0.05 MG/1
50 TABLET ORAL DAILY
Qty: 90 TABLET | Refills: 1 | Status: SHIPPED
Start: 2019-11-26 | End: 2020-06-02 | Stop reason: SDUPTHER

## 2019-12-27 ENCOUNTER — HOSPITAL ENCOUNTER (EMERGENCY)
Age: 59
Discharge: HOME | End: 2019-12-27
Payer: COMMERCIAL

## 2019-12-27 VITALS
WEIGHT: 143 LBS | TEMPERATURE: 97.6 F | OXYGEN SATURATION: 99 % | HEIGHT: 68 IN | RESPIRATION RATE: 20 BRPM | SYSTOLIC BLOOD PRESSURE: 134 MMHG | BODY MASS INDEX: 21.67 KG/M2 | HEART RATE: 71 BPM | DIASTOLIC BLOOD PRESSURE: 79 MMHG

## 2019-12-27 DIAGNOSIS — J01.90 ACUTE SINUSITIS, RECURRENCE NOT SPECIFIED, UNSPECIFIED LOCATION: Primary | ICD-10-CM

## 2019-12-27 PROCEDURE — 99212 OFFICE O/P EST SF 10 MIN: CPT

## 2019-12-27 RX ORDER — AMOXICILLIN AND CLAVULANATE POTASSIUM 875; 125 MG/1; MG/1
1 TABLET, FILM COATED ORAL 2 TIMES DAILY
Qty: 20 TABLET | Refills: 0 | Status: SHIPPED | OUTPATIENT
Start: 2019-12-27 | End: 2020-01-06

## 2019-12-27 ASSESSMENT — PAIN SCALES - GENERAL: PAINLEVEL_OUTOF10: 4

## 2019-12-27 ASSESSMENT — PAIN DESCRIPTION - DESCRIPTORS: DESCRIPTORS: SORE;HEADACHE;ACHING

## 2019-12-27 ASSESSMENT — PAIN DESCRIPTION - ONSET: ONSET: GRADUAL

## 2019-12-27 ASSESSMENT — PAIN DESCRIPTION - ORIENTATION: ORIENTATION: RIGHT;LEFT

## 2019-12-27 ASSESSMENT — PAIN DESCRIPTION - FREQUENCY: FREQUENCY: CONTINUOUS

## 2019-12-27 ASSESSMENT — PAIN DESCRIPTION - PROGRESSION: CLINICAL_PROGRESSION: GRADUALLY WORSENING

## 2019-12-27 ASSESSMENT — PAIN DESCRIPTION - PAIN TYPE: TYPE: ACUTE PAIN

## 2020-02-10 ENCOUNTER — OFFICE VISIT (OUTPATIENT)
Dept: FAMILY MEDICINE CLINIC | Age: 60
End: 2020-02-10
Payer: COMMERCIAL

## 2020-02-10 VITALS
HEIGHT: 68 IN | DIASTOLIC BLOOD PRESSURE: 70 MMHG | TEMPERATURE: 98 F | WEIGHT: 145.6 LBS | RESPIRATION RATE: 18 BRPM | SYSTOLIC BLOOD PRESSURE: 120 MMHG | BODY MASS INDEX: 22.07 KG/M2 | HEART RATE: 73 BPM | OXYGEN SATURATION: 97 %

## 2020-02-10 PROBLEM — Z90.49 HISTORY OF CHOLECYSTECTOMY: Status: ACTIVE | Noted: 2020-02-10

## 2020-02-10 LAB
BILIRUBIN, POC: NEGATIVE
BLOOD URINE, POC: NEGATIVE
CLARITY, POC: CLEAR
COLOR, POC: YELLOW
GLUCOSE URINE, POC: NEGATIVE
HBA1C MFR BLD: 5.1 %
KETONES, POC: NEGATIVE
LEUKOCYTE EST, POC: NORMAL
NITRITE, POC: NEGATIVE
PH, POC: 7
PROTEIN, POC: NEGATIVE
SPECIFIC GRAVITY, POC: >=1.03
UROBILINOGEN, POC: 0.2

## 2020-02-10 PROCEDURE — 83036 HEMOGLOBIN GLYCOSYLATED A1C: CPT | Performed by: FAMILY MEDICINE

## 2020-02-10 PROCEDURE — 99213 OFFICE O/P EST LOW 20 MIN: CPT | Performed by: FAMILY MEDICINE

## 2020-02-10 PROCEDURE — 81003 URINALYSIS AUTO W/O SCOPE: CPT | Performed by: FAMILY MEDICINE

## 2020-02-10 RX ORDER — CIPROFLOXACIN 500 MG/1
500 TABLET, FILM COATED ORAL 2 TIMES DAILY
Qty: 14 TABLET | Refills: 0 | Status: SHIPPED | OUTPATIENT
Start: 2020-02-10 | End: 2020-02-17

## 2020-02-10 RX ORDER — FAMOTIDINE 20 MG/1
20 TABLET, FILM COATED ORAL 2 TIMES DAILY
COMMUNITY
End: 2020-04-24 | Stop reason: CLARIF

## 2020-02-10 ASSESSMENT — ENCOUNTER SYMPTOMS
SINUS PAIN: 0
PHOTOPHOBIA: 0
ALLERGIC/IMMUNOLOGIC NEGATIVE: 1
SHORTNESS OF BREATH: 0
FACIAL SWELLING: 0
COUGH: 0
STRIDOR: 0
ABDOMINAL PAIN: 1
CHOKING: 0
TROUBLE SWALLOWING: 0
RHINORRHEA: 0
EYE PAIN: 0
SINUS PRESSURE: 0
ORTHOPNEA: 0
COLOR CHANGE: 0
CONSTIPATION: 0
ABDOMINAL DISTENTION: 0
APNEA: 0
RESPIRATORY NEGATIVE: 1
VOICE CHANGE: 0
DIARRHEA: 0
CHEST TIGHTNESS: 0
BLOOD IN STOOL: 0
EYE ITCHING: 0
EYE REDNESS: 0
EYE DISCHARGE: 0
WHEEZING: 0
BACK PAIN: 0
BLURRED VISION: 0
SORE THROAT: 0
VISUAL CHANGE: 0
ANAL BLEEDING: 0
RECTAL PAIN: 0

## 2020-02-10 ASSESSMENT — PATIENT HEALTH QUESTIONNAIRE - PHQ9
2. FEELING DOWN, DEPRESSED OR HOPELESS: 0
SUM OF ALL RESPONSES TO PHQ QUESTIONS 1-9: 0
SUM OF ALL RESPONSES TO PHQ QUESTIONS 1-9: 0
1. LITTLE INTEREST OR PLEASURE IN DOING THINGS: 0
SUM OF ALL RESPONSES TO PHQ9 QUESTIONS 1 & 2: 0

## 2020-02-10 NOTE — PATIENT INSTRUCTIONS
Patient Education        Learning About Diabetes Food Guidelines  Your Care Instructions    Meal planning is important to manage diabetes. It helps keep your blood sugar at a target level (which you set with your doctor). You don't have to eat special foods. You can eat what your family eats, including sweets once in a while. But you do have to pay attention to how often you eat and how much you eat of certain foods. You may want to work with a dietitian or a certified diabetes educator (CDE) to help you plan meals and snacks. A dietitian or CDE can also help you lose weight if that is one of your goals. What should you know about eating carbs? Managing the amount of carbohydrate (carbs) you eat is an important part of healthy meals when you have diabetes. Carbohydrate is found in many foods. · Learn which foods have carbs. And learn the amounts of carbs in different foods. ? Bread, cereal, pasta, and rice have about 15 grams of carbs in a serving. A serving is 1 slice of bread (1 ounce), ½ cup of cooked cereal, or 1/3 cup of cooked pasta or rice. ? Fruits have 15 grams of carbs in a serving. A serving is 1 small fresh fruit, such as an apple or orange; ½ of a banana; ½ cup of cooked or canned fruit; ½ cup of fruit juice; 1 cup of melon or raspberries; or 2 tablespoons of dried fruit. ? Milk and no-sugar-added yogurt have 15 grams of carbs in a serving. A serving is 1 cup of milk or 2/3 cup of no-sugar-added yogurt. ? Starchy vegetables have 15 grams of carbs in a serving. A serving is ½ cup of mashed potatoes or sweet potato; 1 cup winter squash; ½ of a small baked potato; ½ cup of cooked beans; or ½ cup cooked corn or green peas. · Learn how much carbs to eat each day and at each meal. A dietitian or CDE can teach you how to keep track of the amount of carbs you eat. This is called carbohydrate counting. · If you are not sure how to count carbohydrate grams, use the Plate Method to plan meals.  It is a when cooking. · Don't skip meals. Your blood sugar may drop too low if you skip meals and take insulin or certain medicines for diabetes. · Check with your doctor before you drink alcohol. Alcohol can cause your blood sugar to drop too low. Alcohol can also cause a bad reaction if you take certain diabetes medicines. Follow-up care is a key part of your treatment and safety. Be sure to make and go to all appointments, and call your doctor if you are having problems. It's also a good idea to know your test results and keep a list of the medicines you take. Where can you learn more? Go to https://chpepiceweb.[employer]. org and sign in to your [employer] account. Enter P993 in the [employer] box to learn more about \"Learning About Diabetes Food Guidelines. \"     If you do not have an account, please click on the \"Sign Up Now\" link. Current as of: April 16, 2019  Content Version: 12.3  © 1122-8134 [employer]. Care instructions adapted under license by [facility] [place] ([facility]). If you have questions about a medical condition or this instruction, always ask your healthcare professional. [place] [address] any warranty or liability for your use of this information. Patient Education        Urinary Tract Infection in Women: Care Instructions  Your Care Instructions    A urinary tract infection, or UTI, is a general term for an infection anywhere between the kidneys and the urethra (where urine comes out). Most UTIs are bladder infections. They often cause pain or burning when you urinate. UTIs are caused by bacteria and can be cured with antibiotics. Be sure to complete your treatment so that the infection goes away. Follow-up care is a key part of your treatment and safety. Be sure to make and go to all appointments, and call your doctor if you are having problems. It's also a good idea to know your test results and keep a list of the medicines you take.   How can you care for yourself at home? · Take your antibiotics as directed. Do not stop taking them just because you feel better. You need to take the full course of antibiotics. · Drink extra water and other fluids for the next day or two. This may help wash out the bacteria that are causing the infection. (If you have kidney, heart, or liver disease and have to limit fluids, talk with your doctor before you increase your fluid intake.)  · Avoid drinks that are carbonated or have caffeine. They can irritate the bladder. · Urinate often. Try to empty your bladder each time. · To relieve pain, take a hot bath or lay a heating pad set on low over your lower belly or genital area. Never go to sleep with a heating pad in place. To prevent UTIs  · Drink plenty of water each day. This helps you urinate often, which clears bacteria from your system. (If you have kidney, heart, or liver disease and have to limit fluids, talk with your doctor before you increase your fluid intake.)  · Urinate when you need to. · Urinate right after you have sex. · Change sanitary pads often. · Avoid douches, bubble baths, feminine hygiene sprays, and other feminine hygiene products that have deodorants. · After going to the bathroom, wipe from front to back. When should you call for help? Call your doctor now or seek immediate medical care if:    · Symptoms such as fever, chills, nausea, or vomiting get worse or appear for the first time.     · You have new pain in your back just below your rib cage. This is called flank pain.     · There is new blood or pus in your urine.     · You have any problems with your antibiotic medicine.    Watch closely for changes in your health, and be sure to contact your doctor if:    · You are not getting better after taking an antibiotic for 2 days.     · Your symptoms go away but then come back. Where can you learn more? Go to https://earnestine.[employer]. org and sign in to your [employer] account.  Enter A615 in the [facility] box to learn more about \"Urinary Tract Infection in Women: Care Instructions. \"     If you do not have an account, please click on the \"Sign Up Now\" link. Current as of: August 21, 2019  Content Version: 12.3  © 1346-5321 [employer]. Care instructions adapted under license by [facility] [place] ([facility]). If you have questions about a medical condition or this instruction, always ask your healthcare professional. [address] any warranty or liability for your use of this information.

## 2020-02-18 ENCOUNTER — TELEPHONE (OUTPATIENT)
Dept: FAMILY MEDICINE CLINIC | Age: 60
End: 2020-02-18

## 2020-02-18 RX ORDER — NITROFURANTOIN 25; 75 MG/1; MG/1
100 CAPSULE ORAL 2 TIMES DAILY
Qty: 14 CAPSULE | Refills: 0 | Status: SHIPPED | OUTPATIENT
Start: 2020-02-18 | End: 2020-02-25

## 2020-03-16 ENCOUNTER — OFFICE VISIT (OUTPATIENT)
Dept: FAMILY MEDICINE CLINIC | Age: 60
End: 2020-03-16
Payer: COMMERCIAL

## 2020-03-16 VITALS
TEMPERATURE: 98 F | BODY MASS INDEX: 22.29 KG/M2 | WEIGHT: 142 LBS | DIASTOLIC BLOOD PRESSURE: 64 MMHG | HEART RATE: 89 BPM | OXYGEN SATURATION: 99 % | HEIGHT: 67 IN | SYSTOLIC BLOOD PRESSURE: 108 MMHG

## 2020-03-16 LAB
INFLUENZA A ANTIGEN, POC: NEGATIVE
INFLUENZA B ANTIGEN, POC: NEGATIVE
S PYO AG THROAT QL: NORMAL

## 2020-03-16 PROCEDURE — 99214 OFFICE O/P EST MOD 30 MIN: CPT | Performed by: FAMILY MEDICINE

## 2020-03-16 PROCEDURE — 87804 INFLUENZA ASSAY W/OPTIC: CPT | Performed by: FAMILY MEDICINE

## 2020-03-16 PROCEDURE — 87880 STREP A ASSAY W/OPTIC: CPT | Performed by: FAMILY MEDICINE

## 2020-03-16 RX ORDER — AMOXICILLIN AND CLAVULANATE POTASSIUM 875; 125 MG/1; MG/1
1 TABLET, FILM COATED ORAL 2 TIMES DAILY
Qty: 14 TABLET | Refills: 0 | Status: SHIPPED | OUTPATIENT
Start: 2020-03-16 | End: 2020-03-23

## 2020-03-20 ASSESSMENT — ENCOUNTER SYMPTOMS
EYE REDNESS: 0
ANAL BLEEDING: 0
COLOR CHANGE: 0
DIARRHEA: 0
PHOTOPHOBIA: 0
BACK PAIN: 0
ALLERGIC/IMMUNOLOGIC NEGATIVE: 1
VOMITING: 0
SORE THROAT: 0
CONSTIPATION: 0
SINUS PRESSURE: 1
WHEEZING: 0
EYES NEGATIVE: 1
STRIDOR: 0
TROUBLE SWALLOWING: 0
FACIAL SWELLING: 0
SHORTNESS OF BREATH: 0
RECTAL PAIN: 0
VOICE CHANGE: 0
ABDOMINAL PAIN: 1
ABDOMINAL DISTENTION: 0
EYE ITCHING: 0
COUGH: 1
CHOKING: 0
NAUSEA: 0
CHEST TIGHTNESS: 0
EYE PAIN: 0
SINUS PAIN: 1
RHINORRHEA: 1
BLOOD IN STOOL: 0
EYE DISCHARGE: 0

## 2020-03-20 NOTE — PROGRESS NOTES
SHILO Xiong is a 62 y.o. female. HPI/Chief C/O:  Chief Complaint   Patient presents with    Sinus Problem     sore throat; headache; slight sinus pressure and drainage; dizzy; slight cough    Abdominal Pain     patient states she thinks she has a gastritis going on     Allergies   Allergen Reactions    Flagyl [Metronidazole] Rash    Omeprazole Diarrhea and Nausea And Vomiting     As well as dizziness    Vicodin [Hydrocodone-Acetaminophen] Nausea And Vomiting   this 62year old female presents with nasal congestion, cough, sore throat, vertigo, and fatigue for 1 week. Pt c/o RUQ pain and lower abdominal pain. Pt denies nausea, vomiting, and fever. Pt had cholecystectomy. Pt c/o sharp chest pain. Pt has type 2 DM, renal cysts, and H/O pleural effusion. ROS:  Review of Systems   Constitutional: Positive for fatigue. Negative for activity change, appetite change, chills, diaphoresis, fever and unexpected weight change. HENT: Positive for congestion, rhinorrhea, sinus pressure and sinus pain. Negative for dental problem, drooling, ear discharge, ear pain, facial swelling, hearing loss, mouth sores, nosebleeds, postnasal drip, sneezing, sore throat, tinnitus, trouble swallowing and voice change. Eyes: Negative. Negative for photophobia, pain, discharge, redness, itching and visual disturbance. Respiratory: Positive for cough. Negative for choking, chest tightness, shortness of breath, wheezing and stridor. Cardiovascular: Positive for chest pain. Negative for palpitations and leg swelling. Gastrointestinal: Positive for abdominal pain. Negative for abdominal distention, anal bleeding, blood in stool, constipation, diarrhea, nausea, rectal pain and vomiting. Endocrine: Negative. Negative for cold intolerance, heat intolerance, polydipsia, polyphagia and polyuria. Genitourinary: Negative.   Negative for decreased urine volume, difficulty urinating, dysuria, flank pain, frequency, genital sores, hematuria, menstrual problem, pelvic pain and urgency. Musculoskeletal: Negative. Negative for arthralgias, back pain, gait problem, joint swelling, myalgias, neck pain and neck stiffness. Skin: Negative. Negative for color change, pallor, rash and wound. Allergic/Immunologic: Negative. Neurological: Positive for dizziness. Negative for tremors, seizures, syncope, facial asymmetry, speech difficulty, weakness, light-headedness, numbness and headaches. Hematological: Negative. Negative for adenopathy. Does not bruise/bleed easily. Psychiatric/Behavioral: Negative. Negative for agitation, behavioral problems, confusion, decreased concentration, dysphoric mood, hallucinations, self-injury, sleep disturbance and suicidal ideas. The patient is not nervous/anxious and is not hyperactive. Past Medical/Surgical Hx;  Reviewed with patient      Diagnosis Date    Diabetes mellitus ([facility] [address]     Diverticulitis     Frequent UTI     Hiatal hernia     Hypertension     Kidney stones     PONV (postoperative nausea and vomiting)     Thyroid disease      Past Surgical History:   Procedure Laterality Date    CHOLECYSTECTOMY, LAPAROSCOPIC N/A 6/20/2019    CHOLECYSTECTOMY LAPAROSCOPIC performed by Armando Padilla MD at [address] [place] [address] ECHO COMPL W DOP COLOR FLOW  8/15/2013         ENDOSCOPY, COLON, DIAGNOSTIC      HYSTERECTOMY      RECTAL PROLAPSE REPAIR         Past Family Hx:  Reviewed with patient  History reviewed. No pertinent family history. Social Hx:  Reviewed with patient  Social History     Tobacco Use    Smoking status: Never Smoker    Smokeless tobacco: Never Used   Substance Use Topics    Alcohol use: No       OBJECTIVE  /64   Pulse 89   Temp 98 °F (36.7 °C)   Ht 5' 7\" (1.702 m)   Wt 142 lb (64.4 kg)   LMP  (LMP Unknown)   SpO2 99%   BMI 22.24 kg/m²     Problem List:  Tyrone Wiggisn does not have any pertinent problems on file.     PHYS EX:  Physical Exam  Vitals signs and nursing note reviewed. Constitutional:       General: She is not in acute distress. Appearance: Normal appearance. She is well-developed and normal weight. She is not ill-appearing, toxic-appearing or diaphoretic. HENT:      Head: Normocephalic and atraumatic. Right Ear: Tympanic membrane, ear canal and external ear normal.      Left Ear: Tympanic membrane, ear canal and external ear normal.      Nose: Congestion and rhinorrhea present. Mouth/Throat:      Mouth: Mucous membranes are moist.      Pharynx: Oropharynx is clear. Posterior oropharyngeal erythema present. No oropharyngeal exudate. Eyes:      General: No scleral icterus. Right eye: No discharge. Left eye: No discharge. Conjunctiva/sclera: Conjunctivae normal.      Pupils: Pupils are equal, round, and reactive to light. Neck:      Musculoskeletal: Normal range of motion and neck supple. No neck rigidity or muscular tenderness. Thyroid: No thyromegaly. Vascular: No carotid bruit or JVD. Trachea: No tracheal deviation. Cardiovascular:      Rate and Rhythm: Normal rate and regular rhythm. Pulses: Normal pulses. Heart sounds: Murmur present. No friction rub. No gallop. Pulmonary:      Effort: Pulmonary effort is normal. No respiratory distress. Breath sounds: Normal breath sounds. No stridor. No wheezing, rhonchi or rales. Chest:      Chest wall: No tenderness. Abdominal:      General: Bowel sounds are normal. There is no distension. Palpations: Abdomen is soft. There is no mass. Tenderness: There is abdominal tenderness. There is no guarding or rebound. Hernia: No hernia is present. Comments: Pt has RUQ and lower abdominal pain. Musculoskeletal: Normal range of motion. General: No swelling, tenderness, deformity or signs of injury. Right lower leg: No edema. Left lower leg: No edema.    Lymphadenopathy:      Cervical:

## 2020-03-23 ENCOUNTER — TELEPHONE (OUTPATIENT)
Dept: FAMILY MEDICINE CLINIC | Age: 60
End: 2020-03-23

## 2020-03-23 RX ORDER — MECLIZINE HYDROCHLORIDE 25 MG/1
TABLET ORAL
Qty: 30 TABLET | Refills: 0 | Status: SHIPPED
Start: 2020-03-23 | End: 2020-08-19 | Stop reason: SDUPTHER

## 2020-03-23 RX ORDER — AMOXICILLIN 250 MG/1
250 CAPSULE ORAL 3 TIMES DAILY
Qty: 30 CAPSULE | Refills: 0 | Status: SHIPPED | OUTPATIENT
Start: 2020-03-23 | End: 2020-04-02

## 2020-03-23 NOTE — TELEPHONE ENCOUNTER
This MA attempted to reach pt - no answer. MA left message for pt advising of additional labs ordered as well as Rx being sent to pharmacy. Advised pt to call with any questions or concerns.     Electronically signed by Osiel Sigala MA on 3/23/20 at 3:50 PM EDT

## 2020-03-23 NOTE — TELEPHONE ENCOUNTER
Pt still wants to know if she can have another round of antibiotics?       Electronically signed by Peng Jain MA on 3/23/20 at 3:23 PM EDT

## 2020-03-23 NOTE — TELEPHONE ENCOUNTER
Call in antivert 25 mg number 30   1/2 to 1 tablet every 8 hours prn NR may cause drowsiness     Add lipase and amylase to lab

## 2020-03-23 NOTE — TELEPHONE ENCOUNTER
This MA attempted to return call to pt - no answer. MA left message for pt requesting return call to office for additional information regarding symptoms.       Electronically signed by Alissa Santiago MA on 3/23/20 at 1:27 PM EDT

## 2020-03-23 NOTE — TELEPHONE ENCOUNTER
Pt returned phone call to office - pt denies fever, cough, or SOB. States that she has a lot of congestion/head fullness, drainage, some dizziness, and a sore throat. Pt also asking for additional labs to be ordered when she goes to get her bloodwork done. Pt wants her pancreas checked. Please advise.     Electronically signed by Murali Holden MA on 3/23/20 at 1:34 PM EDT

## 2020-04-24 ENCOUNTER — VIRTUAL VISIT (OUTPATIENT)
Dept: FAMILY MEDICINE CLINIC | Age: 60
End: 2020-04-24
Payer: COMMERCIAL

## 2020-04-24 ENCOUNTER — TELEPHONE (OUTPATIENT)
Dept: FAMILY MEDICINE CLINIC | Age: 60
End: 2020-04-24

## 2020-04-24 PROCEDURE — 99442 PR PHYS/QHP TELEPHONE EVALUATION 11-20 MIN: CPT | Performed by: FAMILY MEDICINE

## 2020-04-24 RX ORDER — CEFDINIR 300 MG/1
300 CAPSULE ORAL 2 TIMES DAILY
Qty: 20 CAPSULE | Refills: 0 | Status: SHIPPED | OUTPATIENT
Start: 2020-04-24 | End: 2020-05-04

## 2020-04-28 PROBLEM — K04.7 INFECTED TOOTH: Status: ACTIVE | Noted: 2020-04-28

## 2020-04-28 PROBLEM — H92.02 LEFT EAR PAIN: Status: ACTIVE | Noted: 2020-04-28

## 2020-06-02 RX ORDER — LEVOTHYROXINE SODIUM 0.05 MG/1
50 TABLET ORAL DAILY
Qty: 90 TABLET | Refills: 1 | Status: SHIPPED
Start: 2020-06-02 | End: 2020-12-14

## 2020-06-02 NOTE — TELEPHONE ENCOUNTER
Patient called for refill. Chart reviewed - Rx sent to the pharmacy.     Electronically signed by Clemente Vaughn MA on 6/2/20 at 1:04 PM EDT

## 2020-06-29 ENCOUNTER — NURSE TRIAGE (OUTPATIENT)
Dept: OTHER | Facility: CLINIC | Age: 60
End: 2020-06-29

## 2020-06-29 ENCOUNTER — TELEPHONE (OUTPATIENT)
Dept: ADMINISTRATIVE | Age: 60
End: 2020-06-29

## 2020-06-29 ENCOUNTER — VIRTUAL VISIT (OUTPATIENT)
Dept: FAMILY MEDICINE CLINIC | Age: 60
End: 2020-06-29
Payer: COMMERCIAL

## 2020-06-29 PROCEDURE — 99442 PR PHYS/QHP TELEPHONE EVALUATION 11-20 MIN: CPT | Performed by: FAMILY MEDICINE

## 2020-06-29 RX ORDER — CEPHALEXIN 500 MG/1
500 CAPSULE ORAL 3 TIMES DAILY
Qty: 21 CAPSULE | Refills: 0 | Status: SHIPPED | OUTPATIENT
Start: 2020-06-29 | End: 2020-07-06

## 2020-07-01 NOTE — PROGRESS NOTES
TELEPHONE VISIT    Consent:  He and/or health care decision maker is aware that that he may receive a bill for this telephone service, depending on his insurance coverage, and has provided verbal consent to proceed: Yes    Pt does not have access to video computer    Documentation:  I communicated with the patient and/or health care decision maker about  this 56year old female presents with sore throat, and cough for 1 week. Pt c/o dysuria, urinary frequency, and urgency for a few days. Pt denies fever, denies shortness of breath, denies nausea, denies vomiting, and denies chest pain. Details of this discussion including any medical advice provided: Please take tylenol every 6 hours as needed for temperature or aches   Hydrate with 40 to 50 oz fluids   I have sent in medication for you  Please stay in touch and let me know how you are doing  If you get worse, please call asap  You should self isolate   Pt instructed no work until after covid test. Pt instructed no work 14 days. Patient instructed on treatment and prevention UTI. Pt instructed if any worse go ED ASAP. I affirm this is a Patient Initiated Episode with a Patient who has not had a related appointment within my department in the past 7 days or scheduled within the next 24 hours.         Patient's location: {[email or web address]:[id number]::\"home address in [place]\",\"other address in [facility]   Physician  location other address in [facility]   Other people involved in call Dr. Phil Rucker          Total Time: minutes: 11-20 minutes

## 2020-07-09 ENCOUNTER — TELEPHONE (OUTPATIENT)
Dept: FAMILY MEDICINE CLINIC | Age: 60
End: 2020-07-09

## 2020-07-10 RX ORDER — NITROFURANTOIN 25; 75 MG/1; MG/1
100 CAPSULE ORAL 2 TIMES DAILY
Qty: 10 CAPSULE | Refills: 0 | Status: SHIPPED | OUTPATIENT
Start: 2020-07-10 | End: 2020-07-15

## 2020-07-10 RX ORDER — PHENAZOPYRIDINE HYDROCHLORIDE 200 MG/1
200 TABLET, FILM COATED ORAL 3 TIMES DAILY
Qty: 15 TABLET | Refills: 0 | Status: SHIPPED | OUTPATIENT
Start: 2020-07-10 | End: 2020-07-15

## 2020-07-10 NOTE — TELEPHONE ENCOUNTER
Prescriptions sent to pharmacy, patient informed.  (Patient also informed of orange urine = side effect of pyridium.)

## 2020-07-18 ENCOUNTER — APPOINTMENT (OUTPATIENT)
Dept: CT IMAGING | Age: 60
End: 2020-07-18
Payer: COMMERCIAL

## 2020-07-18 ENCOUNTER — HOSPITAL ENCOUNTER (EMERGENCY)
Age: 60
Discharge: HOME | End: 2020-07-18
Attending: FAMILY MEDICINE
Payer: COMMERCIAL

## 2020-07-18 VITALS
HEART RATE: 60 BPM | WEIGHT: 148.06 LBS | SYSTOLIC BLOOD PRESSURE: 110 MMHG | TEMPERATURE: 96.6 F | RESPIRATION RATE: 18 BRPM | BODY MASS INDEX: 21.2 KG/M2 | OXYGEN SATURATION: 99 % | HEIGHT: 70 IN | DIASTOLIC BLOOD PRESSURE: 59 MMHG

## 2020-07-18 LAB
ALBUMIN SERPL-MCNC: 4.3 G/DL (ref 3.5–5.2)
ALP BLD-CCNC: 57 U/L (ref 35–104)
ALT SERPL-CCNC: 7 U/L (ref 0–32)
AMYLASE: 92 U/L (ref 20–100)
ANION GAP SERPL CALCULATED.3IONS-SCNC: 9 MMOL/L (ref 7–16)
AST SERPL-CCNC: 12 U/L (ref 0–31)
BACTERIA: NORMAL /HPF
BASOPHILS ABSOLUTE: 0.05 E9/L (ref 0–0.2)
BASOPHILS RELATIVE PERCENT: 0.5 % (ref 0–2)
BILIRUB SERPL-MCNC: 0.8 MG/DL (ref 0–1.2)
BILIRUBIN URINE: NEGATIVE
BLOOD, URINE: NEGATIVE
BUN BLDV-MCNC: 11 MG/DL (ref 8–23)
CALCIUM SERPL-MCNC: 9 MG/DL (ref 8.6–10.2)
CHLORIDE BLD-SCNC: 104 MMOL/L (ref 98–107)
CLARITY: CLEAR
CO2: 26 MMOL/L (ref 22–29)
COLOR: YELLOW
CREAT SERPL-MCNC: 0.8 MG/DL (ref 0.5–1)
EOSINOPHILS ABSOLUTE: 0.19 E9/L (ref 0.05–0.5)
EOSINOPHILS RELATIVE PERCENT: 2.1 % (ref 0–6)
GFR AFRICAN AMERICAN: >60
GFR NON-AFRICAN AMERICAN: >60 ML/MIN/1.73
GLUCOSE BLD-MCNC: 111 MG/DL (ref 74–99)
GLUCOSE URINE: NEGATIVE MG/DL
HCT VFR BLD CALC: 35.8 % (ref 34–48)
HEMOGLOBIN: 12.3 G/DL (ref 11.5–15.5)
IMMATURE GRANULOCYTES #: 0.02 E9/L
IMMATURE GRANULOCYTES %: 0.2 % (ref 0–5)
KETONES, URINE: NEGATIVE MG/DL
LACTIC ACID: 1.1 MMOL/L (ref 0.5–2.2)
LEUKOCYTE ESTERASE, URINE: ABNORMAL
LIPASE: 51 U/L (ref 13–60)
LYMPHOCYTES ABSOLUTE: 2.15 E9/L (ref 1.5–4)
LYMPHOCYTES RELATIVE PERCENT: 23.6 % (ref 20–42)
MCH RBC QN AUTO: 32 PG (ref 26–35)
MCHC RBC AUTO-ENTMCNC: 34.4 % (ref 32–34.5)
MCV RBC AUTO: 93.2 FL (ref 80–99.9)
MONOCYTES ABSOLUTE: 0.76 E9/L (ref 0.1–0.95)
MONOCYTES RELATIVE PERCENT: 8.3 % (ref 2–12)
NEUTROPHILS ABSOLUTE: 5.95 E9/L (ref 1.8–7.3)
NEUTROPHILS RELATIVE PERCENT: 65.3 % (ref 43–80)
NITRITE, URINE: NEGATIVE
PDW BLD-RTO: 11.3 FL (ref 11.5–15)
PH UA: 6.5 (ref 5–9)
PLATELET # BLD: 300 E9/L (ref 130–450)
PMV BLD AUTO: 8.9 FL (ref 7–12)
POTASSIUM REFLEX MAGNESIUM: 4.2 MMOL/L (ref 3.5–5)
PROTEIN UA: NEGATIVE MG/DL
RBC # BLD: 3.84 E12/L (ref 3.5–5.5)
RBC UA: NORMAL /HPF (ref 0–2)
SODIUM BLD-SCNC: 139 MMOL/L (ref 132–146)
SPECIFIC GRAVITY UA: 1.01 (ref 1–1.03)
TOTAL PROTEIN: 6.8 G/DL (ref 6.4–8.3)
UROBILINOGEN, URINE: 0.2 E.U./DL
WBC # BLD: 9.1 E9/L (ref 4.5–11.5)
WBC UA: NORMAL /HPF (ref 0–5)

## 2020-07-18 PROCEDURE — 2580000003 HC RX 258: Performed by: RADIOLOGY

## 2020-07-18 PROCEDURE — 36415 COLL VENOUS BLD VENIPUNCTURE: CPT

## 2020-07-18 PROCEDURE — 82150 ASSAY OF AMYLASE: CPT

## 2020-07-18 PROCEDURE — 96375 TX/PRO/DX INJ NEW DRUG ADDON: CPT

## 2020-07-18 PROCEDURE — 83605 ASSAY OF LACTIC ACID: CPT

## 2020-07-18 PROCEDURE — 6360000002 HC RX W HCPCS: Performed by: FAMILY MEDICINE

## 2020-07-18 PROCEDURE — 83690 ASSAY OF LIPASE: CPT

## 2020-07-18 PROCEDURE — 96374 THER/PROPH/DIAG INJ IV PUSH: CPT

## 2020-07-18 PROCEDURE — 81001 URINALYSIS AUTO W/SCOPE: CPT

## 2020-07-18 PROCEDURE — 6360000004 HC RX CONTRAST MEDICATION: Performed by: RADIOLOGY

## 2020-07-18 PROCEDURE — 74177 CT ABD & PELVIS W/CONTRAST: CPT

## 2020-07-18 PROCEDURE — 85025 COMPLETE CBC W/AUTO DIFF WBC: CPT

## 2020-07-18 PROCEDURE — 99284 EMERGENCY DEPT VISIT MOD MDM: CPT

## 2020-07-18 PROCEDURE — 80053 COMPREHEN METABOLIC PANEL: CPT

## 2020-07-18 PROCEDURE — 2580000003 HC RX 258: Performed by: FAMILY MEDICINE

## 2020-07-18 RX ORDER — AMOXICILLIN AND CLAVULANATE POTASSIUM 875; 125 MG/1; MG/1
1 TABLET, FILM COATED ORAL 2 TIMES DAILY
Qty: 20 TABLET | Refills: 0 | Status: SHIPPED | OUTPATIENT
Start: 2020-07-18 | End: 2020-07-28

## 2020-07-18 RX ORDER — ONDANSETRON 2 MG/ML
4 INJECTION INTRAMUSCULAR; INTRAVENOUS ONCE
Status: COMPLETED | OUTPATIENT
Start: 2020-07-18 | End: 2020-07-18

## 2020-07-18 RX ORDER — SODIUM CHLORIDE 0.9 % (FLUSH) 0.9 %
10 SYRINGE (ML) INJECTION PRN
Status: COMPLETED | OUTPATIENT
Start: 2020-07-18 | End: 2020-07-18

## 2020-07-18 RX ORDER — KETOROLAC TROMETHAMINE 30 MG/ML
30 INJECTION, SOLUTION INTRAMUSCULAR; INTRAVENOUS ONCE
Status: COMPLETED | OUTPATIENT
Start: 2020-07-18 | End: 2020-07-18

## 2020-07-18 RX ORDER — 0.9 % SODIUM CHLORIDE 0.9 %
1000 INTRAVENOUS SOLUTION INTRAVENOUS ONCE
Status: COMPLETED | OUTPATIENT
Start: 2020-07-18 | End: 2020-07-18

## 2020-07-18 RX ORDER — SULFAMETHOXAZOLE AND TRIMETHOPRIM 800; 160 MG/1; MG/1
1 TABLET ORAL 2 TIMES DAILY
Qty: 14 TABLET | Refills: 0 | Status: SHIPPED | OUTPATIENT
Start: 2020-07-18 | End: 2020-07-25

## 2020-07-18 RX ORDER — TRAMADOL HYDROCHLORIDE 50 MG/1
50 TABLET ORAL EVERY 6 HOURS PRN
Qty: 15 TABLET | Refills: 0 | Status: SHIPPED | OUTPATIENT
Start: 2020-07-18 | End: 2020-07-28

## 2020-07-18 RX ADMIN — KETOROLAC TROMETHAMINE 30 MG: 30 INJECTION, SOLUTION INTRAMUSCULAR at 11:17

## 2020-07-18 RX ADMIN — ONDANSETRON HYDROCHLORIDE 4 MG: 2 SOLUTION INTRAMUSCULAR; INTRAVENOUS at 11:17

## 2020-07-18 RX ADMIN — Medication 10 ML: at 11:56

## 2020-07-18 RX ADMIN — SODIUM CHLORIDE 1000 ML: 9 INJECTION, SOLUTION INTRAVENOUS at 11:10

## 2020-07-18 RX ADMIN — IOPAMIDOL 100 ML: 755 INJECTION, SOLUTION INTRAVENOUS at 11:56

## 2020-07-18 ASSESSMENT — PAIN DESCRIPTION - PAIN TYPE
TYPE: ACUTE PAIN

## 2020-07-18 ASSESSMENT — PAIN DESCRIPTION - ORIENTATION
ORIENTATION: LOWER
ORIENTATION: LOWER;UPPER
ORIENTATION: RIGHT;LEFT;LOWER

## 2020-07-18 ASSESSMENT — PAIN DESCRIPTION - FREQUENCY: FREQUENCY: CONTINUOUS

## 2020-07-18 ASSESSMENT — PAIN DESCRIPTION - PROGRESSION: CLINICAL_PROGRESSION: GRADUALLY WORSENING

## 2020-07-18 ASSESSMENT — PAIN DESCRIPTION - LOCATION
LOCATION: ABDOMEN
LOCATION: ABDOMEN;FLANK;BACK
LOCATION: ABDOMEN

## 2020-07-18 ASSESSMENT — PAIN DESCRIPTION - DESCRIPTORS: DESCRIPTORS: CRAMPING

## 2020-07-18 ASSESSMENT — PAIN SCALES - GENERAL
PAINLEVEL_OUTOF10: 5
PAINLEVEL_OUTOF10: 4
PAINLEVEL_OUTOF10: 5
PAINLEVEL_OUTOF10: 3

## 2020-07-18 ASSESSMENT — PAIN DESCRIPTION - ONSET: ONSET: ON-GOING

## 2020-07-18 NOTE — ED PROVIDER NOTES
HPI:  7/18/20,   Time: 10:42 AM EDT         Patric Meneses is a 56 y.o. female presenting to the ED for   A 2 to 3-week history of intermittent abdominal pain and cramping and bloating sensation associated with nausea but no vomiting. She has been in contact with her primary care physician has been treated on 2 occasions with antibiotics for urinary tract infection. She has not had a urinalysis done at that time. This was done presumably because she has frequent urinary tract infections. She also has a history of diverticulitis. Her abdominal surgeries included a laparoscopic cholecystectomy and she has had a hysterectomy. Her ovaries are still intact. She still has an appendix. When she had the hysterectomy, mesh was placed in the lower abdomen as well. She does have a history of chronically intermittent bloating and intermittent diarrhea. Her last bowel movement was yesterday morning it was loose. She had a colonoscopy in the past, had polyps removed but other minutes mention of abnormality was given to the patient at that time. ROS:   Pertinent positives and negatives are stated within HPI, all other systems reviewed and are negative.  --------------------------------------------- PAST HISTORY ---------------------------------------------  Past Medical History:  has a past medical history of Diabetes mellitus ([facility] [address] Diverticulitis, Frequent UTI, Hiatal hernia, Hypertension, Kidney stones, PONV (postoperative nausea and vomiting), and Thyroid disease. Past Surgical History:  has a past surgical history that includes Hysterectomy; Rectal prolapse repair; ECHO Compl W Dop Color Flow (8/15/2013); Colonoscopy; Endoscopy, colon, diagnostic; and Cholecystectomy, laparoscopic (N/A, 6/20/2019). Social History:  reports that she has never smoked. She has never used smokeless tobacco. She reports that she does not drink alcohol. Family History: family history is not on file.      The patients home medications have been reviewed. Allergies: Flagyl [metronidazole];  Omeprazole; and Vicodin [hydrocodone-acetaminophen]    -------------------------------------------------- RESULTS -------------------------------------------------  All laboratory and radiology results have been personally reviewed by myself   LABS:  Results for orders placed or performed during the hospital encounter of 07/18/20   Urinalysis   Result Value Ref Range    Color, UA Yellow Straw/Yellow    Clarity, UA Clear Clear    Glucose, Ur Negative Negative mg/dL    Bilirubin Urine Negative Negative    Ketones, Urine Negative Negative mg/dL    Specific Gravity, UA 1.010 1.005 - 1.030    Blood, Urine Negative Negative    pH, UA 6.5 5.0 - 9.0    Protein, UA Negative Negative mg/dL    Urobilinogen, Urine 0.2 <2.0 E.U./dL    Nitrite, Urine Negative Negative    Leukocyte Esterase, Urine SMALL (A) Negative   Microscopic Urinalysis   Result Value Ref Range    WBC, UA 2-5 0 - 5 /HPF    RBC, UA NONE 0 - 2 /HPF    Bacteria, UA NONE SEEN None Seen /HPF   Lactic Acid, Plasma   Result Value Ref Range    Lactic Acid 1.1 0.5 - 2.2 mmol/L   CBC Auto Differential   Result Value Ref Range    WBC 9.1 4.5 - 11.5 E9/L    RBC 3.84 3.50 - 5.50 E12/L    Hemoglobin 12.3 11.5 - 15.5 g/dL    Hematocrit 35.8 34.0 - 48.0 %    MCV 93.2 80.0 - 99.9 fL    MCH 32.0 26.0 - 35.0 pg    MCHC 34.4 32.0 - 34.5 %    RDW 11.3 (L) 11.5 - 15.0 fL    Platelets 348 636 - 051 E9/L    MPV 8.9 7.0 - 12.0 fL    Neutrophils % 65.3 43.0 - 80.0 %    Immature Granulocytes % 0.2 0.0 - 5.0 %    Lymphocytes % 23.6 20.0 - 42.0 %    Monocytes % 8.3 2.0 - 12.0 %    Eosinophils % 2.1 0.0 - 6.0 %    Basophils % 0.5 0.0 - 2.0 %    Neutrophils Absolute 5.95 1.80 - 7.30 E9/L    Immature Granulocytes # 0.02 E9/L    Lymphocytes Absolute 2.15 1.50 - 4.00 E9/L    Monocytes Absolute 0.76 0.10 - 0.95 E9/L    Eosinophils Absolute 0.19 0.05 - 0.50 E9/L    Basophils Absolute 0.05 0.00 - 0.20 E9/L   Comprehensive Metabolic Panel w/ Reflex to MG   Result Value Ref Range    Sodium 139 132 - 146 mmol/L    Potassium reflex Magnesium 4.2 3.5 - 5.0 mmol/L    Chloride 104 98 - 107 mmol/L    CO2 26 22 - 29 mmol/L    Anion Gap 9 7 - 16 mmol/L    Glucose 111 (H) 74 - 99 mg/dL    BUN 11 8 - 23 mg/dL    CREATININE 0.8 0.5 - 1.0 mg/dL    GFR Non-African American >60 >=60 mL/min/1.73    GFR African American >60     Calcium 9.0 8.6 - 10.2 mg/dL    Total Protein 6.8 6.4 - 8.3 g/dL    Alb 4.3 3.5 - 5.2 g/dL    Total Bilirubin 0.8 0.0 - 1.2 mg/dL    Alkaline Phosphatase 57 35 - 104 U/L    ALT 7 0 - 32 U/L    AST 12 0 - 31 U/L   Lipase   Result Value Ref Range    Lipase 51 13 - 60 U/L   Amylase   Result Value Ref Range    Amylase 92 20 - 100 U/L       RADIOLOGY:  Interpreted by Radiologist.  CT ABDOMEN PELVIS W IV CONTRAST Additional Contrast? None   Final Result   Acute diverticulitis of the distal descending and sigmoid colon   without complications.                ------------------------- NURSING NOTES AND VITALS REVIEWED ---------------------------   The nursing notes within the ED encounter and vital signs as below have been reviewed. BP (!) 110/59   Pulse 60   Temp 96.6 °F (35.9 °C) (Infrared)   Resp 18   Ht 5' 10\" (1.778 m)   Wt 148 lb 1 oz (67.2 kg)   LMP  (LMP Unknown)   SpO2 99%   BMI 21.24 kg/m²   Oxygen Saturation Interpretation: Normal      ---------------------------------------------------PHYSICAL EXAM--------------------------------------    Constitutional/General: Alert and oriented x3, well appearing, non toxic in NAD  Head: NC/AT  Eyes: PERRL, EOMI  Mouth: Oropharynx clear, handling secretions, no trismus  Neck: Supple, full ROM, no meningeal signs  Pulmonary: Lungs clear to auscultation bilaterally, no wheezes, rales, or rhonchi. Not in respiratory distress  Cardiovascular:  Regular rate and rhythm, no murmurs, gallops, or rubs.  2+ distal pulses  Abdomen: Soft, there is diffuse abdominal tenderness predominantly

## 2020-08-19 ENCOUNTER — VIRTUAL VISIT (OUTPATIENT)
Dept: FAMILY MEDICINE CLINIC | Age: 60
End: 2020-08-19
Payer: COMMERCIAL

## 2020-08-19 ENCOUNTER — TELEPHONE (OUTPATIENT)
Dept: ADMINISTRATIVE | Age: 60
End: 2020-08-19

## 2020-08-19 PROCEDURE — 99214 OFFICE O/P EST MOD 30 MIN: CPT | Performed by: FAMILY MEDICINE

## 2020-08-19 RX ORDER — AMOXICILLIN AND CLAVULANATE POTASSIUM 875; 125 MG/1; MG/1
1 TABLET, FILM COATED ORAL 2 TIMES DAILY
Qty: 14 TABLET | Refills: 0 | Status: SHIPPED | OUTPATIENT
Start: 2020-08-19 | End: 2020-08-26

## 2020-08-19 RX ORDER — MECLIZINE HYDROCHLORIDE 25 MG/1
TABLET ORAL
Qty: 30 TABLET | Refills: 0 | Status: SHIPPED
Start: 2020-08-19 | End: 2020-09-30 | Stop reason: ALTCHOICE

## 2020-08-19 RX ORDER — GUAIFENESIN 600 MG/1
600 TABLET, EXTENDED RELEASE ORAL 2 TIMES DAILY
Qty: 30 TABLET | Refills: 0 | Status: SHIPPED | OUTPATIENT
Start: 2020-08-19 | End: 2020-09-03

## 2020-08-19 NOTE — PROGRESS NOTES
TeleMedicine Video Visit    This visit was performed as a virtual video visit using a synchronous, two-way, audio-video telehealth technology platform. Patient identification was verified at the start of the visit, including the patient's telephone number and physical location. I discussed with the patient the nature of our telehealth visits, that:     1. Due to the nature of an audio- video modality, the only components of a physical exam that could be done are the elements supported by direct observation. 2. I would evaluate the patient and recommend diagnostics and treatments based on my assessment. 3. If it was felt that the patient should be evaluated in clinic or an emergency room setting, then they would be directed there. 4. Our sessions are not being recorded and that personal health information is protected. 5. Our team would provide follow up care in person if/when the patient needs it. Patient does agree to proceed with telemedicine consultation. Patient's location: home address in [facility]  Physician  location other address in [facility] other people involved in call none. Time spent: Greater than Not billed by time    This visit was completed virtually using D[employer]. me      [address]        SUBJECTIVE:  CC: had concerns including Sinus Problem (Patient states she has a sinus infection. Head/face/teeth pressure. Worsened by EGD that was done yesteredy by Dr Sebastián Gaxiola.) and Letter for School/Work (Patient missed work today, plans on returning Monday 8/24/2020, is asking for work excuse. ). HPI:Yahir Abbasi presented to the clinic for a routine visit. [address] [facility] is a 56year old female presenting for a virtual visit today. She reports head congestion for the past 5 days that were worsened after her recent EGD yesterday. She is taking Tylenol without relief. She saw Dr. Benjamin Renner 7/1/20 for a sore throat and cough and did not get a covid test or isolate.  Patient was given conservative treatment measures and did not follow up. She reports her symptoms resolved at that time. Review of Systems   Constitutional: Positive for fatigue. Negative for activity change. HENT: Positive for congestion, rhinorrhea, sinus pressure and sore throat. Negative for ear discharge and ear pain. Eyes: Negative for pain and discharge. Respiratory: Negative for cough, shortness of breath and wheezing. Cardiovascular: Negative for chest pain and palpitations. Gastrointestinal: Negative for abdominal distention, abdominal pain, constipation and diarrhea. Skin: Negative for color change and pallor. Neurological: Negative for seizures and syncope. Outpatient Medications Marked as Taking for the 20 encounter (Virtual Visit) with Aggie Huang MD   Medication Sig Dispense Refill    metFORMIN (GLUCOPHAGE) 500 MG tablet Take 1 tablet by mouth 2 times daily (with meals) 180 tablet 1    meclizine (ANTIVERT) 25 MG tablet 1/2 to 1 tablet every 8 hours prn dizziness 30 tablet 0    [] amoxicillin-clavulanate (AUGMENTIN) 875-125 MG per tablet Take 1 tablet by mouth 2 times daily for 7 days 14 tablet 0    guaiFENesin (MUCINEX) 600 MG extended release tablet Take 1 tablet by mouth 2 times daily for 15 days 30 tablet 0    levothyroxine (SYNTHROID) 50 MCG tablet Take 1 tablet by mouth daily 90 tablet 1    LANSOPRAZOLE PO Take 50 mg by mouth daily       Probiotic Product (PROBIOTIC PO) Take by mouth      allopurinol (ZYLOPRIM) 300 MG tablet Take 1 tablet by mouth daily (Patient taking differently: Take 150 mg by mouth daily ) 90 tablet 1       I have reviewed all pertinent PMHx, PSHx, FamHx, SocialHx, medications, and allergies and updated history as appropriate. OBJECTIVE    VS: LMP  (LMP Unknown)   Physical Exam  Constitutional:       Appearance: Normal appearance. She is not ill-appearing. Neurological:      Mental Status: She is oriented to person, place, and time. Psychiatric:         Mood and Affect: Mood normal.         Behavior: Behavior normal.         ASSESSMENT/PLAN:  1. Acute bacterial sinusitis  Advised to isolate at this time and get Covid test. Anticipated Rtw as long as no fever for 24 hours and symptom improvement is 8/25 or prior if covid negative. Also recommend supplement for zinc and vitamin c. Nasal rinses recommended, salt water gargles, ceapcol spray, and honey with tea. Augmentin and Mucinex also rx. - COVID-19 Ambulatory; Future  - amoxicillin-clavulanate (AUGMENTIN) 875-125 MG per tablet; Take 1 tablet by mouth 2 times daily for 7 days  Dispense: 14 tablet; Refill: 0  - guaiFENesin (MUCINEX) 600 MG extended release tablet; Take 1 tablet by mouth 2 times daily for 15 days  Dispense: 30 tablet; Refill: 0    2. Dizziness  - meclizine (ANTIVERT) 25 MG tablet; 1/2 to 1 tablet every 8 hours prn dizziness  Dispense: 30 tablet; Refill: 0    3. Type 2 diabetes mellitus without complication, without long-term current use of insulin (HCC)  - metFORMIN (GLUCOPHAGE) 500 MG tablet; Take 1 tablet by mouth 2 times daily (with meals)  Dispense: 180 tablet; Refill: 1      I have reviewed my findings and recommendations with Tashi Valderrama MD  8/28/2020 1:33 PM     Counseled regarding above diagnosis, including possible risks and complications, especially if left uncontrolled. Patient counseled on red flag symptoms and if they occur to go to the ED. Discussed medications risk/benefits and possible side effects and alternatives to treatment. Patient and/or guardian verbalizes understanding, agrees, feels comfortable with and wishes to proceed with above treatment plan.       Advised patient regarding importance of keeping up with recommended health maintenance and to schedule as soon as possible if overdue, as this is important in assessing for undiagnosed pathology, especially cancer, as well as protecting against potentially harmful/life threatening disease. Patient and/or guardian verbalizes understanding and agrees with above counseling, assessment and plan. All questions answered. Please note this report has been partially produced using speech recognition software  and may contain errors related to that system including grammar, punctuation and spelling as well as words and phrases that may seem inappropriate. If there are questions or concerns please feel free to contact me to clarify.

## 2020-08-19 NOTE — TELEPHONE ENCOUNTER
Patient called to be seen today as she has a Sinus infection, sore throat,headache and stomach ache. She does not have a smart device to do a VV. She would like to know if she can just get a Telephone Visit? Please advise.

## 2020-08-26 ENCOUNTER — TELEPHONE (OUTPATIENT)
Dept: FAMILY MEDICINE CLINIC | Age: 60
End: 2020-08-26

## 2020-08-26 NOTE — TELEPHONE ENCOUNTER
Spoke with patient. She states she went to [address] [place] [address]. Advised she needs to bring in a copy of the negative result for her records. Then we will write the work slip.   Electronically signed by Monika Webster on 8/26/2020 at 10:42 AM

## 2020-08-26 NOTE — LETTER
[facility]  [address] [place] [id number]  Phone: [phone]  Fax: Kathy Langley MD        August 26, 2020     Patient: Doc Doshi   YOB: 1960   Date of Visit: 8/19/2020       To Whom it May Concern:    Lili Gillespie was seen in my clinic on 8/19/2020. She may return to work on 8/27/2020 with no restrictions. If you have any questions or concerns, please don't hesitate to call.     Sincerely,         Darya Vasquez MD

## 2020-08-26 NOTE — TELEPHONE ENCOUNTER
Patient brought in negative Covid result. Return to work slip provided.   Electronically signed by Amilcar Drummond on 8/26/2020 at 2:37 PM

## 2020-08-27 ENCOUNTER — TELEPHONE (OUTPATIENT)
Dept: FAMILY MEDICINE CLINIC | Age: 60
End: 2020-08-27

## 2020-08-28 ASSESSMENT — ENCOUNTER SYMPTOMS
WHEEZING: 0
COLOR CHANGE: 0
SINUS PRESSURE: 1
SORE THROAT: 1
DIARRHEA: 0
EYE PAIN: 0
SHORTNESS OF BREATH: 0
EYE DISCHARGE: 0
ABDOMINAL PAIN: 0
RHINORRHEA: 1
ABDOMINAL DISTENTION: 0
COUGH: 0
CONSTIPATION: 0

## 2020-09-29 ENCOUNTER — TELEPHONE (OUTPATIENT)
Dept: FAMILY MEDICINE CLINIC | Age: 60
End: 2020-09-29

## 2020-09-29 NOTE — TELEPHONE ENCOUNTER
She needs to come into the office for a sugar check  This could all be related to her sugar  Send in Florastor one BID               Pyridium 200 one TID for 5 days

## 2020-09-29 NOTE — TELEPHONE ENCOUNTER
Patient called stating she has a uti having pressure, frequent urination and burning.   Patient recently being treated for c-diff by Dr Amada Boswell

## 2020-09-29 NOTE — TELEPHONE ENCOUNTER
Left message on voicemail for patient to return call to the office regarding orders.   Electronically signed by Eben Chester on 9/29/2020 at 1:55 PM

## 2020-09-30 ENCOUNTER — HOSPITAL ENCOUNTER (OUTPATIENT)
Age: 60
Discharge: HOME | End: 2020-10-02
Payer: COMMERCIAL

## 2020-09-30 ENCOUNTER — OFFICE VISIT (OUTPATIENT)
Dept: FAMILY MEDICINE CLINIC | Age: 60
End: 2020-09-30
Payer: COMMERCIAL

## 2020-09-30 VITALS
SYSTOLIC BLOOD PRESSURE: 108 MMHG | TEMPERATURE: 97.9 F | HEIGHT: 67 IN | WEIGHT: 150 LBS | DIASTOLIC BLOOD PRESSURE: 64 MMHG | HEART RATE: 65 BPM | BODY MASS INDEX: 23.54 KG/M2 | RESPIRATION RATE: 18 BRPM | OXYGEN SATURATION: 98 %

## 2020-09-30 LAB
BILIRUBIN, POC: NEGATIVE
BLOOD URINE, POC: NORMAL
CHP ED QC CHECK: NORMAL
CLARITY, POC: CLEAR
COLOR, POC: YELLOW
GLUCOSE BLD-MCNC: 104 MG/DL
GLUCOSE URINE, POC: NEGATIVE
HBA1C MFR BLD: 5.4 %
KETONES, POC: NEGATIVE
LEUKOCYTE EST, POC: NORMAL
NITRITE, POC: NEGATIVE
PH, POC: 5.5
PROTEIN, POC: NEGATIVE
SPECIFIC GRAVITY, POC: 1.02
UROBILINOGEN, POC: 0.2

## 2020-09-30 PROCEDURE — 83036 HEMOGLOBIN GLYCOSYLATED A1C: CPT | Performed by: FAMILY MEDICINE

## 2020-09-30 PROCEDURE — 82962 GLUCOSE BLOOD TEST: CPT | Performed by: FAMILY MEDICINE

## 2020-09-30 PROCEDURE — 99214 OFFICE O/P EST MOD 30 MIN: CPT | Performed by: FAMILY MEDICINE

## 2020-09-30 PROCEDURE — 87077 CULTURE AEROBIC IDENTIFY: CPT

## 2020-09-30 PROCEDURE — 87088 URINE BACTERIA CULTURE: CPT

## 2020-09-30 PROCEDURE — 87186 SC STD MICRODIL/AGAR DIL: CPT

## 2020-09-30 PROCEDURE — 81002 URINALYSIS NONAUTO W/O SCOPE: CPT | Performed by: FAMILY MEDICINE

## 2020-09-30 RX ORDER — ALLOPURINOL 300 MG/1
150 TABLET ORAL DAILY
Qty: 45 TABLET | Refills: 1 | Status: SHIPPED
Start: 2020-09-30 | End: 2021-07-29

## 2020-09-30 RX ORDER — VANCOMYCIN HYDROCHLORIDE 250 MG/1
250 CAPSULE ORAL 4 TIMES DAILY
COMMUNITY
End: 2020-11-23

## 2020-09-30 RX ORDER — PHENAZOPYRIDINE HYDROCHLORIDE 200 MG/1
200 TABLET, FILM COATED ORAL 3 TIMES DAILY PRN
Qty: 21 TABLET | Refills: 0 | Status: SHIPPED | OUTPATIENT
Start: 2020-09-30 | End: 2020-10-07

## 2020-10-02 LAB
ORGANISM: ABNORMAL
URINE CULTURE, ROUTINE: ABNORMAL

## 2020-10-05 RX ORDER — SULFAMETHOXAZOLE AND TRIMETHOPRIM 800; 160 MG/1; MG/1
1 TABLET ORAL 2 TIMES DAILY
Qty: 20 TABLET | Refills: 0 | Status: SHIPPED | OUTPATIENT
Start: 2020-10-05 | End: 2020-10-15

## 2020-10-06 PROBLEM — R30.0 DYSURIA: Status: ACTIVE | Noted: 2020-10-06

## 2020-10-06 PROBLEM — A04.72 C. DIFFICILE COLITIS: Status: ACTIVE | Noted: 2020-10-06

## 2020-10-06 PROBLEM — R31.9 HEMATURIA: Status: ACTIVE | Noted: 2020-10-06

## 2020-10-06 PROBLEM — M10.9 GOUT: Status: ACTIVE | Noted: 2020-10-06

## 2020-10-06 ASSESSMENT — ENCOUNTER SYMPTOMS
VISUAL CHANGE: 0
CHEST TIGHTNESS: 0
RESPIRATORY NEGATIVE: 1
COUGH: 0
SORE THROAT: 0
STRIDOR: 0
APNEA: 0
SINUS PAIN: 0
SHORTNESS OF BREATH: 0
EYE PAIN: 0
COLOR CHANGE: 0
EYE REDNESS: 0
FACIAL SWELLING: 0
ORTHOPNEA: 0
RECTAL PAIN: 0
TROUBLE SWALLOWING: 0
PHOTOPHOBIA: 0
CHOKING: 0
RHINORRHEA: 0
BLOOD IN STOOL: 0
BLURRED VISION: 0
ALLERGIC/IMMUNOLOGIC NEGATIVE: 1
SINUS PRESSURE: 0
EYE ITCHING: 0
VOICE CHANGE: 0
ANAL BLEEDING: 0
DIARRHEA: 1
NAUSEA: 0
ABDOMINAL PAIN: 1
EYE DISCHARGE: 0
ABDOMINAL DISTENTION: 0
BACK PAIN: 0
CONSTIPATION: 0
WHEEZING: 0

## 2020-10-06 NOTE — PROGRESS NOTES
SUBJECTIVE  Amy Mcgrath is a 56 y.o. female. HPI/Chief C/O:  Chief Complaint   Patient presents with    Dysuria     Burning with urination, bladder spasms.  Other     Patient is currently being treated by Dr Marybel Buenrostro for C-Diff. (Vancomycin)     Allergies   Allergen Reactions    Flagyl [Metronidazole] Rash    Omeprazole Diarrhea and Nausea And Vomiting     As well as dizziness    Vicodin [Hydrocodone-Acetaminophen] Nausea And Vomiting   The patient is here for a medication list and treatment planning review  We will go over our care planning goals as well as take care of all refills  We will set up labs as well   C/O lower abdominal and flank pains  Burning with her urine    This 56year old female has C. Difficile colitis and follows with GI specialist, has appointment on 10/3/2020. Diabetes   She presents for her follow-up diabetic visit. She has type 2 diabetes mellitus. Hypoglycemia symptoms include nervousness/anxiousness. Pertinent negatives for hypoglycemia include no confusion, dizziness, headaches, pallor, seizures, speech difficulty, sweats or tremors. Pertinent negatives for diabetes include no blurred vision, no chest pain, no fatigue, no foot paresthesias, no foot ulcerations, no polydipsia, no polyphagia, no polyuria, no visual change, no weakness and no weight loss. There are no hypoglycemic complications. Pertinent negatives for diabetic complications include no autonomic neuropathy, CVA, heart disease, nephropathy, peripheral neuropathy, PVD or retinopathy. Risk factors for coronary artery disease include obesity, dyslipidemia, diabetes mellitus and family history. Current diabetic treatment includes diet and oral agent (monotherapy). She is compliant with treatment some of the time. An ACE inhibitor/angiotensin II receptor blocker is not being taken. Hypertension   Associated symptoms include malaise/fatigue.  Pertinent negatives include no anxiety, blurred vision, chest pain, headaches, neck pain, orthopnea, palpitations, peripheral edema, PND, shortness of breath or sweats. Risk factors for coronary artery disease include diabetes mellitus, dyslipidemia, family history and post-menopausal state. Past treatments include lifestyle changes. The current treatment provides significant improvement. Compliance problems include exercise and diet. There is no history of angina, kidney disease, CAD/MI, CVA, heart failure, left ventricular hypertrophy, PVD or retinopathy. Identifiable causes of hypertension include a thyroid problem. There is no history of chronic renal disease, coarctation of the aorta, hyperaldosteronism, hypercortisolism, hyperparathyroidism, a hypertension causing med, pheochromocytoma, renovascular disease or sleep apnea. Dysuria    Associated symptoms include frequency and urgency. Pertinent negatives include no nausea or sweats. Other   Associated symptoms include abdominal pain, arthralgias and myalgias. Pertinent negatives include no chest pain, congestion, coughing, diaphoresis, fatigue, fever, headaches, joint swelling, nausea, neck pain, numbness, rash, sore throat, visual change or weakness. ROS:  Review of Systems   Constitutional: Positive for malaise/fatigue. Negative for activity change, appetite change, diaphoresis, fatigue, fever, unexpected weight change and weight loss. HENT: Negative. Negative for congestion, dental problem, drooling, ear discharge, ear pain, facial swelling, hearing loss, mouth sores, nosebleeds, postnasal drip, rhinorrhea, sinus pressure, sinus pain, sneezing, sore throat, tinnitus, trouble swallowing and voice change. Eyes: Negative for blurred vision, photophobia, pain, discharge, redness, itching and visual disturbance. Respiratory: Negative. Negative for apnea, cough, choking, chest tightness, shortness of breath, wheezing and stridor. Cardiovascular: Negative.   Negative for chest pain, palpitations, orthopnea, leg swelling and PND. Gastrointestinal: Positive for abdominal pain and diarrhea. Negative for abdominal distention, anal bleeding, blood in stool, constipation, nausea and rectal pain. Endocrine: Negative. Negative for cold intolerance, heat intolerance, polydipsia, polyphagia and polyuria. Genitourinary: Positive for dysuria, frequency and urgency. Negative for decreased urine volume, difficulty urinating, enuresis and genital sores. Musculoskeletal: Positive for arthralgias and myalgias. Negative for back pain, gait problem, joint swelling, neck pain and neck stiffness. Skin: Negative. Negative for color change, pallor, rash and wound. Allergic/Immunologic: Negative. Negative for environmental allergies, food allergies and immunocompromised state. Neurological: Negative. Negative for dizziness, tremors, seizures, syncope, facial asymmetry, speech difficulty, weakness, light-headedness, numbness and headaches. Hematological: Negative. Negative for adenopathy. Does not bruise/bleed easily. Psychiatric/Behavioral: Negative for agitation, behavioral problems, confusion, decreased concentration, dysphoric mood, hallucinations, self-injury, sleep disturbance and suicidal ideas. The patient is nervous/anxious. The patient is not hyperactive. Past Medical/Surgical Hx;  Reviewed with patient      Diagnosis Date    Diabetes mellitus ([facility] [address]     Diverticulitis     Frequent UTI     Hiatal hernia     Hypertension     Kidney stones     PONV (postoperative nausea and vomiting)     Thyroid disease      Past Surgical History:   Procedure Laterality Date    CHOLECYSTECTOMY, LAPAROSCOPIC N/A 6/20/2019    CHOLECYSTECTOMY LAPAROSCOPIC performed by Montserrat Morrison MD at [address] [place] [address] ECHO COMPL W DOP COLOR FLOW  8/15/2013         ENDOSCOPY, COLON, DIAGNOSTIC      HYSTERECTOMY      RECTAL PROLAPSE REPAIR         Past Family Hx:  Reviewed with patient  History reviewed.  No pertinent family history. Social Hx:  Reviewed with patient  Social History     Tobacco Use    Smoking status: Never Smoker    Smokeless tobacco: Never Used   Substance Use Topics    Alcohol use: No       OBJECTIVE  /64 (Site: Left Upper Arm, Position: Sitting, Cuff Size: Large Adult)   Pulse 65   Temp 97.9 °F (36.6 °C) (Temporal)   Resp 18   Ht 5' 7\" (1.702 m)   Wt 150 lb (68 kg)   LMP  (LMP Unknown)   SpO2 98%   BMI 23.49 kg/m²     Problem List:  Shaheen Adair does not have any pertinent problems on file. PHYS EX:  Physical Exam  Vitals signs and nursing note reviewed. Constitutional:       General: She is not in acute distress. Appearance: Normal appearance. She is well-developed and normal weight. She is not ill-appearing, toxic-appearing or diaphoretic. HENT:      Head: Normocephalic and atraumatic. Right Ear: Tympanic membrane, ear canal and external ear normal.      Left Ear: Tympanic membrane, ear canal and external ear normal.      Nose: Nose normal. No congestion or rhinorrhea. Mouth/Throat:      Mouth: Mucous membranes are moist.      Pharynx: No oropharyngeal exudate or posterior oropharyngeal erythema. Eyes:      General: No scleral icterus. Right eye: No discharge. Left eye: No discharge. Extraocular Movements: Extraocular movements intact. Conjunctiva/sclera: Conjunctivae normal.      Pupils: Pupils are equal, round, and reactive to light. Neck:      Musculoskeletal: Normal range of motion and neck supple. No neck rigidity or muscular tenderness. Thyroid: No thyromegaly. Vascular: No carotid bruit or JVD. Trachea: No tracheal deviation. Cardiovascular:      Rate and Rhythm: Normal rate and regular rhythm. Pulses: Normal pulses. Heart sounds: Normal heart sounds. No murmur. No friction rub. No gallop. Pulmonary:      Effort: Pulmonary effort is normal. No respiratory distress.       Breath sounds: Normal breath Comprehensive Metabolic Panel; Future  -     Lipid Panel; Future  -     CBC Auto Differential; Future  --diabetic diet   Not controlled. Urinary tract infection without hematuria, site unspecified  Dysuria  hematuria  -     POCT Urinalysis No Micro (Auto)  Not controlled. UA, urine C&S. Pt is on vancomy  History of cholecystectomy  -     Comprehensive Metabolic Panel; Future  -     CBC Auto Differential; Future  Controlled. C. difficile colitis  Stable. GI specialist, vancomycin, probiotic    Gout  Stable. Zyloprim. Pt instructed if any worse go ED ASAP. Outpatient Encounter Medications as of 9/30/2020   Medication Sig Dispense Refill    vancomycin (VANCOCIN) 250 MG capsule Take 250 mg by mouth 4 times daily      allopurinol (ZYLOPRIM) 300 MG tablet Take 0.5 tablets by mouth daily 45 tablet 1    phenazopyridine (PYRIDIUM) 200 MG tablet Take 1 tablet by mouth 3 times daily as needed for Pain 21 tablet 0    metFORMIN (GLUCOPHAGE) 500 MG tablet Take 1 tablet by mouth 2 times daily (with meals) 180 tablet 1    levothyroxine (SYNTHROID) 50 MCG tablet Take 1 tablet by mouth daily 90 tablet 1    LANSOPRAZOLE PO Take 50 mg by mouth daily       Probiotic Product (PROBIOTIC PO) Take by mouth      [DISCONTINUED] meclizine (ANTIVERT) 25 MG tablet 1/2 to 1 tablet every 8 hours prn dizziness 30 tablet 0    [DISCONTINUED] allopurinol (ZYLOPRIM) 300 MG tablet Take 1 tablet by mouth daily (Patient taking differently: Take 150 mg by mouth daily ) 90 tablet 1     No facility-administered encounter medications on file as of 9/30/2020. Return in about 4 weeks (around 10/28/2020).         Reviewed recent labs related to Yahir's current problems      Discussed importance of regular Health Maintenance follow up  Health Maintenance   Topic    Hepatitis C screen     Diabetic foot exam     HIV screen     DTaP/Tdap/Td vaccine (1 - Tdap)    Shingles Vaccine (1 of 2)    Diabetic retinal exam     Flu vaccine (1)

## 2020-11-13 LAB
ORGANISM: ABNORMAL
URINE CULTURE, ROUTINE: ABNORMAL

## 2020-11-23 ENCOUNTER — VIRTUAL VISIT (OUTPATIENT)
Dept: FAMILY MEDICINE CLINIC | Age: 60
End: 2020-11-23
Payer: COMMERCIAL

## 2020-11-23 PROCEDURE — 99213 OFFICE O/P EST LOW 20 MIN: CPT | Performed by: FAMILY MEDICINE

## 2020-11-23 RX ORDER — METHYLPREDNISOLONE 4 MG/1
TABLET ORAL
Qty: 1 KIT | Refills: 0 | Status: SHIPPED | OUTPATIENT
Start: 2020-11-23 | End: 2020-11-29

## 2020-11-24 ENCOUNTER — TELEPHONE (OUTPATIENT)
Dept: FAMILY MEDICINE CLINIC | Age: 60
End: 2020-11-24

## 2020-11-24 NOTE — TELEPHONE ENCOUNTER
Patient's covid test is negative. Patient returned to work. Letter written and faxed.   Electronically signed by Briana Street on 11/24/2020 at 1:24 PM

## 2020-11-27 ASSESSMENT — ENCOUNTER SYMPTOMS
EYE DISCHARGE: 0
ABDOMINAL DISTENTION: 0
CHOKING: 0
EYES NEGATIVE: 1
ABDOMINAL PAIN: 0
RHINORRHEA: 0
SORE THROAT: 1
COUGH: 1
STRIDOR: 0
VOMITING: 0
COLOR CHANGE: 0
EYE PAIN: 0
SINUS PAIN: 1
RECTAL PAIN: 0
SINUS PRESSURE: 1
ANAL BLEEDING: 0
WHEEZING: 0
EYE ITCHING: 0
FACIAL SWELLING: 0
DIARRHEA: 0
PHOTOPHOBIA: 0
BLOOD IN STOOL: 0
ALLERGIC/IMMUNOLOGIC NEGATIVE: 1
TROUBLE SWALLOWING: 0
EYE REDNESS: 0
SHORTNESS OF BREATH: 0
CONSTIPATION: 0
NAUSEA: 0
CHEST TIGHTNESS: 0
BACK PAIN: 0
VOICE CHANGE: 0

## 2020-12-07 ENCOUNTER — VIRTUAL VISIT (OUTPATIENT)
Dept: FAMILY MEDICINE CLINIC | Age: 60
End: 2020-12-07
Payer: COMMERCIAL

## 2020-12-07 PROCEDURE — 99213 OFFICE O/P EST LOW 20 MIN: CPT | Performed by: FAMILY MEDICINE

## 2020-12-07 RX ORDER — DOXYCYCLINE HYCLATE 100 MG
100 TABLET ORAL 2 TIMES DAILY
Qty: 14 TABLET | Refills: 0 | Status: SHIPPED | OUTPATIENT
Start: 2020-12-07 | End: 2020-12-14

## 2020-12-07 NOTE — PROGRESS NOTES
TeleMedicine Video Visit    This visit was performed as a virtual video visit using a synchronous, two-way, audio-video telehealth technology platform. Patient identification was verified at the start of the visit, including the patient's telephone number and physical location. I discussed with the patient the nature of our telehealth visits, that:     1. Due to the nature of an audio- video modality, the only components of a physical exam that could be done are the elements supported by direct observation. 2. I would evaluate the patient and recommend diagnostics and treatments based on my assessment. 3. If it was felt that the patient should be evaluated in clinic or an emergency room setting, then they would be directed there. 4. Our sessions are not being recorded and that personal health information is protected. 5. Our team would provide follow up care in person if/when the patient needs it. Patient does agree to proceed with telemedicine consultation. Patient's location: home address in [facility]  Physician  location other address in [facility] other people involved in call none. Time spent: Greater than Not billed by time    This visit was completed virtually using D[employer]. me      [facility]        SUBJECTIVE:  CC: had concerns including URI (chest congestion; cough-slight production; sinus drainage; denies sob; denies loss of taste/smell; denies fever. Patient seen 11/23 for URI and doesn't feel like she got any better. Patient is tested for Covid weekly at work-results are negative.) and Other (patient consents to telemedicine visit and is at home in [place]). HPI:Yahir Abbasi presented to the clinic for a routine visit. Mental Status: She is oriented to person, place, and time. Psychiatric:         Mood and Affect: Mood normal.         Behavior: Behavior normal.         ASSESSMENT/PLAN:  1. Bronchitis  - XR CHEST STANDARD (2 VW); Future  - doxycycline hyclate (VIBRA-TABS) 100 MG tablet; Take 1 tablet by mouth 2 times daily for 7 days  Dispense: 14 tablet; Refill: 0  - combivent    Patient has a history of cdiff colitis and completed treatment vanc and probiotic reports being off for approximately 1m. Continue Probiotic. I have reviewed my findings and recommendations with Candida Fernandez MD  12/15/2020 4:24 PM     Counseled regarding above diagnosis, including possible risks and complications, especially if left uncontrolled. Patient counseled on red flag symptoms and if they occur to go to the ED. Discussed medications risk/benefits and possible side effects and alternatives to treatment. Patient and/or guardian verbalizes understanding, agrees, feels comfortable with and wishes to proceed with above treatment plan. Advised patient regarding importance of keeping up with recommended health maintenance and to schedule as soon as possible if overdue, as this is important in assessing for undiagnosed pathology, especially cancer, as well as protecting against potentially harmful/life threatening disease. Patient and/or guardian verbalizes understanding and agrees with above counseling, assessment and plan. All questions answered. Please note this report has been partially produced using speech recognition software  and may contain errors related to that system including grammar, punctuation and spelling as well as words and phrases that may seem inappropriate. If there are questions or concerns please feel free to contact me to clarify.

## 2020-12-14 ENCOUNTER — TELEPHONE (OUTPATIENT)
Dept: FAMILY MEDICINE CLINIC | Age: 60
End: 2020-12-14

## 2020-12-14 RX ORDER — LEVOTHYROXINE SODIUM 0.05 MG/1
TABLET ORAL
Qty: 90 TABLET | Refills: 1 | Status: SHIPPED
Start: 2020-12-14 | End: 2021-07-12 | Stop reason: SDUPTHER

## 2020-12-14 NOTE — TELEPHONE ENCOUNTER
Patient called stated she had a COVID-19 test at work on Friday, 12/11/20 and is positive. Patient c/o chest heaviness, headache and diarrhea and is asking if RX can be sent to help. Informed patient will send msg, but if symptoms increase/worsen, go to ED. Patient stated understanding.      Last seen 12/7/2020  Next appt Visit date not found  [facility]

## 2020-12-14 NOTE — TELEPHONE ENCOUNTER
Returned phone call to pt - scheduled for virtual visit tomorrow 12/13 with Dr. Niesha Hogue.     Electronically signed by Brian Mason MA on 12/14/20 at 4:31 PM EST

## 2020-12-15 ENCOUNTER — VIRTUAL VISIT (OUTPATIENT)
Dept: FAMILY MEDICINE CLINIC | Age: 60
End: 2020-12-15
Payer: COMMERCIAL

## 2020-12-15 PROCEDURE — 99213 OFFICE O/P EST LOW 20 MIN: CPT | Performed by: FAMILY MEDICINE

## 2020-12-15 RX ORDER — PREDNISONE 20 MG/1
20 TABLET ORAL 2 TIMES DAILY
Qty: 10 TABLET | Refills: 0 | Status: SHIPPED | OUTPATIENT
Start: 2020-12-15 | End: 2020-12-20

## 2020-12-15 RX ORDER — ALBUTEROL SULFATE 90 UG/1
2 AEROSOL, METERED RESPIRATORY (INHALATION) EVERY 6 HOURS PRN
Qty: 3 INHALER | Refills: 1 | Status: SHIPPED
Start: 2020-12-15 | End: 2022-10-07

## 2020-12-15 ASSESSMENT — ENCOUNTER SYMPTOMS
SHORTNESS OF BREATH: 0
SINUS PAIN: 1
CONSTIPATION: 0
WHEEZING: 1
NAUSEA: 0
COUGH: 1
VOMITING: 0
DIARRHEA: 0
ABDOMINAL PAIN: 0

## 2020-12-15 NOTE — PROGRESS NOTES
TeleMedicine Video Visit    This visit was performed as a virtual video visit using a synchronous, two-way, audio-video telehealth technology platform. Patient identification was verified at the start of the visit, including the patient's telephone number and physical location. I discussed with the patient the nature of our telehealth visits, that:     1. Due to the nature of an audio- video modality, the only components of a physical exam that could be done are the elements supported by direct observation. 2. I would evaluate the patient and recommend diagnostics and treatments based on my assessment. 3. If it was felt that the patient should be evaluated in clinic or an emergency room setting, then they would be directed there. 4. Our sessions are not being recorded and that personal health information is protected. 5. Our team would provide follow up care in person if/when the patient needs it. Patient does agree to proceed with telemedicine consultation. Patient's location: home address in [facility]  Physician  location other address in [facility] other people involved in call none. Time spent: Greater than Not billed by time    This visit was completed virtually using Doxy. me      [facility]        SUBJECTIVE:  CC: had concerns including Positive For Covid-19 (patient tested for  Covid 12/11/2020. She is Positive. C/O chest congestion; cough-productive; sinus pressure; Denies fever or sob. C/O slight loss of taste/smell) and Other (patient consents to telemedicine visit and is at home in [place]). HPI:Yahir Abbasi presented to the clinic for a routine visit. Opal Krueger is a 56year old female presenting for a virtual visit today to follow up after testing positive for covid . She reports being sick since , but kept testing negative until the other day. She was prescribed a steroid burst, Doxycyline, and Combivent in the past. She reports the Combivent was too expensive, so she never picked it up. She reports improvement in her symptoms, but notes \"not feeling all the way better yet. \" Mainly she notes muscle aches and fatigue. As well she has some residual congestion and loss of taste/smell. Review of Systems   Constitutional: Positive for fatigue. Negative for appetite change and fever. HENT: Positive for congestion and sinus pressure. Respiratory: Positive for cough. Negative for shortness of breath and wheezing. Cardiovascular: Negative for chest pain and palpitations. Gastrointestinal: Negative for abdominal pain, constipation, diarrhea, nausea and vomiting. Musculoskeletal: Positive for myalgias. Negative for back pain. Outpatient Medications Marked as Taking for the 12/15/20 encounter (Virtual Visit) with Analilia Osorio MD   Medication Sig Dispense Refill    albuterol sulfate HFA (VENTOLIN HFA) 108 (90 Base) MCG/ACT inhaler Inhale 2 puffs into the lungs every 6 hours as needed for Wheezing or Shortness of Breath 3 Inhaler 1    [] predniSONE (DELTASONE) 20 MG tablet Take 1 tablet by mouth 2 times daily for 5 days 10 tablet 0    levothyroxine (SYNTHROID) 50 MCG tablet Take 1 tablet by mouth once daily 90 tablet 1    allopurinol (ZYLOPRIM) 300 MG tablet Take 0.5 tablets by mouth daily 45 tablet 1    metFORMIN (GLUCOPHAGE) 500 MG tablet Take 1 tablet by mouth 2 times daily (with meals) 180 tablet 1    LANSOPRAZOLE PO Take 50 mg by mouth daily       Probiotic Product (PROBIOTIC PO) Take by mouth         I have reviewed all pertinent PMHx, PSHx, FamHx, SocialHx, medications, and allergies and updated history as appropriate. OBJECTIVE    VS: LMP  (LMP Unknown)   Physical Exam  Constitutional:       General: She is not in acute distress. Appearance: Normal appearance. She is not ill-appearing. Neurological:      Mental Status: She is oriented to person, place, and time. Psychiatric:         Mood and Affect: Mood normal.         Behavior: Behavior normal.         ASSESSMENT/PLAN:  1. COVID-19  Recommend quarantining per cdc guidelines. As well recommend vitamin c, d, zinc, and aspirin regimen. - albuterol sulfate HFA (VENTOLIN HFA) 108 (90 Base) MCG/ACT inhaler; Inhale 2 puffs into the lungs every 6 hours as needed for Wheezing or Shortness of Breath  Dispense: 3 Inhaler; Refill: 1  - predniSONE (DELTASONE) 20 MG tablet; Take 1 tablet by mouth 2 times daily for 5 days  Dispense: 10 tablet; Refill: 0      I have reviewed my findings and recommendations with Mena Link MD  12/28/2020 10:36 AM     Counseled regarding above diagnosis, including possible risks and complications, especially if left uncontrolled. Patient counseled on red flag symptoms and if they occur to go to the ED. Discussed medications risk/benefits and possible side effects and alternatives to treatment. Patient and/or guardian verbalizes understanding, agrees, feels comfortable with and wishes to proceed with above treatment plan. Advised patient regarding importance of keeping up with recommended health maintenance and to schedule as soon as possible if overdue, as this is important in assessing for undiagnosed pathology, especially cancer, as well as protecting against potentially harmful/life threatening disease. Patient and/or guardian verbalizes understanding and agrees with above counseling, assessment and plan. All questions answered.     Please note this report has been partially produced using speech recognition software and may contain errors related to that system including grammar, punctuation and spelling as well as words and phrases that may seem inappropriate. If there are questions or concerns please feel free to contact me to clarify.

## 2020-12-28 ASSESSMENT — ENCOUNTER SYMPTOMS
VOMITING: 0
CONSTIPATION: 0
COUGH: 1
WHEEZING: 0
ABDOMINAL PAIN: 0
SHORTNESS OF BREATH: 0
NAUSEA: 0
BACK PAIN: 0
DIARRHEA: 0
SINUS PRESSURE: 1

## 2021-02-23 DIAGNOSIS — E11.9 TYPE 2 DIABETES MELLITUS WITHOUT COMPLICATION, WITHOUT LONG-TERM CURRENT USE OF INSULIN (HCC): ICD-10-CM

## 2021-05-28 DIAGNOSIS — E11.9 TYPE 2 DIABETES MELLITUS WITHOUT COMPLICATION, WITHOUT LONG-TERM CURRENT USE OF INSULIN (HCC): ICD-10-CM

## 2021-06-16 ENCOUNTER — HOSPITAL ENCOUNTER (OUTPATIENT)
Dept: MAMMOGRAPHY | Age: 61
Discharge: HOME | End: 2021-06-18
Payer: COMMERCIAL

## 2021-06-16 ENCOUNTER — HOSPITAL ENCOUNTER (OUTPATIENT)
Dept: ULTRASOUND IMAGING | Age: 61
Discharge: HOME | End: 2021-06-16
Payer: COMMERCIAL

## 2021-06-16 VITALS — WEIGHT: 160 LBS | BODY MASS INDEX: 24.25 KG/M2 | HEIGHT: 68 IN

## 2021-06-16 DIAGNOSIS — N63.10 MASS OF RIGHT BREAST: ICD-10-CM

## 2021-06-16 DIAGNOSIS — N63.10 BREAST MASS, RIGHT: ICD-10-CM

## 2021-06-16 PROCEDURE — G0279 TOMOSYNTHESIS, MAMMO: HCPCS

## 2021-06-16 PROCEDURE — 76642 ULTRASOUND BREAST LIMITED: CPT

## 2021-06-18 DIAGNOSIS — N63.10 BREAST MASS, RIGHT: Primary | ICD-10-CM

## 2021-06-21 ENCOUNTER — TELEPHONE (OUTPATIENT)
Dept: GENERAL RADIOLOGY | Age: 61
End: 2021-06-21

## 2021-06-21 NOTE — TELEPHONE ENCOUNTER
[place] Friend left me a voicemail message. After speaking with Percy Soliman at Dr. Otilio Edward office (Pt. Was referred to Dr. Angella Jaime), I returned patient's call. She is scheduled for breast biopsy on 7/1/21 at [address] [place] [address],[address], but would like to have breast biopsy at [facility]. Appointment changed to 6/24/21 at [facility]. Instructed that we will provide an appointment with  on the day of her biopsy. She is agreeable to this.  Electronically signed by Ariadne Taylor, RN, BSN on 6/21/2021 at 1:51 PM

## 2021-06-24 ENCOUNTER — HOSPITAL ENCOUNTER (OUTPATIENT)
Dept: GENERAL RADIOLOGY | Age: 61
Discharge: HOME | End: 2021-06-26
Payer: COMMERCIAL

## 2021-06-24 DIAGNOSIS — N63.10 BREAST MASS, RIGHT: ICD-10-CM

## 2021-06-24 PROCEDURE — 88342 IMHCHEM/IMCYTCHM 1ST ANTB: CPT

## 2021-06-24 PROCEDURE — 88360 TUMOR IMMUNOHISTOCHEM/MANUAL: CPT

## 2021-06-24 PROCEDURE — A4648 IMPLANTABLE TISSUE MARKER: HCPCS

## 2021-06-24 PROCEDURE — 77065 DX MAMMO INCL CAD UNI: CPT

## 2021-06-24 PROCEDURE — 2500000003 HC RX 250 WO HCPCS

## 2021-06-24 PROCEDURE — 88305 TISSUE EXAM BY PATHOLOGIST: CPT

## 2021-06-28 DIAGNOSIS — E11.9 TYPE 2 DIABETES MELLITUS WITHOUT COMPLICATION, WITHOUT LONG-TERM CURRENT USE OF INSULIN (HCC): ICD-10-CM

## 2021-06-28 NOTE — TELEPHONE ENCOUNTER
Pt called in she is out of metformin 500mg. Can we send it to [address] in [facility]? Pt made appt for July 28 she said the is the soonest she can get it.

## 2021-07-01 ENCOUNTER — TELEPHONE (OUTPATIENT)
Dept: GENERAL RADIOLOGY | Age: 61
End: 2021-07-01

## 2021-07-01 DIAGNOSIS — Z17.0 MALIGNANT NEOPLASM OF BREAST IN FEMALE, ESTROGEN RECEPTOR POSITIVE, UNSPECIFIED LATERALITY, UNSPECIFIED SITE OF BREAST (HCC): Primary | ICD-10-CM

## 2021-07-01 DIAGNOSIS — C50.919 MALIGNANT NEOPLASM OF BREAST IN FEMALE, ESTROGEN RECEPTOR POSITIVE, UNSPECIFIED LATERALITY, UNSPECIFIED SITE OF BREAST (HCC): Primary | ICD-10-CM

## 2021-07-01 NOTE — PROGRESS NOTES
completed. See documentation in the flow sheets. Past Medical History:   Diagnosis Date    Diabetes mellitus ([address]     Diverticulitis     Frequent UTI     Hiatal hernia     Hypertension     Kidney stones     PONV (postoperative nausea and vomiting)     Thyroid disease        Past Surgical History:   Procedure Laterality Date    CHOLECYSTECTOMY, LAPAROSCOPIC N/A 6/20/2019    CHOLECYSTECTOMY LAPAROSCOPIC performed by Sandra Rodriguez MD [facility] ECHO COMPL W DOP COLOR FLOW  8/15/2013         ENDOSCOPY, COLON, DIAGNOSTIC      HYSTERECTOMY      RECTAL PROLAPSE REPAIR      US BREAST NEEDLE BIOPSY RIGHT Right 6/24/2021    US BREAST NEEDLE BIOPSY RIGHT 6/24/2021 [facility]       Current Outpatient Medications   Medication Sig Dispense Refill    metFORMIN (GLUCOPHAGE) 500 MG tablet Take 1 tablet by mouth 2 times daily (with meals) Further refill requires appointment. 60 tablet 0    albuterol sulfate HFA (VENTOLIN HFA) 108 (90 Base) MCG/ACT inhaler Inhale 2 puffs into the lungs every 6 hours as needed for Wheezing or Shortness of Breath 3 Inhaler 1    levothyroxine (SYNTHROID) 50 MCG tablet Take 1 tablet by mouth once daily 90 tablet 1    allopurinol (ZYLOPRIM) 300 MG tablet Take 0.5 tablets by mouth daily 45 tablet 1    LANSOPRAZOLE PO Take 50 mg by mouth daily       Probiotic Product (PROBIOTIC PO) Take by mouth       No current facility-administered medications for this visit.        Allergies   Allergen Reactions    Flagyl [Metronidazole] Rash    Omeprazole Diarrhea and Nausea And Vomiting     As well as dizziness    Vicodin [Hydrocodone-Acetaminophen] Nausea And Vomiting       Family History   Problem Relation Age of Onset    Breast Cancer Mother     Prostate Cancer Father     Prostate Cancer Brother     Breast Cancer Maternal Aunt     Prostate Cancer Paternal Uncle        Social History     Socioeconomic History    Marital status:      Spouse name: Not on file    Number of children: Not on file    Years of education: Not on file    Highest education level: Not on file   Occupational History    Not on file   Tobacco Use    Smoking status: Never Smoker    Smokeless tobacco: Never Used   Substance and Sexual Activity    Alcohol use: No    Drug use: Not on file    Sexual activity: Not on file   Other Topics Concern    Not on file   Social History Narrative    Not on file     Social Determinants of Health     Financial Resource Strain:     Difficulty of Paying Living Expenses:    Food Insecurity:     Worried About Running Out of Food in the Last Year:     [address] in the Last Year:    Transportation Needs:     Lack of Transportation (Medical):  Lack of Transportation (Non-Medical):    Physical Activity:     Days of Exercise per Week:     Minutes of Exercise per Session:    Stress:     Feeling of Stress :    Social Connections:     Frequency of Communication with Friends and Family:     Frequency of Social Gatherings with Friends and Family:     Attends [employer] Services:     Active Member of Clubs or Organizations:     Attends Club or Organization Meetings:     Marital Status:    Intimate Partner Violence:     Fear of Current or Ex-Partner:     Emotionally Abused:     Physically Abused:     Sexually Abused:        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 Enjoys grandchildren and dogs. Review of Systems  CONSTITUTIONAL: No fever, chills. Good appetite and energy level. ENMT: Eyes: No diplopia; Nose: No epistaxis. Mouth: No sore throat. RESPIRATORY: No hemoptysis, shortness of breath, cough. CARDIOVASCULAR: No chest pain, pressure, or palpitations. GASTROINTESTINAL: No nausea/vomiting, abdominal pain, diarrhea/constipation. Symptomatic reflux. No blood in the stools. GENITOURINARY: No dysuria, urinary frequency, hematuria. NEURO: No syncope, presyncope, headache.   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 Left calf after pregnancy on coumadin for 1 year, 1984. Review of systems as noted above completely reviewed with patient. No changes. Had Covid, severs, December 2020. Objective:   Physical Exam  Vitals and nursing note reviewed. Constitutional:       General: She is not in acute distress. Appearance: She is well-developed. She is not diaphoretic. HENT:      Head: Normocephalic and atraumatic. Eyes:      Conjunctiva/sclera: Conjunctivae normal.      Pupils: Pupils are equal, round, and reactive to light. Neck:      Thyroid: No thyromegaly. Trachea: No tracheal deviation. Cardiovascular:      Rate and Rhythm: Normal rate and regular rhythm. Heart sounds: Normal heart sounds. Pulmonary:      Effort: Pulmonary effort is normal. No respiratory distress. Breath sounds: Normal breath sounds. Chest:      Breasts: Breasts are symmetrical.         Right: Mass present. No inverted nipple, nipple discharge, skin change or tenderness. Left: No inverted nipple, mass, nipple discharge, skin change or tenderness. Comments: Breast ptosis bilaterally, significant  Abdominal:      General: There is no distension. Palpations: Abdomen is soft. Musculoskeletal:      Cervical back: Normal range of motion and neck supple. Lymphadenopathy:      Cervical: No cervical adenopathy. Upper Body:      Right upper body: No supraclavicular adenopathy. Left upper body: No supraclavicular adenopathy. Skin:     General: Skin is warm and dry. Coloration: Skin is not pale. Findings: No erythema. Neurological:      Mental Status: She is alert and oriented to person, place, and time. Psychiatric:         Behavior: Behavior normal.         Thought Content:  Thought content normal.         Judgment: Judgment normal.         Assessment:      59 y.o. woman who underwent a stereotactic/US guided right breast core biopsy at 8 o'clock position on June 24 , 2021. Pathological evaluation completed at [facility]:   This report is revised by RS on 07/06/2021 in order to correct the   specimen designation from \"left\" to \"right\". The FINAL DIAGNOSIS remains   UNCHANGED. Originally reported on 06/30/2021. Diagnosis:   Right breast at 8:00, core needle biopsy: Invasive carcinoma of no   special type (ductal), grade 2; see comment. Comment:     The carcinoma displays highly infiltrative architectural   features including single file and single cell patterns of invasion. Strong membranous E-cadherin expression is diffusely present in   association with the invasive carcinoma, supportive of ductal origin. No   in situ component is identified in the tissue sample. Benedicto histologic score for the invasive carcinoma corresponds to a   tubule score 3, nuclear score 3, mitotic score 1, total score 7, grade 2.    Intradepartmental consultation is obtained. Breast Cancer Marker Studies:     Estrogen Receptors (ER): Positive   Percentage of cells positive: 90%   Intensity: Strong     Progesterone Receptors (MT): Positive   Percentage of cells positive: 30%   Intensity: Moderate to strong     Her-2/stone (c-erb B-2) protein expression: Negative/0         6/16/2021:BILATERAL DIAGNOSTIC MAMMOGRAM    FINDINGS:   Breast tissue composition is heterogeneously dense which could obscure an   underlying mass.       In the posterior central right breast at about the 9 o'clock position there   is an irregular spiculated mass with architectural distortion measuring up to   2.9 cm. This is suspicious for malignancy.  No other spiculated mass or   suspicious microcalcification cluster is identified.           Ultrasound:       Targeted sonographic examination of the right breast was performed with   attention to the palpable abnormality, upper outer quadrant and axilla.       There is a large irregular solid hypoechoic mass at about the 8 to 9 o'clock   position measuring 2.4 x 1.4 x 1.4 cm.  It shows some internal color flow and   acoustic shadowing.  It is taller than wide and is felt to correspond to the   palpable abnormality and the mass seen on the mammogram.  Overall, these   findings are very suspicious for malignancy.       No other cyst or solid mass is seen.  No mass or lymphadenopathy is seen in   the right axilla.           Impression   1. Bilateral heterogeneously dense breast that could obscure an underlying   mass. 2. Large irregular hypoechoic solid mass seen on ultrasound and mammogram at   about the 8 to 9 o'clock position measuring 2.4 x 1.4 x 1.4 cm.  This is very   suspicious for malignancy and ultrasound-guided core biopsy is suggested.       BIRADS:   BIRADS - CATEGORY 5       Highly Suggestive for Malignancy.  Biopsy should be considered at this time.       OVERALL ASSESSMENT - HIGHLY SUGGESTIVE OF MALIGNANCY.       6/16/2021: ULTRASOUND RIGHT BREAST: [facility]  FINDINGS:   Breast tissue composition is heterogeneously dense which could obscure an   underlying mass.       In the posterior central right breast at about the 9 o'clock position there   is an irregular spiculated mass with architectural distortion measuring up to   2.9 cm. This is suspicious for malignancy.  No other spiculated mass or   suspicious microcalcification cluster is identified.           Ultrasound:       Targeted sonographic examination of the right breast was performed with   attention to the palpable abnormality, upper outer quadrant and axilla.       There is a large irregular solid hypoechoic mass at about the 8 to 9 o'clock   position measuring 2.4 x 1.4 x 1.4 cm.  It shows some internal color flow and   acoustic shadowing.  It is taller than wide and is felt to correspond to the   palpable abnormality and the mass seen on the mammogram.  Overall, these   findings are very suspicious for malignancy.       No other cyst or solid mass is seen.  No mass or lymphadenopathy is seen in   the right axilla.           Impression   1. Bilateral heterogeneously dense breast that could obscure an underlying   mass. 2. Large irregular hypoechoic solid mass seen on ultrasound and mammogram at   about the 8 to 9 o'clock position measuring 2.4 x 1.4 x 1.4 cm.  This is very   suspicious for malignancy and ultrasound-guided core biopsy is suggested.       BIRADS:   BIRADS - CATEGORY 5       Highly Suggestive for Malignancy.  Biopsy should be considered at this time.       OVERALL ASSESSMENT - HIGHLY SUGGESTIVE OF MALIGNANCY.         Clinical T2 right breast cancer lower outer quadrant. Bilateral marked breast ptosis and very dense breast tissue. Candidate for both genetic testing and an MRI. Daughter had genetic testing for microcalcifications bilaterally and reports that it was negative. Likely candidate for oncoplastic breast reduction at the time of conservative therapy to facilitate a better outcome from her radiation therapy. Referral placed to plastic surgery. Questions answered to patient, daughters, and 's satisfaction. Plan:      1. Metastatic workup to include CXR, CBC, CMP, completed with this visit. Sample drawn for genetic testing. MRI will be scheduled. 2. Patient has met with our Breast Navigator for information and to receive literature. 3. If indicated outside slides will be obtained and reviewed by Pathology at [facility]. 4. She would likely be a candidate for conservative therapy with possible oncoplastic reduction. 5. We had a discussion of the treatment options for breast cancer including risks, benefits, and recurrence rates for breast conservation therapy versus mastectomy. We discussed reconstruction options. We discussed the indications and risks of sentinel lymph node biopsy and possibility of axillary lymph node dissection.  We also discussed the possible role of systemic and radiation therapy. NCCN guidelines were utilized in our discussion. The patient was given the appropriate contact numbers and will call with any questions or concerns. Face-to-face time 55 minutes, greater than 50% in counseling, education, and coordination of care. My final recommendation will be communicated back to the referring physician by way of shared medical record and/or written letter via [phone] [facility] mail. I personally and independently saw and examined patient and reviewed all pertinent laboratory data and imaging studies. I have reviewed and agree with the CNP history and review of systems as documented in the above note. This document is generated, in part, by voice recognition software and thus syntax and grammatical errors are possible. Marina Martinez MD [facility]  July 6, 2021

## 2021-07-01 NOTE — TELEPHONE ENCOUNTER
Call to patient regarding her recent  breast biopsy results. Instructed on her breast biopsy pathology findings including cancer type (IDC) and hormone receptor status (ER+, KS+, HER2-). Instructed on next steps including breast surgery consultation with Dr. Rebeka Villalobos. Instructed to report to the breast center on 7/6/21 at [address] [place] [address]  for labwork and xray and possible meeting with nurse navigJemal. Instructed that she will meet with  around 10:00 for surgical consultation. I answered her questions to her apparent satisfaction. Packet prepared containing a guide to the breast cancer pathology report, handout on the diagnosis and treatment of invasive ductal carcinoma , exercises after breast surgery booklet,  Svetlana's Sister Support group information, clinical trials brochure, list of outpatient counseling agencies, and list of local oncology practices. Packet given to nurse navigator- Zach Lilly.  Electronically signed by Jono Liu RN, BSN on 7/1/2021 at 8:52 AM

## 2021-07-06 ENCOUNTER — HOSPITAL ENCOUNTER (OUTPATIENT)
Dept: GENERAL RADIOLOGY | Age: 61
Discharge: HOME | End: 2021-07-08
Payer: COMMERCIAL

## 2021-07-06 ENCOUNTER — TELEPHONE (OUTPATIENT)
Dept: BREAST CENTER | Age: 61
End: 2021-07-06

## 2021-07-06 ENCOUNTER — OFFICE VISIT (OUTPATIENT)
Dept: BREAST CENTER | Age: 61
End: 2021-07-06
Payer: COMMERCIAL

## 2021-07-06 VITALS
WEIGHT: 166.9 LBS | OXYGEN SATURATION: 98 % | HEART RATE: 74 BPM | DIASTOLIC BLOOD PRESSURE: 78 MMHG | HEIGHT: 67 IN | RESPIRATION RATE: 18 BRPM | SYSTOLIC BLOOD PRESSURE: 126 MMHG | BODY MASS INDEX: 26.19 KG/M2 | TEMPERATURE: 97 F

## 2021-07-06 DIAGNOSIS — Z17.0 MALIGNANT NEOPLASM OF BREAST IN FEMALE, ESTROGEN RECEPTOR POSITIVE, UNSPECIFIED LATERALITY, UNSPECIFIED SITE OF BREAST (HCC): ICD-10-CM

## 2021-07-06 DIAGNOSIS — C50.919 MALIGNANT NEOPLASM OF BREAST IN FEMALE, ESTROGEN RECEPTOR POSITIVE, UNSPECIFIED LATERALITY, UNSPECIFIED SITE OF BREAST (HCC): ICD-10-CM

## 2021-07-06 DIAGNOSIS — C50.911 MALIGNANT NEOPLASM OF RIGHT BREAST IN FEMALE, ESTROGEN RECEPTOR POSITIVE, UNSPECIFIED SITE OF BREAST (HCC): ICD-10-CM

## 2021-07-06 DIAGNOSIS — Z17.0 MALIGNANT NEOPLASM OF RIGHT BREAST IN FEMALE, ESTROGEN RECEPTOR POSITIVE, UNSPECIFIED SITE OF BREAST (HCC): ICD-10-CM

## 2021-07-06 LAB
ALBUMIN SERPL-MCNC: 4.3 G/DL (ref 3.5–5.2)
ALP BLD-CCNC: 63 U/L (ref 35–104)
ALT SERPL-CCNC: 11 U/L (ref 0–32)
ANION GAP SERPL CALCULATED.3IONS-SCNC: 13 MMOL/L (ref 7–16)
AST SERPL-CCNC: 14 U/L (ref 0–31)
BASOPHILS ABSOLUTE: 0.06 E9/L (ref 0–0.2)
BASOPHILS RELATIVE PERCENT: 0.8 % (ref 0–2)
BILIRUB SERPL-MCNC: 0.4 MG/DL (ref 0–1.2)
BUN BLDV-MCNC: 17 MG/DL (ref 6–23)
CALCIUM SERPL-MCNC: 9.9 MG/DL (ref 8.6–10.2)
CHLORIDE BLD-SCNC: 100 MMOL/L (ref 98–107)
CO2: 25 MMOL/L (ref 22–29)
CREAT SERPL-MCNC: 0.8 MG/DL (ref 0.5–1)
EOSINOPHILS ABSOLUTE: 0.32 E9/L (ref 0.05–0.5)
EOSINOPHILS RELATIVE PERCENT: 4.2 % (ref 0–6)
GFR AFRICAN AMERICAN: >60
GFR NON-AFRICAN AMERICAN: >60 ML/MIN/1.73
GLUCOSE BLD-MCNC: 166 MG/DL (ref 74–99)
HCT VFR BLD CALC: 40.9 % (ref 34–48)
HEMOGLOBIN: 13.4 G/DL (ref 11.5–15.5)
IMMATURE GRANULOCYTES #: 0.02 E9/L
IMMATURE GRANULOCYTES %: 0.3 % (ref 0–5)
LYMPHOCYTES ABSOLUTE: 2.73 E9/L (ref 1.5–4)
LYMPHOCYTES RELATIVE PERCENT: 35.6 % (ref 20–42)
MCH RBC QN AUTO: 30 PG (ref 26–35)
MCHC RBC AUTO-ENTMCNC: 32.8 % (ref 32–34.5)
MCV RBC AUTO: 91.7 FL (ref 80–99.9)
MONOCYTES ABSOLUTE: 0.58 E9/L (ref 0.1–0.95)
MONOCYTES RELATIVE PERCENT: 7.6 % (ref 2–12)
NEUTROPHILS ABSOLUTE: 3.96 E9/L (ref 1.8–7.3)
NEUTROPHILS RELATIVE PERCENT: 51.5 % (ref 43–80)
PDW BLD-RTO: 11.7 FL (ref 11.5–15)
PLATELET # BLD: 383 E9/L (ref 130–450)
PMV BLD AUTO: 9.8 FL (ref 7–12)
POTASSIUM SERPL-SCNC: 4.6 MMOL/L (ref 3.5–5)
RBC # BLD: 4.46 E12/L (ref 3.5–5.5)
SODIUM BLD-SCNC: 138 MMOL/L (ref 132–146)
TOTAL PROTEIN: 7.5 G/DL (ref 6.4–8.3)
WBC # BLD: 7.7 E9/L (ref 4.5–11.5)

## 2021-07-06 PROCEDURE — 99214 OFFICE O/P EST MOD 30 MIN: CPT | Performed by: SURGERY

## 2021-07-06 PROCEDURE — 71046 X-RAY EXAM CHEST 2 VIEWS: CPT

## 2021-07-06 PROCEDURE — 99203 OFFICE O/P NEW LOW 30 MIN: CPT | Performed by: SURGERY

## 2021-07-06 PROCEDURE — 36415 COLL VENOUS BLD VENIPUNCTURE: CPT | Performed by: SURGERY

## 2021-07-06 NOTE — TELEPHONE ENCOUNTER
Authorization obtained for breast MRI  W WO contrast.     Spoke with Olga Dalton with [employer].   Authorization number: [id number]  Valid: 7/6/21-8/4/21

## 2021-07-06 NOTE — PATIENT INSTRUCTIONS
RELEASE OF RESULTS AND RECORDS  As of October 1, 2020, all results (x-ray reports, labwork, pathology reports) will be released through [address] in real time per federal law. Once your test results are final, they will be automatically released to your electronic medical record [employer] where you will be able to see those results and any clinical notes associated with that result. In some cases, you may see those results and notes before your provider or the staff have had a chance to review. We will make every effort to contact you, especially about any abnormal or confusing results. If you view a result before we have contacted you, please wait up to one business day for us to reach you before calling with questions. If anything in your notes seems inaccurate, please message us through [employer] with potential corrections. If you see a term or language in your clinical note that doesn't make sense, please use the online health library reference tool in your MyChart (under the Health tab)  to help you interpret the note.       Referral to Dr rosenthal, office will call to schedule        MRI- WILL CALL TO SCHEDULE

## 2021-07-06 NOTE — LETTER
[facility] [address] [address]  Phone: [phone]  Fax: [phone]    Flores Burden MD    July 6, 2021     Savage Medel DO  [address] [facility] [address] [facility]    Patient: Barbara Cruz   MR Number: [id number]   YOB: 1960   Date of Visit: 7/6/2021       Dear Nasreen Narvaez:    Thank you for referring Josh Ruiz to me for evaluation/treatment. Below are the relevant portions of my assessment and plan of care. 59 y.o. woman who underwent a stereotactic/US guided right breast core biopsy at 8 o'clock position on June 24 , 2021. Pathological evaluation completed at [facility]:   This report is revised by RS on 07/06/2021 in order to correct the   specimen designation from \"left\" to \"right\". The FINAL DIAGNOSIS remains   UNCHANGED. Originally reported on 06/30/2021. Diagnosis:   Right breast at 8:00, core needle biopsy: Invasive carcinoma of no   special type (ductal), grade 2; see comment. Comment:     The carcinoma displays highly infiltrative architectural   features including single file and single cell patterns of invasion. Strong membranous E-cadherin expression is diffusely present in   association with the invasive carcinoma, supportive of ductal origin. No   in situ component is identified in the tissue sample. Benedicto histologic score for the invasive carcinoma corresponds to a   tubule score 3, nuclear score 3, mitotic score 1, total score 7, grade 2.    Intradepartmental consultation is obtained.      Breast Cancer Marker Studies:     Estrogen Receptors (ER): Positive   Percentage of cells positive: 90%   Intensity: Strong     Progesterone Receptors (IA): Positive   Percentage of cells positive: 30%   Intensity: Moderate to strong     Her-2/stone (c-erb B-2) protein expression: Negative/0         6/16/2021:BILATERAL DIAGNOSTIC MAMMOGRAM    FINDINGS:   Breast tissue composition is heterogeneously dense which could obscure an   underlying mass.       In the posterior central right breast at about the 9 o'clock position there   is an irregular spiculated mass with architectural distortion measuring up to   2.9 cm. This is suspicious for malignancy. No other spiculated mass or   suspicious microcalcification cluster is identified.           Ultrasound:       Targeted sonographic examination of the right breast was performed with   attention to the palpable abnormality, upper outer quadrant and axilla.       There is a large irregular solid hypoechoic mass at about the 8 to 9 o'clock   position measuring 2.4 x 1.4 x 1.4 cm.  It shows some internal color flow and   acoustic shadowing.  It is taller than wide and is felt to correspond to the   palpable abnormality and the mass seen on the mammogram.  Overall, these   findings are very suspicious for malignancy.       No other cyst or solid mass is seen.  No mass or lymphadenopathy is seen in   the right axilla.           Impression   1. Bilateral heterogeneously dense breast that could obscure an underlying   mass. 2. Large irregular hypoechoic solid mass seen on ultrasound and mammogram at   about the 8 to 9 o'clock position measuring 2.4 x 1.4 x 1.4 cm.  This is very   suspicious for malignancy and ultrasound-guided core biopsy is suggested.       BIRADS:   BIRADS - CATEGORY 5       Highly Suggestive for Malignancy.  Biopsy should be considered at this time.       OVERALL ASSESSMENT - HIGHLY SUGGESTIVE OF MALIGNANCY.       6/16/2021: ULTRASOUND RIGHT BREAST: [facility]  FINDINGS:   Breast tissue composition is heterogeneously dense which could obscure an   underlying mass.       In the posterior central right breast at about the 9 o'clock position there   is an irregular spiculated mass with architectural distortion measuring up to   2.9 cm. This is suspicious for malignancy.  No other spiculated mass or   suspicious microcalcification cluster is identified.           Ultrasound:       Targeted sonographic examination of the right breast was performed with   attention to the palpable abnormality, upper outer quadrant and axilla.       There is a large irregular solid hypoechoic mass at about the 8 to 9 o'clock   position measuring 2.4 x 1.4 x 1.4 cm.  It shows some internal color flow and   acoustic shadowing.  It is taller than wide and is felt to correspond to the   palpable abnormality and the mass seen on the mammogram.  Overall, these   findings are very suspicious for malignancy.       No other cyst or solid mass is seen.  No mass or lymphadenopathy is seen in   the right axilla.           Impression   1. Bilateral heterogeneously dense breast that could obscure an underlying   mass. 2. Large irregular hypoechoic solid mass seen on ultrasound and mammogram at   about the 8 to 9 o'clock position measuring 2.4 x 1.4 x 1.4 cm.  This is very   suspicious for malignancy and ultrasound-guided core biopsy is suggested.       BIRADS:   BIRADS - CATEGORY 5       Highly Suggestive for Malignancy.  Biopsy should be considered at this time.       OVERALL ASSESSMENT - HIGHLY SUGGESTIVE OF MALIGNANCY.         Clinical T2 right breast cancer lower outer quadrant. Bilateral marked breast ptosis and very dense breast tissue. Candidate for both genetic testing and an MRI. Daughter had genetic testing for microcalcifications bilaterally and reports that it was negative. Likely candidate for oncoplastic breast reduction at the time of conservative therapy to facilitate a better outcome from her radiation therapy. Referral placed to plastic surgery. Questions answered to patient, daughters, and 's satisfaction. 1. Metastatic workup to include CXR, CBC, CMP, completed with this visit. Sample drawn for genetic testing. MRI will be scheduled.   2. Patient has met with our Breast Navigator for information and to receive literature. 3. If indicated outside slides will be obtained and reviewed by Pathology at [facility]. 4. She would likely be a candidate for conservative therapy with possible oncoplastic reduction. 5. We had a discussion of the treatment options for breast cancer including risks, benefits, and recurrence rates for breast conservation therapy versus mastectomy. We discussed reconstruction options. We discussed the indications and risks of sentinel lymph node biopsy and possibility of axillary lymph node dissection. We also discussed the possible role of systemic and radiation therapy. NCCN guidelines were utilized in our discussion. The patient was given the appropriate contact numbers and will call with any questions or concerns. If you have questions, please do not hesitate to call me. I look forward to following Yahir along with you.     Sincerely,  Anya Avendaño MD

## 2021-07-08 ENCOUNTER — TELEPHONE (OUTPATIENT)
Dept: SURGERY | Age: 61
End: 2021-07-08

## 2021-07-08 ENCOUNTER — HOSPITAL ENCOUNTER (OUTPATIENT)
Dept: MRI IMAGING | Age: 61
Discharge: HOME | End: 2021-07-10
Payer: COMMERCIAL

## 2021-07-08 DIAGNOSIS — C50.911 MALIGNANT NEOPLASM OF RIGHT BREAST IN FEMALE, ESTROGEN RECEPTOR POSITIVE, UNSPECIFIED SITE OF BREAST (HCC): ICD-10-CM

## 2021-07-08 DIAGNOSIS — Z17.0 MALIGNANT NEOPLASM OF RIGHT BREAST IN FEMALE, ESTROGEN RECEPTOR POSITIVE, UNSPECIFIED SITE OF BREAST (HCC): ICD-10-CM

## 2021-07-08 PROCEDURE — A9585 GADOBUTROL INJECTION: HCPCS | Performed by: RADIOLOGY

## 2021-07-08 PROCEDURE — 6360000004 HC RX CONTRAST MEDICATION: Performed by: RADIOLOGY

## 2021-07-08 PROCEDURE — 77049 MRI BREAST C-+ W/CAD BI: CPT

## 2021-07-08 RX ADMIN — GADOBUTROL 7 ML: 604.72 INJECTION INTRAVENOUS at 12:47

## 2021-07-08 NOTE — TELEPHONE ENCOUNTER
Called to discuss MRI results. Candidate for conservative therapy. Has upcoming appt. with Plastic Surgery. Await [facility] results.

## 2021-07-12 ENCOUNTER — OFFICE VISIT (OUTPATIENT)
Dept: FAMILY MEDICINE CLINIC | Age: 61
End: 2021-07-12
Payer: COMMERCIAL

## 2021-07-12 VITALS
OXYGEN SATURATION: 95 % | HEART RATE: 66 BPM | RESPIRATION RATE: 18 BRPM | SYSTOLIC BLOOD PRESSURE: 118 MMHG | BODY MASS INDEX: 26.37 KG/M2 | HEIGHT: 67 IN | DIASTOLIC BLOOD PRESSURE: 80 MMHG | WEIGHT: 168 LBS | TEMPERATURE: 98 F

## 2021-07-12 DIAGNOSIS — R53.83 FATIGUE, UNSPECIFIED TYPE: ICD-10-CM

## 2021-07-12 DIAGNOSIS — E03.9 ACQUIRED HYPOTHYROIDISM: ICD-10-CM

## 2021-07-12 DIAGNOSIS — N39.0 RECURRENT UTI: ICD-10-CM

## 2021-07-12 DIAGNOSIS — E11.9 TYPE 2 DIABETES MELLITUS WITHOUT COMPLICATION, WITHOUT LONG-TERM CURRENT USE OF INSULIN (HCC): ICD-10-CM

## 2021-07-12 DIAGNOSIS — J01.90 ACUTE BACTERIAL SINUSITIS: Primary | ICD-10-CM

## 2021-07-12 DIAGNOSIS — R73.01 IFG (IMPAIRED FASTING GLUCOSE): ICD-10-CM

## 2021-07-12 DIAGNOSIS — K21.9 GASTROESOPHAGEAL REFLUX DISEASE WITHOUT ESOPHAGITIS: ICD-10-CM

## 2021-07-12 DIAGNOSIS — E78.2 MIXED HYPERLIPIDEMIA: ICD-10-CM

## 2021-07-12 DIAGNOSIS — B96.89 ACUTE BACTERIAL SINUSITIS: Primary | ICD-10-CM

## 2021-07-12 LAB
BILIRUBIN, POC: NEGATIVE
BLOOD URINE, POC: ABNORMAL
CHOLESTEROL, TOTAL: 241 MG/DL (ref 0–199)
CLARITY, POC: ABNORMAL
COLOR, POC: YELLOW
CREATININE URINE POCT: 50
GLUCOSE URINE, POC: NEGATIVE
HBA1C MFR BLD: 6.9 % (ref 4–5.6)
HDLC SERPL-MCNC: 53 MG/DL
KETONES, POC: NEGATIVE
LDL CHOLESTEROL CALCULATED: 154 MG/DL (ref 0–99)
LEUKOCYTE EST, POC: ABNORMAL
MICROALBUMIN/CREAT 24H UR: 80 MG/G{CREAT}
MICROALBUMIN/CREAT UR-RTO: ABNORMAL
NITRITE, POC: NEGATIVE
PH, POC: 6.5
PROTEIN, POC: 30
SPECIFIC GRAVITY, POC: 1.02
TRIGL SERPL-MCNC: 171 MG/DL (ref 0–149)
TSH SERPL DL<=0.05 MIU/L-ACNC: 2.78 UIU/ML (ref 0.27–4.2)
UROBILINOGEN, POC: 0.2
VLDLC SERPL CALC-MCNC: 34 MG/DL

## 2021-07-12 PROCEDURE — 99214 OFFICE O/P EST MOD 30 MIN: CPT | Performed by: FAMILY MEDICINE

## 2021-07-12 PROCEDURE — 81003 URINALYSIS AUTO W/O SCOPE: CPT | Performed by: FAMILY MEDICINE

## 2021-07-12 PROCEDURE — 82044 UR ALBUMIN SEMIQUANTITATIVE: CPT | Performed by: FAMILY MEDICINE

## 2021-07-12 RX ORDER — AMOXICILLIN AND CLAVULANATE POTASSIUM 875; 125 MG/1; MG/1
1 TABLET, FILM COATED ORAL 2 TIMES DAILY
Qty: 20 TABLET | Refills: 0 | Status: SHIPPED | OUTPATIENT
Start: 2021-07-12 | End: 2021-07-22

## 2021-07-12 RX ORDER — LANSOPRAZOLE 30 MG/1
30 CAPSULE, DELAYED RELEASE ORAL DAILY
Qty: 90 CAPSULE | Refills: 1 | Status: SHIPPED
Start: 2021-07-12 | End: 2022-01-26 | Stop reason: SDUPTHER

## 2021-07-12 RX ORDER — LEVOTHYROXINE SODIUM 0.05 MG/1
50 TABLET ORAL DAILY
Qty: 90 TABLET | Refills: 1 | Status: SHIPPED
Start: 2021-07-12 | End: 2022-01-26 | Stop reason: SDUPTHER

## 2021-07-12 SDOH — ECONOMIC STABILITY: FOOD INSECURITY: WITHIN THE PAST 12 MONTHS, THE FOOD YOU BOUGHT JUST DIDN'T LAST AND YOU DIDN'T HAVE MONEY TO GET MORE.: NEVER TRUE

## 2021-07-12 SDOH — ECONOMIC STABILITY: FOOD INSECURITY: WITHIN THE PAST 12 MONTHS, YOU WORRIED THAT YOUR FOOD WOULD RUN OUT BEFORE YOU GOT MONEY TO BUY MORE.: NEVER TRUE

## 2021-07-12 ASSESSMENT — PATIENT HEALTH QUESTIONNAIRE - PHQ9
SUM OF ALL RESPONSES TO PHQ QUESTIONS 1-9: 0
SUM OF ALL RESPONSES TO PHQ9 QUESTIONS 1 & 2: 0
SUM OF ALL RESPONSES TO PHQ QUESTIONS 1-9: 0
1. LITTLE INTEREST OR PLEASURE IN DOING THINGS: 0
2. FEELING DOWN, DEPRESSED OR HOPELESS: 0
SUM OF ALL RESPONSES TO PHQ QUESTIONS 1-9: 0

## 2021-07-12 ASSESSMENT — SOCIAL DETERMINANTS OF HEALTH (SDOH): HOW HARD IS IT FOR YOU TO PAY FOR THE VERY BASICS LIKE FOOD, HOUSING, MEDICAL CARE, AND HEATING?: NOT HARD AT ALL

## 2021-07-13 ENCOUNTER — CLINICAL DOCUMENTATION (OUTPATIENT)
Dept: FAMILY MEDICINE CLINIC | Age: 61
End: 2021-07-13

## 2021-07-13 NOTE — PROGRESS NOTES
The 10-year ASCVD risk score (Lillian Diaz et al., 2013) is: 8.4%    Values used to calculate the score:      Age: 59 years      Sex: Female      Is Non- : No      Diabetic: Yes      Tobacco smoker: No      Systolic Blood Pressure: 434 mmHg      Is BP treated: No      HDL Cholesterol: 53 mg/dL      Total Cholesterol: 241 mg/dL

## 2021-07-14 LAB
ORGANISM: ABNORMAL
URINE CULTURE, ROUTINE: ABNORMAL

## 2021-07-16 ENCOUNTER — HOSPITAL ENCOUNTER (OUTPATIENT)
Age: 61
Discharge: HOME | End: 2021-07-18
Payer: COMMERCIAL

## 2021-07-16 ENCOUNTER — OFFICE VISIT (OUTPATIENT)
Dept: SURGERY | Age: 61
End: 2021-07-16
Payer: COMMERCIAL

## 2021-07-16 VITALS
DIASTOLIC BLOOD PRESSURE: 80 MMHG | TEMPERATURE: 97.3 F | SYSTOLIC BLOOD PRESSURE: 122 MMHG | HEART RATE: 77 BPM | BODY MASS INDEX: 24.55 KG/M2 | WEIGHT: 162 LBS | OXYGEN SATURATION: 97 % | HEIGHT: 68 IN

## 2021-07-16 DIAGNOSIS — L98.9 SKIN LESION: Primary | ICD-10-CM

## 2021-07-16 DIAGNOSIS — Z85.3 HISTORY OF RIGHT BREAST CANCER: ICD-10-CM

## 2021-07-16 PROCEDURE — 11102 TANGNTL BX SKIN SINGLE LES: CPT | Performed by: PLASTIC SURGERY

## 2021-07-16 PROCEDURE — 99204 OFFICE O/P NEW MOD 45 MIN: CPT | Performed by: PLASTIC SURGERY

## 2021-07-16 PROCEDURE — 11103 TANGNTL BX SKIN EA SEP/ADDL: CPT | Performed by: PLASTIC SURGERY

## 2021-07-16 RX ORDER — LIDOCAINE HYDROCHLORIDE AND EPINEPHRINE 10; 10 MG/ML; UG/ML
3 INJECTION, SOLUTION INFILTRATION; PERINEURAL ONCE
Status: DISCONTINUED | OUTPATIENT
Start: 2021-07-16 | End: 2022-10-07

## 2021-07-16 NOTE — PROGRESS NOTES
Department of Plastic Surgery - Adult  Attending Consult Note          CHIEF COMPLAINT:  History of Right Breast Cancer    History Obtained From:  patient    HISTORY OF PRESENT ILLNESS:                The patient is a 59 y.o. female who presents with History of Right Breast Cancer. She is here today to discuss her options on breast reconstruction. The patient has met with Dr. Marin Poon  and has elected  to proceed with lumpectomy. She is here today to discuss the possibility of a bilateral breast reduction as her reconstructive option. She states she has had neck and back pain from from breasts for many years.  The patients Breast Cancer History is obtained from the patient and also notes from the care team.      Past Medical History:    Past Medical History:   Diagnosis Date    Diabetes mellitus ([address]     Diverticulitis     Frequent UTI     Hiatal hernia     Hypertension     Kidney stones     PONV (postoperative nausea and vomiting)     Thyroid disease      Past Surgical History:    Past Surgical History:   Procedure Laterality Date    CHOLECYSTECTOMY, LAPAROSCOPIC N/A 6/20/2019    CHOLECYSTECTOMY LAPAROSCOPIC performed by Stefani Conley MD at [address] [place] [address] ECHO COMPL W DOP COLOR FLOW  8/15/2013         ENDOSCOPY, COLON, DIAGNOSTIC      HYSTERECTOMY      RECTAL PROLAPSE REPAIR      US BREAST NEEDLE BIOPSY RIGHT Right 6/24/2021    US BREAST NEEDLE BIOPSY RIGHT 6/24/2021 CHRIS NAZARIO [facility]     Current Medications:      Current Outpatient Medications   Medication Sig Dispense Refill    metFORMIN (GLUCOPHAGE) 500 MG tablet Take 1 tablet by mouth 2 times daily (with meals) 180 tablet 1    levothyroxine (SYNTHROID) 50 MCG tablet Take 1 tablet by mouth Daily 90 tablet 1    lansoprazole (PREVACID) 30 MG delayed release capsule Take 1 capsule by mouth daily 90 capsule 1    amoxicillin-clavulanate (AUGMENTIN) 875-125 MG per tablet Take 1 tablet by mouth 2 times daily for 10 days 20 tablet 0    albuterol sulfate HFA (VENTOLIN HFA) 108 (90 Base) MCG/ACT inhaler Inhale 2 puffs into the lungs every 6 hours as needed for Wheezing or Shortness of Breath 3 Inhaler 1    allopurinol (ZYLOPRIM) 300 MG tablet Take 0.5 tablets by mouth daily 45 tablet 1    Probiotic Product (PROBIOTIC PO) Take by mouth       No current facility-administered medications for this visit. Allergies:  Flagyl [metronidazole], Omeprazole, and Vicodin [hydrocodone-acetaminophen]    Social History:   Social History     Socioeconomic History    Marital status:      Spouse name: Not on file    Number of children: Not on file    Years of education: Not on file    Highest education level: Not on file   Occupational History    Not on file   Tobacco Use    Smoking status: Never Smoker    Smokeless tobacco: Never Used   Substance and Sexual Activity    Alcohol use: No    Drug use: Not on file    Sexual activity: Not on file   Other Topics Concern    Not on file   Social History Narrative    Not on file     Social Determinants of Health     Financial Resource Strain: Low Risk     Difficulty of Paying Living Expenses: Not hard at all   Food Insecurity: No Food Insecurity    Worried About [facility] in the Last Year: Never true    [facility] in the Last Year: Never true   Transportation Needs:     Lack of Transportation (Medical):      Lack of Transportation (Non-Medical):    Physical Activity:     Days of Exercise per Week:     Minutes of Exercise per Session:    Stress:     Feeling of Stress :    Social Connections:     Frequency of Communication with Friends and Family:     Frequency of Social Gatherings with Friends and Family:     Attends Hinduism Services:     Active Member of Clubs or Organizations:     Attends Club or Organization Meetings:     Marital Status:    Intimate Partner Violence:     Fear of Current or Ex-Partner:     Emotionally Abused:     Physically Abused:     Sexually Abused:      Family History:   Family History   Problem Relation Age of Onset   [de-identified] Breast Cancer Mother 52        breast cancer    Prostate Cancer Father     Prostate Cancer Brother     Breast Cancer Maternal Aunt 48        breast cancer    Prostate Cancer Paternal Uncle     Breast Cancer Paternal Grandmother 48        breast cancer       REVIEW OF SYSTEMS:    CONSTITUTIONAL:  negative  RESPIRATORY:  negative  CARDIOVASCULAR:  negative  GASTROINTESTINAL:  negative  BEHAVIOR/PSYCH:  negative  All other review of systems negative      I have reviewed the chief complaint and history of present illness including (ROS and PFSH) and vital documentation by my staff and I agree with their documentation and have added when applicable. PHYSICAL EXAM:          VITALS:  /80 (Site: Left Upper Arm, Position: Sitting, Cuff Size: Medium Adult)   Pulse 77   Temp 97.3 °F (36.3 °C) (Temporal)   Ht 5' 8\" (1.727 m)   Wt 162 lb (73.5 kg)   LMP  (LMP Unknown)   SpO2 97%   Breastfeeding No   BMI 24.63 kg/m²   CONSTITUTIONAL:  awake, alert, cooperative, no apparent distress, and appears stated age  EYES: PERRLA, EOMI, no signs of occular infection  MUSCULOSKELETAL: negative for flaccid muscle tone or spastic movements. NEURO: Cranial nerves II-XII grossly intact. No signs of agitated mood. LUNGS:  No increased work of breathing, good air exchange, clear to auscultation bilaterally, no crackles or wheezing  CARDIOVASCULAR:  Normal apical impulse, regular rate and rhythm,   ABDOMEN:  Normal bowel sounds, soft, non-distended, non-tender,     LEFT BREAST: Rash is not noted, There are no masses palpated, no axillary lymphadenopathy, no nipple discharge. No breast pain. There are no previous scars. Pendulous breasts with grade 3 ptosis    RIGHT BREAST: Rash is not noted, There are masses palpated , no axillary lymphadenopathy, no nipple discharge. No breast pain. There are no previous scars.  Pendulous breast with grade 3 ptosis      DATA:      Breast Measurements were taken and are noted in the media section. Hemoglobin A1C 6.9High   4.0 - 5.6 % Final 07/12/2021 10:10 AM [facility]   Testing Performed By      IMPRESSION/RECOMMENDATIONS:      Diagnosis  -) right Breast Cancer    -Breast Measurements obtained and will be scanned into media. Photos were obtained. Chaperone present  -Based on the patients history, ROS, and PE, It is in my medical opinion the patients diagnosis and symptomology would benefit from a bilateral breast reduction. The risks, benefits and options were discussed with the pt. The risks included but not limited to pain, bleeding, infection, heavy scarring, damage to surrounding structures,  and need for further procedures. Other risks including but not limited to asymmetry, loss of nipple or/and areola, loss of sensation to nipple and areola, inability to breast feed, seroma, hematoma, implant failure, capsular contracture, and fat necrosis were also discussed with the patient. All of her questions were answered. .      Plastic and Reconstructive surgical procedure- Right Oncoplastic Breast Reduction with matching Left breast reduction    I informed patient that while proceeding her breast reduction there is no definitive bra size that could be obtained as the signs for breast will be indeterminate upon the amount of tissue reduction needed to perform her lumpectomy. Furthermore described with the patient that radiation therapy changes postoperatively will change the size of her breast that is affected by her cancer side. I informed the patient that we will plan tentatively to leave her breast with her lumpectomy slightly larger in anticipation that this will become smaller after radiation therapy. Patient voices understanding that there is no guarantee with the exact size or symmetry following her bilateral breast reduction.     All of her questions were answered to her satisfaction and she agrees to proceed with the operation. Face-to-face time greater than 45 minutes, greater than 50% in counseling, education, and coordination of care. Photos were obtained. Chaperone present    The perioperative course was discussed with the patient. The risks and benefits, including but not limited to, bleeding, infection, pain, scarring, asymmetry, loss of tissue, including nipple and areola, unsatisfactory appearance, decreased or increased nipple sensation, poor healing, inability to lactate as well as the need for further surgery were discussed with the patient. She would like to proceed. -We will plan to proceed and have the mid chest and left breast suspicious lesions biopsied.  -The risks, benefits and options were discussed with the pt. The risks included but not limited to pain, bleeding, infection, heavy scarring, damage to surrounding structures, fluid collections, asymmetry, and need for further procedures. All of Her questions were answered to their satisfaction and She agrees to proceed with the procedure.  -After consent was obtained, the area was cleansed with an alcohol swab. Local anesthesia consisting of 1% lidocaine with 1:100,000 epinepherine was injected  surrounding the area. The local was allowed to work. Using a 10-blade the lesion was shaved, hemostasis was obtained and a bandage was placed. The procedure was performed by Sandy Blount MD      The patient was educated to keep the bandage in place and apply bacitracin to the wound site BID. OK to shower at this time. Advised the patient that they can allow soap and water to rinse of the wound site while showering. Once they are done in the shower they are to pat dry the incision site with a clean paper towel. No baths, hot tubs or soaking of the wound site at this time. Pt voices understanding.      I have discussed with the patient that they should make every attempt to follow-up within this time interval in the event that the pathology or radiographic findings require further attention such as surgical or other medical intervention. They voiced complete understanding. Photos obtained    I informed the patient we will call her with the results of her shave biopsy and add these to her surgical planning if necessary. Call with signs of infection or fever (Increase in pain, redness, or drainage)  Follow up 1 week    This document is generated, in part, by voice recognition software and thus  syntax and grammatical errors are possible.     Rebecca Booker MD  10:02 AM  7/27/2021

## 2021-07-18 PROBLEM — K21.9 GASTROESOPHAGEAL REFLUX DISEASE WITHOUT ESOPHAGITIS: Status: ACTIVE | Noted: 2021-07-18

## 2021-07-18 ASSESSMENT — ENCOUNTER SYMPTOMS
CHOKING: 0
NAUSEA: 0
BACK PAIN: 0
PHOTOPHOBIA: 0
STRIDOR: 0
TROUBLE SWALLOWING: 0
COUGH: 1
CHEST TIGHTNESS: 0
SHORTNESS OF BREATH: 0
WHEEZING: 0
SORE THROAT: 0
RECTAL PAIN: 0
DIARRHEA: 0
BLOOD IN STOOL: 0
EYE REDNESS: 0
FACIAL SWELLING: 0
EYE PAIN: 0
RHINORRHEA: 1
EYE ITCHING: 0
COLOR CHANGE: 0
ABDOMINAL PAIN: 0
ANAL BLEEDING: 0
ABDOMINAL DISTENTION: 0
CONSTIPATION: 0
EYE DISCHARGE: 0
VOICE CHANGE: 0
SINUS PRESSURE: 1
APNEA: 0
ALLERGIC/IMMUNOLOGIC NEGATIVE: 1
SINUS PAIN: 1

## 2021-07-19 ENCOUNTER — TELEPHONE (OUTPATIENT)
Dept: BREAST CENTER | Age: 61
End: 2021-07-19

## 2021-07-19 NOTE — TELEPHONE ENCOUNTER
I spoke with John Geiger in reference to her [facility] genetic analysis. The results are Negative. No clinically significant mutation identified. I explained the meaning of the results and informed her that I will notify Dr. Fina Block and a copy will go to Medical and Radiation Oncologist when and if appropriate. I will mail her copy along with the [facility] Understanding your results booklet.

## 2021-07-19 NOTE — PROGRESS NOTES
SUBJECTIVE  Coby Harrell is a 59 y.o. female. HPI/Chief C/O:  Chief Complaint   Patient presents with    Urinary Tract Infection     Pt c/o UTI symptoms x1 week, bladder spasms, dysuria, frequency, abdominal pressure    Sinus Problem     Pt also c/o possible sinus infection x1.5-2 weeks, has been taking OTC medications with no relief, head pressure and drainage    Medication Refill     Pharmacy confirmed, meds pended     Allergies   Allergen Reactions    Flagyl [Metronidazole] Rash    Omeprazole Diarrhea and Nausea And Vomiting     As well as dizziness    Vicodin [Hydrocodone-Acetaminophen] Nausea And Vomiting     This 59year old female presents with dysuria, urinary frequency and urgency. Pt follows with [facility] for beast cancer. Pt has genetic testing set up. Pt c/o sinus pain, drainage, and cough for 2 weeks. ROS:  Review of Systems   Constitutional: Positive for fatigue. Negative for activity change, appetite change, chills, diaphoresis, fever and unexpected weight change. HENT: Positive for congestion, rhinorrhea, sinus pressure and sinus pain. Negative for dental problem, drooling, ear discharge, ear pain, facial swelling, hearing loss, mouth sores, nosebleeds, postnasal drip, sneezing, sore throat, tinnitus, trouble swallowing and voice change. Eyes: Negative for photophobia, pain, discharge, redness, itching and visual disturbance. Respiratory: Positive for cough. Negative for apnea, choking, chest tightness, shortness of breath, wheezing and stridor. Cardiovascular: Negative. Negative for chest pain, palpitations and leg swelling. Gastrointestinal: Negative for abdominal distention, abdominal pain, anal bleeding, blood in stool, constipation, diarrhea, nausea and rectal pain. Endocrine: Negative. Negative for cold intolerance, heat intolerance, polydipsia, polyphagia and polyuria. Genitourinary: Positive for dysuria, frequency and urgency.  Negative for decreased urine volume, difficulty urinating, enuresis and genital sores. Musculoskeletal: Positive for arthralgias and myalgias. Negative for back pain, gait problem, joint swelling, neck pain and neck stiffness. Skin: Negative. Negative for color change, pallor, rash and wound. Allergic/Immunologic: Negative. Negative for environmental allergies, food allergies and immunocompromised state. Neurological: Negative. Negative for dizziness, tremors, seizures, syncope, facial asymmetry, speech difficulty, weakness, light-headedness, numbness and headaches. Hematological: Negative. Negative for adenopathy. Does not bruise/bleed easily. Psychiatric/Behavioral: Positive for sleep disturbance. Negative for agitation, behavioral problems, confusion, decreased concentration, dysphoric mood, hallucinations, self-injury and suicidal ideas. The patient is nervous/anxious. The patient is not hyperactive.          Past Medical/Surgical Hx;  Reviewed with patient      Diagnosis Date    Diabetes mellitus ([facility] [address]     Diverticulitis     Frequent UTI     Hiatal hernia     Hypertension     Kidney stones     PONV (postoperative nausea and vomiting)     Thyroid disease      Past Surgical History:   Procedure Laterality Date    CHOLECYSTECTOMY, LAPAROSCOPIC N/A 6/20/2019    CHOLECYSTECTOMY LAPAROSCOPIC performed by Stefani Conley MD at [address] [place] [address] ECHO COMPL W DOP COLOR FLOW  8/15/2013         ENDOSCOPY, COLON, DIAGNOSTIC      HYSTERECTOMY      RECTAL PROLAPSE REPAIR      US BREAST NEEDLE BIOPSY RIGHT Right 6/24/2021    US BREAST NEEDLE BIOPSY RIGHT 6/24/2021 [facility]       Past Family Hx:  Reviewed with patient      Problem Relation Age of Onset    Breast Cancer Mother 52        breast cancer    Prostate Cancer Father     Prostate Cancer Brother     Breast Cancer Maternal Aunt 48        breast cancer    Prostate Cancer Paternal Uncle     Breast Cancer Paternal Grandmother 47        breast cancer Social Hx:  Reviewed with patient  Social History     Tobacco Use    Smoking status: Never Smoker    Smokeless tobacco: Never Used   Substance Use Topics    Alcohol use: No       OBJECTIVE  /80   Pulse 66   Temp 98 °F (36.7 °C) (Temporal)   Resp 18   Ht 5' 7\" (1.702 m)   Wt 168 lb (76.2 kg)   LMP  (LMP Unknown)   SpO2 95%   Breastfeeding No   BMI 26.31 kg/m²     Problem List:  Opal Krueger does not have any pertinent problems on file. PHYS EX:  Physical Exam  Vitals and nursing note reviewed. Constitutional:       General: She is not in acute distress. Appearance: Normal appearance. She is well-developed and normal weight. She is not ill-appearing, toxic-appearing or diaphoretic. HENT:      Head: Normocephalic and atraumatic. Right Ear: Tympanic membrane, ear canal and external ear normal. There is no impacted cerumen. Left Ear: Tympanic membrane, ear canal and external ear normal. There is no impacted cerumen. Nose: Congestion and rhinorrhea present. Mouth/Throat:      Mouth: Mucous membranes are moist.      Pharynx: No oropharyngeal exudate or posterior oropharyngeal erythema. Eyes:      General: No scleral icterus. Right eye: No discharge. Left eye: No discharge. Extraocular Movements: Extraocular movements intact. Conjunctiva/sclera: Conjunctivae normal.      Pupils: Pupils are equal, round, and reactive to light. Neck:      Thyroid: No thyromegaly. Vascular: No carotid bruit or JVD. Trachea: No tracheal deviation. Cardiovascular:      Rate and Rhythm: Normal rate and regular rhythm. Pulses: Normal pulses. Heart sounds: Normal heart sounds. No murmur heard. No friction rub. No gallop. Pulmonary:      Effort: Pulmonary effort is normal. No respiratory distress. Breath sounds: Normal breath sounds. No stridor. No wheezing, rhonchi or rales. Chest:      Chest wall: No tenderness.    Abdominal:      General: Bowel sounds are normal. There is no distension. Palpations: Abdomen is soft. There is no mass. Tenderness: There is no abdominal tenderness (lower quadrant pains and both flanks). There is no guarding or rebound. Hernia: No hernia is present. Genitourinary:     Vagina: Normal.   Musculoskeletal:         General: Tenderness present. No swelling, deformity or signs of injury. Cervical back: Normal range of motion and neck supple. No rigidity. No muscular tenderness. Right lower leg: No edema. Left lower leg: No edema. Comments: Pain and decreased ROM multiple joints. Lymphadenopathy:      Cervical: No cervical adenopathy. Skin:     General: Skin is warm. Coloration: Skin is not jaundiced or pale. Findings: No bruising, erythema, lesion or rash. Neurological:      General: No focal deficit present. Mental Status: She is alert and oriented to person, place, and time. Cranial Nerves: No cranial nerve deficit. Sensory: No sensory deficit. Motor: No weakness or abnormal muscle tone. Coordination: Coordination normal.      Gait: Gait normal.      Deep Tendon Reflexes: Reflexes are normal and symmetric. Reflexes normal.   Psychiatric:         Mood and Affect: Mood normal.         Behavior: Behavior normal.         Thought Content: Thought content normal.         Judgment: Judgment normal.         ASSESSMENT/PLAN  M[place]ank [place] was seen today for urinary tract infection, sinus problem and medication refill. Diagnoses and all orders for this visit:    Acute bacterial sinusitis  -     amoxicillin-clavulanate (AUGMENTIN) 875-125 MG per tablet; Take 1 tablet by mouth 2 times daily for 10 days    Type 2 diabetes mellitus without complication, without long-term current use of insulin (HCC)  -     metFORMIN (GLUCOPHAGE) 500 MG tablet;  Take 1 tablet by mouth 2 times daily (with meals)  -     POCT Microalbumin    Acquired hypothyroidism  -     levothyroxine (SYNTHROID) 50 MCG tablet; Take 1 tablet by mouth Daily  -     TSH; Future    Recurrent UTI  -     POCT Urinalysis No Micro (Auto)  -     Culture, Urine; Future  -     amoxicillin-clavulanate (AUGMENTIN) 875-125 MG per tablet; Take 1 tablet by mouth 2 times daily for 10 days    Mixed hyperlipidemia  -     LIPID PANEL; Future    IFG (impaired fasting glucose)  -     HEMOGLOBIN A1C; Future    Fatigue, unspecified type  -     TSH; Future    Gastroesophageal reflux disease without esophagitis  -     lansoprazole (PREVACID) 30 MG delayed release capsule; Take 1 capsule by mouth daily    Pt instructed if any worse go ED ASAP. Outpatient Encounter Medications as of 7/12/2021   Medication Sig Dispense Refill    metFORMIN (GLUCOPHAGE) 500 MG tablet Take 1 tablet by mouth 2 times daily (with meals) 180 tablet 1    levothyroxine (SYNTHROID) 50 MCG tablet Take 1 tablet by mouth Daily 90 tablet 1    lansoprazole (PREVACID) 30 MG delayed release capsule Take 1 capsule by mouth daily 90 capsule 1    amoxicillin-clavulanate (AUGMENTIN) 875-125 MG per tablet Take 1 tablet by mouth 2 times daily for 10 days 20 tablet 0    albuterol sulfate HFA (VENTOLIN HFA) 108 (90 Base) MCG/ACT inhaler Inhale 2 puffs into the lungs every 6 hours as needed for Wheezing or Shortness of Breath 3 Inhaler 1    allopurinol (ZYLOPRIM) 300 MG tablet Take 0.5 tablets by mouth daily 45 tablet 1    Probiotic Product (PROBIOTIC PO) Take by mouth      [DISCONTINUED] metFORMIN (GLUCOPHAGE) 500 MG tablet Take 1 tablet by mouth 2 times daily (with meals) Further refill requires appointment. 60 tablet 0    [DISCONTINUED] levothyroxine (SYNTHROID) 50 MCG tablet Take 1 tablet by mouth once daily 90 tablet 1    [DISCONTINUED] LANSOPRAZOLE PO Take 50 mg by mouth daily        No facility-administered encounter medications on file as of 7/12/2021. Return in about 3 months (around 10/12/2021).         Reviewed recent labs related to Yahir's current problems      Discussed importance of regular Health Maintenance follow up  Health Maintenance   Topic    Hepatitis C screen     Diabetic foot exam     HIV screen     DTaP/Tdap/Td vaccine (1 - Tdap)    Shingles Vaccine (1 of 2)    Diabetic retinal exam     Flu vaccine (1)    Breast cancer screen     A1C test (Diabetic or Prediabetic)     Diabetic microalbuminuria test     Lipid screen     TSH testing     Pneumococcal 0-64 years Vaccine (2 of 2 - PPSV23)    Colon cancer screen colonoscopy     COVID-19 Vaccine     Hepatitis A vaccine     Hib vaccine     Meningococcal (ACWY) vaccine

## 2021-07-19 NOTE — PROGRESS NOTES
Jasmin Nolasco is a 59 y.o. female evaluated via telephone on 7/22/2021. Consent:  She and/or health care decision maker is aware that that she may receive a bill for this telephone service, depending on her insurance coverage, and has provided verbal consent to proceed: Yes    The patient was in the state of [place] during the entirety of the phone conversation. Documentation:  I communicated with the patient and/or health care decision maker about the pathology results from their most recent biopsy. Details of this discussion including any medical advice provided:     [facility] [address] [place] [address] 27     [address]            [address] [facility]                                                   [facility] [address], [address], [address] [place] [address]               FINAL SURGICAL PATHOLOGY REPORT     NAME:            KYLE BERRY            Date of       07/16/2021                                            Collection:   Medical Record   [id number]              Date of       07/19/2021   Number:                                  Receipt:   Age:  56 Y        Sex:  F                Date          07/21/2021 14:28                                            Reported:   Date Of Birth:   1960   Financial        [id number]             Admitting     Front App   Number:                                  Physician:   Patient          HSJO                    Ordering      Front App   Location:                                Physician:     Accession Number:  [id number]                                         Additional Physicians:DIEGO RUTH       Diagnosis:   A.  Skin lesion, left breast, shave biopsy: Seborrheic keratosis.      B.  Skin lesion, midline chest, shave biopsy: Squamous cell carcinoma in   situ, incompletely excised. Comment:   Marked subepithelial lichenoid chronic lymphocytic   inflammation and parakeratosis are present in association with the   squamous cell carcinoma in situ seen in tissue sections of the midline   chest skin lesion biopsy (B).  In situ squamous cell carcinoma is present   involving epithelial perpendicular margins.                                      Thais Charles M.D.                                        (Electronic Signature)         Specimen Submitted:     A.  SKIN LESION, LEFT BREAST   B.  SKIN LESION, MIDLINE CHEST      Preoperative Dx:   Lesion         Microscopic Evaluation: Was performed. Gross Description:   Submitted in two parts in formalin. A. Labeled \"Yahir White, left breast\" consists of a 0.9 x 0.7 x 0.1 cm   tangential shave biopsy of white gray, finely granular skin. The surgical   margin is inked in black. Sectioned through its base and entirely   submitted. Block label A1.     B. Labeled \"Yahir White, midline chest\" consists of a 0.7 x 0.7 x 0.1 cm   tangential shave biopsy of white gray, finely granular-hyperkeratotic   skin. The surgical margin is inked in black. Step sectioned through its   base and entirely submitted. Block label Roger Gillis)     CODES:    [id number];     I explained to the patient their pathology results which are benign in nature. I explained to the patient that they can discontinue bacitracin to the biopsy site and allow the wound to heal by secondary intention. They can cover with a Band-Aid for continued wound covering if there is any open granulation tissue. I finally explained to the patient that they can begin scar massage once the wound is well-healed and to keep sunscreen over this when they were out in the sun as to have optimal scarring.   Lastly I informed the patient that although the biopsy results are benign this lesion can ultimately return in the future. Explained to the patient should the lesion return the future to monitor and with any changes bring these changes to our attention. I informed the patient that we will need to plan a surgical procedure in the near future to excise this lesion. I informed the patient that they will need to schedule an office visit to be seen by Dr. Krystal Guerra to discuss surgical planning. The patient voices understanding I informed them that they will need to arrange this with our front office staff to have an office visit at their convenience moving forward. The patient states that they will contact our office for scheduling of this upcoming visit. I informed the patient in the meantime to continue covering the biopsy site with a gauze pad or bandage     We will add this procedure to her breat reconstruction procedure    I affirm this is a Patient Initiated Episode with a Patient who has not had a related appointment within my department in the past 7 days or scheduled within the next 24 hours. Patient identification was verified at the start of the visit: Yes    Total Time: minutes: <5 minutes (not billable)    The visit was conducted pursuant to the emergency declaration under the [facility], [address] [place] [address] authority and the [employer] General Act. Patient identification was verified, and a caregiver was present when appropriate. The patient was located in a state where the provider was credentialed to provide care.     Note: not billable if this call serves to triage the patient into an appointment for the relevant concern      FLORENTINO Salazar

## 2021-07-22 ENCOUNTER — VIRTUAL VISIT (OUTPATIENT)
Dept: SURGERY | Age: 61
End: 2021-07-22

## 2021-07-22 DIAGNOSIS — C44.92 SCC (SQUAMOUS CELL CARCINOMA): Primary | ICD-10-CM

## 2021-07-28 ENCOUNTER — TELEPHONE (OUTPATIENT)
Dept: FAMILY MEDICINE CLINIC | Age: 61
End: 2021-07-28

## 2021-07-28 NOTE — TELEPHONE ENCOUNTER
----- Message from Fatemeh Jamil sent at 7/28/2021  2:26 PM EDT -----  Subject: Message to Provider    QUESTIONS  Information for Provider? The patient went to the doctor a few weeks ago   and was tested for a uti and it was positive but then sent it off for more   testing to find out what antibiotic she would need and she hasn't heard   back or gotten the antibiotic and is requesting a call back. ---------------------------------------------------------------------------  --------------  Gene NPMok FOSTER  What is the best way for the office to contact you? OK to leave message on   voicemail  Preferred Call Back Phone Number? [phone]  ---------------------------------------------------------------------------  --------------  SCRIPT ANSWERS  Relationship to Patient?  Self

## 2021-07-28 NOTE — TELEPHONE ENCOUNTER
Please review Urine Culture from 7/12. Patient states she had a UA and urine was sent out for culture. Says she never received an antibiotic and never received a call about results. Please advise.

## 2021-07-29 DIAGNOSIS — M10.9 GOUT, UNSPECIFIED CAUSE, UNSPECIFIED CHRONICITY, UNSPECIFIED SITE: ICD-10-CM

## 2021-07-29 DIAGNOSIS — E11.9 TYPE 2 DIABETES MELLITUS WITHOUT COMPLICATION, WITHOUT LONG-TERM CURRENT USE OF INSULIN (HCC): ICD-10-CM

## 2021-07-29 RX ORDER — ALLOPURINOL 300 MG/1
TABLET ORAL
Qty: 45 TABLET | Refills: 1 | Status: SHIPPED
Start: 2021-07-29 | End: 2022-03-16 | Stop reason: SDUPTHER

## 2021-07-30 ENCOUNTER — TELEPHONE (OUTPATIENT)
Dept: SURGERY | Age: 61
End: 2021-07-30

## 2021-07-30 NOTE — TELEPHONE ENCOUNTER
Joint case with   Right oncoplastic breast reduction matching left breast reduction &   excision squamous cell carcinoma right mid chest   Surgery has been scheduled at [address] on [email or web address]  , Pre-Admission Testing will call you prior to surgery to inform you arrival time and any other additional directions,if they are unable to reach you,please call them two days prior at [phone]. If taking Fish Oil, Vitamins, two weeks prior to surgery stop taking. If taking NSAIDS (such as Aspirin, Ibuprofen) anticoagulants please consult with your prescribing physician to get further instructions on when to stop medication prior to surgery that is scheduled, patient understood.     Pre-Auth #:authorization [id number]  CPT Codes: z85.3  ICD 14:[id number]

## 2021-08-03 DIAGNOSIS — Z17.0 MALIGNANT NEOPLASM OF RIGHT BREAST IN FEMALE, ESTROGEN RECEPTOR POSITIVE, UNSPECIFIED SITE OF BREAST (HCC): Primary | ICD-10-CM

## 2021-08-03 DIAGNOSIS — C50.911 MALIGNANT NEOPLASM OF RIGHT BREAST IN FEMALE, ESTROGEN RECEPTOR POSITIVE, UNSPECIFIED SITE OF BREAST (HCC): Primary | ICD-10-CM

## 2021-08-04 ENCOUNTER — TELEPHONE (OUTPATIENT)
Dept: FAMILY MEDICINE CLINIC | Age: 61
End: 2021-08-04

## 2021-08-04 ENCOUNTER — TELEPHONE (OUTPATIENT)
Dept: BREAST CENTER | Age: 61
End: 2021-08-04

## 2021-08-04 ENCOUNTER — PREP FOR PROCEDURE (OUTPATIENT)
Dept: SURGERY | Age: 61
End: 2021-08-04

## 2021-08-04 DIAGNOSIS — Z01.818 PRE-OP TESTING: Primary | ICD-10-CM

## 2021-08-04 RX ORDER — SODIUM CHLORIDE, SODIUM LACTATE, POTASSIUM CHLORIDE, CALCIUM CHLORIDE 600; 310; 30; 20 MG/100ML; MG/100ML; MG/100ML; MG/100ML
INJECTION, SOLUTION INTRAVENOUS CONTINUOUS
Status: CANCELLED | OUTPATIENT
Start: 2021-08-04

## 2021-08-04 RX ORDER — SODIUM CHLORIDE 0.9 % (FLUSH) 0.9 %
5-40 SYRINGE (ML) INJECTION PRN
Status: CANCELLED | OUTPATIENT
Start: 2021-08-04

## 2021-08-04 RX ORDER — SODIUM CHLORIDE 9 MG/ML
25 INJECTION, SOLUTION INTRAVENOUS PRN
Status: CANCELLED | OUTPATIENT
Start: 2021-08-04

## 2021-08-04 RX ORDER — SODIUM CHLORIDE 0.9 % (FLUSH) 0.9 %
5-40 SYRINGE (ML) INJECTION EVERY 12 HOURS SCHEDULED
Status: CANCELLED | OUTPATIENT
Start: 2021-08-04

## 2021-08-04 RX ORDER — ACETAMINOPHEN 325 MG/1
1000 TABLET ORAL ONCE
Status: CANCELLED | OUTPATIENT
Start: 2021-08-04 | End: 2021-08-04

## 2021-08-04 NOTE — TELEPHONE ENCOUNTER
This MA spoke with patient. Order placed for EKG. Patient advised that if her specialist requires medical clearance, she will need to be seen. She states he just wants us to order an EKG.   Electronically signed by Fidelia Oropeza on 8/4/2021 at 2:28 PM

## 2021-08-04 NOTE — TELEPHONE ENCOUNTER
Patient Medical leave paperwork has been completed and faxed with progress notes to [facility].

## 2021-08-04 NOTE — TELEPHONE ENCOUNTER
----- Message from Taran Walls sent at 8/4/2021  9:22 AM EDT -----  Subject: Message to Provider    QUESTIONS  Information for Provider? Patient is having surgery 8/17/2021 and her   surgeon wants to know if you can do an EKG prior to her having it.  ---------------------------------------------------------------------------  --------------  [address]  What is the best way for the office to contact you? OK to leave message on   voicemail  Preferred Call Back Phone Number? [phone]  ---------------------------------------------------------------------------  --------------  SCRIPT ANSWERS  Relationship to Patient?  Self

## 2021-08-05 ENCOUNTER — HOSPITAL ENCOUNTER (OUTPATIENT)
Age: 61
Discharge: HOME | End: 2021-08-05
Payer: COMMERCIAL

## 2021-08-05 LAB
EKG ATRIAL RATE: 55 BPM
EKG P AXIS: 25 DEGREES
EKG P-R INTERVAL: 142 MS
EKG Q-T INTERVAL: 458 MS
EKG QRS DURATION: 86 MS
EKG QTC CALCULATION (BAZETT): 438 MS
EKG R AXIS: 7 DEGREES
EKG T AXIS: 22 DEGREES
EKG VENTRICULAR RATE: 55 BPM

## 2021-08-05 PROCEDURE — 93005 ELECTROCARDIOGRAM TRACING: CPT | Performed by: FAMILY MEDICINE

## 2021-08-05 PROCEDURE — 93010 ELECTROCARDIOGRAM REPORT: CPT | Performed by: INTERNAL MEDICINE

## 2021-08-09 RX ORDER — SODIUM CHLORIDE 0.9 % (FLUSH) 0.9 %
5-40 SYRINGE (ML) INJECTION EVERY 12 HOURS SCHEDULED
Status: CANCELLED | OUTPATIENT
Start: 2021-08-09

## 2021-08-09 RX ORDER — SODIUM CHLORIDE 0.9 % (FLUSH) 0.9 %
5-40 SYRINGE (ML) INJECTION PRN
Status: CANCELLED | OUTPATIENT
Start: 2021-08-09

## 2021-08-09 RX ORDER — SODIUM CHLORIDE 9 MG/ML
25 INJECTION, SOLUTION INTRAVENOUS PRN
Status: CANCELLED | OUTPATIENT
Start: 2021-08-09

## 2021-08-09 NOTE — H&P
Department of Plastic Surgery - Adult  Attending Consult Note              CHIEF COMPLAINT:  History of Right Breast Cancer     History Obtained From:  patient     HISTORY OF PRESENT ILLNESS:                 The patient is a 59 y.o. female who presents with History of Right Breast Cancer. She is here today to discuss her options on breast reconstruction. The patient has met with Dr. Kirsten Lechuga  and has elected  to proceed with lumpectomy. She is here today to discuss the possibility of a bilateral breast reduction as her reconstructive option. She states she has had neck and back pain from from breasts for many years.  The patients Breast Cancer History is obtained from the patient and also notes from the care team.       Past Medical History:    Past Medical History        Past Medical History:   Diagnosis Date    Diabetes mellitus ([address]      Diverticulitis      Frequent UTI      Hiatal hernia      Hypertension      Kidney stones      PONV (postoperative nausea and vomiting)      Thyroid disease           Past Surgical History:    Past Surgical History         Past Surgical History:   Procedure Laterality Date    CHOLECYSTECTOMY, LAPAROSCOPIC N/A 6/20/2019     CHOLECYSTECTOMY LAPAROSCOPIC performed [facility] Low Gonzalez MD [facility] [address] ECHO COMPL W DOP COLOR FLOW   8/15/2013          ENDOSCOPY, COLON, DIAGNOSTIC        HYSTERECTOMY        RECTAL PROLAPSE REPAIR        US BREAST NEEDLE BIOPSY RIGHT Right 6/24/2021     US BREAST NEEDLE BIOPSY RIGHT 6/24/2021 [facility]         Current Medications:      Current Facility-Administered Medications          Current Outpatient Medications   Medication Sig Dispense Refill    metFORMIN (GLUCOPHAGE) 500 MG tablet Take 1 tablet by mouth 2 times daily (with meals) 180 tablet 1    levothyroxine (SYNTHROID) 50 MCG tablet Take 1 tablet by mouth Daily 90 tablet 1    lansoprazole (PREVACID) 30 MG delayed release capsule Take 1 capsule by mouth daily 90 capsule 1    amoxicillin-clavulanate (AUGMENTIN) 875-125 MG per tablet Take 1 tablet by mouth 2 times daily for 10 days 20 tablet 0    albuterol sulfate HFA (VENTOLIN HFA) 108 (90 Base) MCG/ACT inhaler Inhale 2 puffs into the lungs every 6 hours as needed for Wheezing or Shortness of Breath 3 Inhaler 1    allopurinol (ZYLOPRIM) 300 MG tablet Take 0.5 tablets by mouth daily 45 tablet 1    Probiotic Product (PROBIOTIC PO) Take by mouth          No current facility-administered medications for this visit.            Allergies:  Flagyl [metronidazole], Omeprazole, and Vicodin [hydrocodone-acetaminophen]     Social History:   Social History               Socioeconomic History    Marital status:        Spouse name: Not on file    Number of children: Not on file    Years of education: Not on file    Highest education level: Not on file   Occupational History    Not on file   Tobacco Use    Smoking status: Never Smoker    Smokeless tobacco: Never Used   Substance and Sexual Activity    Alcohol use: No    Drug use: Not on file    Sexual activity: Not on file   Other Topics Concern    Not on file   Social History Narrative    Not on file      Social Determinants of Health          Financial Resource Strain: Low Risk     Difficulty of Paying Living Expenses: Not hard at all   Food Insecurity: No Food Insecurity    Worried About Running Out of Food in the Last Year: Never true    Margie of Food in the Last Year: Never true   Transportation Needs:     Lack of Transportation (Medical):      Lack of Transportation (Non-Medical):    Physical Activity:     Days of Exercise per Week:     Minutes of Exercise per Session:    Stress:     Feeling of Stress :    Social Connections:     Frequency of Communication with Friends and Family:     Frequency of Social Gatherings with Friends and Family:     Attends Buddhist Services:     Active Member of Clubs or Organizations:     Attends Club or Organization Meetings:     Marital Status:    Intimate Partner Violence:     Fear of Current or Ex-Partner:     Emotionally Abused:     Physically Abused:     Sexually Abused:          Family History:   Family History         Family History   Problem Relation Age of Onset    Breast Cancer Mother 52         breast cancer    Prostate Cancer Father      Prostate Cancer Brother      Breast Cancer Maternal Aunt 48         breast cancer    Prostate Cancer Paternal Uncle      Breast Cancer Paternal Grandmother 48         breast cancer         [address] [facility]      [facility]     [address]            [address]                                                   [address] [facility] [facility] [address], [address], [address], [address] [facility]note 2], [address] [place] [address]                                                     [id number]               FINAL SURGICAL PATHOLOGY REPORT     NAME:            KYLE BERRY            Date of       07/16/2021                                            Collection:   Medical Record   [id number]              Date of       07/19/2021   Number:                                  Receipt:   Age:  56 Y        Sex:  F                Date          07/21/2021 14:28                                            Reported:   Date Of Birth:   1960   Financial        [id number]             Admitting     NATHAN HENDRICKSON   Number:                                  Physician:   Patient          JEFF                    Ordering      NATHAN InfiKno   Location:                                Physician:     Accession Number:  [id number]                                         Additional Physicians:DIEGO RUTH       Diagnosis:   A.  Skin lesion, left breast, shave biopsy: Seborrheic keratosis.      B.  Skin lesion, midline chest, shave biopsy: Squamous cell carcinoma in   situ, incompletely excised. Comment:   Marked subepithelial lichenoid chronic lymphocytic   inflammation and parakeratosis are present in association with the   squamous cell carcinoma in situ seen in tissue sections of the midline   chest skin lesion biopsy (B).  In situ squamous cell carcinoma is present   involving epithelial perpendicular margins.                                      Bhakti Muñoz M.D.                                        (Electronic Signature)     REVIEW OF SYSTEMS:    CONSTITUTIONAL:  negative  RESPIRATORY:  negative  CARDIOVASCULAR:  negative  GASTROINTESTINAL:  negative  BEHAVIOR/PSYCH:  negative  All other review of systems negative        I have reviewed the chief complaint and history of present illness including (ROS and PFSH) and vital documentation by my staff and I agree with their documentation and have added when applicable.     PHYSICAL EXAM:             VITALS:  /80 (Site: Left Upper Arm, Position: Sitting, Cuff Size: Medium Adult)   Pulse 77   Temp 97.3 °F (36.3 °C) (Temporal)   Ht 5' 8\" (1.727 m)   Wt 162 lb (73.5 kg)   LMP  (LMP Unknown)   SpO2 97%   Breastfeeding No   BMI 24.63 kg/m²   CONSTITUTIONAL:  awake, alert, cooperative, no apparent distress, and appears stated age  EYES: PERRLA, EOMI, no signs of occular infection  MUSCULOSKELETAL: negative for flaccid muscle tone or spastic movements. NEURO: Cranial nerves II-XII grossly intact. No signs of agitated mood. LUNGS:  No increased work of breathing, good air exchange, clear to auscultation bilaterally, no crackles or wheezing  CARDIOVASCULAR:  Normal apical impulse, regular rate and rhythm,   ABDOMEN:  Normal bowel sounds, soft, non-distended, non-tender,      LEFT BREAST: Rash is not noted, There are no masses palpated, no axillary lymphadenopathy, no nipple discharge. No breast pain. There are no previous scars.  Pendulous breasts with grade 3 ptosis     RIGHT BREAST: Rash is not noted, There are masses palpated , no axillary lymphadenopathy, no nipple discharge. No breast pain. There are no previous scars. Pendulous breast with grade 3 ptosis        DATA:       Breast Measurements were taken and are noted in the media section. Hemoglobin A1C 6.9High   4.0 - 5.6 % Final 07/12/2021 10:10 AM  -  [facility]   Testing Performed By        IMPRESSION/RECOMMENDATIONS:       Diagnosis  -) right Breast Cancer     -Breast Measurements obtained and will be scanned into media. Photos were obtained. Chaperone present  -Based on the patients history, ROS, and PE, It is in my medical opinion the patients diagnosis and symptomology would benefit from a bilateral breast reduction.         The risks, benefits and options were discussed with the pt. The risks included but not limited to pain, bleeding, infection, heavy scarring, damage to surrounding structures,  and need for further procedures. Other risks including but not limited to asymmetry, loss of nipple or/and areola, loss of sensation to nipple and areola, inability to breast feed, seroma, hematoma, implant failure, capsular contracture, and fat necrosis were also discussed with the patient. All of her questions were answered. .        Plastic and Reconstructive surgical procedure- Right Oncoplastic Breast Reduction with matching Left breast reduction, excision of right mid chest squamous cell carcinoma     I informed patient that while proceeding her breast reduction there is no definitive bra size that could be obtained as the signs for breast will be indeterminate upon the amount of tissue reduction needed to perform her lumpectomy. Furthermore described with the patient that radiation therapy changes postoperatively will change the size of her breast that is affected by her cancer side.  I informed the patient that we will plan tentatively to leave her breast with her lumpectomy slightly larger in anticipation that this will become smaller after radiation therapy. Patient voices understanding that there is no guarantee with the exact size or symmetry following her bilateral breast reduction.     All of her questions were answered to her satisfaction and she agrees to proceed with the operation.     Face-to-face time greater than 45 minutes, greater than 50% in counseling, education, and coordination of care.      Photos were obtained. Chaperone present     The perioperative course was discussed with the patient. The risks and benefits, including but not limited to, bleeding, infection, pain, scarring, asymmetry, loss of tissue, including nipple and areola, unsatisfactory appearance, decreased or increased nipple sensation, poor healing, inability to lactate as well as the need for further surgery were discussed with the patient. She would like to proceed.        Attestation    No change in patient's H & P. I have reviewed the procedure with the patient as well as the risk and benefits. They have no further questions and agree to proceed with surgery.     Sergio Sanabria MD   11:02 AM  8/17/2021

## 2021-08-12 ENCOUNTER — TELEPHONE (OUTPATIENT)
Dept: BREAST CENTER | Age: 61
End: 2021-08-12

## 2021-08-13 NOTE — PROGRESS NOTES
Jesse 34 PRE-ADMISSION TESTING GENERAL INSTRUCTIONS- [facility]-phone number:[phone]    GENERAL INSTRUCTIONS  [x] Antibacterial Soap shower Night before and/or AM of Surgery  [] Shay wipe instruction sheet and wipes given. [x] Nothing by mouth after midnight, including gum, candy, mints, or water. [x] You may brush your teeth, gargle, but do NOT swallow water. []Hibiclens shower  the night before and the morning of surgery. Do not use             Hibiclens on your face or head. [x]No smoking, chewing tobacco, illegal drugs, or alcohol within 24 hours of your surgery. [x] Jewelry, valuables or body piercing's should not be brought to the hospital. All body and/or tongue piercing's must be removed prior to arriving to hospital.  ALL hair pins must be removed. [x] Do not wear makeup, lotions, powders, deodorant. Nail polish as directed by the nurse. [x] Arrange transportation with a responsible adult  to and from the hospital. If you do not have a responsible adult  to transport you, you will need to make arrangements with a medical transportation company (i.e. [employer]. A Uber/taxi/bus is not appropriate unless you are accompanied by a responsible adult ). Arrange for someone to be with you for the remainder of the day and for 24 hours after your procedure due to having had anesthesia. Who will be your  for transportation?_____DAUGHTER_____________   Who will be staying with you for 24 hrs after your procedure?______HUSBAND____________  [x] Bring insurance card and photo ID.  [] Transfusion Bracelet: Please bring with you to hospital, day of surgery  [] Bring urine specimen day of surgery. Any small container is acceptable. [] Use inhalers the morning of surgery and bring with you to hospital.  [] Bring copy of living will or healthcare power of  papers to be placed in your electronic record.   [] CPAP/BI-PAP: Please bring your machine if you are to spend the night in the hospital.     PARKING INSTRUCTIONS:   [x] Arrival Time:__0900___________  · [x] Parking lot '\"I\"  is located on [facility] (the corner of [facility] and [facility]). To enter, press the button and the gate will lift. A free token will be provided to exit the lot. One car per patient is allowed to park in this lot. All other cars are to park on [address] [place] [address] either in the parking garage or the handicap lot. [] To reach the [facility] lobby from [address] [place] [address], upon entering the hospital, take elevator B to the 3rd floor. EDUCATION INSTRUCTIONS:      [] Knee or hip replacement booklet & exercise pamphlets given. [] Lia 77 placed in chart. [] Pre-admission Testing educational folder given  [] Incentive Spirometry,coughing & deep breathing exercises reviewed. []Medication information sheet(s)   []Fluoroscopy-Xray used in surgery reviewed with patient. Educational pamphlet placed in chart. [x]Pain: Post-op pain is normal and to be expected. You will be asked to rate your pain from 0-10(a zero is not acceptable-education is needed). Your post-op pain goal is:5[] Ask your nurse for your pain medication. [] Joint camp offered. [] Joint replacement booklets given. [] Other:___________________________    MEDICATION INSTRUCTIONS:   [x]Bring a complete list of your medications, please write the last time you took the medicine, give this list to the nurse. [x] Take the following medications the morning of surgery with 1-2 ounces of water:   [x] Stop herbal supplements and vitamins 5 days before your surgery. [x] DO NOT take any diabetic medicine the morning of surgery. Follow instructions for insulin the day before surgery. [] If you are diabetic and your blood sugar is low or you feel symptomatic, you may drink 1-2 ounces of apple juice or take a glucose tablet.   The morning of your procedure, you may call the pre-op area if you have concerns about your blood sugar [phone]. [] Use your inhalers the morning of surgery. Bring your emergency inhaler with you day of surgery. [] Follow physician instructions regarding any blood thinners you may be taking. WHAT TO EXPECT:  [x] The day of surgery you will be greeted and checked in by the [employer].  In addition, you will be registered in the [facility] by a Patient Access Representative. Please bring your photo ID and insurance card. A nurse will greet you in accordance to the time you are needed in the pre-op area to prepare you for surgery. Please do not be discouraged if you are not greeted in the order you arrive as there are many variables that are involved in patient preparation. Your patience is greatly appreciated as you wait for your nurse. Please bring in items such as: books, magazines, newspapers, electronics, or any other items  to occupy your time in the waiting area. [x]  Delays may occur with surgery and staff will make a sincere effort to keep you informed of delays. If any delays occur with your procedure, we apologize ahead of time for your inconvenience as we recognize the value of your time.

## 2021-08-16 ENCOUNTER — ANESTHESIA EVENT (OUTPATIENT)
Dept: OPERATING ROOM | Age: 61
End: 2021-08-16
Payer: COMMERCIAL

## 2021-08-16 NOTE — H&P
Patient ID: Paula Shaikh is a 59 y.o. female.     HPI  History and Physical     Patient's Name/Date of Birth: Paula Shaikh / 1960    Paula Shaikh presents for conservative therapy for  right breast invasive ductal cancer. (ER+/OK+/HER2 -)     PCP: Xuan Hayes DO, Gynecologist:  Tiburcio GUERRERO.     The lesion is located in the 8 o'clock position of the Right breast. The lesion was discovered by self/exam. The patient has noted a change in BSE since presentation. Patient denies nipple discharge. Patient denies a personal history of breast cancer. Breast cancer risk factors include mom breast cancer age 52, paternal aunt breast cancer, 48 paternal gm breast cancer age 48. Ashkenazi Restoration Ancestry: No.     OBSTETRIC RELATED HISTORY:  Age of menarche was 13. Age of menopause was 60. Patient denies hormonal therapy. Patient is . Age of first live birth was 19. Patient did not breast feed. Is patient interested in fertility information about fertility preservation? No     CANCER SURVEILLANCE HISTORY:  Mammograms: Yes  Breast MRI's: No   Breast Biopsies: Yes   Colonoscopy: Yes   GI Polyps: Yes   EGD: Yes   Pelvic Exam: Yes   Pap Smear: Yes   Dermatology: No   Lung screening: no        Have you received the flu vaccination this year? No  Have you received the Pneumococcal vaccination in the last 5 years? No  Have you received the COVID 19 vaccination this year? Yes     Estimated body mass index is 24.33 kg/m² as calculated from the following:    Height as of 21: 5' 8\" (1.727 m). Weight as of 21: 160 lb (72.6 kg). Bra Size: 40c     Because violence is so common, we ask all our patients: are you in a relationship or do you live with a person who threatens, hurts, or controls you:  denies     Patient drinks little caffeinated beverages. Patient does not smoke cigarettes. Patient does not use recreational drugs.        Lymphedema screening completed.  See documentation in the flow sheets.      Past Medical History   Past Medical History:   Diagnosis Date    Diabetes mellitus ([address]      Diverticulitis      Frequent UTI      Hiatal hernia      Hypertension      Kidney stones      PONV (postoperative nausea and vomiting)      Thyroid disease              Past Surgical History         Past Surgical History:   Procedure Laterality Date    CHOLECYSTECTOMY, LAPAROSCOPIC N/A 6/20/2019     CHOLECYSTECTOMY LAPAROSCOPIC performed by Anson Shankar MD at [facility] ECHO COMPL W DOP COLOR FLOW   8/15/2013          ENDOSCOPY, COLON, DIAGNOSTIC        HYSTERECTOMY        RECTAL PROLAPSE REPAIR        US BREAST NEEDLE BIOPSY RIGHT Right 6/24/2021     US BREAST NEEDLE BIOPSY RIGHT 6/24/2021 [facility]            Current Facility-Administered Medications          Current Outpatient Medications   Medication Sig Dispense Refill    metFORMIN (GLUCOPHAGE) 500 MG tablet Take 1 tablet by mouth 2 times daily (with meals) Further refill requires appointment.  60 tablet 0    albuterol sulfate HFA (VENTOLIN HFA) 108 (90 Base) MCG/ACT inhaler Inhale 2 puffs into the lungs every 6 hours as needed for Wheezing or Shortness of Breath 3 Inhaler 1    levothyroxine (SYNTHROID) 50 MCG tablet Take 1 tablet by mouth once daily 90 tablet 1    allopurinol (ZYLOPRIM) 300 MG tablet Take 0.5 tablets by mouth daily 45 tablet 1    LANSOPRAZOLE PO Take 50 mg by mouth daily         Probiotic Product (PROBIOTIC PO) Take by mouth          No current facility-administered medications for this visit.                  Allergies   Allergen Reactions    Flagyl [Metronidazole] Rash    Omeprazole Diarrhea and Nausea And Vomiting       As well as dizziness    Vicodin [Hydrocodone-Acetaminophen] Nausea And Vomiting         Family History         Family History   Problem Relation Age of Onset    Breast Cancer Mother      Prostate Cancer Father      Prostate Cancer Brother      Breast Cancer Maternal Aunt      Prostate Cancer Paternal Uncle              Social History               Socioeconomic History    Marital status:        Spouse name: Not on file    Number of children: Not on file    Years of education: Not on file    Highest education level: Not on file   Occupational History    Not on file   Tobacco Use    Smoking status: Never Smoker    Smokeless tobacco: Never Used   Substance and Sexual Activity    Alcohol use: No    Drug use: Not on file    Sexual activity: Not on file   Other Topics Concern    Not on file   Social History Narrative    Not on file      Social Determinants of Health          Financial Resource Strain:     Difficulty of Paying Living Expenses:    Food Insecurity:     Worried About Running Out of Food in the Last Year:     Ran Out of Food in the Last Year:    Transportation Needs:     Lack of Transportation (Medical):  Lack of Transportation (Non-Medical):    Physical Activity:     Days of Exercise per Week:     Minutes of Exercise per Session:    Stress:     Feeling of Stress :    Social Connections:     Frequency of Communication with Friends and Family:     Frequency of Social Gatherings with Friends and Family:     Attends Gnosticism Services:     Active Member of Clubs or Organizations:     Attends Club or Organization Meetings:     Marital Status:    Intimate Partner Violence:     Fear of Current or Ex-Partner:     Emotionally Abused:     Physically Abused:     Sexually Abused:             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 Enjoys grandchildren and dogs.        Review of Systems  CONSTITUTIONAL: No fever, chills. Good appetite and energy level. ENMT: Eyes: No diplopia; Nose: No epistaxis. Mouth: No sore throat. RESPIRATORY: No hemoptysis, shortness of breath, cough. CARDIOVASCULAR: No chest pain, pressure, or palpitations. GASTROINTESTINAL: No nausea/vomiting, abdominal pain, diarrhea/constipation.   Symptomatic reflux. No blood in the stools. GENITOURINARY: No dysuria, urinary frequency, hematuria. NEURO: No syncope, presyncope, headache. 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 Left calf after pregnancy on coumadin for 1 year, 1984. Review of systems as noted above completely reviewed with patient. No changes. Had Covid, severe, December 2020.     Objective:   Physical Exam  Vitals and nursing note reviewed. Constitutional:       General: She is not in acute distress. Appearance: She is well-developed. She is not diaphoretic. HENT:      Head: Normocephalic and atraumatic. Eyes:      Conjunctiva/sclera: Conjunctivae normal.      Pupils: Pupils are equal, round, and reactive to light. Neck:      Thyroid: No thyromegaly. Trachea: No tracheal deviation. Cardiovascular:      Rate and Rhythm: Normal rate and regular rhythm. Heart sounds: Normal heart sounds. Pulmonary:      Effort: Pulmonary effort is normal. No respiratory distress. Breath sounds: Normal breath sounds. Chest:      Breasts: Breasts are symmetrical.         Right: Mass present. No inverted nipple, nipple discharge, skin change or tenderness. Left: No inverted nipple, mass, nipple discharge, skin change or tenderness. Comments: Breast ptosis bilaterally, significant  Abdominal:      General: There is no distension. Palpations: Abdomen is soft. Musculoskeletal:      Cervical back: Normal range of motion and neck supple. Lymphadenopathy:      Cervical: No cervical adenopathy. Upper Body:      Right upper body: No supraclavicular adenopathy. Left upper body: No supraclavicular adenopathy. Skin:     General: Skin is warm and dry. Coloration: Skin is not pale. Findings: No erythema. Neurological:      Mental Status: She is alert and oriented to person, place, and time.    Psychiatric:         Behavior: Behavior normal.         Thought Content: correspond to the   palpable abnormality and the mass seen on the mammogram.  Overall, these   findings are very suspicious for malignancy.       No other cyst or solid mass is seen.  No mass or lymphadenopathy is seen in   the right axilla.           Impression   1. Bilateral heterogeneously dense breast that could obscure an underlying   mass. 2. Large irregular hypoechoic solid mass seen on ultrasound and mammogram at   about the 8 to 9 o'clock position measuring 2.4 x 1.4 x 1.4 cm.  This is very   suspicious for malignancy and ultrasound-guided core biopsy is suggested.       BIRADS:   BIRADS - CATEGORY 5       Highly Suggestive for Malignancy.  Biopsy should be considered at this time.       OVERALL ASSESSMENT - HIGHLY SUGGESTIVE OF MALIGNANCY.          Clinical T2 right breast cancer lower outer quadrant. Bilateral marked breast ptosis and very dense breast tissue. Candidate for both genetic testing and an MRI. MyRisk: Negative for any inherited mutations. 7/8/21 MRI:   FINDINGS:   There is heterogeneous fibroglandular tissue with minimal background   parenchymal enhancement.  An irregular, enhancing mass is again noted in the   right breast 8 o'clock which measures 2.4 x 1.8 x 2.3 cm (image 63 of the   axial T1 post contrast series).  Focal, heterogeneous non mass enhancement   measuring 1.3 x 1.2 x 1.2 cm along the lateral aspect of the mass may be due   to recent biopsy vs satellite lesion (image 65).  No suspicious mass or non   mass enhancement seen on the left.  There is no lymphadenopathy.  Bilateral   skin and nipple-areolar complexes are normal.  Visualized chest and abdominal   organs are unremarkable.           Impression   1.  Irregular mass in the right breast 8 o'clock measuring up to 2.4 cm in   size is consistent with biopsy proven neoplasm.  Focal, heterogeneous non   mass enhancement along the lateral aspect of the mass may be due to recent   biopsy vs satellite lesion.       2.  No evidence of neoplasm in the left breast.       3.  There is no lymphadenopathy.             Daughter had genetic testing for microcalcifications bilaterally and reports that it was negative. Likely candidate for oncoplastic breast reduction at the time of conservative therapy to facilitate a better outcome from her radiation therapy. Questions answered to patient, daughters, and 's satisfaction. Plan: Right breast lumpectomy, blue dye injection, right axillary sentinel lymph node excision, possible right axillary dissection with oncoplastic bilateral reduction. The risks, benefits, expected outcomes, and alternatives to this procedure have been discussed with the patient, which include but are not limited to bleeding, infection, flap necrosis and medical complications to anesthesia or surgery such as pneumonia, heart problems, blood clots, etc. Patient also was told that with minimally invasive procedures adequate margins are not always obtained with the first operation, and she has a >20% chance of requiring a second procedure to obtain clear margins around her tumor. Patient understood and desires to undergo the procedure.

## 2021-08-17 ENCOUNTER — ANESTHESIA (OUTPATIENT)
Dept: OPERATING ROOM | Age: 61
End: 2021-08-17
Payer: COMMERCIAL

## 2021-08-17 ENCOUNTER — HOSPITAL ENCOUNTER (OUTPATIENT)
Dept: NUCLEAR MEDICINE | Age: 61
Discharge: HOME | End: 2021-08-19
Payer: COMMERCIAL

## 2021-08-17 ENCOUNTER — HOSPITAL ENCOUNTER (OUTPATIENT)
Age: 61
Setting detail: OUTPATIENT SURGERY
Discharge: HOME | End: 2021-08-17
Attending: PLASTIC SURGERY | Admitting: PLASTIC SURGERY
Payer: COMMERCIAL

## 2021-08-17 ENCOUNTER — APPOINTMENT (OUTPATIENT)
Dept: GENERAL RADIOLOGY | Age: 61
End: 2021-08-17
Attending: PLASTIC SURGERY
Payer: COMMERCIAL

## 2021-08-17 ENCOUNTER — HOSPITAL ENCOUNTER (OUTPATIENT)
Dept: GENERAL RADIOLOGY | Age: 61
Discharge: HOME | End: 2021-08-19
Attending: PLASTIC SURGERY
Payer: COMMERCIAL

## 2021-08-17 VITALS
OXYGEN SATURATION: 96 % | WEIGHT: 160 LBS | HEART RATE: 65 BPM | BODY MASS INDEX: 24.25 KG/M2 | TEMPERATURE: 97.4 F | HEIGHT: 68 IN | RESPIRATION RATE: 20 BRPM | SYSTOLIC BLOOD PRESSURE: 126 MMHG | DIASTOLIC BLOOD PRESSURE: 74 MMHG

## 2021-08-17 VITALS — OXYGEN SATURATION: 100 % | DIASTOLIC BLOOD PRESSURE: 81 MMHG | SYSTOLIC BLOOD PRESSURE: 141 MMHG

## 2021-08-17 DIAGNOSIS — Z17.0 MALIGNANT NEOPLASM OF RIGHT BREAST IN FEMALE, ESTROGEN RECEPTOR POSITIVE, UNSPECIFIED SITE OF BREAST (HCC): ICD-10-CM

## 2021-08-17 DIAGNOSIS — G89.18 POST-OP PAIN: ICD-10-CM

## 2021-08-17 DIAGNOSIS — C50.911 MALIGNANT NEOPLASM OF RIGHT BREAST IN FEMALE, ESTROGEN RECEPTOR POSITIVE, UNSPECIFIED SITE OF BREAST (HCC): ICD-10-CM

## 2021-08-17 DIAGNOSIS — Z01.818 PRE-OP EXAM: Primary | ICD-10-CM

## 2021-08-17 LAB
ANION GAP SERPL CALCULATED.3IONS-SCNC: 9 MMOL/L (ref 7–16)
BUN BLDV-MCNC: 16 MG/DL (ref 6–23)
CALCIUM SERPL-MCNC: 9.2 MG/DL (ref 8.6–10.2)
CHLORIDE BLD-SCNC: 102 MMOL/L (ref 98–107)
CO2: 28 MMOL/L (ref 22–29)
CREAT SERPL-MCNC: 0.8 MG/DL (ref 0.5–1)
GFR AFRICAN AMERICAN: >60
GFR NON-AFRICAN AMERICAN: >60 ML/MIN/1.73
GLUCOSE BLD-MCNC: 168 MG/DL (ref 74–99)
HCT VFR BLD CALC: 39.7 % (ref 34–48)
HEMOGLOBIN: 13.2 G/DL (ref 11.5–15.5)
MCH RBC QN AUTO: 29.5 PG (ref 26–35)
MCHC RBC AUTO-ENTMCNC: 33.2 % (ref 32–34.5)
MCV RBC AUTO: 88.8 FL (ref 80–99.9)
METER GLUCOSE: 152 MG/DL (ref 74–99)
PDW BLD-RTO: 11.9 FL (ref 11.5–15)
PLATELET # BLD: 381 E9/L (ref 130–450)
PMV BLD AUTO: 9.6 FL (ref 7–12)
POTASSIUM SERPL-SCNC: 4.3 MMOL/L (ref 3.5–5)
RBC # BLD: 4.47 E12/L (ref 3.5–5.5)
SODIUM BLD-SCNC: 139 MMOL/L (ref 132–146)
WBC # BLD: 8.4 E9/L (ref 4.5–11.5)

## 2021-08-17 PROCEDURE — 6360000002 HC RX W HCPCS

## 2021-08-17 PROCEDURE — 6360000002 HC RX W HCPCS: Performed by: ANESTHESIOLOGY

## 2021-08-17 PROCEDURE — 2780000010 HC IMPLANT OTHER: Performed by: SURGERY

## 2021-08-17 PROCEDURE — 6360000002 HC RX W HCPCS: Performed by: SURGERY

## 2021-08-17 PROCEDURE — 88342 IMHCHEM/IMCYTCHM 1ST ANTB: CPT

## 2021-08-17 PROCEDURE — 2500000003 HC RX 250 WO HCPCS

## 2021-08-17 PROCEDURE — 3700000000 HC ANESTHESIA ATTENDED CARE: Performed by: SURGERY

## 2021-08-17 PROCEDURE — 88305 TISSUE EXAM BY PATHOLOGIST: CPT

## 2021-08-17 PROCEDURE — 7100000001 HC PACU RECOVERY - ADDTL 15 MIN: Performed by: SURGERY

## 2021-08-17 PROCEDURE — 64450 NJX AA&/STRD OTHER PN/BRANCH: CPT | Performed by: ANESTHESIOLOGY

## 2021-08-17 PROCEDURE — 38525 BIOPSY/REMOVAL LYMPH NODES: CPT | Performed by: SURGERY

## 2021-08-17 PROCEDURE — 76098 X-RAY EXAM SURGICAL SPECIMEN: CPT

## 2021-08-17 PROCEDURE — 3600000013 HC SURGERY LEVEL 3 ADDTL 15MIN: Performed by: SURGERY

## 2021-08-17 PROCEDURE — 2580000003 HC RX 258

## 2021-08-17 PROCEDURE — A9520 TC99 TILMANOCEPT DIAG 0.5MCI: HCPCS | Performed by: RADIOLOGY

## 2021-08-17 PROCEDURE — 88307 TISSUE EXAM BY PATHOLOGIST: CPT

## 2021-08-17 PROCEDURE — 80048 BASIC METABOLIC PNL TOTAL CA: CPT

## 2021-08-17 PROCEDURE — 3430000000 HC RX DIAGNOSTIC RADIOPHARMACEUTICAL: Performed by: RADIOLOGY

## 2021-08-17 PROCEDURE — 3700000001 HC ADD 15 MINUTES (ANESTHESIA): Performed by: SURGERY

## 2021-08-17 PROCEDURE — 7100000011 HC PHASE II RECOVERY - ADDTL 15 MIN: Performed by: SURGERY

## 2021-08-17 PROCEDURE — 19301 PARTIAL MASTECTOMY: CPT | Performed by: SURGERY

## 2021-08-17 PROCEDURE — 19281 PERQ DEVICE BREAST 1ST IMAG: CPT

## 2021-08-17 PROCEDURE — 38792 RA TRACER ID OF SENTINL NODE: CPT

## 2021-08-17 PROCEDURE — 88341 IMHCHEM/IMCYTCHM EA ADD ANTB: CPT

## 2021-08-17 PROCEDURE — 82962 GLUCOSE BLOOD TEST: CPT

## 2021-08-17 PROCEDURE — 2720000010 HC SURG SUPPLY STERILE: Performed by: SURGERY

## 2021-08-17 PROCEDURE — 2500000003 HC RX 250 WO HCPCS: Performed by: SURGERY

## 2021-08-17 PROCEDURE — 38900 IO MAP OF SENT LYMPH NODE: CPT | Performed by: SURGERY

## 2021-08-17 PROCEDURE — 85027 COMPLETE CBC AUTOMATED: CPT

## 2021-08-17 PROCEDURE — 7100000000 HC PACU RECOVERY - FIRST 15 MIN: Performed by: SURGERY

## 2021-08-17 PROCEDURE — 19318 BREAST REDUCTION: CPT | Performed by: PLASTIC SURGERY

## 2021-08-17 PROCEDURE — 2709999900 HC NON-CHARGEABLE SUPPLY: Performed by: SURGERY

## 2021-08-17 PROCEDURE — 7100000010 HC PHASE II RECOVERY - FIRST 15 MIN: Performed by: SURGERY

## 2021-08-17 PROCEDURE — 36415 COLL VENOUS BLD VENIPUNCTURE: CPT

## 2021-08-17 PROCEDURE — 19318 BREAST REDUCTION: CPT | Performed by: PHYSICIAN ASSISTANT

## 2021-08-17 PROCEDURE — 3600000003 HC SURGERY LEVEL 3 BASE: Performed by: SURGERY

## 2021-08-17 PROCEDURE — 6370000000 HC RX 637 (ALT 250 FOR IP): Performed by: SURGERY

## 2021-08-17 RX ORDER — ROPIVACAINE HYDROCHLORIDE 5 MG/ML
30 INJECTION, SOLUTION EPIDURAL; INFILTRATION; PERINEURAL
Status: COMPLETED | OUTPATIENT
Start: 2021-08-17 | End: 2021-08-17

## 2021-08-17 RX ORDER — FENTANYL CITRATE 50 UG/ML
25 INJECTION, SOLUTION INTRAMUSCULAR; INTRAVENOUS EVERY 5 MIN PRN
Status: DISCONTINUED | OUTPATIENT
Start: 2021-08-17 | End: 2021-08-17 | Stop reason: HOSPADM

## 2021-08-17 RX ORDER — OXYCODONE HYDROCHLORIDE AND ACETAMINOPHEN 5; 325 MG/1; MG/1
1 TABLET ORAL EVERY 6 HOURS PRN
Qty: 15 TABLET | Refills: 0 | Status: SHIPPED | OUTPATIENT
Start: 2021-08-17 | End: 2021-08-24

## 2021-08-17 RX ORDER — MEPERIDINE HYDROCHLORIDE 25 MG/ML
12.5 INJECTION INTRAMUSCULAR; INTRAVENOUS; SUBCUTANEOUS EVERY 5 MIN PRN
Status: DISCONTINUED | OUTPATIENT
Start: 2021-08-17 | End: 2021-08-17 | Stop reason: HOSPADM

## 2021-08-17 RX ORDER — SODIUM CHLORIDE 0.9 % (FLUSH) 0.9 %
5-40 SYRINGE (ML) INJECTION PRN
Status: DISCONTINUED | OUTPATIENT
Start: 2021-08-17 | End: 2021-08-17 | Stop reason: HOSPADM

## 2021-08-17 RX ORDER — SODIUM CHLORIDE 0.9 % (FLUSH) 0.9 %
5-40 SYRINGE (ML) INJECTION EVERY 12 HOURS SCHEDULED
Status: DISCONTINUED | OUTPATIENT
Start: 2021-08-17 | End: 2021-08-17 | Stop reason: HOSPADM

## 2021-08-17 RX ORDER — ONDANSETRON 4 MG/1
4 TABLET, FILM COATED ORAL DAILY PRN
Qty: 12 TABLET | Refills: 1 | Status: SHIPPED | OUTPATIENT
Start: 2021-08-17 | End: 2022-03-16 | Stop reason: ALTCHOICE

## 2021-08-17 RX ORDER — ACETAMINOPHEN 500 MG
1000 TABLET ORAL ONCE
Status: COMPLETED | OUTPATIENT
Start: 2021-08-17 | End: 2021-08-17

## 2021-08-17 RX ORDER — PROMETHAZINE HYDROCHLORIDE 25 MG/ML
25 INJECTION, SOLUTION INTRAMUSCULAR; INTRAVENOUS PRN
Status: DISCONTINUED | OUTPATIENT
Start: 2021-08-17 | End: 2021-08-17 | Stop reason: HOSPADM

## 2021-08-17 RX ORDER — BUPIVACAINE HYDROCHLORIDE AND EPINEPHRINE 2.5; 5 MG/ML; UG/ML
INJECTION, SOLUTION EPIDURAL; INFILTRATION; INTRACAUDAL; PERINEURAL PRN
Status: DISCONTINUED | OUTPATIENT
Start: 2021-08-17 | End: 2021-08-17 | Stop reason: ALTCHOICE

## 2021-08-17 RX ORDER — ROPIVACAINE HYDROCHLORIDE 5 MG/ML
INJECTION, SOLUTION EPIDURAL; INFILTRATION; PERINEURAL
Status: COMPLETED | OUTPATIENT
Start: 2021-08-17 | End: 2021-08-17

## 2021-08-17 RX ORDER — KETOROLAC TROMETHAMINE 30 MG/ML
INJECTION, SOLUTION INTRAMUSCULAR; INTRAVENOUS PRN
Status: DISCONTINUED | OUTPATIENT
Start: 2021-08-17 | End: 2021-08-17 | Stop reason: SDUPTHER

## 2021-08-17 RX ORDER — FENTANYL CITRATE 50 UG/ML
100 INJECTION, SOLUTION INTRAMUSCULAR; INTRAVENOUS ONCE
Status: COMPLETED | OUTPATIENT
Start: 2021-08-17 | End: 2021-08-17

## 2021-08-17 RX ORDER — ONDANSETRON 2 MG/ML
INJECTION INTRAMUSCULAR; INTRAVENOUS PRN
Status: DISCONTINUED | OUTPATIENT
Start: 2021-08-17 | End: 2021-08-17 | Stop reason: SDUPTHER

## 2021-08-17 RX ORDER — SODIUM CHLORIDE 9 MG/ML
25 INJECTION, SOLUTION INTRAVENOUS PRN
Status: DISCONTINUED | OUTPATIENT
Start: 2021-08-17 | End: 2021-08-17 | Stop reason: HOSPADM

## 2021-08-17 RX ORDER — ONDANSETRON 2 MG/ML
4 INJECTION INTRAMUSCULAR; INTRAVENOUS ONCE
Status: COMPLETED | OUTPATIENT
Start: 2021-08-17 | End: 2021-08-17

## 2021-08-17 RX ORDER — ONDANSETRON 2 MG/ML
INJECTION INTRAMUSCULAR; INTRAVENOUS
Status: COMPLETED
Start: 2021-08-17 | End: 2021-08-17

## 2021-08-17 RX ORDER — PROPOFOL 10 MG/ML
INJECTION, EMULSION INTRAVENOUS PRN
Status: DISCONTINUED | OUTPATIENT
Start: 2021-08-17 | End: 2021-08-17 | Stop reason: SDUPTHER

## 2021-08-17 RX ORDER — ROCURONIUM BROMIDE 10 MG/ML
INJECTION, SOLUTION INTRAVENOUS PRN
Status: DISCONTINUED | OUTPATIENT
Start: 2021-08-17 | End: 2021-08-17 | Stop reason: SDUPTHER

## 2021-08-17 RX ORDER — MIDAZOLAM HYDROCHLORIDE 2 MG/2ML
1 INJECTION, SOLUTION INTRAMUSCULAR; INTRAVENOUS EVERY 5 MIN PRN
Status: DISCONTINUED | OUTPATIENT
Start: 2021-08-17 | End: 2021-08-17 | Stop reason: HOSPADM

## 2021-08-17 RX ORDER — SODIUM CHLORIDE, SODIUM LACTATE, POTASSIUM CHLORIDE, CALCIUM CHLORIDE 600; 310; 30; 20 MG/100ML; MG/100ML; MG/100ML; MG/100ML
INJECTION, SOLUTION INTRAVENOUS CONTINUOUS
Status: DISCONTINUED | OUTPATIENT
Start: 2021-08-17 | End: 2021-08-17 | Stop reason: HOSPADM

## 2021-08-17 RX ORDER — LABETALOL HYDROCHLORIDE 5 MG/ML
5 INJECTION, SOLUTION INTRAVENOUS EVERY 10 MIN PRN
Status: DISCONTINUED | OUTPATIENT
Start: 2021-08-17 | End: 2021-08-17 | Stop reason: HOSPADM

## 2021-08-17 RX ORDER — DEXAMETHASONE SODIUM PHOSPHATE 10 MG/ML
INJECTION INTRAMUSCULAR; INTRAVENOUS PRN
Status: DISCONTINUED | OUTPATIENT
Start: 2021-08-17 | End: 2021-08-17 | Stop reason: SDUPTHER

## 2021-08-17 RX ORDER — SODIUM CHLORIDE 9 MG/ML
INJECTION, SOLUTION INTRAVENOUS CONTINUOUS PRN
Status: DISCONTINUED | OUTPATIENT
Start: 2021-08-17 | End: 2021-08-17 | Stop reason: SDUPTHER

## 2021-08-17 RX ORDER — GLYCOPYRROLATE 1 MG/5 ML
SYRINGE (ML) INTRAVENOUS PRN
Status: DISCONTINUED | OUTPATIENT
Start: 2021-08-17 | End: 2021-08-17 | Stop reason: SDUPTHER

## 2021-08-17 RX ORDER — FENTANYL CITRATE 50 UG/ML
INJECTION, SOLUTION INTRAMUSCULAR; INTRAVENOUS PRN
Status: DISCONTINUED | OUTPATIENT
Start: 2021-08-17 | End: 2021-08-17 | Stop reason: SDUPTHER

## 2021-08-17 RX ORDER — NEOSTIGMINE METHYLSULFATE 1 MG/ML
INJECTION, SOLUTION INTRAVENOUS PRN
Status: DISCONTINUED | OUTPATIENT
Start: 2021-08-17 | End: 2021-08-17 | Stop reason: SDUPTHER

## 2021-08-17 RX ORDER — METHYLENE BLUE 10 MG/ML
INJECTION INTRAVENOUS PRN
Status: DISCONTINUED | OUTPATIENT
Start: 2021-08-17 | End: 2021-08-17 | Stop reason: ALTCHOICE

## 2021-08-17 RX ORDER — CEPHALEXIN 500 MG/1
500 CAPSULE ORAL 4 TIMES DAILY
Qty: 20 CAPSULE | Refills: 0 | Status: SHIPPED | OUTPATIENT
Start: 2021-08-17 | End: 2021-08-22

## 2021-08-17 RX ORDER — LIDOCAINE HYDROCHLORIDE 20 MG/ML
INJECTION, SOLUTION INTRAVENOUS PRN
Status: DISCONTINUED | OUTPATIENT
Start: 2021-08-17 | End: 2021-08-17 | Stop reason: SDUPTHER

## 2021-08-17 RX ADMIN — ROPIVACAINE HYDROCHLORIDE 30 ML: 5 INJECTION EPIDURAL; INFILTRATION; PERINEURAL at 13:26

## 2021-08-17 RX ADMIN — SODIUM CHLORIDE: 9 INJECTION, SOLUTION INTRAVENOUS at 13:49

## 2021-08-17 RX ADMIN — ROCURONIUM BROMIDE 10 MG: 10 INJECTION, SOLUTION INTRAVENOUS at 14:45

## 2021-08-17 RX ADMIN — FENTANYL CITRATE 50 MCG: 50 INJECTION, SOLUTION INTRAMUSCULAR; INTRAVENOUS at 14:41

## 2021-08-17 RX ADMIN — LIDOCAINE HYDROCHLORIDE 100 MG: 20 INJECTION, SOLUTION INTRAVENOUS at 13:56

## 2021-08-17 RX ADMIN — MIDAZOLAM 2 MG: 1 INJECTION INTRAMUSCULAR; INTRAVENOUS at 13:16

## 2021-08-17 RX ADMIN — DEXAMETHASONE SODIUM PHOSPHATE 10 MG: 10 INJECTION INTRAMUSCULAR; INTRAVENOUS at 14:00

## 2021-08-17 RX ADMIN — ONDANSETRON 4 MG: 2 INJECTION INTRAMUSCULAR; INTRAVENOUS at 17:48

## 2021-08-17 RX ADMIN — FENTANYL CITRATE 50 MCG: 50 INJECTION, SOLUTION INTRAMUSCULAR; INTRAVENOUS at 13:56

## 2021-08-17 RX ADMIN — ONDANSETRON HYDROCHLORIDE 4 MG: 2 INJECTION, SOLUTION INTRAMUSCULAR; INTRAVENOUS at 15:42

## 2021-08-17 RX ADMIN — FENTANYL CITRATE 50 MCG: 50 INJECTION, SOLUTION INTRAMUSCULAR; INTRAVENOUS at 15:20

## 2021-08-17 RX ADMIN — KETOROLAC TROMETHAMINE 30 MG: 30 INJECTION, SOLUTION INTRAMUSCULAR; INTRAVENOUS at 15:46

## 2021-08-17 RX ADMIN — FENTANYL CITRATE 100 MCG: 50 INJECTION, SOLUTION INTRAMUSCULAR; INTRAVENOUS at 13:16

## 2021-08-17 RX ADMIN — Medication 2000 MG: at 13:57

## 2021-08-17 RX ADMIN — ROPIVACAINE HYDROCHLORIDE 30 ML: 5 INJECTION, SOLUTION EPIDURAL; INFILTRATION; PERINEURAL at 13:21

## 2021-08-17 RX ADMIN — FENTANYL CITRATE 50 MCG: 50 INJECTION, SOLUTION INTRAMUSCULAR; INTRAVENOUS at 15:07

## 2021-08-17 RX ADMIN — Medication 0.6 MG: at 15:54

## 2021-08-17 RX ADMIN — PROPOFOL 180 MG: 10 INJECTION, EMULSION INTRAVENOUS at 13:56

## 2021-08-17 RX ADMIN — Medication 3 MG: at 15:54

## 2021-08-17 RX ADMIN — TILMANOCEPT 0.5 MILLICURIE: KIT at 11:30

## 2021-08-17 RX ADMIN — ROCURONIUM BROMIDE 40 MG: 10 INJECTION, SOLUTION INTRAVENOUS at 13:56

## 2021-08-17 RX ADMIN — ACETAMINOPHEN 1000 MG: 500 TABLET ORAL at 10:13

## 2021-08-17 RX ADMIN — FENTANYL CITRATE 50 MCG: 50 INJECTION, SOLUTION INTRAMUSCULAR; INTRAVENOUS at 15:51

## 2021-08-17 ASSESSMENT — PULMONARY FUNCTION TESTS
PIF_VALUE: 14
PIF_VALUE: 17
PIF_VALUE: 15
PIF_VALUE: 17
PIF_VALUE: 0
PIF_VALUE: 18
PIF_VALUE: 17
PIF_VALUE: 17
PIF_VALUE: 3
PIF_VALUE: 17
PIF_VALUE: 15
PIF_VALUE: 18
PIF_VALUE: 14
PIF_VALUE: 17
PIF_VALUE: 6
PIF_VALUE: 16
PIF_VALUE: 17
PIF_VALUE: 0
PIF_VALUE: 18
PIF_VALUE: 17
PIF_VALUE: 17
PIF_VALUE: 19
PIF_VALUE: 17
PIF_VALUE: 18
PIF_VALUE: 0
PIF_VALUE: 18
PIF_VALUE: 9
PIF_VALUE: 14
PIF_VALUE: 16
PIF_VALUE: 6
PIF_VALUE: 13
PIF_VALUE: 17
PIF_VALUE: 17
PIF_VALUE: 0
PIF_VALUE: 17
PIF_VALUE: 22
PIF_VALUE: 17
PIF_VALUE: 17
PIF_VALUE: 16
PIF_VALUE: 17
PIF_VALUE: 18
PIF_VALUE: 1
PIF_VALUE: 14
PIF_VALUE: 9
PIF_VALUE: 18
PIF_VALUE: 17
PIF_VALUE: 19
PIF_VALUE: 17
PIF_VALUE: 18
PIF_VALUE: 17
PIF_VALUE: 15
PIF_VALUE: 0
PIF_VALUE: 17
PIF_VALUE: 16
PIF_VALUE: 18
PIF_VALUE: 17
PIF_VALUE: 15
PIF_VALUE: 17
PIF_VALUE: 0
PIF_VALUE: 14
PIF_VALUE: 17
PIF_VALUE: 19
PIF_VALUE: 17
PIF_VALUE: 18
PIF_VALUE: 1
PIF_VALUE: 2
PIF_VALUE: 17
PIF_VALUE: 23
PIF_VALUE: 18
PIF_VALUE: 18
PIF_VALUE: 17
PIF_VALUE: 14
PIF_VALUE: 23
PIF_VALUE: 18
PIF_VALUE: 17
PIF_VALUE: 0
PIF_VALUE: 18
PIF_VALUE: 17
PIF_VALUE: 18
PIF_VALUE: 17
PIF_VALUE: 15
PIF_VALUE: 17
PIF_VALUE: 2
PIF_VALUE: 17
PIF_VALUE: 18
PIF_VALUE: 19
PIF_VALUE: 18
PIF_VALUE: 18
PIF_VALUE: 16
PIF_VALUE: 17
PIF_VALUE: 17
PIF_VALUE: 15
PIF_VALUE: 17
PIF_VALUE: 17
PIF_VALUE: 1
PIF_VALUE: 18
PIF_VALUE: 17
PIF_VALUE: 17
PIF_VALUE: 18
PIF_VALUE: 1
PIF_VALUE: 17
PIF_VALUE: 18
PIF_VALUE: 17
PIF_VALUE: 17
PIF_VALUE: 18
PIF_VALUE: 18
PIF_VALUE: 17
PIF_VALUE: 17

## 2021-08-17 ASSESSMENT — PAIN DESCRIPTION - LOCATION: LOCATION: BREAST

## 2021-08-17 ASSESSMENT — PAIN SCALES - GENERAL
PAINLEVEL_OUTOF10: 0

## 2021-08-17 ASSESSMENT — LIFESTYLE VARIABLES: SMOKING_STATUS: 0

## 2021-08-17 ASSESSMENT — PAIN - FUNCTIONAL ASSESSMENT: PAIN_FUNCTIONAL_ASSESSMENT: 0-10

## 2021-08-17 ASSESSMENT — PAIN DESCRIPTION - ORIENTATION: ORIENTATION: RIGHT;LEFT

## 2021-08-17 ASSESSMENT — PAIN DESCRIPTION - PAIN TYPE: TYPE: SURGICAL PAIN

## 2021-08-17 NOTE — OP NOTE
Operative Note      Patient: Barbara Cruz  YOB: 1960  MRN: [id number]    Date of Procedure: 8/17/2021    Pre-Op Diagnosis: RIGHT BREAST CANCER    Post-Op Diagnosis: Same       Procedure(s):  RIGHT ONCOPLASTIC BREAST REDUCTION, RIGHT BREAST NEEDLE LOCALIZED LUMPECTOMY, BLUE DYE INJECTION, RIGHT AXILLARY SENTINEL NODE EXCISION BIOPSY    Surgeon(s):  MD Flores Joseph MD    Assistant:   Physician Assistant: FLORENTINO Salazar  Resident: Temitope Stone MD    Anesthesia: General    Estimated Blood Loss (mL): 10    Complications: None    Specimens:   ID Type Source Tests Collected by Time Destination   A : LEFT BREAST TISSUE 288.8 GRAMS Tissue Tissue SURGICAL PATHOLOGY Phillip Santana MD 8/17/2021 1506    B : MID CHEST SQUAMOUS CELL CARCINOMA  Tissue Tissue SURGICAL PATHOLOGY Phillip Santana MD 8/17/2021 1511    C : SENTINEL NODE #1-65 Tissue Tissue SURGICAL PATHOLOGY Flores Burden MD 8/17/2021 1516    D : SENTINEL NODE #2-52 Tissue Tissue SURGICAL PATHOLOGY Flores Burden MD 8/17/2021 1519    E : SENTINEL NODE #3-104 Tissue Tissue SURGICAL PATHOLOGY Flores Burden MD 8/17/2021 1520    F : CENTRAL BREAST LUMPECTOMY Tissue Tissue SURGICAL PATHOLOGY Flores Burden MD 8/17/2021 1524    G : SENTINEL LYMPH NODE #4-BLUE Tissue Tissue SURGICAL PATHOLOGY Flores Burden MD 8/17/2021 1527        Implants:  * No implants in log *      Drains: none:    Findings:   Right breast lumpectomy containing biopsy clip and area of interest.  [place] lymph node #1: count 61  [place] lymph node # 2: count 52  [place] lymph node #3: count 104  [place] lymph node #4: blue, no counts    History: This is a 59year old female who presented with a palpable right breast mass. Workup revealed ER+/VA+/HER2- invasive ductal cancer. Lumpectomy with sentinel lymph node biopsy was recommended.  She was also referred to Plastic Surgery for oncoplastic right breast reduction with matching left breast reduction, as well as extended to the lateral corner of the mastopexy incision. The specimen was marked for orientation with colored paint, and mammogram of the specimen confirmed the biopsy clip and the area of interest within the specimen. The cavity borders were marked with clips. Next, axillary sentinel lymph node biopsy was performed. The axillary fascia was opened with cautery through the wound, and the axilla was entered. Using the neoprobe, three sentinel lymph node specimens were identified. Each specimen was grasped with Allis clamp, and afferent and efferent lymphovascular structures were isolated, clipped, and divided. A fourth blue lymph node was identified and similarly dissected out. [place] lymph node #1 count was 61, sentinel lymph node #2 count was 52, sentinel lymph node #3 count was 104, and sentinel lymph node #4 was blue with no counts. There were no remaining sentinel lymph nodes by radioactive tracer or blue dye, and no palpably pathologic lymph nodes remaining. During the dissection, the intercostobrachial nerve was identified and preserved. The wound was irrigated with saline. The case was then turned over to the Plastic Surgery team, as dictated separately. Dr. Zhen Bowden was present for this portion of the case.  Operation performed with curative intent: Yes   Tracer(s) used to identify sentinel nodes in the upfront surgery (nonneoadjuvant) setting (select all that apply) : Dye and Radioactive tracer   Tracer(s) used to identify sentinel nodes in the neoadjuvant setting (select all that apply): Not Applicable   All nodes (colored or noncolored) present at the end of a dye-filled lymphatic channel were removed: Yes   All significantly radioactive nodes were removed: Yes   All palpably suspicious nodes were removed:  Yes   Biopsy-proven positive nodes marked with clips prior to chemotherapy were identified and removed: Yes    Electronically signed by Odell Keyes MD on 8/17/2021 at 3:35 PM

## 2021-08-17 NOTE — OP NOTE
Operative Note      Patient: Shena Doran  YOB: 1960  MRN: [id number]    Date of Procedure: 8/17/2021    Pre-Op Diagnosis: RIGHT BREAST CANCER, SQUAMOUS CELL CARCINOMA MID CHEST    Post-Op Diagnosis: Same       Procedure(s):  RIGHT ONCOPLASTIC BREAST REDUCTION, MATCHING LEFT BREAST REDUCTION   Surgeon(s):  Deninse Amos MD      Assistant:   Physician Assistant: FLORENTINO Velazquez      Anesthesia: General    Estimated Blood Loss (mL): 04BB    Complications: None    Specimens:   ID Type Source Tests Collected by Time Destination   A : LEFT BREAST TISSUE 288.8 GRAMS Tissue Tissue SURGICAL PATHOLOGY Dennise Amos MD 8/17/2021 1506    B : MID CHEST SQUAMOUS CELL CARCINOMA  Tissue Tissue SURGICAL PATHOLOGY Dennise Amos MD 8/17/2021 1511    C : SENTINEL NODE #1-65 Tissue Tissue SURGICAL PATHOLOGY Jeevan Mcguire MD 8/17/2021 1516    D : SENTINEL NODE #2-52 Tissue Tissue SURGICAL PATHOLOGY Jeevan Mcguire MD 8/17/2021 1519    E : SENTINEL NODE #3-104 Tissue Tissue SURGICAL PATHOLOGY Jeevan Mcguire MD 8/17/2021 1520    F : CENTRAL BREAST LUMPECTOMY Tissue Tissue SURGICAL PATHOLOGY Jeevan Mcguire MD 8/17/2021 1524    G : SENTINEL LYMPH NODE #4-BLUE Tissue Tissue SURGICAL PATHOLOGY Jeevan Mcguire MD 8/17/2021 1527    H : RIGHT BREAST TISSUE Tissue Tissue SURGICAL PATHOLOGY Dennise Amos MD 8/17/2021 1532        Implants:  * No implants in log *      Drains:   [REMOVED] Urethral Catheter Non-latex 16 fr (Removed)           Detailed Description of Procedure:            ASSISTANT:  Tali Uriostegui PA-C. PA was required for the case due  to lack of adequately trained assistant; was involved in prepping,  draping, retracting, dressing and suturing. SPECIMEN:  Bilateral breast skin and tissue. Resected weight on the left  side 288.8 gm, on the right side 228.9 gm.           PREOPERATIVE INDICATIONS:  The patient is a 59 y.o. female with history of right-sided breast cancer and bilateral symptomatic macromastia, refractory to conservative medical management. The patient elected to proceed with a right sided oncoplastic breast reduction with matching left breast reduction. Risks, benefits and alternatives were explained to the patient including but not limited to bleeding, scarring, infection, asymmetries, loss of nipple and areolar complex, and need for further surgery. She understood and elected to proceed. She was seen on the day of surgery, marked and all questions were answered. These markings were done in the preoperative holding area in the standing position. A Bolden-pattern skin incision was designed with an inferiorly-based 8 cm wide pedicle. The nipple and areolar complex were placed at the level of the inframammary fold which corresponded to 21 cm from the sternal notch. The nipple and areola were also resized to 38 mm. She was taken back to the operating room, placed in the supine position. SCDs were on and functioning at the time of induction of anesthesia. Preoperative antibiotics were given prior to incision. The area was prepped and draped in the usual sterile fashion with chlorhexidine prep stick following a wash with chlorhexidine Shay wipes. Attention was first turned to the cancer breast. Following blue dye injection by breast surgery, a 10-blade was used to score all incisions and the pedicle was de-epithelialized with a Brown forceps and 10-blade. This breast was handed over to the breast surgeon. This maneuver was repeated on the left breast, and dissection was then carried circumferentially around the pedicle with PEAK PlasmaBlade with care to spare the inferior vascularized base. 2 cm thick cephalically-based skin flaps were then created with the aid of mastectomy hooks, were uniformly dissected down to the chest wall. The intervening tissue was removed from the patient and weighed.   Further cephalic dissection was performed to allow for cephalic advancement of the inferior

## 2021-08-17 NOTE — H&P
Patient ID: Yahir Abbasi is a 59 y. o. female.     HPI  History and Physical     Patient's Name/Date of Birth: Yahir Abbasi / 1960     Yahir Abbasi presents for conservative therapy for  right breast invasive ductal cancer. (ER+/PA+/HER2 -)     PCP: Fatemeh Narvaez DO, Gynecologist:  Tiburcio GUERRERO.     The lesion is located in the 8 o'clock position of the Right breast. The lesion was discovered by self/exam. The patient has noted a change in BSE since presentation. Patient denies nipple discharge. Dilip Renate personal history of breast cancer.  Breast cancer risk factors include mom breast cancer age 52, paternal aunt breast cancer, 48 paternal gm breast cancer age 48.  Ashkenazi Roman Catholic Ancestry: No.     OBSTETRIC RELATED HISTORY:  Age of menarche was 12. Age of menopause was 54.    Patient denies hormonal therapy. Patient is . Age of first live birth was 17. Patient did not breast feed. Is patient interested in fertility information about fertility preservation? No     CANCER SURVEILLANCE HISTORY:  Mammograms: Yes  Breast MRI's: No   Breast Biopsies: Yes   Colonoscopy: Yes   GI Polyps: Yes   EGD: Yes   Pelvic Exam: Yes   Pap Smear: Yes   Dermatology: No   Lung screening: no        Have you received the flu vaccination this year? No  Have you received the Pneumococcal vaccination in the last 5 years? No  Have you received the COVID 19 vaccination this year? Yes     Estimated body mass index is 24.33 kg/m² as calculated from the following:    Height as of 21: 5' 8\" (1.727 m).    Weight as of 21: 160 lb (72.6 kg). Bra Size: 40c     Because violence is so common, we ask all our patients: are you in a relationship or do you live with a person who threatens, hurts, or controls you:  denies     Patient drinks little caffeinated beverages. Patient does not smoke cigarettes. Patient does not use recreational drugs.        Lymphedema screening completed.  See documentation in the flow sheets.      Past Medical History        Past Medical History:   Diagnosis Date    Diabetes mellitus ([address]      Diverticulitis      Frequent UTI      Hiatal hernia      Hypertension      Kidney stones      PONV (postoperative nausea and vomiting)      Thyroid disease              Past Surgical History             Past Surgical History:   Procedure Laterality Date    CHOLECYSTECTOMY, LAPAROSCOPIC N/A 6/20/2019     CHOLECYSTECTOMY LAPAROSCOPIC performed by Scott García MD at [facility] ECHO COMPL W DOP COLOR FLOW   8/15/2013          ENDOSCOPY, COLON, DIAGNOSTIC        HYSTERECTOMY        RECTAL PROLAPSE REPAIR        US BREAST NEEDLE BIOPSY RIGHT Right 6/24/2021     US BREAST NEEDLE BIOPSY RIGHT 6/24/2021 [facility]            Current Facility-Administered Medications               Current Outpatient Medications   Medication Sig Dispense Refill    metFORMIN (GLUCOPHAGE) 500 MG tablet Take 1 tablet by mouth 2 times daily (with meals) Further refill requires appointment.  60 tablet 0    albuterol sulfate HFA (VENTOLIN HFA) 108 (90 Base) MCG/ACT inhaler Inhale 2 puffs into the lungs every 6 hours as needed for Wheezing or Shortness of Breath 3 Inhaler 1    levothyroxine (SYNTHROID) 50 MCG tablet Take 1 tablet by mouth once daily 90 tablet 1    allopurinol (ZYLOPRIM) 300 MG tablet Take 0.5 tablets by mouth daily 45 tablet 1    LANSOPRAZOLE PO Take 50 mg by mouth daily         Probiotic Product (PROBIOTIC PO) Take by mouth          No current facility-administered medications for this visit.                      Allergies   Allergen Reactions    Flagyl [Metronidazole] Rash    Omeprazole Diarrhea and Nausea And Vomiting       As well as dizziness    Vicodin [Hydrocodone-Acetaminophen] Nausea And Vomiting         Family History             Family History   Problem Relation Age of Onset    Breast Cancer Mother      Prostate Cancer Father      Prostate Cancer Brother      Breast Cancer Maternal Aunt      Prostate Cancer Paternal Uncle                  Social History                   Socioeconomic History    Marital status:        Spouse name: Not on file    Number of children: Not on file    Years of education: Not on file    Highest education level: Not on file   Occupational History    Not on file   Tobacco Use    Smoking status: Never Smoker    Smokeless tobacco: Never Used   Substance and Sexual Activity    Alcohol use: No    Drug use: Not on file    Sexual activity: Not on file   Other Topics Concern    Not on file   Social History Narrative    Not on file      Social Determinants of Health            Financial Resource Strain:     Difficulty of Paying Living Expenses:    Food Insecurity:     Worried About Running Out of Food in the Last Year:     [address] in the Last Year:    Transportation Needs:     Lack of Transportation (Medical):  Lack of Transportation (Non-Medical):    Physical Activity:     Days of Exercise per Week:     Minutes of Exercise per Session:    Stress:     Feeling of Stress :    Social Connections:     Frequency of Communication with Friends and Family:     Frequency of Social Gatherings with Friends and Family:     Attends [employer] Services:     Active Member of Clubs or Organizations:     Attends Club or Organization Meetings:     Marital Status:    Intimate Partner Violence:     Fear of Current or Ex-Partner:     Emotionally Abused:     Physically Abused:     Sexually Abused:              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 Enjoys grandchildren and dogs.        Review of Systems  CONSTITUTIONAL: No fever, chills. Good appetite and energy level. ENMT: Eyes: No diplopia; Nose: No epistaxis. Mouth: No sore throat. RESPIRATORY: No hemoptysis, shortness of breath, cough. CARDIOVASCULAR: No chest pain, pressure, or palpitations.   GASTROINTESTINAL: No nausea/vomiting, abdominal pain, diarrhea/constipation.  Symptomatic reflux. No blood in the stools. GENITOURINARY: No dysuria, urinary frequency, hematuria. NEURO: No syncope, presyncope, headache. Remainder:  ROS NEGATIVE     Patient admits to previous history of DVT/PE. Left calf after pregnancy on coumadin for 1 year, 1984. Review of systems as noted above completely reviewed with patient.  No changes. Had Covid, severe, December 2020.     Objective:   Physical Exam  Vitals and nursing note reviewed. Constitutional:       General: She is not in acute distress.     Appearance: She is well-developed. She is not diaphoretic. HENT:      Head: Normocephalic and atraumatic. Eyes:      Conjunctiva/sclera: Conjunctivae normal.      Pupils: Pupils are equal, round, and reactive to light. Neck:      Thyroid: No thyromegaly.      Trachea: No tracheal deviation. Cardiovascular:      Rate and Rhythm: Normal rate and regular rhythm.      Heart sounds: Normal heart sounds. Pulmonary:      Effort: Pulmonary effort is normal. No respiratory distress.      Breath sounds: Normal breath sounds. Chest:      Breasts: Breasts are symmetrical.         Right: Mass present. No inverted nipple, nipple discharge, skin change or tenderness.         Left: No inverted nipple, mass, nipple discharge, skin change or tenderness.          Comments: Breast ptosis bilaterally, significant  Abdominal:      General: There is no distension.      Palpations: Abdomen is soft. Musculoskeletal:      Cervical back: Normal range of motion and neck supple. Lymphadenopathy:      Cervical: No cervical adenopathy.      Upper Body:      Right upper body: No supraclavicular adenopathy.      Left upper body: No supraclavicular adenopathy. Skin:     General: Skin is warm and dry.      Coloration: Skin is not pale.      Findings: No erythema. Neurological:      Mental Status: She is alert and oriented to person, place, and time.    Psychiatric:         Behavior: Behavior normal.         Thought Content: Thought content normal.         Judgment: Judgment normal.            Assessment:   59 y. o. woman who underwent a stereotactic/US guided right breast core biopsy at 8 o'clock position on June 24 , 2021. Pathological evaluation completed at [facility]        REASON FOR REVISION:   This report is revised by [facility] on 07/06/2021 in order to correct the   specimen designation from \"left\" to \"right\". The FINAL DIAGNOSIS remains   UNCHANGED. Originally reported on 06/30/2021. Diagnosis:   Right breast at 8:00, core needle biopsy: Invasive carcinoma of no   special type (ductal), grade 2; see comment. Comment:     The carcinoma displays highly infiltrative architectural   features including single file and single cell patterns of invasion. Strong membranous E-cadherin expression is diffusely present in   association with the invasive carcinoma, supportive of ductal origin. No   in situ component is identified in the tissue sample. Benedicto histologic score for the invasive carcinoma corresponds to a   tubule score 3, nuclear score 3, mitotic score 1, total score 7, grade 2.    Intradepartmental consultation is obtained. Breast Cancer Marker Studies:     Estrogen Receptors (ER): Positive   Percentage of cells positive: 90%   Intensity: Strong     Progesterone Receptors (OH): Positive   Percentage of cells positive: 30%   Intensity: Moderate to strong     Her-2/stone (c-erb B-2) protein expression: Negative/0           6/16/2021:BILATERAL DIAGNOSTIC MAMMOGRAM     FINDINGS:   Breast tissue composition is heterogeneously dense which could obscure an   underlying mass.       In the posterior central right breast at about the 9 o'clock position there   is an irregular spiculated mass with architectural distortion measuring up to   2.9 cm. This is suspicious for malignancy.  No other spiculated mass or   suspicious microcalcification cluster is identified.         Ultrasound:       Targeted sonographic examination of the right breast was performed with   attention to the palpable abnormality, upper outer quadrant and axilla.       There is a large irregular solid hypoechoic mass at about the 8 to 9 o'clock   position measuring 2.4 x 1.4 x 1.4 cm.  It shows some internal color flow and   acoustic shadowing.  It is taller than wide and is felt to correspond to the   palpable abnormality and the mass seen on the mammogram.  Overall, these   findings are very suspicious for malignancy.       No other cyst or solid mass is seen.  No mass or lymphadenopathy is seen in   the right axilla.           Impression   1. Bilateral heterogeneously dense breast that could obscure an underlying   mass. 2. Large irregular hypoechoic solid mass seen on ultrasound and mammogram at   about the 8 to 9 o'clock position measuring 2.4 x 1.4 x 1.4 cm.  This is very   suspicious for malignancy and ultrasound-guided core biopsy is suggested.       BIRADS:   BIRADS - CATEGORY 5       Highly Suggestive for Malignancy.  Biopsy should be considered at this time.       OVERALL ASSESSMENT - HIGHLY SUGGESTIVE OF MALIGNANCY.         6/16/2021: ULTRASOUND RIGHT BREAST: [facility]  FINDINGS:   Breast tissue composition is heterogeneously dense which could obscure an   underlying mass.       In the posterior central right breast at about the 9 o'clock position there   is an irregular spiculated mass with architectural distortion measuring up to   2.9 cm. This is suspicious for malignancy.  No other spiculated mass or   suspicious microcalcification cluster is identified.           Ultrasound:       Targeted sonographic examination of the right breast was performed with   attention to the palpable abnormality, upper outer quadrant and axilla.       There is a large irregular solid hypoechoic mass at about the 8 to 9 o'clock   position measuring 2.4 x 1.4 x 1.4 cm.  It shows some internal color flow and   acoustic shadowing.  It is taller than wide and is felt to correspond to the   palpable abnormality and the mass seen on the mammogram.  Overall, these   findings are very suspicious for malignancy.       No other cyst or solid mass is seen.  No mass or lymphadenopathy is seen in   the right axilla.           Impression   1. Bilateral heterogeneously dense breast that could obscure an underlying   mass. 2. Large irregular hypoechoic solid mass seen on ultrasound and mammogram at   about the 8 to 9 o'clock position measuring 2.4 x 1.4 x 1.4 cm.  This is very   suspicious for malignancy and ultrasound-guided core biopsy is suggested.       BIRADS:   BIRADS - CATEGORY 5       Highly Suggestive for Malignancy.  Biopsy should be considered at this time.       OVERALL ASSESSMENT - HIGHLY SUGGESTIVE OF MALIGNANCY.          Clinical T2 right breast cancer lower outer quadrant.  Bilateral marked breast ptosis and very dense breast tissue.  Candidate for both genetic testing and an MRI.       MyRisk: Negative for any inherited mutations.     7/8/21 MRI:   FINDINGS:   There is heterogeneous fibroglandular tissue with minimal background   parenchymal enhancement.  An irregular, enhancing mass is again noted in the   right breast 8 o'clock which measures 2.4 x 1.8 x 2.3 cm (image 63 of the   axial T1 post contrast series).  Focal, heterogeneous non mass enhancement   measuring 1.3 x 1.2 x 1.2 cm along the lateral aspect of the mass may be due   to recent biopsy vs satellite lesion (image 65).   No suspicious mass or non   mass enhancement seen on the left.  There is no lymphadenopathy.  Bilateral   skin and nipple-areolar complexes are normal.  Visualized chest and abdominal   organs are unremarkable.           Impression   1.  Irregular mass in the right breast 8 o'clock measuring up to 2.4 cm in   size is consistent with biopsy proven neoplasm.  Focal, heterogeneous non   mass enhancement along the lateral aspect of the mass may be due to recent   biopsy vs satellite lesion.       2.  No evidence of neoplasm in the left breast.       3.  There is no lymphadenopathy.              Daughter had genetic testing for microcalcifications bilaterally and reports that it was negative.  Likely candidate for oncoplastic breast reduction at the time of conservative therapy to facilitate a better outcome from her radiation therapy.   Questions answered to patient, daughters, and 's satisfaction.     Plan: Right breast lumpectomy, blue dye injection, right axillary sentinel lymph node excision, possible right axillary dissection with oncoplastic bilateral reduction.     The risks, benefits, expected outcomes, and alternatives to this procedure have been discussed with the patient, which include but are not limited to bleeding, infection, flap necrosis and medical complications to anesthesia or surgery such as pneumonia, heart problems, blood clots, etc. Patient also was told that with minimally invasive procedures adequate margins are not always obtained with the first operation, and she has a >20% chance of requiring a second procedure to obtain clear margins around her tumor.  Patient understood and desires to undergo the procedure.

## 2021-08-17 NOTE — ANESTHESIA PROCEDURE NOTES
Peripheral Block    Patient location during procedure: holding area  Start time: 8/17/2021 1:16 PM  End time: 8/17/2021 1:26 PM  Staffing  Performed: anesthesiologist   Anesthesiologist: Bess Osorio MD  Preanesthetic Checklist  Completed: patient identified, IV checked, site marked, risks and benefits discussed, surgical consent, monitors and equipment checked, pre-op evaluation, timeout performed, anesthesia consent given, oxygen available and patient being monitored  Peripheral Block  Patient position: supine  Prep: ChloraPrep  Patient monitoring: cardiac monitor, continuous pulse ox, frequent blood pressure checks and IV access  Block type: PECS I and PECS II  Laterality: right  Injection technique: single-shot  Guidance: ultrasound guided  Provider prep: mask and sterile gloves  Needle  Needle type: combined needle/nerve stimulator   Needle gauge: 20 G  Needle length: 10 cm  Needle localization: ultrasound guidance  Assessment  Injection assessment: negative aspiration for heme and no paresthesia on injection  Slow fractionated injection: yes  Hemodynamics: stable  Additional Notes  Ropivacaine 0.5% 15 ml PEC1 and 15 ml PEC2 (30 ml Total)  Medications Administered  Ropivacaine (NAROPIN) injection 0.5%, 30 mL  Reason for block: post-op pain management and at surgeon's request

## 2021-08-17 NOTE — PROGRESS NOTES
Patient discharged from PACU VSS. Transferred to [facility] phase of care. SRDA phase of care will be done in PACU. Patient placed on appropriate monitors. Patient assisted with liquids. Family called to be with patient.

## 2021-08-17 NOTE — ANESTHESIA PRE PROCEDURE
Department of Anesthesiology  Preprocedure Note       Name:  Shawna Neumann   Age:  59 y.o.  :  1960                                          MRN:  [id number]         Date:  2021      Surgeon: Haley Ruggiero):  MD Anya Parmar MD Adelina Fanti, MD    Procedure: RIGHT ONCOPLASTIC BREAST REDUCTION, MATCHING LEFT BREAST REDUCTION AND EXCISION SQUAMOUS CELL CARCINOMA, RIGHT MID CHEST (Right Breast)  RIGHT MID CHEST SQUAMOUS CELL CARCINOMA (Right )  RIGHT BREAST NEEDLE LOCALIZED LUMPECTOMY, BLUE DYE INJECTION RIGHT AXILLARY SENTINEL LYMPH NODE EXCISION POSSIBLE RIGHT AXILLARY DISSECTION--TRIDENT MACHINE, NEOPROBEM RIGHT PECTORIAL BLOCK. (Right )  RIGHT BREAST NEEDLE LOCALIZED LUMPECTOMY, BLUE DYE INJECTION. RIGHT AXILLARY SENTINEL LYMPH NODE EXCISION POSSIBLE RIGHT AXILLARY DISSECTION--TRIDENT MACHINE, NEOPROBE RIGHT PECTORAL BLOCK (Right )    Medications prior to admission:   Prior to Admission medications    Medication Sig Start Date End Date Taking? Authorizing Provider   allopurinol (ZYLOPRIM) 300 MG tablet Take 1/2 (one-half) tablet by mouth once daily 21   Waldo Paddy Catterlin, DO   metFORMIN (GLUCOPHAGE) 500 MG tablet Take 1 tablet by mouth 2 times daily (with meals) 21  Valeta Shady Dale P Catterlin, DO   levothyroxine (SYNTHROID) 50 MCG tablet Take 1 tablet by mouth Daily 21  Valeta Shady Dale P Catterlin, DO   lansoprazole (PREVACID) 30 MG delayed release capsule Take 1 capsule by mouth daily 21  Fatemeh P Catterlin, DO   albuterol sulfate HFA (VENTOLIN HFA) 108 (90 Base) MCG/ACT inhaler Inhale 2 puffs into the lungs every 6 hours as needed for Wheezing or Shortness of Breath 12/15/20   Nabila Cordova MD   Probiotic Product (PROBIOTIC PO) Take by mouth    Historical Provider, MD       Current medications:    No current facility-administered medications for this visit. No current outpatient medications on file.      Facility-Administered Medications Ordered in Other Visits   Medication Dose Route Frequency Provider Last Rate Last Admin    0.9 % sodium chloride infusion  25 mL Intravenous PRN Vera Gonzales MD        ceFAZolin (ANCEF) 2000 mg in sterile water 20 mL IV syringe  2,000 mg Intravenous On Call to Jarrod Laird MD        lactated ringers infusion   Intravenous Continuous Vera Gonzales MD        sodium chloride flush 0.9 % injection 5-40 mL  5-40 mL Intravenous 2 times per day Vera Gonzales MD        sodium chloride flush 0.9 % injection 5-40 mL  5-40 mL Intravenous PRN Vera Gonzales MD        technetium Tc 99m tilmanocept [facility]) injection 0.5 millicurie  008 micro curie Intradermal ONCE PRN Thurston Meckel, DO   0.5 millicurie at 32/29/30 3072    ceFAZolin 2000 mg in 20 mL SWFI IV Syringe IV syringe                Allergies:     Allergies   Allergen Reactions    Flagyl [Metronidazole] Rash    Omeprazole Diarrhea and Nausea And Vomiting     As well as dizziness    Vicodin [Hydrocodone-Acetaminophen] Nausea And Vomiting       Problem List:    Patient Active Problem List   Diagnosis Code    Type 2 diabetes mellitus without complication, without long-term current use of insulin ([facility]) E11.9    Urinary tract infection without hematuria N39.0    Fatigue R53.83    Acquired hypothyroidism E03.9    H/O: hysterectomy Z90.710    Recurrent UTI N39.0    Acute bacterial sinusitis J01.90, B96.89    Left lower quadrant pain R10.32    Renal cysts, acquired, bilateral N28.1    Diverticulitis K57.92    Pericardial effusion I31.3    Right otitis media H66.91    History of cholecystectomy Z90.49    Infected tooth K04.7    Left ear pain H92.02    Dysuria R30.0    C. difficile colitis A04.72    Hematuria R31.9    Gout M10.9    Gastroesophageal reflux disease without esophagitis K21.9       Past Medical History:        Diagnosis Date    Diabetes mellitus ([facility] [address]     Diverticulitis     Frequent UTI     Hiatal hernia     Hypertension     Kidney stones     PONV (postoperative nausea and vomiting)     Thyroid disease        Past Surgical History:        Procedure Laterality Date    CHOLECYSTECTOMY, LAPAROSCOPIC N/A 6/20/2019    CHOLECYSTECTOMY LAPAROSCOPIC [facility] Low Gonzalez MD [facility] ECHO COMPL W DOP COLOR FLOW  8/15/2013         ENDOSCOPY, COLON, DIAGNOSTIC      HYSTERECTOMY      RECTAL PROLAPSE REPAIR      US BREAST NEEDLE BIOPSY RIGHT Right 6/24/2021    US BREAST NEEDLE BIOPSY RIGHT 6/24/2021 [facility]       Social History:    Social History     Tobacco Use    Smoking status: Never Smoker    Smokeless tobacco: Never Used   Substance Use Topics    Alcohol use: No                                Counseling given: Not Answered      Vital Signs (Current): There were no vitals filed for this visit.                                            BP Readings from Last 3 Encounters:   08/17/21 (!) 151/76   07/16/21 122/80   07/12/21 118/80       NPO Status:                                                                                 BMI:   Wt Readings from Last 3 Encounters:   08/17/21 160 lb (72.6 kg)   07/16/21 162 lb (73.5 kg)   07/12/21 168 lb (76.2 kg)     There is no height or weight on file to calculate BMI.    CBC:   Lab Results   Component Value Date    WBC 8.4 08/17/2021    RBC 4.47 08/17/2021    HGB 13.2 08/17/2021    HCT 39.7 08/17/2021    MCV 88.8 08/17/2021    RDW 11.9 08/17/2021     08/17/2021       CMP:   Lab Results   Component Value Date     08/17/2021    K 4.3 08/17/2021    K 4.2 07/18/2020     08/17/2021    CO2 28 08/17/2021    BUN 16 08/17/2021    CREATININE 0.8 08/17/2021    GFRAA >60 08/17/2021    LABGLOM >60 08/17/2021    GLUCOSE 168 08/17/2021    GLUCOSE 141 07/07/2011    PROT 7.5 07/06/2021    CALCIUM 9.2 08/17/2021    BILITOT 0.4 07/06/2021    ALKPHOS 63 07/06/2021    AST 14 07/06/2021    ALT 11 07/06/2021       POC Tests: No results for input(s): POCGLU, POCNA, POCK, POCCL, intravenous. MIPS: Postoperative opioids intended and Prophylactic antiemetics administered. Anesthetic plan and risks discussed with patient. Plan discussed with CRNA.               Right PEC Block for Post-op Pain Control    Keke Dior MD   8/17/2021

## 2021-08-18 ENCOUNTER — TELEPHONE (OUTPATIENT)
Dept: SURGERY | Age: 61
End: 2021-08-18

## 2021-08-18 DIAGNOSIS — G89.18 POST-OP PAIN: Primary | ICD-10-CM

## 2021-08-18 RX ORDER — ACETAMINOPHEN AND CODEINE PHOSPHATE 300; 30 MG/1; MG/1
1 TABLET ORAL EVERY 4 HOURS PRN
Qty: 20 TABLET | Refills: 0 | Status: SHIPPED | OUTPATIENT
Start: 2021-08-18 | End: 2021-08-23

## 2021-08-18 NOTE — ANESTHESIA POSTPROCEDURE EVALUATION
Department of Anesthesiology  Postprocedure Note    Patient: Ryann Hicks  MRN: [id number]  YOB: 1960  Date of evaluation: 8/18/2021  Time:  6:52 AM     Procedure Summary     Date: 08/17/21 Room / Location: [facility] OR 09 / [facility]    Anesthesia Start: 6868 Anesthesia Stop: 4026    Procedure: RIGHT ONCOPLASTIC BREAST REDUCTION, MATCHING LEFT BREAST REDUCTION AND EXCISION SQUAMOUS CELL CARCINOMA, RIGHT MID CHEST, RIGHT BREAST NEEDLE LOCALIZED LUMPECTOMY BLUE DYE INJECTION, RIGHT AXILLARY SENTINEL NODE EXCISION, POSSIBLE RIGHT AXILLARY DISECTION (Right Breast) Diagnosis: (RIGHT BREAST CANCER, SQUAMOUS CELL CARCINOMA MID CHEST)    Surgeons: Mya Mayers MD Responsible Provider: Noe Sears MD    Anesthesia Type: general ASA Status: 3          Anesthesia Type: general    Dayan Phase I: Dayan Score: 8    Dayan Phase II: Dayan Score: 10    Last vitals: Reviewed and per EMR flowsheets.        Anesthesia Post Evaluation    Patient location during evaluation: PACU  Patient participation: complete - patient participated  Level of consciousness: awake  Airway patency: patent  Nausea & Vomiting: no nausea and no vomiting  Complications: no  Cardiovascular status: hemodynamically stable  Respiratory status: acceptable  Hydration status: stable

## 2021-08-18 NOTE — TELEPHONE ENCOUNTER
Pt daughter called stating that mom can not take percocet and asked if there is any way tylenol 3's or Norco can be given? If so, please send to [facility] on [address] in [place].

## 2021-08-19 NOTE — PROGRESS NOTES
Subjective: Follow up today from bilateral breast reduction. Denies fever, nausea, vomiting, leg pain or swelling, pain is mild to moderate. She states she is taking her ABX as directed as well as analgesia PRN. She is wearing her surgical bra as directed. She states her only complaint at this time is some difficulties with upper extremity range of motion. The patient states that she attributes this to her nerve block on her right side. She states this is gotten better over the last few days but attributes this to her only complaint at this time from a physical standpoint. She brings to my attention today concerns with having her pathology report from her surgical intervention displayed to her on her my chart prior to hearing the diagnosis and medical decision making process from her providers. She presents today with her  and daughter    Objective:    LMP  (LMP Unknown)       Left Breast Wound: Clean, dry, and intact, no drainage. NAC with capillary refill intact. Appropriate level of edema and ecchymosis noted. flaps viable with good capillary refill at the area of trifurcation. Steri strips intact    Right Breast Wound: Clean, dry, and intact, no drainage. NAC with capillary refill intact. Appropriate level of edema and ecchymosis noted. flaps viable with good capillary refill at the area of trifurcation. Steri strips intact    Left mid chest squamous cell carcinoma excision clean dry and intact no signs of infection.     Assessment:    Patient Active Problem List   Diagnosis    Type 2 diabetes mellitus without complication, without long-term current use of insulin ([address]    Urinary tract infection without hematuria    Fatigue    Acquired hypothyroidism    H/O: hysterectomy    Recurrent UTI    Acute bacterial sinusitis    Left lower quadrant pain    Renal cysts, acquired, bilateral    Diverticulitis    Pericardial effusion    Right otitis media    History of cholecystectomy    Infected tooth    Left ear pain    Dysuria    C. difficile colitis    Hematuria    Gout    Gastroesophageal reflux disease without esophagitis    Pre-op exam    Malignant neoplasm of right breast in female, estrogen receptor positive [facility])       Pathology report- [address]        [address] [facility]   [address]            [address], [address], [address] [facility], [address] [place] [address]               FINAL SURGICAL PATHOLOGY REPORT     NAME:            KYLE BERRY            Date of       08/17/2021                                            Collection:   Medical Record   [id number]              Date of       08/18/2021   Number:                                  Receipt:   Age:  56 Y        Sex:  F                Date          08/20/2021 16:26                                            Reported:   Date Of Birth:   1960   Financial        [id number]             Admitting     OnLive   Number:                                  Physician:   Patient          COELTTE GARCIA         Ordering      OnLive   Location:                                Physician:     Accession Number:  [id number]             Diagnosis:   A.  Left breast, reduction mammoplasty:    - Skin and benign breast tissue (288.8 g) showing stromal   fibrosis/hyalinosis. Dagmar Gloss, lesion of mid chest, excision:    - Focal residual squamous cell carcinoma in situ, margins free of tumor;        - Actinic keratosis;         - Dermal scar with chronic inflammation, consistent with changes of        previous biopsy site.      C.  [place] lymph node #1, biopsy:    - Two of two (2/2) nodes positive for metastatic carcinoma (micrometastasis), see comment. D.  [place] lymph node #2, biopsy:    - Benign node (1) showing fatty replacement. E.  [place] lymph node #3, biopsy:    - Benign node (1) showing fatty replacement. F.  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 G.  [place] lymph node #4, biopsy:    - Benign node showing prominent fatty replacement. H.  Right breast, reduction mammoplasty:    - Skin and benign breast tissue (79.6 g) showing stromal   fibrosis/hyalinosis. Plan:     Status post bilateral breast reduction    I explained to the patient that her limited range of motion to her right upper extremity should be transient after having her pectoralis major block. Patient voices understanding I encouraged her to begin range of motion exercises to her bilateral upper extremities including walking her hands up the wall. She voiced understanding    In regards to the patient's pathology results that were noted on her my chart regarding her lymph node biopsy I informed her that Dr. Bubba Mujica would be addressing this with her. The patient and family were very understanding of the circumstances that they have not heard from Dr. Bubba Mujica regarding the results. I informed them that as these biopsy results were just recently released Dr. Bubba Mujica has likely not gotten to them in her in basket as she is likely seeing patients or in surgery. The patient's family discussed with me their dissatisfaction for having these results without the understanding of the pathology. I informed them that I would bring this to managements attention. Continue with analgesia PRN   ABX until completed  Surgical Bra at all times with padding PRN for comfort  No driving while taking narcotic pain medication or while UE ROM is limited. OK to begin B/L UE ROM exercises  OK to shower at this time.   Advised the patient that they can allow soap and water to rinse of the incision site while showering. Once they are done in the shower they are to pat dry the incision site with a clean paper towel. No baths, hot tubs or soaking of the wound site at this time. Pt voices understanding. F/U in 2 weeks. Call office with concerns or signs of infection.     FLORENTINO Zaldivar

## 2021-08-23 ENCOUNTER — OFFICE VISIT (OUTPATIENT)
Dept: SURGERY | Age: 61
End: 2021-08-23

## 2021-08-23 VITALS — TEMPERATURE: 96.8 F

## 2021-08-23 DIAGNOSIS — C44.92 SCC (SQUAMOUS CELL CARCINOMA): ICD-10-CM

## 2021-08-23 DIAGNOSIS — Z85.3 HISTORY OF RIGHT BREAST CANCER: ICD-10-CM

## 2021-08-23 DIAGNOSIS — N62 MACROMASTIA: Primary | ICD-10-CM

## 2021-08-23 PROCEDURE — 99024 POSTOP FOLLOW-UP VISIT: CPT | Performed by: PHYSICIAN ASSISTANT

## 2021-08-23 RX ORDER — FLUCONAZOLE 150 MG/1
150 TABLET ORAL ONCE
Qty: 1 TABLET | Refills: 0 | Status: SHIPPED | OUTPATIENT
Start: 2021-08-23 | End: 2021-08-23

## 2021-08-25 NOTE — PROGRESS NOTES
Subjective:      Patient ID: Mellissa Angeles is a 59 y.o. female. HPI  History and Physical    Patient's Name/Date of Birth: Mellissa Angeles / 1960  PCP: Elle Fletcher DO, Gynecologist:  Tiburcio GUERRERO. Date: August 30, 2021    Patient is here for a post-operative visit. She underwent right oncoplastic breast reduction, matching left breast reduction and excision squamous cell carcinoma, right mid chest with Dr Francisca Matute and right breast needle localized lumpectomy, blue dye injection, right axillary sentinel node excision, possible right axillary dissection with Dr Kevin Valverde on August 17, 2021. Pathological evaluation completed at [facility]):    Pathology report discussed. Dissection proceeded posteriorly to the chest wall so posterior margin is free. Diagnosis:   A.  Left breast, reduction mammoplasty:    - Skin and benign breast tissue (288.8 g) showing stromal   fibrosis/hyalinosis. Melisa aPscual, lesion of mid chest, excision:    - Focal residual squamous cell carcinoma in situ, margins free of tumor;        - Actinic keratosis;         - Dermal scar with chronic inflammation, consistent with changes of        previous biopsy site. C.  [place] lymph node #1, biopsy:    - Two of two (2/2) nodes positive for metastatic carcinoma   (micrometastasis), see comment. D.  [place] lymph node #2, biopsy:    - Benign node (1) showing fatty replacement. E.  [place] lymph node #3, biopsy:    - Benign node (1) showing fatty replacement. F.  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 G.  [place] lymph node #4, biopsy:    - Benign node showing prominent fatty replacement. H.  Right breast, reduction mammoplasty:    - Skin and benign breast tissue (79.6 g) showing stromal   fibrosis/hyalinosis.      Comment:   A few small foci of metastatic carcinoma (</= 2 mm/each metastasis) in two sentinel lymph nodes in specimen C are noted   histologically and further confirmed by immunostaining for pankeratin and   GATA3. Comparing with the patient's previous core needle biopsy specimen from   the same tumor on 07/16/2021 (see report VDF-), the tumor seen   here is much more mitotic active (focally, 3-4 mitotic figures/hpf). Therefore, the provisional Benedicto score of this tumor is updated to   3+3+3=9 (grade 3) from originally reported 3+3+1=7 (grade 2). CANCER CASE SUMMARY   Procedure: Excision   Specimen Laterality: Right   TUMOR   Tumor Site: Central   Histologic Type:  Invasive carcinoma of no special type   Histologic Grade: [place] Histologic Score        Glandular/tubular differentiation: Score 3        Nuclear pleomorphism: Score 3        Mitotic rate: Score 3        Overall grade: Grade 3 (scores of 9)   Tumor Size: 2.2 x 2.2 x 1.4 cm   Tumor Focality: Single focus of invasive carcinoma   Ductal Carcinoma In Situ (DCIS): Not identified   Lobular Carcinoma In Situ (LCIS): Not identified   Lymph-Vascular Invasion: Not identified   Dermal lymphovascular invasion: No skin present   Microcalcifications: Not identified   Treatment effect: No known presurgical therapy   MARGINS        All margins negative for invasive carcinoma        Distance from invasive carcinoma to closest margin: 1 mm        Closest margins to invasive carcinoma: medial (slide F1) and   posterior (slide F2)        REGIONAL LYMPH NODES   Tumor present in regional lymph nodes        Number of lymph nodes with macrometastasis (>2 mm): 0        Number of lymph nodes with micrometasteses (0.2-2 mm): 2        Number of lymph nodes with isolated tumor cells: 0        Size of largest jarett metastasis: 2 mm        Extranodal extension: Not identified   Total number of lymph nodes examined: 4   Number of sentinel nodes examined: 4   DISTANT METASTASIS: Not applicable   Pathologic Staging (pTNM, AJCC 8th Edition)        pT category: pT2 (tumor >20 mm but </=50 mm in greatest dimension)   Regional lymph nodes modifier: (sn)-[place] nodes evaluated        pN category: (sn)pN1mi        Ancillary Studies:  ER(+)/[place](+)/HER-2(-), per report: HUE-       ONCOTYPE DX: PENDING      HISTORY     Janie Abbasi presents for evaluation of newly diagnosed right breast invasive ductal cancer. (ER+/KY+/HER2 -)        The lesion is located in the 8 o'clock position of the Right breast. The lesion was discovered by self/exam. The patient has noted a change in BSE since presentation. Patient denies nipple discharge. Patient denies a personal history of breast cancer. Breast cancer risk factors include mom breast cancer age 52, paternal aunt breast cancer, 48 paternal gm breast cancer age 48. Ashkenazi Denominational Ancestry: No.    OBSTETRIC RELATED HISTORY:  Age of menarche was 13. Age of menopause was 60. Patient denies hormonal therapy. Patient is . Age of first live birth was 19. Patient did not breast feed. Is patient interested in fertility information about fertility preservation? No    CANCER SURVEILLANCE HISTORY:  Mammograms: Yes  Breast MRI's: No   Breast Biopsies: Yes   Colonoscopy: Yes   GI Polyps: Yes   EGD: Yes   Pelvic Exam: Yes   Pap Smear: Yes   Dermatology: No   Lung screening: no      Have you received the flu vaccination this year? No  Have you received the Pneumococcal vaccination in the last 5 years? No  Have you received the COVID 19 vaccination this year? Yes    Estimated body mass index is 24.33 kg/m² as calculated from the following:    Height as of 21: 5' 8\" (1.727 m). Weight as of 21: 160 lb (72.6 kg). Bra Size: 40c    Because violence is so common, we ask all our patients: are you in a relationship or do you live with a person who threatens, hurts, or controls you:  denies    Patient drinks little caffeinated beverages. Patient does not smoke cigarettes.  Patient does not use recreational drugs. Lymphedema screening completed. See documentation in the flow sheets.      Past Medical History:   Diagnosis Date    Diabetes mellitus ([address]     Diverticulitis     Frequent UTI     Hiatal hernia     Hypertension     Kidney stones     PONV (postoperative nausea and vomiting)     Thyroid disease        Past Surgical History:   Procedure Laterality Date    BREAST REDUCTION SURGERY Right 8/17/2021    RIGHT ONCOPLASTIC BREAST REDUCTION, MATCHING LEFT BREAST REDUCTION AND EXCISION SQUAMOUS CELL CARCINOMA, RIGHT MID CHEST, RIGHT BREAST NEEDLE LOCALIZED LUMPECTOMY BLUE DYE INJECTION, RIGHT AXILLARY SENTINEL NODE EXCISION, POSSIBLE RIGHT AXILLARY DISECTION performed by Chitra Stover MD at [address], LAPAROSCOPIC N/A 6/20/2019    CHOLECYSTECTOMY LAPAROSCOPIC performed by Julieta Puente MD at [address] [facility] ECHO COMPL W DOP COLOR FLOW  8/15/2013         ENDOSCOPY, COLON, DIAGNOSTIC      HYSTERECTOMY      RECTAL PROLAPSE REPAIR      US BREAST NEEDLE BIOPSY RIGHT Right 6/24/2021    US BREAST NEEDLE BIOPSY RIGHT 6/24/2021 [facility]       Current Outpatient Medications   Medication Sig Dispense Refill    ondansetron (ZOFRAN) 4 MG tablet Take 1 tablet by mouth daily as needed for Nausea or Vomiting 12 tablet 1    allopurinol (ZYLOPRIM) 300 MG tablet Take 1/2 (one-half) tablet by mouth once daily 45 tablet 1    metFORMIN (GLUCOPHAGE) 500 MG tablet Take 1 tablet by mouth 2 times daily (with meals) 180 tablet 1    levothyroxine (SYNTHROID) 50 MCG tablet Take 1 tablet by mouth Daily 90 tablet 1    lansoprazole (PREVACID) 30 MG delayed release capsule Take 1 capsule by mouth daily 90 capsule 1    albuterol sulfate HFA (VENTOLIN HFA) 108 (90 Base) MCG/ACT inhaler Inhale 2 puffs into the lungs every 6 hours as needed for Wheezing or Shortness of Breath 3 Inhaler 1    Probiotic Product (PROBIOTIC PO) Take by mouth       Current Facility-Administered Medications   Medication Dose Route Frequency Provider Last Rate Last Admin    lidocaine-EPINEPHrine 1 %-[id number] injection 3 mL  3 mL Intradermal Once FLORENTINO Santana           Allergies   Allergen Reactions    Flagyl [Metronidazole] Rash    Omeprazole Diarrhea and Nausea And Vomiting     As well as dizziness    Vicodin [Hydrocodone-Acetaminophen] Nausea And Vomiting       Family History   Problem Relation Age of Onset    Breast Cancer Mother 52        breast cancer    Prostate Cancer Father     Prostate Cancer Brother     Breast Cancer Maternal Aunt 48        breast cancer    Prostate Cancer Paternal Uncle     Breast Cancer Paternal Grandmother 48        breast cancer       Social History     Socioeconomic History    Marital status:      Spouse name: Not on file    Number of children: Not on file    Years of education: Not on file    Highest education level: Not on file   Occupational History    Not on file   Tobacco Use    Smoking status: Never Smoker    Smokeless tobacco: Never Used   Substance and Sexual Activity    Alcohol use: No    Drug use: Not on file    Sexual activity: Not on file   Other Topics Concern    Not on file   Social History Narrative    Not on file     Social Determinants of Health     Financial Resource Strain: Low Risk     Difficulty of Paying Living Expenses: Not hard at all   Food Insecurity: No Food Insecurity    Worried About Running Out of Food in the Last Year: Never true    Margie of Food in the Last Year: Never true   Transportation Needs:     Lack of Transportation (Medical):      Lack of Transportation (Non-Medical):    Physical Activity:     Days of Exercise per Week:     Minutes of Exercise per Session:    Stress:     Feeling of Stress :    Social Connections:     Frequency of Communication with Friends and Family:     Frequency of Social Gatherings with Friends and Family:     Attends [employer] Services:     and benign breast tissue (288.8 g) showing stromal   fibrosis/hyalinosis. Jailene Tucker, lesion of mid chest, excision:    - Focal residual squamous cell carcinoma in situ, margins free of tumor;        - Actinic keratosis;         - Dermal scar with chronic inflammation, consistent with changes of        previous biopsy site. C.  [place] lymph node #1, biopsy:    - Two of two (2/2) nodes positive for metastatic carcinoma   (micrometastasis), see comment. D.  [place] lymph node #2, biopsy:    - Benign node (1) showing fatty replacement. E.  [place] lymph node #3, biopsy:    - Benign node (1) showing fatty replacement. F.  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 G.  [place] lymph node #4, biopsy:    - Benign node showing prominent fatty replacement. H.  Right breast, reduction mammoplasty:    - Skin and benign breast tissue (79.6 g) showing stromal   fibrosis/hyalinosis. Comment:   A few small foci of metastatic carcinoma (</= 2 mm/each   metastasis) in two sentinel lymph nodes in specimen C are noted   histologically and further confirmed by immunostaining for pankeratin and   GATA3. Comparing with the patient's previous core needle biopsy specimen from   the same tumor on 07/16/2021 (see report L-), the tumor seen   here is much more mitotic active (focally, 3-4 mitotic figures/hpf). Therefore, the provisional Benedicto score of this tumor is updated to   3+3+3=9 (grade 3) from originally reported 3+3+1=7 (grade 2). CANCER CASE SUMMARY   Procedure: Excision   Specimen Laterality: Right   TUMOR   Tumor Site: Central   Histologic Type:  Invasive carcinoma of no special type   Histologic Grade: Benedicto Histologic Score        Glandular/tubular differentiation: Score 3        Nuclear pleomorphism: Score 3        Mitotic rate: Score 3      Overall grade: Grade 3 (scores of 9)   Tumor Size: 2.2 x 2.2 x 1.4 cm   Tumor Focality: Single focus of invasive carcinoma   Ductal Carcinoma In Situ (DCIS): Not identified   Lobular Carcinoma In Situ (LCIS): Not identified   Lymph-Vascular Invasion: Not identified   Dermal lymphovascular invasion: No skin present   Microcalcifications: Not identified   Treatment effect: No known presurgical therapy   MARGINS        All margins negative for invasive carcinoma        Distance from invasive carcinoma to closest margin: 1 mm        Closest margins to invasive carcinoma: medial (slide F1) and   posterior (slide F2)        REGIONAL LYMPH NODES   Tumor present in regional lymph nodes        Number of lymph nodes with macrometastasis (>2 mm): 0        Number of lymph nodes with micrometasteses (0.2-2 mm): 2        Number of lymph nodes with isolated tumor cells: 0        Size of largest jarett metastasis: 2 mm        Extranodal extension: Not identified   Total number of lymph nodes examined: 4   Number of sentinel nodes examined: 4   DISTANT METASTASIS: Not applicable   Pathologic Staging (pTNM, AJCC 8th Edition)        pT category: pT2 (tumor >20 mm but </=50 mm in greatest dimension)   Regional lymph nodes modifier: (sn)-[place] nodes evaluated        pN category: (sn)pN1mi        Ancillary Studies:  ER(+)/AR(+)/HER-2(-), per report: WOP-     ONCOTYPE DX: PENDING    HISTORY    59 y.o. woman who underwent a stereotactic/US guided right breast core biopsy at 8 o'clock position on June 24 , 2021. Pathological evaluation completed at [facility]:   This report is revised by [facility] on 07/06/2021 in order to correct the   specimen designation from \"left\" to \"right\". The FINAL DIAGNOSIS remains   UNCHANGED. Originally reported on 06/30/2021. Diagnosis:   Right breast at 8:00, core needle biopsy: Invasive carcinoma of no   special type (ductal), grade 2; see comment.      Comment:     The carcinoma displays highly infiltrative architectural   features including single file and single cell patterns of invasion. Strong membranous E-cadherin expression is diffusely present in   association with the invasive carcinoma, supportive of ductal origin. No   in situ component is identified in the tissue sample. [place] histologic score for the invasive carcinoma corresponds to a   tubule score 3, nuclear score 3, mitotic score 1, total score 7, grade 2.    Intradepartmental consultation is obtained. Breast Cancer Marker Studies:     Estrogen Receptors (ER): Positive   Percentage of cells positive: 90%   Intensity: Strong     Progesterone Receptors (NM): Positive   Percentage of cells positive: 30%   Intensity: Moderate to strong     Her-2/stone (c-erb B-2) protein expression: Negative/0         6/16/2021:BILATERAL DIAGNOSTIC MAMMOGRAM    FINDINGS:   Breast tissue composition is heterogeneously dense which could obscure an   underlying mass.       In the posterior central right breast at about the 9 o'clock position there   is an irregular spiculated mass with architectural distortion measuring up to   2.9 cm. This is suspicious for malignancy. No other spiculated mass or   suspicious microcalcification cluster is identified.           Ultrasound:       Targeted sonographic examination of the right breast was performed with   attention to the palpable abnormality, upper outer quadrant and axilla.       There is a large irregular solid hypoechoic mass at about the 8 to 9 o'clock   position measuring 2.4 x 1.4 x 1.4 cm.  It shows some internal color flow and   acoustic shadowing.  It is taller than wide and is felt to correspond to the   palpable abnormality and the mass seen on the mammogram.  Overall, these   findings are very suspicious for malignancy.       No other cyst or solid mass is seen.  No mass or lymphadenopathy is seen in   the right axilla.           Impression   1.  Bilateral heterogeneously dense breast that could obscure an underlying   mass. 2. Large irregular hypoechoic solid mass seen on ultrasound and mammogram at   about the 8 to 9 o'clock position measuring 2.4 x 1.4 x 1.4 cm.  This is very   suspicious for malignancy and ultrasound-guided core biopsy is suggested.       BIRADS:   BIRADS - CATEGORY 5       Highly Suggestive for Malignancy.  Biopsy should be considered at this time.       OVERALL ASSESSMENT - HIGHLY SUGGESTIVE OF MALIGNANCY.       6/16/2021: ULTRASOUND RIGHT BREAST: [facility]  FINDINGS:   Breast tissue composition is heterogeneously dense which could obscure an   underlying mass.       In the posterior central right breast at about the 9 o'clock position there   is an irregular spiculated mass with architectural distortion measuring up to   2.9 cm. This is suspicious for malignancy. No other spiculated mass or   suspicious microcalcification cluster is identified.           Ultrasound:       Targeted sonographic examination of the right breast was performed with   attention to the palpable abnormality, upper outer quadrant and axilla.       There is a large irregular solid hypoechoic mass at about the 8 to 9 o'clock   position measuring 2.4 x 1.4 x 1.4 cm.  It shows some internal color flow and   acoustic shadowing.  It is taller than wide and is felt to correspond to the   palpable abnormality and the mass seen on the mammogram.  Overall, these   findings are very suspicious for malignancy.       No other cyst or solid mass is seen.  No mass or lymphadenopathy is seen in   the right axilla.           Impression   1. Bilateral heterogeneously dense breast that could obscure an underlying   mass.    2. Large irregular hypoechoic solid mass seen on ultrasound and mammogram at   about the 8 to 9 o'clock position measuring 2.4 x 1.4 x 1.4 cm.  This is very   suspicious for malignancy and ultrasound-guided core biopsy is suggested.       BIRADS:   BIRADS - CATEGORY 5     Highly Suggestive for Malignancy.  Biopsy should be considered at this time.       OVERALL ASSESSMENT - HIGHLY SUGGESTIVE OF MALIGNANCY.         PPS IIB. Referrals place to [facility]. Questions answered to patient, daughters, and 's satisfaction. Plan:      Plan:   1. Patient instructed to keep incision clean and dry well after bathing. Patient can start scar massage once incision is completely healed and strong enough to handle the motion (usually 10 - 14 days post operatively). Rub in a circular motion on and around the scar with firm, even pressure for 5 minutes four times per day. Use lotion or moisturizer to do the scar massage to allow ease with motion over the scar and prevent friction at the area. 2. Continue monthly breast self examination. Bring any changes to your physician's attention. 3. Routine screening imaging in July 2022.  4. Range of motion exercises for bilateral shoulder encouraged. Lymphedema precautions discussed. 5. Education/Lifestyle recommendations: A regular exercise program is encouraged. Avoid excessive caffeine intake. Maintain a diet rich in vegetables and fruits avoiding processed and fast food. 6. Consultations: Oncology appointment will be scheduled with Oncologist of patient's and/or PCP's preference. 7. Continue regular appointments with PCP & Gynecologist.  8. Office follow-up visit should be scheduled in 6  months and PRN. I personally and independently saw and examined patient and reviewed all pertinent laboratory data and imaging studies. I have reviewed and agree with the CNP history and review of systems as documented in the above note. This document is generated, in part, by voice recognition software and thus syntax and grammatical errors are possible. Marina Jordan MD [facility]  August 30, 2021

## 2021-08-26 ENCOUNTER — TELEPHONE (OUTPATIENT)
Dept: SURGERY | Age: 61
End: 2021-08-26

## 2021-08-26 DIAGNOSIS — Z17.0 MALIGNANT NEOPLASM OF CENTRAL PORTION OF RIGHT BREAST IN FEMALE, ESTROGEN RECEPTOR POSITIVE (HCC): Primary | ICD-10-CM

## 2021-08-26 DIAGNOSIS — C50.111 MALIGNANT NEOPLASM OF CENTRAL PORTION OF RIGHT BREAST IN FEMALE, ESTROGEN RECEPTOR POSITIVE (HCC): Primary | ICD-10-CM

## 2021-08-26 NOTE — TELEPHONE ENCOUNTER
Discussed pathology report. Patient voiced no complaints.   Requests to see Dr. Britta Hollingsworth at [facility]

## 2021-08-27 ENCOUNTER — TELEPHONE (OUTPATIENT)
Dept: SURGERY | Age: 61
End: 2021-08-27

## 2021-08-27 DIAGNOSIS — G89.18 POST-OP PAIN: Primary | ICD-10-CM

## 2021-08-27 RX ORDER — ACETAMINOPHEN AND CODEINE PHOSPHATE 300; 30 MG/1; MG/1
1 TABLET ORAL EVERY 4 HOURS PRN
Qty: 20 TABLET | Refills: 0 | Status: SHIPPED | OUTPATIENT
Start: 2021-08-27 | End: 2021-09-01

## 2021-08-30 ENCOUNTER — OFFICE VISIT (OUTPATIENT)
Dept: BREAST CENTER | Age: 61
End: 2021-08-30
Payer: COMMERCIAL

## 2021-08-30 VITALS
SYSTOLIC BLOOD PRESSURE: 138 MMHG | HEART RATE: 105 BPM | HEIGHT: 68 IN | TEMPERATURE: 98.1 F | DIASTOLIC BLOOD PRESSURE: 76 MMHG | WEIGHT: 163 LBS | OXYGEN SATURATION: 98 % | BODY MASS INDEX: 24.71 KG/M2 | RESPIRATION RATE: 16 BRPM

## 2021-08-30 DIAGNOSIS — Z17.0 MALIGNANT NEOPLASM OF RIGHT BREAST IN FEMALE, ESTROGEN RECEPTOR POSITIVE, UNSPECIFIED SITE OF BREAST (HCC): Primary | ICD-10-CM

## 2021-08-30 DIAGNOSIS — C50.911 MALIGNANT NEOPLASM OF RIGHT BREAST IN FEMALE, ESTROGEN RECEPTOR POSITIVE, UNSPECIFIED SITE OF BREAST (HCC): Primary | ICD-10-CM

## 2021-08-30 PROCEDURE — 99024 POSTOP FOLLOW-UP VISIT: CPT | Performed by: SURGERY

## 2021-08-30 NOTE — LETTER
[facility] [address]  Phone: [phone]  Fax: [phone]    Anjel Coulter MD    August 30, 2021     Felix Akbar DO  [address] [facility] [address] [facility]    Patient: Gwenlyn Dancer   MR Number: [id number]   YOB: 1960   Date of Visit: 8/30/2021       Dear Brittany Narvaez:    Thank you for referring Steph Moreira to me for evaluation/treatment. Below are the relevant portions of my assessment and plan of care. She underwent right oncoplastic breast reduction, matching left breast reduction and excision squamous cell carcinoma, right mid chest with Dr Libertad Tinoco and right breast needle localized lumpectomy, blue dye injection, right axillary sentinel node excision, possible right axillary dissection with Dr Fatemeh Ruvalcaba on August 17, 2021. Pathological evaluation completed at [facility]):    Pathology report discussed. Diagnosis:   A.  Left breast, reduction mammoplasty:    - Skin and benign breast tissue (288.8 g) showing stromal   fibrosis/hyalinosis. Danny Huh, lesion of mid chest, excision:    - Focal residual squamous cell carcinoma in situ, margins free of tumor;        - Actinic keratosis;         - Dermal scar with chronic inflammation, consistent with changes of        previous biopsy site. C.  [place] lymph node #1, biopsy:    - Two of two (2/2) nodes positive for metastatic carcinoma   (micrometastasis), see comment. D.  [place] lymph node #2, biopsy:    - Benign node (1) showing fatty replacement. E.  [place] lymph node #3, biopsy:    - Benign node (1) showing fatty replacement. F.  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 G.  [place] lymph node #4, biopsy:    - Benign node showing prominent fatty replacement.      H.  Right breast, reduction mammoplasty:    - Skin and benign breast tissue (79.6 g) showing stromal   fibrosis/hyalinosis. Comment:   A few small foci of metastatic carcinoma (</= 2 mm/each   metastasis) in two sentinel lymph nodes in specimen C are noted   histologically and further confirmed by immunostaining for pankeratin and   GATA3. Comparing with the patient's previous core needle biopsy specimen from   the same tumor on 07/16/2021 (see report DZL-), the tumor seen   here is much more mitotic active (focally, 3-4 mitotic figures/hpf). Therefore, the provisional Gervais score of this tumor is updated to   3+3+3=9 (grade 3) from originally reported 3+3+1=7 (grade 2). CANCER CASE SUMMARY   Procedure: Excision   Specimen Laterality: Right   TUMOR   Tumor Site: Central   Histologic Type:  Invasive carcinoma of no special type   Histologic Grade: Gervais Histologic Score        Glandular/tubular differentiation: Score 3        Nuclear pleomorphism: Score 3        Mitotic rate: Score 3        Overall grade: Grade 3 (scores of 9)   Tumor Size: 2.2 x 2.2 x 1.4 cm   Tumor Focality: Single focus of invasive carcinoma   Ductal Carcinoma In Situ (DCIS): Not identified   Lobular Carcinoma In Situ (LCIS): Not identified   Lymph-Vascular Invasion: Not identified   Dermal lymphovascular invasion: No skin present   Microcalcifications: Not identified   Treatment effect: No known presurgical therapy   MARGINS        All margins negative for invasive carcinoma        Distance from invasive carcinoma to closest margin: 1 mm        Closest margins to invasive carcinoma: medial (slide F1) and   posterior (slide F2)        REGIONAL LYMPH NODES   Tumor present in regional lymph nodes        Number of lymph nodes with macrometastasis (>2 mm): 0        Number of lymph nodes with micrometasteses (0.2-2 mm): 2        Number of lymph nodes with isolated tumor cells: 0        Size of largest jarett metastasis: 2 mm        Extranodal extension: Not identified   Total number of lymph nodes examined: 4   Number of sentinel nodes examined: 4   DISTANT METASTASIS: Not applicable   Pathologic Staging (pTNM, AJCC 8th Edition)        pT category: pT2 (tumor >20 mm but </=50 mm in greatest dimension)   Regional lymph nodes modifier: (sn)-[place] nodes evaluated        pN category: (sn)pN1mi        Ancillary Studies:  ER(+)/FL(+)/HER-2(-), per report: OLX-     ONCOTYPE DX: PENDING    HISTORY    59 y.o. woman who underwent a stereotactic/US guided right breast core biopsy at 8 o'clock position on June 24 , 2021. Pathological evaluation completed at [facility] 285:   This report is revised by [facility] on 07/06/2021 in order to correct the   specimen designation from \"left\" to \"right\". The FINAL DIAGNOSIS remains   UNCHANGED. Originally reported on 06/30/2021. Diagnosis:   Right breast at 8:00, core needle biopsy: Invasive carcinoma of no   special type (ductal), grade 2; see comment. Comment:     The carcinoma displays highly infiltrative architectural   features including single file and single cell patterns of invasion. Strong membranous E-cadherin expression is diffusely present in   association with the invasive carcinoma, supportive of ductal origin. No   in situ component is identified in the tissue sample. Benedicto histologic score for the invasive carcinoma corresponds to a   tubule score 3, nuclear score 3, mitotic score 1, total score 7, grade 2.    Intradepartmental consultation is obtained.      Breast Cancer Marker Studies:     Estrogen Receptors (ER): Positive   Percentage of cells positive: 90%   Intensity: Strong     Progesterone Receptors (FL): Positive   Percentage of cells positive: 30%   Intensity: Moderate to strong     Her-2/stone (c-erb B-2) protein expression: Negative/0         6/16/2021:BILATERAL DIAGNOSTIC MAMMOGRAM    FINDINGS:   Breast tissue composition is heterogeneously dense which could obscure an   underlying mass.       In the posterior central right breast at about the 9 o'clock position there   is an irregular spiculated mass with architectural distortion measuring up to   2.9 cm. This is suspicious for malignancy. No other spiculated mass or   suspicious microcalcification cluster is identified.           Ultrasound:       Targeted sonographic examination of the right breast was performed with   attention to the palpable abnormality, upper outer quadrant and axilla.       There is a large irregular solid hypoechoic mass at about the 8 to 9 o'clock   position measuring 2.4 x 1.4 x 1.4 cm.  It shows some internal color flow and   acoustic shadowing.  It is taller than wide and is felt to correspond to the   palpable abnormality and the mass seen on the mammogram.  Overall, these   findings are very suspicious for malignancy.       No other cyst or solid mass is seen.  No mass or lymphadenopathy is seen in   the right axilla.           Impression   1. Bilateral heterogeneously dense breast that could obscure an underlying   mass. 2. Large irregular hypoechoic solid mass seen on ultrasound and mammogram at   about the 8 to 9 o'clock position measuring 2.4 x 1.4 x 1.4 cm.  This is very   suspicious for malignancy and ultrasound-guided core biopsy is suggested.       BIRADS:   BIRADS - CATEGORY 5       Highly Suggestive for Malignancy.  Biopsy should be considered at this time.       OVERALL ASSESSMENT - HIGHLY SUGGESTIVE OF MALIGNANCY.       6/16/2021: ULTRASOUND RIGHT BREAST:  JOES  FINDINGS:   Breast tissue composition is heterogeneously dense which could obscure an   underlying mass.       In the posterior central right breast at about the 9 o'clock position there   is an irregular spiculated mass with architectural distortion measuring up to   2.9 cm. This is suspicious for malignancy.  No other spiculated mass or   suspicious microcalcification cluster is identified.           Ultrasound:     Targeted sonographic examination of the right breast was performed with   attention to the palpable abnormality, upper outer quadrant and axilla.       There is a large irregular solid hypoechoic mass at about the 8 to 9 o'clock   position measuring 2.4 x 1.4 x 1.4 cm.  It shows some internal color flow and   acoustic shadowing.  It is taller than wide and is felt to correspond to the   palpable abnormality and the mass seen on the mammogram.  Overall, these   findings are very suspicious for malignancy.       No other cyst or solid mass is seen.  No mass or lymphadenopathy is seen in   the right axilla.           Impression   1. Bilateral heterogeneously dense breast that could obscure an underlying   mass. 2. Large irregular hypoechoic solid mass seen on ultrasound and mammogram at   about the 8 to 9 o'clock position measuring 2.4 x 1.4 x 1.4 cm.  This is very   suspicious for malignancy and ultrasound-guided core biopsy is suggested.       BIRADS:   BIRADS - CATEGORY 5       Highly Suggestive for Malignancy.  Biopsy should be considered at this time.       OVERALL ASSESSMENT - HIGHLY SUGGESTIVE OF MALIGNANCY.         PPS IIB. Referrals place to Med/Rad Onc. Questions answered to patient, daughters, and 's satisfaction. Plan:   1. Patient instructed to keep incision clean and dry well after bathing. Patient can start scar massage once incision is completely healed and strong enough to handle the motion (usually 10 - 14 days post operatively). Rub in a circular motion on and around the scar with firm, even pressure for 5 minutes four times per day. Use lotion or moisturizer to do the scar massage to allow ease with motion over the scar and prevent friction at the area. 2. Continue monthly breast self examination. Bring any changes to your physician's attention. 3. Routine screening imaging in July 2022.  4. Range of motion exercises for bilateral shoulder encouraged.  Lymphedema precautions discussed. 5. Education/Lifestyle recommendations: A regular exercise program is encouraged. Avoid excessive caffeine intake. Maintain a diet rich in vegetables and fruits avoiding processed and fast food. 6. Consultations: Oncology appointment will be scheduled with Oncologist of patient's and/or PCP's preference. 7. Continue regular appointments with PCP & Gynecologist.  8. Office follow-up visit should be scheduled in 6  months and PRN. If you have questions, please do not hesitate to call me. I look forward to following Yahir along with you.     Sincerely,    Jensen Brooke MD

## 2021-08-31 ENCOUNTER — TELEPHONE (OUTPATIENT)
Dept: INFUSION THERAPY | Age: 61
End: 2021-08-31

## 2021-09-02 NOTE — PROGRESS NOTES
female, estrogen receptor positive [facility])       Pathology report- [address]       [address]     [address] [place]            [address], [address], [address] [facility], [address] [place] [address]                                                          [id number]               FINAL SURGICAL PATHOLOGY REPORT     NAME:            KYLE BERRY            Date of       08/17/2021                                            Collection:   Medical Record   [id number]              Date of       08/18/2021   Number:                                  Receipt:   Age:  56 Y        Sex:  F                Date          08/20/2021 16:26                                            Reported:   Date Of Birth:   1960   Financial        [id number]             Admitting     "[employer]"   Number:                                  Physician:   Patient          COLETTE GARCIA         Ordering      "[employer]"   Location:                                Physician:     Accession Number:  [id number]-             Diagnosis:   A.  Left breast, reduction mammoplasty:    - Skin and benign breast tissue (288.8 g) showing stromal   fibrosis/hyalinosis. Shannon Martha, lesion of mid chest, excision:    - Focal residual squamous cell carcinoma in situ, margins free of tumor;        - Actinic keratosis;         - Dermal scar with chronic inflammation, consistent with changes of        previous biopsy site. C.  [place] lymph node #1, biopsy:    - Two of two (2/2) nodes positive for metastatic carcinoma   (micrometastasis), see comment. D.  [place] lymph node #2, biopsy:    - Benign node (1) showing fatty replacement. E.  [place] lymph node #3, biopsy:    - Benign node (1) showing fatty replacement.      F.  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 G.  [place] lymph node #4, biopsy:    - Benign node showing prominent fatty replacement. H.  Right breast, reduction mammoplasty:    - Skin and benign breast tissue (79.6 g) showing stromal   fibrosis/hyalinosis. Plan:     Status post bilateral breast reduction    I informed the patient that she is healing well after her Steri-Strips were removed I educated her on beginning scar care. She will continue with her radiation therapy as planned and she is planning to see Dr. Augustus Birmingham in medical oncology. I informed the patient that regarding her right lateral breast there is some fullness which can be edema and further wound healing I informed her as there is no dermatologic changes I am not concerned with any underlying hematoma at this time. I advised her to begin massage to this area as well as warm compresses. I will plan to see her back in additional 3 weeks to reassess the palpable firmness to the right lateral breast and to reiterate scar care and assessment for beginning radiation therapy. Call office with concerns or signs of infection.     FLORENTINO Hutchison

## 2021-09-03 ENCOUNTER — OFFICE VISIT (OUTPATIENT)
Dept: SURGERY | Age: 61
End: 2021-09-03

## 2021-09-03 VITALS — OXYGEN SATURATION: 97 % | RESPIRATION RATE: 20 BRPM | TEMPERATURE: 97.1 F | HEART RATE: 88 BPM

## 2021-09-03 DIAGNOSIS — Z85.3 HISTORY OF RIGHT BREAST CANCER: Primary | ICD-10-CM

## 2021-09-03 DIAGNOSIS — N62 MACROMASTIA: ICD-10-CM

## 2021-09-03 PROCEDURE — 99024 POSTOP FOLLOW-UP VISIT: CPT | Performed by: PHYSICIAN ASSISTANT

## 2021-09-04 NOTE — ANESTHESIA POSTPROCEDURE EVALUATION
Department of Anesthesiology  Postprocedure Note    Patient: Jermaine Strong  MRN: [id number]  YOB: 1960  Date of evaluation: 6/21/2019  Time:  6:55 AM     Procedure Summary     Date:  06/20/19 Room / Location:  [address]    Anesthesia Start:  1245 Anesthesia Stop:  2549    Procedure:  CHOLECYSTECTOMY LAPAROSCOPIC (N/A ) Diagnosis:  (SYMPTOMATIC CHOLELITHIASIS)    Surgeon:  Denny Skiff, MD Responsible Provider:  Isiah Carter MD    Anesthesia Type:  general ASA Status:  3          Anesthesia Type: general    Dayan Phase I: Dayan Score: 10    Dayan Phase II: Dayan Score: 10    Last vitals: Reviewed and per EMR flowsheets.        Anesthesia Post Evaluation    Patient location during evaluation: PACU  Patient participation: complete - patient participated  Level of consciousness: awake  Airway patency: patent  Nausea & Vomiting: no nausea and no vomiting  Complications: no  Cardiovascular status: hemodynamically stable  Respiratory status: acceptable  Hydration status: stable Negative

## 2021-09-07 ENCOUNTER — OFFICE VISIT (OUTPATIENT)
Dept: ONCOLOGY | Age: 61
End: 2021-09-07
Payer: COMMERCIAL

## 2021-09-07 ENCOUNTER — TELEPHONE (OUTPATIENT)
Dept: CASE MANAGEMENT | Age: 61
End: 2021-09-07

## 2021-09-07 ENCOUNTER — HOSPITAL ENCOUNTER (OUTPATIENT)
Dept: INFUSION THERAPY | Age: 61
Discharge: HOME | End: 2021-09-07
Payer: COMMERCIAL

## 2021-09-07 VITALS
WEIGHT: 164.8 LBS | HEIGHT: 68 IN | TEMPERATURE: 97.5 F | DIASTOLIC BLOOD PRESSURE: 86 MMHG | HEART RATE: 93 BPM | BODY MASS INDEX: 24.98 KG/M2 | RESPIRATION RATE: 18 BRPM | SYSTOLIC BLOOD PRESSURE: 143 MMHG | OXYGEN SATURATION: 98 %

## 2021-09-07 DIAGNOSIS — Z85.3 PERSONAL HISTORY OF BREAST CANCER: Primary | ICD-10-CM

## 2021-09-07 PROCEDURE — 99245 OFF/OP CONSLTJ NEW/EST HI 55: CPT | Performed by: INTERNAL MEDICINE

## 2021-09-07 PROCEDURE — 99214 OFFICE O/P EST MOD 30 MIN: CPT

## 2021-09-07 SDOH — ECONOMIC STABILITY: HOUSING INSECURITY: PLEASE ASSESS YOUR PATIENT'S LEVEL OF DISTRESS CONCERNING HOUSING (SCALE FROM 1-10): 6

## 2021-09-07 NOTE — PROGRESS NOTES
Department of [place] [address]  Attending Consult Note    Reason for Visit: Consultation on a patient with Right Breast Cancer    Referring Physician: Tavon Wesley MD    PCP:  Lucy Gillespie DO    History of Present Illness: The lesion was located in the 8 o'clock position of the right breast    Breast cancer risk factors include mom breast cancer age 52, paternal aunt breast cancer, 48 paternal gm breast cancer age 48    Bilateral Diagnostic Mammogram/Right Breast U/S on 06/16/2021:  Large irregular hypoechoic solid mass seen on ultrasound and mammogram at about the 8 to 9 o'clock position measuring 2.4 x 1.4 x 1.4 cm.      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 Comment: The carcinoma displays highly infiltrative architectural features including single file and single cell patterns of invasion. Strong membranous E-cadherin expression is diffusely present in association with the invasive carcinoma, supportive of ductal origin. No in situ component is identified in the tissue sample. Benedicto histologic score for the invasive carcinoma corresponds to a tubule score 3, nuclear score 3, mitotic score 1, total score 7, grade 2. Intradepartmental consultation is obtained. Breast Cancer Marker Studies:   Estrogen Receptors (ER):90%   Progesterone Receptors ([place]): 30%   Her-2/stone (c-erb B-2) protein expression: Negative/0     CXR 07/06/2021:  No acute process    MRI Bilateral Breast 07/08/2021:  Irregular mass in the right breast 8 o'clock measuring up to 2.4 cm in size is consistent with biopsy proven neoplasm.  Focal, heterogeneous non mass enhancement along the lateral aspect of the mass may be due to recent biopsy vs satellite lesion.   No LN  No evidence of neoplasm in the left breast    Right oncoplastic breast reduction, matching left breast reduction and excision squamous cell carcinoma, right mid chest with Dr Anamika Cruz and right breast needle localized lumpectomy, blue dye injection, right axillary sentinel node excision, possible right axillary dissection with Dr Gallito Holcomb on August 17, 2021    Review of Systems;  CONSTITUTIONAL: No fever, chills. Good appetite and energy level. ENMT: Eyes: No diplopia; Nose: No epistaxis. Mouth: No sore throat. RESPIRATORY: No hemoptysis, shortness of breath, cough. CARDIOVASCULAR: No chest pain, palpitations. GASTROINTESTINAL: No nausea/vomiting, abdominal pain, diarrhea/constipation. GENITOURINARY: No dysuria, urinary frequency, hematuria. NEURO: No syncope, presyncope, headache.   Remainder:  ROS NEGATIVE    Past Medical History:      Diagnosis Date    Diabetes mellitus ([address]     Diverticulitis     Frequent UTI     Hiatal hernia     Hypertension     Kidney stones     PONV (postoperative nausea and vomiting)     Thyroid disease      Past Surgical History:      Procedure Laterality Date    BREAST REDUCTION SURGERY Right 8/17/2021    RIGHT ONCOPLASTIC BREAST REDUCTION, MATCHING LEFT BREAST REDUCTION AND EXCISION SQUAMOUS CELL CARCINOMA, RIGHT MID CHEST, RIGHT BREAST NEEDLE LOCALIZED LUMPECTOMY BLUE DYE INJECTION, RIGHT AXILLARY SENTINEL NODE EXCISION, POSSIBLE RIGHT AXILLARY DISECTION performed by Jenny Ramesh MD at [address] [place] [address], LAPAROSCOPIC N/A 6/20/2019    CHOLECYSTECTOMY LAPAROSCOPIC performed by Pedro Ryan MD at [address] [place] [address] ECHO COMPL W DOP COLOR FLOW  8/15/2013         ENDOSCOPY, COLON, DIAGNOSTIC      HYSTERECTOMY      RECTAL PROLAPSE REPAIR      US BREAST NEEDLE BIOPSY RIGHT Right 6/24/2021    US BREAST NEEDLE BIOPSY RIGHT 6/24/2021 [facility]     Family History:  Family History   Problem Relation Age of Onset    Breast Cancer Mother 52        breast cancer    Prostate Cancer Father     Prostate Cancer Brother     Breast Cancer Maternal Aunt 48        breast cancer    Prostate Cancer Paternal Uncle     Breast Cancer Paternal Grandmother 48        breast cancer     Medications:  Reviewed and reconciled. Social History:  Social History     Socioeconomic History    Marital status:      Spouse name: Not on file    Number of children: Not on file    Years of education: Not on file    Highest education level: Not on file   Occupational History    Not on file   Tobacco Use    Smoking status: Never Smoker    Smokeless tobacco: Never Used   Vaping Use    Vaping Use: Never used   Substance and Sexual Activity    Alcohol use: No    Drug use: Never    Sexual activity: Not on file   Other Topics Concern    Not on file   Social History Narrative    Not on file     Social Determinants of Health     Financial Resource Strain: Low Risk     Difficulty of Paying Living Expenses: Not hard at all   Food Insecurity: No Food Insecurity    Worried About Running Out of Food in the Last Year: Never true    [address] in the Last Year: Never true   Transportation Needs:     Lack of Transportation (Medical):  Lack of Transportation (Non-Medical):    Physical Activity:     Days of Exercise per Week:     Minutes of Exercise per Session:    Stress:     Feeling of Stress :    Social Connections:     Frequency of Communication with Friends and Family:     Frequency of Social Gatherings with Friends and Family:     Attends Rastafarian Services:     Active Member of Clubs or Organizations:     Attends Club or Organization Meetings:     Marital Status:    Intimate Partner Violence:     Fear of Current or Ex-Partner:     Emotionally Abused:     Physically Abused:     Sexually Abused: Allergies:   Allergies   Allergen Reactions    Flagyl [Metronidazole] Rash    Omeprazole Diarrhea and Nausea And Vomiting     As well as dizziness    Vicodin [Hydrocodone-Acetaminophen] Nausea And Vomiting     Physical Exam:  BP (!) 143/86   Pulse 93   Temp 97.5 °F (36.4 °C)   Resp 18   Ht 5' 8\" (1.727 m)   Wt 164 lb 12.8 oz (74.8 kg)   LMP  (LMP Unknown)   SpO2 98%   BMI 25.06 kg/m²   GENERAL: Alert, oriented x 3, not in acute distress. HEENT: PERRLA; EOMI. Oropharynx clear. NECK: Supple. Without lymphadenopathy. LUNGS: Good air entry bilaterally. No wheezing, crackles or ronchi. CARDIOVASCULAR: Regular rate. No murmurs, rubs or gallops. ABDOMEN: Soft. Non-tender, non-distended. Positive bowel sounds  EXTREMITIES: Without clubbing, cyanosis, or edema. NEUROLOGIC: No focal deficits. ECOG1    Impression/Plan:  58 y/o female who underwent Right oncoplastic breast reduction, matching left breast reduction and excision squamous cell carcinoma, right mid chest with Dr Gonsalo Hollis and right breast needle localized lumpectomy, blue dye injection, right axillary sentinel node excision, possible right axillary dissection with Dr Libby Guerin on August 17, 2021  A.  Left breast, reduction mammoplasty:   - Skin and benign breast tissue (288.8 g) showing stromal fibrosis/hyalinosis. Shannon Martha, lesion of mid chest, excision:   - Focal residual squamous cell carcinoma in situ, margins free of tumor;   - Actinic keratosis;   - Dermal scar with chronic inflammation, consistent with changes of previous biopsy site. C.  [place] lymph node #1, biopsy:    - Two of two (2/2) nodes positive for metastatic carcinoma (micrometastasis), see comment. D.  [place] lymph node #2, biopsy:   - Benign node (1) showing fatty replacement. E.  [place] lymph node #3, biopsy:   - Benign node (1) showing fatty replacement. F.  Right breast, central, lumpectomy with needle localization:   - Invasive carcinoma of no special type (ductal), grade 3;   - Changes of previous biopsy site, see comment. G.  [place] lymph node #4, biopsy:   - Benign node showing prominent fatty replacement.    H.  Right breast, reduction mammoplasty:   - Skin and benign breast tissue (79.6 g) showing stromal fibrosis/hyalinosis. Comment:A few small foci of metastatic carcinoma (</= 2 mm/each metastasis) in two sentinel lymph nodes in specimen C are noted histologically and further confirmed by immunostaining for pankeratin and GATA3. Comparing with the patient's previous core needle biopsy specimen from the same tumor on 07/16/2021 (see report K[facility]-), the tumor seen here is much more mitotic active (focally, 3-4 mitotic figures/hpf). Therefore, the provisional Benedicto score of this tumor is updated to 3+3+3=9 (grade 3) from originally reported 3+3+1=7 (grade 2). CANCER CASE SUMMARY   Procedure: Excision   Specimen Laterality: Right   TUMOR   Tumor Site: Central   Histologic Type:  Invasive carcinoma of no special type   Histologic Grade: Benedicto Histologic Score   Glandular/tubular differentiation: Score 3   Nuclear pleomorphism: Score 3   Mitotic rate: Score 3   Overall grade: Grade 3 (scores of 9)   Tumor Size: 2.2 x 2.2 x 1.4 cm   Tumor Focality: Single focus of invasive carcinoma   Ductal Carcinoma In Situ (DCIS): Not identified   Lobular Carcinoma In Situ (LCIS): Not identified   Lymph-Vascular Invasion: Not identified   Dermal lymphovascular invasion: No skin present   Microcalcifications: Not identified   Treatment effect: No known presurgical therapy   MARGINS   All margins negative for invasive carcinoma   Distance from invasive carcinoma to closest margin: 1 mm   Closest margins to invasive carcinoma: medial (slide F1) and posterior (slide F2)   REGIONAL LYMPH NODES   Tumor present in regional lymph nodes   Number of lymph nodes with macrometastasis (>2 mm): 0   Number of lymph nodes with micrometasteses (0.2-2 mm): 2   Number of lymph nodes with isolated tumor cells: 0   Size of largest jarett metastasis: 2 mm   Extranodal extension: Not identified   Total number of lymph nodes examined: 4   Number of sentinel nodes examined: 4   DISTANT METASTASIS: Not applicable   Pathologic Staging (pTNM, AJCC 8th Edition)   pT2 (sn)pN1mi     Breast Cancer Marker Studies:   Estrogen Receptors (ER):90%   Progesterone Receptors (CT): 30%   Her-2/stone (c-erb B-2) protein expression: Negative/0     Pathology NCCN guidelines reviewed. Oncotype Dx ordered and pending  Radiation Oncology consulted  DEXA scan ordered. RTC 2 weeks to review Oncotype Dx and treatment plan accordingly. Genetics: My-Risk Negative. No clinically significant mutation identified. No VUS identified. Thank you for allowing us to participate in the care of .  Batool Isbell MD   9/7/2021

## 2021-09-07 NOTE — PROGRESS NOTES
Sudhakar Wisdom Ayleen  1960 59 y.o. Referring Physician:  Dr. Ashley Siddiqui    PCP: Ashby Goltz Catterlin, DO    Vitals:    21 1416   BP: (!) 143/86   Pulse: 93   Resp: 18   Temp: 97.5 °F (36.4 °C)   SpO2: 98%        Wt Readings from Last 3 Encounters:   21 164 lb 12.8 oz (74.8 kg)   21 163 lb (73.9 kg)   21 160 lb (72.6 kg)        Body mass index is 25.06 kg/m². Chief Complaint: No chief complaint on file. Cancer Staging  Malignant neoplasm of right breast in female, estrogen receptor positive ([facility]  Staging form: Breast, AJCC 8th Edition  - Clinical: No stage assigned - Unsigned  - Pathologic stage from 2021: Stage IIA (pT2, pN1mi(sn), cM0, G3, ER+, TX+, HER2-) - Signed by Jeevan Mcguire MD on 2021      Prior Radiation Therapy? NO    Concurrent Chemo/radiation? NO    Prior Chemotherapy? NO    Prior Hormonal Therapy? NO    Head and Neck Cancer? No, patient does NOT have HN cancer.       LMP: 43    Age at first Menses: 13    : 3    Para: 3      Current Outpatient Medications:     ondansetron (ZOFRAN) 4 MG tablet, Take 1 tablet by mouth daily as needed for Nausea or Vomiting, Disp: 12 tablet, Rfl: 1    allopurinol (ZYLOPRIM) 300 MG tablet, Take 1/2 (one-half) tablet by mouth once daily, Disp: 45 tablet, Rfl: 1    metFORMIN (GLUCOPHAGE) 500 MG tablet, Take 1 tablet by mouth 2 times daily (with meals), Disp: 180 tablet, Rfl: 1    levothyroxine (SYNTHROID) 50 MCG tablet, Take 1 tablet by mouth Daily, Disp: 90 tablet, Rfl: 1    lansoprazole (PREVACID) 30 MG delayed release capsule, Take 1 capsule by mouth daily, Disp: 90 capsule, Rfl: 1    albuterol sulfate HFA (VENTOLIN HFA) 108 (90 Base) MCG/ACT inhaler, Inhale 2 puffs into the lungs every 6 hours as needed for Wheezing or Shortness of Breath, Disp: 3 Inhaler, Rfl: 1    Probiotic Product (PROBIOTIC PO), Take by mouth , Disp: , Rfl:        Past Medical History:   Diagnosis Date    [facility]     Diabetes mellitus ([address]     Diverticulitis     Frequent UTI     Hiatal hernia     Hypertension     Kidney stones     PONV (postoperative nausea and vomiting)     Thyroid disease        Past Surgical History:   Procedure Laterality Date    BREAST REDUCTION SURGERY Right 8/17/2021    RIGHT ONCOPLASTIC BREAST REDUCTION, MATCHING LEFT BREAST REDUCTION AND EXCISION SQUAMOUS CELL CARCINOMA, RIGHT MID CHEST, RIGHT BREAST NEEDLE LOCALIZED LUMPECTOMY BLUE DYE INJECTION, RIGHT AXILLARY SENTINEL NODE EXCISION, POSSIBLE RIGHT AXILLARY DISECTION performed by Mera Rojo MD at [address] [place] [address], LAPAROSCOPIC N/A 6/20/2019    CHOLECYSTECTOMY LAPAROSCOPIC performed by Scarlett Caballero MD at [address] [place] [address] ECHO COMPL W DOP COLOR FLOW  8/15/2013         ENDOSCOPY, COLON, DIAGNOSTIC      HYSTERECTOMY      RECTAL PROLAPSE REPAIR      US BREAST NEEDLE BIOPSY RIGHT Right 6/24/2021    US BREAST NEEDLE BIOPSY RIGHT 6/24/2021 [facility]       Family History   Problem Relation Age of Onset    Breast Cancer Mother 52        breast cancer    Prostate Cancer Father     Prostate Cancer Brother     Breast Cancer Maternal Aunt 48        breast cancer    Prostate Cancer Paternal Uncle     Breast Cancer Paternal Grandmother 48        breast cancer    Breast Cancer Paternal Aunt 64       Social History     Socioeconomic History    Marital status:      Spouse name: Not on file    Number of children: Not on file    Years of education: Not on file    Highest education level: Not on file   Occupational History    Not on file   Tobacco Use    Smoking status: Never Smoker    Smokeless tobacco: Never Used   Vaping Use    Vaping Use: Never used   Substance and Sexual Activity    Alcohol use: Not Currently     Comment: Rarely social    Drug use: Never    Sexual activity: Not on file   Other Topics Concern    Not on file   Social History Narrative    Not on file     Social Determinants of Health     Financial Resource Strain: Low Risk     Difficulty of Paying Living Expenses: Not hard at all   Food Insecurity: No Food Insecurity    Worried About Running Out of Food in the Last Year: Never true    Margie of Food in the Last Year: Never true   Transportation Needs:     Lack of Transportation (Medical):  Lack of Transportation (Non-Medical):    Physical Activity:     Days of Exercise per Week:     Minutes of Exercise per Session:    Stress:     Feeling of Stress :    Social Connections:     Frequency of Communication with Friends and Family:     Frequency of Social Gatherings with Friends and Family:     Attends Sikh Services:     Active Member of Clubs or Organizations:     Attends Club or Organization Meetings:     Marital Status:    Intimate Partner Violence:     Fear of Current or Ex-Partner:     Emotionally Abused:     Physically Abused:     Sexually Abused:        Occupation: Resident Aide at [employer]  Retired:  NO    Pacemaker/Defibulator/ICD:  No    Mediport: No           FALLS RISK SCREENING ASSESSMENT    Instructions:  Assess the patient and [place] the appropriate indicators that are present for fall risk identification. Total the numbers circled and assign a fall risk score from Table 2.  Reassess patient at a minimum every 12 weeks or with status change. Assessment   Date  9/7/2021     1. Mental Ability: confusion/cognitively impaired No - 0       2. Elimination Issues: incontinence, frequency No - 0       3. Ambulatory: use of assistive devices (walker, cane, off-loading devices), attached to equipment (IV pole, oxygen) No - 0     4. Sensory Limitations: dizziness, vertigo, impaired vision No - 0       5. Age Less than 65 years - 0       6. Medication: diuretics, strong analgesics, hypnotics, sedatives, antihypertensive agents   No - 0   7.   Falls:  recent history of falls within the last 3 months (not to include slipping or tripping)   No - 0   TOTAL 1    If score of 4 or greater was education given? No       TABLE 2   Risk Score Risk Level Plan of Care   0-3 Little or  No Risk 1. Provide assistance as indicated for ambulation activities  2. Reorient confused/cognitively impaired patient  3. Call-light/bell within patient's reach  4. Chair/bed in low position, stretcher/bed with siderails up except when performing patient care activities  5. Educate patient/family/caregiver on falls prevention  6.  Reassess in 12 weeks or with any noted change in patient condition which places them at a risk for a fall   4-6 Moderate Risk 1. Provide assistance as indicated for ambulation activities  2. Reorient confused/cognitively impaired patient  3. Call-light/bell within patient's reach  4. Chair/bed in low position, stretcher/bed with siderails up except when performing patient care activities  5. Educate patient/family/caregiver on falls prevention  6. Falls risk precaution (Yellow sticker Level II) placed on patient chart   7 or   Higher High Risk 1. Place patient in easily observable treatment room  2. Patient attended at all times by family member or staff  3. Provide assistance as indicated for ambulation activities  4. Reorient confused/cognitively impaired patient  5. Call-light/bell within patient's reach  6. Chair/bed in low position, stretcher/bed with siderails up except when performing patient care activities  7. Educate patient/family/caregiver on falls prevention  8. Falls risk precaution (Yellow sticker Level III) placed on patient chart           MALNUTRITION RISK SCREENING ASSESSMENT    Instructions:  Assess the patient and enter the appropriate indicators that are present for nutrition risk identification. Total the numbers entered and assign a risk score. Follow the appropriate action for total score listed below. Assessment   Date  9/7/2021     1. Have you lost weight without trying? 0- No     2.  Have you been eating poorly because of a decreased appetite? 0- No   3. Do you have a diagnosis of head and neck cancer? 0- No                                                                                    TOTAL 0        Score of 0-1: No action  Score 2 or greater:  · For Non-Diabetic Patient: Recommend adding Ensure Enlive 2 x daily and provide patient with Ensure wellness bag with coupons  · For Diabetic Patient: Recommend adding Glucerna Shake 2 x daily and provide patient with Glucerna Wellness bag with coupons  · Route to the dietitian via [employer] [address]    · Are you having  difficulty performing daily routine tasks  due to fatigue or weakness (ie: bathing/showering, dressing, housework, meal prep, work, child [place]): No     · Do you have any arm flexibility/ROM restrictions, swelling or pain that limit activity: No     · Any changes in memory, attention/focus that impact daily activities: No     · Do you avoid participation in leisure/social activity due weakness, fatigue or pain: No     ARE ANY OF THE ABOVE ARE ANSWERED YES: No          PT ASSESSMENT FOR REFERRAL    · Have you had any recent falls in past 2 months: No     · Do you have difficulty  going up/down stairs: No     · Are you having difficulty walking: No     · Do you often hold onto furniture/environmental supports or feel off balance when you are walking: No     · Do you need to take rest breaks when you are walking: No     · Any pain on scale of 1-10 that limits your mobility: No 0/10    ARE ANY OF THE ABOVE ARE ANSWERED YES: No           PREHAB REFERRALS FOR NEOADJUVANT BREAST CANCER PATIENTS    Is this patient a breast cancer patient requiring neoadjuvant chemotherapy: No, this patient does NOT require neoadjuvant chemotherapy.           LYMPHEDEMA SCREENING ASSESSMENT FOR PATIENTS WITH BREAST CANCER    The patient reports the following signs/symptoms of lymphedema: None    Please ask the provider to assess patient for lymphedema for any reported signs or symptoms so a referral to Lymphedema Therapy can be considered. PREHAB AUDIOLOGY REFERRAL    - Is patient planned to receive Cisplatin? No. This patient is not planned to start Cisplatin. - Is patient complaining of new onset hearing loss? No. Patient is not complaining of new onset hearing loss.         Kvng Stack RN

## 2021-09-07 NOTE — TELEPHONE ENCOUNTER
Met with patient and family during her initial consultation with Dr. Erika Cabrera for her recent Stage IIA (T2, N1mi, M0) ER/HI+, Her2- right breast cancer diagnosis per Dr. Cristóbal Kelly. Introduced myself and explained my role with patients receiving treatment at our center. Patient was friendly and receptive. Completed nursing assessment for the center including medication review and medical, social, and surgical histories. Dr. Erika Cabrera reviewed in detail her breast surgical pathology findings including cancer type and hormone receptor status. Instructed on next steps including OncotypeDX testing results (pending), radiation therapy consultation, and DEXA scan per Dr. Erika Cabrera recommendations. Provided patient with RT Consultation appointment with Dr. Miguel Li for 9/9/2021 at [phone]. Agreeable to appointment. Discussed additional ancillary services available at the center. Declines any referrals at this time. Provided patient with additional written educational literature including Patient Resource: Breast Cancer, ACS Booklet: Exercises after breast surgery, ACS Booklet: Information on [address] information. Patient previously received [facility] from [facility]. Provided patient with my contact information, office hours, and encouragement to call me with questions or concerns. Patient verbalizes understanding and appreciative of nurse navigator visit. Emotional support provided and greater than 45 minutes spent with patient. Nurse navigator will continue to follow. Tanya Almonte BSW, RN, OCN  Oncology Nurse Navigator

## 2021-09-09 ENCOUNTER — TELEPHONE (OUTPATIENT)
Dept: BREAST CENTER | Age: 61
End: 2021-09-09

## 2021-09-09 ENCOUNTER — HOSPITAL ENCOUNTER (OUTPATIENT)
Dept: RADIATION ONCOLOGY | Age: 61
Discharge: HOME | End: 2021-09-09
Payer: COMMERCIAL

## 2021-09-09 DIAGNOSIS — C50.919 MALIGNANT NEOPLASM OF FEMALE BREAST, UNSPECIFIED ESTROGEN RECEPTOR STATUS, UNSPECIFIED LATERALITY, UNSPECIFIED SITE OF BREAST (HCC): Primary | ICD-10-CM

## 2021-09-09 PROCEDURE — 99245 OFF/OP CONSLTJ NEW/EST HI 55: CPT | Performed by: RADIOLOGY

## 2021-09-09 PROCEDURE — 99205 OFFICE O/P NEW HI 60 MIN: CPT

## 2021-09-09 NOTE — PROGRESS NOTES
Dave Abbasi  1960 59 y.o. Referring Physician: Dr. Tian Damon     PCP: Dia Narvaez DO     There were no vitals filed for this visit. Wt Readings from Last 3 Encounters:   21 164 lb 12.8 oz (74.8 kg)   21 163 lb (73.9 kg)   21 160 lb (72.6 kg)        There is no height or weight on file to calculate BMI. Chief Complaint: No chief complaint on file. Cancer Staging  Malignant neoplasm of right breast in female, estrogen receptor positive ([facility] [address]  Staging form: Breast, AJCC 8th Edition  - Clinical: No stage assigned - Unsigned  - Pathologic stage from 2021: Stage IIA (pT2, pN1mi(sn), cM0, G3, ER+, [place]+, HER2-) - Signed by Chitra Stover MD on 2021      Prior Radiation Therapy? NO    Concurrent Chemo/radiation? NO    Prior Chemotherapy? NO    Prior Hormonal Therapy? Oral contraceptives many years ago     Head and Neck Cancer? No, patient does NOT have HN cancer. LMP: 16 (?)     Age at first Menses: 13    : 3    Para: 3    First gave birth at 23.          Current Outpatient Medications   Medication Sig Dispense Refill    ondansetron (ZOFRAN) 4 MG tablet Take 1 tablet by mouth daily as needed for Nausea or Vomiting 12 tablet 1    allopurinol (ZYLOPRIM) 300 MG tablet Take 1/2 (one-half) tablet by mouth once daily 45 tablet 1    metFORMIN (GLUCOPHAGE) 500 MG tablet Take 1 tablet by mouth 2 times daily (with meals) 180 tablet 1    levothyroxine (SYNTHROID) 50 MCG tablet Take 1 tablet by mouth Daily 90 tablet 1    lansoprazole (PREVACID) 30 MG delayed release capsule Take 1 capsule by mouth daily 90 capsule 1    albuterol sulfate HFA (VENTOLIN HFA) 108 (90 Base) MCG/ACT inhaler Inhale 2 puffs into the lungs every 6 hours as needed for Wheezing or Shortness of Breath 3 Inhaler 1    Probiotic Product (PROBIOTIC PO) Take by mouth        Current Facility-Administered Medications   Medication Dose Route Frequency Provider Last Rate Last Admin    lidocaine-EPINEPHrine 1 %-[id number] injection 3 mL  3 mL IntraDERmal Once FLORENTINO Collins           Past Medical History:   Diagnosis Date    Cancer ([facility] [address]     Diabetes mellitus ([address]     Diverticulitis     Frequent UTI     Hiatal hernia     Hypertension     Kidney stones     PONV (postoperative nausea and vomiting)     Thyroid disease        Past Surgical History:   Procedure Laterality Date    BREAST REDUCTION SURGERY Right 8/17/2021    RIGHT ONCOPLASTIC BREAST REDUCTION, MATCHING LEFT BREAST REDUCTION AND EXCISION SQUAMOUS CELL CARCINOMA, RIGHT MID CHEST, RIGHT BREAST NEEDLE LOCALIZED LUMPECTOMY BLUE DYE INJECTION, RIGHT AXILLARY SENTINEL NODE EXCISION, POSSIBLE RIGHT AXILLARY DISECTION performed by Javeir Montano MD at [address] [place] [address], LAPAROSCOPIC N/A 6/20/2019    CHOLECYSTECTOMY LAPAROSCOPIC performed by Low Gonzalez MD at [facility] ECHO COMPL W DOP COLOR FLOW  8/15/2013         ENDOSCOPY, COLON, DIAGNOSTIC      HYSTERECTOMY      RECTAL PROLAPSE REPAIR      US BREAST NEEDLE BIOPSY RIGHT Right 6/24/2021    US BREAST NEEDLE BIOPSY RIGHT 6/24/2021 [facility]       Family History   Problem Relation Age of Onset    Breast Cancer Mother 52        breast cancer    Prostate Cancer Father     Prostate Cancer Brother     Breast Cancer Maternal Aunt 48        breast cancer    Prostate Cancer Paternal Uncle     Breast Cancer Paternal Grandmother 48        breast cancer    Breast Cancer Paternal Aunt 64       Social History     Socioeconomic History    Marital status:      Spouse name: Weston Plummer Number of children: 3    Years of education: diploma     Highest education level: Not on file   Occupational History    Not on file   Tobacco Use    Smoking status: Never Smoker    Smokeless tobacco: Never Used   Vaping Use    Vaping Use: Never used   Substance and Sexual Activity    Alcohol use: Not Currently     Comment: Rarely social    Drug use: Never    Sexual activity: Not Currently   Other Topics Concern    Not on file   Social History Narrative    Not on file     Social Determinants of Health     Financial Resource Strain: Low Risk     Difficulty of Paying Living Expenses: Not hard at all   Food Insecurity: No Food Insecurity    Worried About Running Out of Food in the Last Year: Never true    [address] in the Last Year: Never true   Transportation Needs:     Lack of Transportation (Medical):  Lack of Transportation (Non-Medical):    Physical Activity:     Days of Exercise per Week:     Minutes of Exercise per Session:    Stress:     Feeling of Stress :    Social Connections:     Frequency of Communication with Friends and Family:     Frequency of Social Gatherings with Friends and Family:     Attends Sikhism Services:     Active Member of Clubs or Organizations:     Attends Club or Organization Meetings:     Marital Status:    Intimate Partner Violence:     Fear of Current or Ex-Partner:     Emotionally Abused:     Physically Abused:     Sexually Abused:            Occupation: resident aide   Retired:  NO        REVIEW OF SYSTEMS: Pt seen in office for consult related to breast cancer. Pt is accompanied by her  & daughter. Pt is alert & oriented. Denies pain or discomfort; independent with ADL's. Pt had a routine mammogram on 6/16 that showed a 2.4 x 1.4 x 1.4 cm mass at the 8-9 o'clock position of R breast. Biopsy was done on 6/24 & lumpectomy was done on 8/17 with Dr. Urbano Chester. Dr. Porsha Beal was also present for procedure & pt had a BL oncoplastic breast reduction. Final pathology was positive for invasive carcinoma in the right breast. Margins were negative. 2/4 nodes were positive for micromets. PT2pN1(mi)cM0, Grade 3, ERPR+ Her2-. Oncotype is pending at time of consult. Pt also had a skin lesion mid chest that Dr. Urbano Chester removed, pathology was positive for SCC in situ.  Pt does not currently have a dermatologist. Moderate Risk 1. Provide assistance as indicated for ambulation activities  2. Reorient confused/cognitively impaired patient  3. Call-light/bell within patient's reach  4. Chair/bed in low position, stretcher/bed with siderails up except when performing patient care activities  5. Educate patient/family/caregiver on falls prevention  6. Falls risk precaution (Yellow sticker Level II) placed on patient chart   7 or   Higher High Risk 1. Place patient in easily observable treatment room  2. Patient attended at all times by family member or staff  3. Provide assistance as indicated for ambulation activities  4. Reorient confused/cognitively impaired patient  5. Call-light/bell within patient's reach  6. Chair/bed in low position, stretcher/bed with siderails up except when performing patient care activities  7. Educate patient/family/caregiver on falls prevention  8. Falls risk precaution (Yellow sticker Level III) placed on patient chart           MALNUTRITION RISK SCREENING ASSESSMENT    Instructions:  Assess the patient and enter the appropriate indicators that are present for nutrition risk identification. Total the numbers entered and assign a risk score. Follow the appropriate action for total score listed below. Assessment   Date  9/9/2021     1. Have you lost weight without trying? 0- No     2. Have you been eating poorly because of a decreased appetite? 0- No   3. Do you have a diagnosis of head and neck cancer?       0- No                                                                                    TOTAL 0          Score of 0-1: No action  Score 2 or greater:  · For Non-Diabetic Patient: Recommend adding Ensure Complete 2 x daily and provide patient with Ensure wellness bag with coupons  · For Diabetic Patient: Recommend adding Glucerna Shake 2 x daily and provide patient with Glucerna Wellness bag with coupons  · Route to the dietitian via  [employer] [facility] REFERRAL    · Are you having difficulty performing daily routine tasks due to fatigue or weakness (ie: bathing/showering, dressing, housework, meal prep, work, , etc): No     · Do you have any arm flexibility/ROM restrictions, swelling or pain that limit activity: No     · Any changes in memory, attention/focus that impact daily activities: No     · Do you avoid participation in leisure/social activity due to weakness, fatigue or pain: No     ARE ANY OF THE ABOVE ARE ANSWERED YES: No          PT ASSESSMENT FOR REFERRAL    · Have you had any recent falls in the past 2 months: No     · Do you have difficulty going up/down stairs: No     · Are you having difficulty walking: No     · Do you often hold onto furniture/environmental supports or feel off balance when you are walking: No     · Do you need to take rest breaks when you are walking: No     · Any pain on a scale of 1-10 that limits your mobility: No 0/10    ARE ANY OF THE ABOVE ARE ANSWERED YES: No           LYMPHEDEMA SCREENING ASSESSMENT FOR PATIENTS WITH BREAST CANCER    The patient reports the following signs/symptoms of lymphedema: None    Please ask the provider to assess patient for lymphedema for any reported signs or symptoms so a referral to Lymphedema Therapy can be considered. [facility] REFERRAL    - Is patient planned to receive Cisplatin? Unknown. Will check with Dr Mary Hoover MD and request audiology consult as indicated. - Is patient planned to receive radiation therapy that may be directed toward auditory canals or nerves? No. Patient is not planned to start radiation therapy to auditory canals or nerves. - Is patient complaining of new onset hearing loss? No. Patient is not complaining of new onset hearing loss. Patient education given on RT. The patient expresses understanding and acceptance of instructions.  John Mayo RN 9/9/2021 11:52 AM           John Mayo RN

## 2021-09-09 NOTE — PROGRESS NOTES
This MA attempted to reach pt. No answer. This MA left message for pt requesting return call to schedule follow up visit with Dr. Kala Urban.     Electronically signed by Justin Mayfield MA on 9/9/21 at 7:35 AM EDT

## 2021-09-09 NOTE — TELEPHONE ENCOUNTER
[employer] Oncotype DX results 20. These results will be scanned into the chart and forwarded to participating providers to assist in further care treatment.

## 2021-09-09 NOTE — PROGRESS NOTES
Radiation Oncology      Alejandra Kincaid. Bola Blas 48      Referring Physician: Dr. Cassy Gunderson      Primary Care Physician:Fatemeh Narvaez DO   Primary Oncologist: Dr. Elba Matute      Diagnosis: pT2 (sn)pN1mi cMo right breast cancer s/p BCS with oncoplastic reduction   -2/4 nodes + (micomets)   -8 o'clock   -ER+   -WI+   -Her2-   -Gr3   -ODx pending      Service:  Radiation Oncology consultation performed on 9/9/21        HPI:        Chi Shaver is a pleasant 59year old with right breast cancer s/p BCS with oncoplastic reduction. Pt had a routine mammogram on 6/16 that showed a 2.4 x 1.4 x 1.4 cm mass at the 8-9 o'clock position of R breast. Biopsy was done on 6/24 & lumpectomy was done on 8/17 with Dr. Mela Jacobo. Dr. Sang Bright was also present for procedure & pt had a BL oncoplastic breast reduction. Final pathology was positive for invasive carcinoma in the right breast. Margins were negative. 2/4 nodes were positive for micromets. PT2pN1(mi)cM0, Grade 3, ERPR+ Her2-. Oncotype is pending at time of consult. Pt also had a skin lesion mid chest that Dr. Mela Jacobo removed, pathology was positive for SCC in situ. The patient presents today to discuss fractionated external beam radiation therapy as a component of multidisciplinary, adjuvant management. We reviewed the available medical records including the complete medical history of this pt today prior to consultation. Epic -CE and available scanned documents per the Epic Media tab were reviewed PRN. A complete ROS was also performed today and is noted below. During consultation today I personally discussed the pts workup to date; including but not limited to applicable imaging studies, Pathology reports, and interventions. The NCCN guidelines, as pertaining to the above diagnosis were also recapped for the pt today in brief.   Today, Adriana Monroe  notes Sx that include mild fatigue and soreness. KPS 80-90.          -----    Per [address]:      Impression/Plan:  60 y/o female who underwent Right oncoplastic breast reduction, matching left breast reduction and excision squamous cell carcinoma, right mid chest with Dr Anamika Cruz and right breast needle localized lumpectomy, blue dye injection, right axillary sentinel node excision, possible right axillary dissection with Dr Shahid Diaz, 2021  A.  Left breast, reduction mammoplasty:   - Skin and benign breast tissue (288.8 g) showing stromal fibrosis/hyalinosis. Will Johnyronald, lesion of mid chest, excision:   - Focal residual squamous cell carcinoma in situ, margins free of tumor;   - Actinic keratosis;   - Dermal scar with chronic inflammation, consistent with changes of previous biopsy site. C.  [place] lymph node #1, biopsy:    - Two of two (2/2) nodes positive for metastatic carcinoma (micrometastasis), see comment. D.  [place] lymph node #2, biopsy:   - Benign node (1) showing fatty replacement. E.  [place] lymph node #3, biopsy:   - Benign node (1) showing fatty replacement. F.  Right breast, central, lumpectomy with needle localization:   - Invasive carcinoma of no special type (ductal), grade 3;   - Changes of previous biopsy site, see comment. G.  [place] lymph node #4, biopsy:   - Benign node showing prominent fatty replacement. H.  Right breast, reduction mammoplasty:   - Skin and benign breast tissue (79.6 g) showing stromal fibrosis/hyalinosis. Comment:A few small foci of metastatic carcinoma (</= 2 mm/each metastasis) in two sentinel lymph nodes in specimen C are noted histologically and further confirmed by immunostaining for pankeratin and GATA3. Comparing with the patient's previous core needle biopsy specimen from the same tumor on 07/16/2021 (see report [facility]-), the tumor seen here is much more mitotic active (focally, 3-4 mitotic figures/hpf).  Therefore, the provisional [place] score of this tumor is updated to 3+3+3=9 (grade 3) from originally reported 3+3+1=7 (grade 2). CANCER CASE SUMMARY   Procedure: Excision   Specimen Laterality: Right   TUMOR   Tumor Site: Central   Histologic Type: Invasive carcinoma of no special type   Histologic Grade: [place] Histologic Score   Glandular/tubular differentiation: Score 3   Nuclear pleomorphism: Score 3   Mitotic rate: Score 3   Overall grade: Grade 3 (scores of 9)   Tumor Size: 2.2 x 2.2 x 1.4 cm   Tumor Focality: Single focus of invasive carcinoma   Ductal Carcinoma In Situ (DCIS): Not identified   Lobular Carcinoma In Situ (LCIS): Not identified   Lymph-Vascular Invasion: Not identified   Dermal lymphovascular invasion: No skin present   Microcalcifications: Not identified   Treatment effect: No known presurgical therapy   MARGINS   All margins negative for invasive carcinoma   Distance from invasive carcinoma to closest margin: 1 mm   Closest margins to invasive carcinoma: medial (slide F1) and posterior (slide F2)   REGIONAL LYMPH NODES   Tumor present in regional lymph nodes   Number of lymph nodes with macrometastasis (>2 mm): 0   Number of lymph nodes with micrometasteses (0.2-2 mm): 2   Number of lymph nodes with isolated tumor cells: 0   Size of largest jarett metastasis: 2 mm   Extranodal extension: Not identified   Total number of lymph nodes examined: 4   Number of sentinel nodes examined: 4   DISTANT METASTASIS: Not applicable   Pathologic Staging (pTNM, AJCC 8th Edition)   pT2 (sn)pN1mi      Breast Cancer Marker Studies:   Estrogen Receptors (ER):90%   Progesterone Receptors (PA): 30%   Her-2/stone (c-erb B-2) protein expression: Negative/0      Pathology NCCN guidelines reviewed.      Oncotype Dx ordered and pending  Radiation Oncology consulted  DEXA scan ordered.      RTC 2 weeks to review Oncotype Dx and treatment plan accordingly. Genetics: My-Risk Negative. No clinically significant mutation identified. No VUS identified.      -----    Pathology reviewed:          Diagnosis:   A.  Left breast, reduction mammoplasty:    - Skin and benign breast tissue (288.8 g) showing stromal   fibrosis/hyalinosis. Yara Strong, lesion of mid chest, excision:    - Focal residual squamous cell carcinoma in situ, margins free of tumor;        - Actinic keratosis;         - Dermal scar with chronic inflammation, consistent with changes of        previous biopsy site. C.  [place] lymph node #1, biopsy:    - Two of two (2/2) nodes positive for metastatic carcinoma   (micrometastasis), see comment. D.  [place] lymph node #2, biopsy:    - Benign node (1) showing fatty replacement. E.  [place] lymph node #3, biopsy:    - Benign node (1) showing fatty replacement. F.  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 G.  [place] lymph node #4, biopsy:    - Benign node showing prominent fatty replacement. H.  Right breast, reduction mammoplasty:    - Skin and benign breast tissue (79.6 g) showing stromal   fibrosis/hyalinosis. Comment:   A few small foci of metastatic carcinoma (</= 2 mm/each   metastasis) in two sentinel lymph nodes in specimen C are noted   histologically and further confirmed by immunostaining for pankeratin and   GATA3. Comparing with the patient's previous core needle biopsy specimen from   the same tumor on 07/16/2021 (see report [facility]-), the tumor seen   here is much more mitotic active (focally, 3-4 mitotic figures/hpf). Therefore, the provisional [place] score of this tumor is updated to   3+3+3=9 (grade 3) from originally reported 3+3+1=7 (grade 2). CANCER CASE SUMMARY   Procedure: Excision   Specimen Laterality: Right   TUMOR   Tumor Site: Central   Histologic Type:  Invasive carcinoma of no special type   Histologic Grade: [place] Histologic Score        Glandular/tubular differentiation: Score 3        Nuclear pleomorphism: Score 3        Mitotic rate: Score 3        Overall grade: Grade 3 (scores of 9)   Tumor Size: 2.2 x 2.2 x 1.4 cm   Tumor Focality: Single focus of invasive carcinoma   Ductal Carcinoma In Situ (DCIS): Not identified   Lobular Carcinoma In Situ (LCIS): Not identified   Lymph-Vascular Invasion: Not identified   Dermal lymphovascular invasion: No skin present   Microcalcifications: Not identified   Treatment effect: No known presurgical therapy   MARGINS        All margins negative for invasive carcinoma        Distance from invasive carcinoma to closest margin: 1 mm        Closest margins to invasive carcinoma: medial (slide F1) and   posterior (slide F2)        REGIONAL LYMPH NODES   Tumor present in regional lymph nodes        Number of lymph nodes with macrometastasis (>2 mm): 0        Number of lymph nodes with micrometasteses (0.2-2 mm): 2        Number of lymph nodes with isolated tumor cells: 0        Size of largest jarett metastasis: 2 mm        Extranodal extension: Not identified   Total number of lymph nodes examined: 4   Number of sentinel nodes examined: 4   DISTANT METASTASIS: Not applicable   Pathologic Staging (pTNM, AJCC 8th Edition)        pT category: pT2 (tumor >20 mm but </=50 mm in greatest dimension)   Regional lymph nodes modifier: (sn)-[place] nodes evaluated        pN category: (sn)pN1mi        Ancillary Studies:  ER(+)/WI(+)/HER-2(-), per report: OBD-       -----      Past Medical History:   Diagnosis Date    Cancer [facility] [address] [facility]     Diabetes mellitus ([facility] [address] [facility]     Diverticulitis     Frequent UTI     Hiatal hernia     Hypertension     Kidney stones     PONV (postoperative nausea and vomiting)     Thyroid disease        Past Surgical History:   Procedure Laterality Date    BREAST REDUCTION SURGERY Right 8/17/2021    RIGHT ONCOPLASTIC BREAST REDUCTION, MATCHING LEFT BREAST REDUCTION AND EXCISION SQUAMOUS CELL CARCINOMA, RIGHT MID CHEST, RIGHT BREAST NEEDLE LOCALIZED LUMPECTOMY BLUE DYE INJECTION, RIGHT AXILLARY SENTINEL NODE EXCISION, POSSIBLE RIGHT AXILLARY DISECTION performed by See Burgess MD at [address], LAPAROSCOPIC N/A 6/20/2019    CHOLECYSTECTOMY LAPAROSCOPIC performed by Pao Del Rio MD at [address] ECHO COMPL W DOP COLOR FLOW  8/15/2013         ENDOSCOPY, COLON, DIAGNOSTIC      HYSTERECTOMY      RECTAL PROLAPSE REPAIR      US BREAST NEEDLE BIOPSY RIGHT Right 6/24/2021    US BREAST NEEDLE BIOPSY RIGHT 6/24/2021 [facility]       Family History   Problem Relation Age of Onset    Breast Cancer Mother 52        breast cancer    Prostate Cancer Father     Prostate Cancer Brother     Breast Cancer Maternal Aunt 48        breast cancer    Prostate Cancer Paternal Uncle     Breast Cancer Paternal Grandmother 48        breast cancer    Breast Cancer Paternal Aunt 64       Current Outpatient Medications   Medication Sig Dispense Refill    ondansetron (ZOFRAN) 4 MG tablet Take 1 tablet by mouth daily as needed for Nausea or Vomiting 12 tablet 1    allopurinol (ZYLOPRIM) 300 MG tablet Take 1/2 (one-half) tablet by mouth once daily 45 tablet 1    metFORMIN (GLUCOPHAGE) 500 MG tablet Take 1 tablet by mouth 2 times daily (with meals) 180 tablet 1    levothyroxine (SYNTHROID) 50 MCG tablet Take 1 tablet by mouth Daily 90 tablet 1    lansoprazole (PREVACID) 30 MG delayed release capsule Take 1 capsule by mouth daily 90 capsule 1    albuterol sulfate HFA (VENTOLIN HFA) 108 (90 Base) MCG/ACT inhaler Inhale 2 puffs into the lungs every 6 hours as needed for Wheezing or Shortness of Breath 3 Inhaler 1    Probiotic Product (PROBIOTIC PO) Take by mouth        Current Facility-Administered Medications   Medication Dose Route Frequency Provider Last Rate Last Admin    lidocaine-EPINEPHrine 1 %-[id number] injection 3 mL  3 mL IntraDERmal Once Patricia Corner, PA Allergies   Allergen Reactions    Flagyl [Metronidazole] Rash    Omeprazole Diarrhea and Nausea And Vomiting     As well as dizziness    Vicodin [Hydrocodone-Acetaminophen] Nausea And Vomiting       Social History     Socioeconomic History    Marital status:      Spouse name: Wilian Goel Number of children: 3    Years of education: diploma     Highest education level: None   Occupational History    None   Tobacco Use    Smoking status: Never Smoker    Smokeless tobacco: Never Used   Vaping Use    Vaping Use: Never used   Substance and Sexual Activity    Alcohol use: Not Currently     Comment: Rarely social    Drug use: Never    Sexual activity: Not Currently   Other Topics Concern    None   Social History Narrative    None     Social Determinants of Health     Financial Resource Strain: Low Risk     Difficulty of Paying Living Expenses: Not hard at all   Food Insecurity: No Food Insecurity    Worried About Running Out of Food in the Last Year: Never true    Margie of Food in the Last Year: Never true   Transportation Needs:     Lack of Transportation (Medical):      Lack of Transportation (Non-Medical):    Physical Activity:     Days of Exercise per Week:     Minutes of Exercise per Session:    Stress:     Feeling of Stress :    Social Connections:     Frequency of Communication with Friends and Family:     Frequency of Social Gatherings with Friends and Family:     Attends Restorationism Services:     Active Member of Clubs or Organizations:     Attends Club or Organization Meetings:     Marital Status:    Intimate Partner Violence:     Fear of Current or Ex-Partner:     Emotionally Abused:     Physically Abused:     Sexually Abused:            Review of Systems - History obtained from chart review and the patient  General ROS: positive for  - fatigue  Psychological ROS: negative  Ophthalmic ROS: negative   ENT ROS: negative  Allergy and Immunology ROS: lymphadenopathy.       RECOMMENDATION:       Continued surgical and oncologic consultation is advised.       BIRADS:   6 - Known Neoplasm           Radiation Safety and Treatment Support:  -previous Radiation history: No  -history of connective tissue disease: No  -history of autoimmune disease: No  -pregnant: no  -fertility conservation and /or contraception discussed: no  -nutrition consult prior to [address]: Yes  -PEG: No  -Dental evaluation prior to treatment:No  -Social Work requested: Yes  -Oncology Nurse Navigator requested: Yes  -pre + post treatment PT / Rehab / PM+R evaluation considered: Yes  -ICD: No   -ICD brand: -  -[facility] patient navigator: Mario Grimaldo  -Nurse Practitioners for Radiation Oncology:    ---Katie Geronimo, MSN, RN, FNP-C   ---Magalis Stallworth, MSN, RN, FNP-BC        Assessment and Plan: Opal Krueger is a pleasant and cooperative 59year old with a recent diagnosis of AJCC stage group IIA prognostic (IIB anatomic). We recommend adjuvant external beam radiation therapy. With a breast conserving surgery, combined with fractionated external beam radiation therapy, there is no difference in overall survival compared to mastectomy: ipsilateral breast tumor recurrence rates drop from over 30 % (35 % in the 12 year analysis of NSABP B-06) to 10% [20 year IBRT ; 39% lumpectomy alone vs 14% lumpectomy + RT /// Reba Glynn al. Ivan Goldman J Med. 2002 Oct 17;347(16):1233-41]. There is no excess risk of contralateral breast cancer recurrence per [facility] from a similar era. Multiple other trials have demonstrated a reduction in risk of ipsilateral breast tumor recurrence by 2/3 (whole breast radiation), with further possible benefit of Tamoxifen / Aromasin therapy (per Medical Oncology). The recent EBCTCG meta analysis shows an overall local recurrence rate of less the 10 % for node negative patients.   Whole breast radiation to 50 Gy in standard fractions may be combined with a boost based on specific disease and patient characteristics to further improve local control [Geovanny H, J Clin Oncol. 2007 Aug 1;25(22):0413-19] and was discussed in detail with the patient as applicable [place] / hypofractionation will also be considered based on the ACR / MCKENNA guidelines) as well as regional LN irradiation based on clinical and pathology characteristic (a thorough discussion of the potential benefits and risks on this specific aspect of treatment was performed PRN). The risks (detailed discussion), benefits, alternatives, process and logistics of external beam radiation were reviewed. Specifically, we discussed the possible chronic radiation toxicity which may include but is not limited to lymphedema, pneumonitis, skin/cartilage necrosis (rare), rib fracture (rare) , joint pain, brachioplexopathy and cosmetic changes. We answered all of the patient's questions to the best of our ability who verbalized understanding and seemed satisfied. Radiation planning will commence within 7 days; the next step in management being the simulation scan, with external beam radiation to commence in a timely fashion thereafter. For left sided breast cancer and select right sided cases, utilization of the Active Breathing Coordinator and / or surface guided fractionated external beam radiation therapy (with Vision-RT / 4D) will be considered to reduce radiation dose to the heart (and lung in certain situations) [Vince et al. PRO. 2015 /// Aiden chapman al. Delfina Mcardle. 2011 /// Faraz et alSanya Sheikh. 2012  /// Faina Lindsay. 2017]. It was a pleasure meeting Christina Torrez today and we appreciate the referral and opportunity to be involved in her care. We had an extensive discussion today regarding the course to date (including a focused review of theapplicable radiographic and laboratory information), multidisciplinary approach to cancer care, and indications for external beam radiation therapy as a component therein.   A literature review and multidisciplinary discussion was performed after seeing this patient due to the complexity of the medical decision making in this case. I personally spent greater than 80 minutes on this case and with this patient. I performed the complete history and physical as above at today's visit, at least 45 minutes was in direct discussion and  regarding disease management.          -sim pending [address] recs  -if CHEMO is recommended, RT will be sequenced after        Tor Garrett. Yesica Mcclure MD [place] [address] Oncology  Cell: [phone]    [facility]:  [facility] [address]: [phone]  [address] [place] [address] Street:  [phone]   FAX:    [phone]  [address] [place] [address] Road:  [phone]   FAX:  [phone]        NOTE: This report was transcribed using voice recognition software. Every effort was made to ensure accuracy; however, inadvertent computerized transcription errors may be present.

## 2021-09-10 ENCOUNTER — TELEPHONE (OUTPATIENT)
Dept: CASE MANAGEMENT | Age: 61
End: 2021-09-10

## 2021-09-10 NOTE — TELEPHONE ENCOUNTER
Contacted patient for follow up regarding her DEXA scan scheduling and rescheduling patient's follow up appointment with Dr. Cristobal Sheikh in order for him to him to review her OncotypeDX results and POC. Informed patient that radiology scheduling contacted patient 9/8/2021 at 1200 and left message to return call after prior authorization was received. Provided radiology scheduling contact number and 9/17/2021 1015 appointment with Dr. Cristobal Sheikh at [facility]. Verbalizes understanding. Questions answered to her apparent satisfaction. She is aware of her OncotypeDX RS 20 per her Mychart review. Informed her that I will meet with her during her appointment. Denies any additional needs at this time. Will continue to follow. Tanya Leal, DANIELA, RN, OCN  Oncology Nurse Navigator

## 2021-09-13 ENCOUNTER — TELEPHONE (OUTPATIENT)
Dept: RADIATION ONCOLOGY | Age: 61
End: 2021-09-13

## 2021-09-14 ENCOUNTER — APPOINTMENT (OUTPATIENT)
Dept: RADIATION ONCOLOGY | Age: 61
End: 2021-09-14
Attending: RADIOLOGY
Payer: COMMERCIAL

## 2021-09-14 ENCOUNTER — HOSPITAL ENCOUNTER (OUTPATIENT)
Dept: RADIATION ONCOLOGY | Age: 61
Discharge: HOME | End: 2021-09-14
Attending: RADIOLOGY
Payer: COMMERCIAL

## 2021-09-14 DIAGNOSIS — Z79.811 USE OF AROMATASE INHIBITORS: Primary | ICD-10-CM

## 2021-09-14 DIAGNOSIS — Z17.0 MALIGNANT NEOPLASM OF LOWER-OUTER QUADRANT OF RIGHT BREAST OF FEMALE, ESTROGEN RECEPTOR POSITIVE (HCC): ICD-10-CM

## 2021-09-14 DIAGNOSIS — C50.511 MALIGNANT NEOPLASM OF LOWER-OUTER QUADRANT OF RIGHT BREAST OF FEMALE, ESTROGEN RECEPTOR POSITIVE (HCC): ICD-10-CM

## 2021-09-14 PROCEDURE — 77334 RADIATION TREATMENT AID(S): CPT | Performed by: RADIOLOGY

## 2021-09-14 PROCEDURE — 77290 THER RAD SIMULAJ FIELD CPLX: CPT | Performed by: RADIOLOGY

## 2021-09-17 ENCOUNTER — OFFICE VISIT (OUTPATIENT)
Dept: ONCOLOGY | Age: 61
End: 2021-09-17
Payer: COMMERCIAL

## 2021-09-17 ENCOUNTER — TELEPHONE (OUTPATIENT)
Dept: CASE MANAGEMENT | Age: 61
End: 2021-09-17

## 2021-09-17 VITALS
RESPIRATION RATE: 18 BRPM | TEMPERATURE: 97.7 F | OXYGEN SATURATION: 98 % | WEIGHT: 166.9 LBS | SYSTOLIC BLOOD PRESSURE: 144 MMHG | DIASTOLIC BLOOD PRESSURE: 87 MMHG | BODY MASS INDEX: 25.38 KG/M2 | HEART RATE: 75 BPM

## 2021-09-17 DIAGNOSIS — Z85.3 PERSONAL HISTORY OF BREAST CANCER: Primary | ICD-10-CM

## 2021-09-17 PROCEDURE — 99212 OFFICE O/P EST SF 10 MIN: CPT

## 2021-09-17 PROCEDURE — 99214 OFFICE O/P EST MOD 30 MIN: CPT | Performed by: INTERNAL MEDICINE

## 2021-09-17 NOTE — PROGRESS NOTES
Department of [place] [address]  Attending Clinic Note    Reason for Visit: Follow-up on a patient with Right Breast Cancer    PCP:  Bryce Haddad DO    History of Present Illness: The lesion was located in the 8 o'clock position of the right breast    Breast cancer risk factors include mom breast cancer age 52, paternal aunt breast cancer, 48 paternal gm breast cancer age 48    Bilateral Diagnostic Mammogram/Right Breast U/S on 06/16/2021:  Large irregular hypoechoic solid mass seen on ultrasound and mammogram at about the 8 to 9 o'clock position measuring 2.4 x 1.4 x 1.4 cm.      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 Comment: The carcinoma displays highly infiltrative architectural features including single file and single cell patterns of invasion. Strong membranous E-cadherin expression is diffusely present in association with the invasive carcinoma, supportive of ductal origin. No in situ component is identified in the tissue sample. Milan histologic score for the invasive carcinoma corresponds to a tubule score 3, nuclear score 3, mitotic score 1, total score 7, grade 2. Intradepartmental consultation is obtained. Breast Cancer Marker Studies:   Estrogen Receptors (ER):90%   Progesterone Receptors (NC): 30%   Her-2/stone (c-erb B-2) protein expression: Negative/0     CXR 07/06/2021:  No acute process    MRI Bilateral Breast 07/08/2021:  Irregular mass in the right breast 8 o'clock measuring up to 2.4 cm in size is consistent with biopsy proven neoplasm.  Focal, heterogeneous non mass enhancement along the lateral aspect of the mass may be due to recent biopsy vs satellite lesion.   No LN  No evidence of neoplasm in the left breast    Right oncoplastic breast reduction, matching left breast reduction and excision squamous cell carcinoma, right mid chest with Dr Heather Betancur and right breast needle localized lumpectomy, blue dye injection, right axillary sentinel node excision, possible right axillary dissection with Dr Karmen Brownlee on August 17, 2021  A.  Left breast, reduction mammoplasty:   - Skin and benign breast tissue (288.8 g) showing stromal fibrosis/hyalinosis. Jailene Tucker, lesion of mid chest, excision:   - Focal residual squamous cell carcinoma in situ, margins free of tumor;   - Actinic keratosis;   - Dermal scar with chronic inflammation, consistent with changes of previous biopsy site. C.  [place] lymph node #1, biopsy:    - Two of two (2/2) nodes positive for metastatic carcinoma (micrometastasis), see comment. D.  [place] lymph node #2, biopsy:   - Benign node (1) showing fatty replacement. E.  [place] lymph node #3, biopsy:   - Benign node (1) showing fatty replacement. F.  Right breast, central, lumpectomy with needle localization:   - Invasive carcinoma of no special type (ductal), grade 3;   - Changes of previous biopsy site, see comment. G.  [place] lymph node #4, biopsy:   - Benign node showing prominent fatty replacement. H.  Right breast, reduction mammoplasty:   - Skin and benign breast tissue (79.6 g) showing stromal fibrosis/hyalinosis. Comment:A few small foci of metastatic carcinoma (</= 2 mm/each metastasis) in two sentinel lymph nodes in specimen C are noted histologically and further confirmed by immunostaining for pankeratin and GATA3. Comparing with the patient's previous core needle biopsy specimen from the same tumor on 07/16/2021 (see report RDT-), the tumor seen here is much more mitotic active (focally, 3-4 mitotic figures/hpf). Therefore, the provisional Benedicto score of this tumor is updated to 3+3+3=9 (grade 3) from originally reported 3+3+1=7 (grade 2).      CANCER CASE SUMMARY   Procedure: Excision   Specimen Laterality: Right   TUMOR   Tumor Site: Central diarrhea/constipation. GENITOURINARY: No dysuria, urinary frequency, hematuria. NEURO: No syncope, presyncope, headache. Remainder:  ROS NEGATIVE    Past Medical History:      Diagnosis Date    [facility]     Diabetes mellitus ([facility]     Diverticulitis     Frequent UTI     Hiatal hernia     Hypertension     Kidney stones     PONV (postoperative nausea and vomiting)     Thyroid disease      Medications:  Reviewed and reconciled. Allergies: Allergies   Allergen Reactions    Flagyl [Metronidazole] Rash    Omeprazole Diarrhea and Nausea And Vomiting     As well as dizziness    Vicodin [Hydrocodone-Acetaminophen] Nausea And Vomiting     Physical Exam:  BP (!) 144/87   Pulse 75   Temp 97.7 °F (36.5 °C)   Resp 18   Wt 166 lb 14.4 oz (75.7 kg)   LMP  (LMP Unknown)   SpO2 98%   BMI 25.38 kg/m²   GENERAL: Alert, oriented x 3, not in acute distress. HEENT: PERRLA; EOMI. Oropharynx clear. NECK: Supple. Without lymphadenopathy. LUNGS: Good air entry bilaterally. No wheezing, crackles or ronchi. CARDIOVASCULAR: Regular rate. No murmurs, rubs or gallops. ABDOMEN: Soft. Non-tender, non-distended. Positive bowel sounds  EXTREMITIES: Without clubbing, cyanosis, or edema. NEUROLOGIC: No focal deficits. ECOG1    Impression/Plan:  60 y/o female who underwent Right oncoplastic breast reduction, matching left breast reduction and excision squamous cell carcinoma, right mid chest with Dr Zeina Oconnor and right breast needle localized lumpectomy, blue dye injection, right axillary sentinel node excision, possible right axillary dissection with Dr Asuncion Jordan on August 17, 2021  A.  Left breast, reduction mammoplasty:   - Skin and benign breast tissue (288.8 g) showing stromal fibrosis/hyalinosis.    Biju Simms, lesion of mid chest, excision:   - Focal residual squamous cell carcinoma in situ, margins free of tumor;   - Actinic keratosis;   - Dermal scar with chronic inflammation, consistent with changes of previous biopsy site. C.  [place] lymph node #1, biopsy:    - Two of two (2/2) nodes positive for metastatic carcinoma (micrometastasis), see comment. D.  [place] lymph node #2, biopsy:   - Benign node (1) showing fatty replacement. E.  [place] lymph node #3, biopsy:   - Benign node (1) showing fatty replacement. F.  Right breast, central, lumpectomy with needle localization:   - Invasive carcinoma of no special type (ductal), grade 3;   - Changes of previous biopsy site, see comment. G.  [place] lymph node #4, biopsy:   - Benign node showing prominent fatty replacement. H.  Right breast, reduction mammoplasty:   - Skin and benign breast tissue (79.6 g) showing stromal fibrosis/hyalinosis. Comment:A few small foci of metastatic carcinoma (</= 2 mm/each metastasis) in two sentinel lymph nodes in specimen C are noted histologically and further confirmed by immunostaining for pankeratin and GATA3. Comparing with the patient's previous core needle biopsy specimen from the same tumor on 07/16/2021 (see report KEISHA-), the tumor seen here is much more mitotic active (focally, 3-4 mitotic figures/hpf). Therefore, the provisional Benedicto score of this tumor is updated to 3+3+3=9 (grade 3) from originally reported 3+3+1=7 (grade 2). CANCER CASE SUMMARY   Procedure: Excision   Specimen Laterality: Right   TUMOR   Tumor Site: Central   Histologic Type:  Invasive carcinoma of no special type   Histologic Grade: Benedicto Histologic Score   Glandular/tubular differentiation: Score 3   Nuclear pleomorphism: Score 3   Mitotic rate: Score 3   Overall grade: Grade 3 (scores of 9)   Tumor Size: 2.2 x 2.2 x 1.4 cm   Tumor Focality: Single focus of invasive carcinoma   Ductal Carcinoma In Situ (DCIS): Not identified   Lobular Carcinoma In Situ (LCIS): Not identified   Lymph-Vascular Invasion: Not identified   Dermal lymphovascular invasion: No skin present   Microcalcifications: Not identified   Treatment effect: No known presurgical therapy   MARGINS   All margins negative for invasive carcinoma   Distance from invasive carcinoma to closest margin: 1 mm   Closest margins to invasive carcinoma: medial (slide F1) and posterior (slide F2)   REGIONAL LYMPH NODES   Tumor present in regional lymph nodes   Number of lymph nodes with macrometastasis (>2 mm): 0   Number of lymph nodes with micrometasteses (0.2-2 mm): 2   Number of lymph nodes with isolated tumor cells: 0   Size of largest jarett metastasis: 2 mm   Extranodal extension: Not identified   Total number of lymph nodes examined: 4   Number of sentinel nodes examined: 4   DISTANT METASTASIS: Not applicable   Pathologic Staging (pTNM, AJCC 8th Edition)   pT2 (sn)pN1mi     Breast Cancer Marker Studies:   Estrogen Receptors (ER):90%   Progesterone Receptors (PA): 30%   Her-2/stone (c-erb B-2) protein expression: Negative/0     Oncotype Dx  Recurrence Score Result-20  No indication for adjuvant chemotherapy  Recommended adjuvant RT followed by adjuvant endocrine therapy   Radiation Oncology consult appreciated. RTC around the end of RT to initiate endocrine therapy. DEXA scan ordered in the interim. Genetics: My-Risk Negative. No clinically significant mutation identified. No VUS identified.      Yamila Ramirez MD   9/17/2021

## 2021-09-17 NOTE — TELEPHONE ENCOUNTER
Met with patient and family during her follow up appointment with Dr. Tucker Fisher. He reviewed her OncotypeDX RS 20 and POC recommendations in detail. Patient recommended to proceed with radiation therapy without chemotherapy per her results and then follow with endocrine therapy. Patient completed her CT SIM with Dr. Patrizia Gardner on 9/14/2021. Informed her that she will be contacted to start her radiation therapy treatments once her individual plan is finalized. Answered numerous questions regarding skin care, lotion, and deodorant during her radiation therapy treatments. Verbalizes understanding. Instructed her to complete her DEXA scan prior to RT completion (previously provided radiology scheduling #) and that I will ensure that her follow up appointment with Dr. Tucker Fisher will be at the end of her RT treatments for the endocrine therapy prescription. Verbalizes understanding. Provided support and encouragement. Denies any additional needs from NN at this time. Will continue to follow. Tanya Sosa, DYLANW, RN, OCN  Oncology Nurse Navigator

## 2021-09-23 ENCOUNTER — HOSPITAL ENCOUNTER (OUTPATIENT)
Dept: RADIATION ONCOLOGY | Age: 61
Discharge: HOME | End: 2021-09-23
Attending: RADIOLOGY
Payer: COMMERCIAL

## 2021-09-23 PROCEDURE — 77334 RADIATION TREATMENT AID(S): CPT | Performed by: RADIOLOGY

## 2021-09-23 PROCEDURE — 77300 RADIATION THERAPY DOSE PLAN: CPT | Performed by: RADIOLOGY

## 2021-09-23 PROCEDURE — 77295 3-D RADIOTHERAPY PLAN: CPT | Performed by: RADIOLOGY

## 2021-09-24 ENCOUNTER — OFFICE VISIT (OUTPATIENT)
Dept: SURGERY | Age: 61
End: 2021-09-24

## 2021-09-24 VITALS — HEART RATE: 73 BPM | OXYGEN SATURATION: 97 % | RESPIRATION RATE: 18 BRPM | TEMPERATURE: 97.2 F

## 2021-09-24 DIAGNOSIS — N62 MACROMASTIA: Primary | ICD-10-CM

## 2021-09-24 PROCEDURE — 99024 POSTOP FOLLOW-UP VISIT: CPT | Performed by: PHYSICIAN ASSISTANT

## 2021-09-24 NOTE — PROGRESS NOTES
Subjective: Follow up today from bilateral breast reduction. Denies fever, nausea, vomiting, leg pain or swelling, pain is absent. She is continue with medical oncology and radiation oncology as directed. She voices no complaints with her bilateral breast reduction following her lumpectomy at today's office visit. Objective:    Vitals:    09/24/21 1148   Pulse: 73   Resp: 18   Temp: 97.2 °F (36.2 °C)   SpO2: 97%         Left Breast Wound: Clean, dry, and intact, no drainage. NAC with capillary refill intact. Appropriate level of edema and ecchymosis noted. flaps viable with good capillary refill at the area of trifurcation. Right Breast Wound: Clean, dry, and intact, no drainage. NAC with capillary refill intact. Appropriate level of edema and ecchymosis noted. flaps viable with good capillary refill at the area of trifurcation. Left mid chest squamous cell carcinoma excision clean dry and intact no signs of infection.     Assessment:    Patient Active Problem List   Diagnosis    Type 2 diabetes mellitus without complication, without long-term current use of insulin ([address]    Urinary tract infection without hematuria    Fatigue    Acquired hypothyroidism    H/O: hysterectomy    Recurrent UTI    Acute bacterial sinusitis    Left lower quadrant pain    Renal cysts, acquired, bilateral    Diverticulitis    Pericardial effusion    Right otitis media    History of cholecystectomy    Infected tooth    Left ear pain    Dysuria    C. difficile colitis    Hematuria    Gout    Gastroesophageal reflux disease without esophagitis    Malignant neoplasm of right breast in female, estrogen receptor positive [facility])       Pathology report- [address]       [address]            [address] [place] [address]                                                          [place], [address], [address] [place] [address]               FINAL SURGICAL PATHOLOGY REPORT     NAME:            KYLE BERRY            Date of       08/17/2021                                            Collection:   Medical Record   [id number]              Date of       08/18/2021   Number:                                  Receipt:   Age:  56 Y        Sex:  F                Date          08/20/2021 16:26                                            Reported:   Date Of Birth:   1960   Financial        [id number]             Admitting     [id number]   Number:                                  Physician:   Patient          COLETTE ELDRIDGEFELIPEGAGAN         Ordering      NATHAN HENDRICKSON   Location:                                Physician:     Accession Number:  [id number]             Diagnosis:   A.  Left breast, reduction mammoplasty:    - Skin and benign breast tissue (288.8 g) showing stromal   fibrosis/hyalinosis. Yair Arredondo, lesion of mid chest, excision:    - Focal residual squamous cell carcinoma in situ, margins free of tumor;        - Actinic keratosis;         - Dermal scar with chronic inflammation, consistent with changes of        previous biopsy site. C.  [place] lymph node #1, biopsy:    - Two of two (2/2) nodes positive for metastatic carcinoma   (micrometastasis), see comment. D.  [place] lymph node #2, biopsy:    - Benign node (1) showing fatty replacement. E.  [place] lymph node #3, biopsy:    - Benign node (1) showing fatty replacement. F.  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 G.  [place] lymph node #4, biopsy:    - Benign node showing prominent fatty replacement.      H.  Right breast, reduction mammoplasty:    - Skin and benign breast tissue (79.6 g) showing stromal   fibrosis/hyalinosis. Plan:     Status post bilateral breast reduction    Well-healed. Okay to begin radiation therapy from plastic reconstructive surgery standpoint. Educated patient on scar care. Scar Care Instructions      Sunscreen Recommendations for Scars   We recommend that all patients use a sunscreen when outside but especially on new scars (that are exposed to sunlight) for a year after their procedure.  The SPF should be at least 28. Follow the application directions on the bottle. Why is it so Important to Use Sunscreen on Scars?  A scar is new and more fragile than the surrounding skin.  If you do not use sunscreen, the scar line will react differently to the sun than the surrounding skin.  If you don't use sunscreen, the scar tissue will become darker than surrounding skin. This is a hyper-pigmented scar and will remain darker than the other skin.  After one year, the scar and surrounding skin should react equally to sun. Superficial Scar Massage   Scar massage desensitizes and reduces scar adhesions so skin glides freely.  Rub in a circular motion on and around the scar with firm, even pressure for 5 minutes four times per day    You can start scar massage once incision is completely healed and strong enough to handle the motion (usually 10 - 14 days post operatively).  You use lotion to do the scar massage to allow ease with motion over the scar and prevent friction at the area. Patient had no further Questions. Paper instructions given to patient. Call office with concerns or signs of infection.     FLORENTINO Machado

## 2021-09-28 ENCOUNTER — HOSPITAL ENCOUNTER (OUTPATIENT)
Dept: RADIATION ONCOLOGY | Age: 61
Discharge: HOME | End: 2021-09-28
Attending: RADIOLOGY
Payer: COMMERCIAL

## 2021-09-28 ENCOUNTER — TELEPHONE (OUTPATIENT)
Dept: CASE MANAGEMENT | Age: 61
End: 2021-09-28

## 2021-09-28 PROCEDURE — 77417 THER RADIOLOGY PORT IMAGE(S): CPT | Performed by: RADIOLOGY

## 2021-09-28 PROCEDURE — 77412 RADIATION TX DELIVERY LVL 3: CPT | Performed by: RADIOLOGY

## 2021-09-28 NOTE — TELEPHONE ENCOUNTER
Patient starting her right breast radiation therapy treatments at [facility], and is scheduled until 11/4/2021. Requested Dr. Gama Farley follow up appointment on 10/15/2021 rescheduled until 11/2/2021.  to contact with new appointment. Tanya Gill, DYLANW, RN, OCN  Oncology Nurse Navigator

## 2021-09-29 ENCOUNTER — HOSPITAL ENCOUNTER (OUTPATIENT)
Dept: RADIATION ONCOLOGY | Age: 61
Discharge: HOME | End: 2021-09-29
Attending: RADIOLOGY
Payer: COMMERCIAL

## 2021-09-29 PROCEDURE — 77412 RADIATION TX DELIVERY LVL 3: CPT | Performed by: RADIOLOGY

## 2021-09-30 ENCOUNTER — HOSPITAL ENCOUNTER (OUTPATIENT)
Dept: RADIATION ONCOLOGY | Age: 61
Discharge: HOME | End: 2021-09-30
Attending: RADIOLOGY
Payer: COMMERCIAL

## 2021-09-30 ENCOUNTER — HOSPITAL ENCOUNTER (OUTPATIENT)
Dept: RADIATION ONCOLOGY | Age: 61
Discharge: HOME | End: 2021-09-30
Payer: COMMERCIAL

## 2021-09-30 VITALS
WEIGHT: 165.13 LBS | SYSTOLIC BLOOD PRESSURE: 126 MMHG | TEMPERATURE: 97.6 F | RESPIRATION RATE: 18 BRPM | DIASTOLIC BLOOD PRESSURE: 78 MMHG | OXYGEN SATURATION: 98 % | BODY MASS INDEX: 25.11 KG/M2 | HEART RATE: 72 BPM

## 2021-09-30 PROCEDURE — 77412 RADIATION TX DELIVERY LVL 3: CPT | Performed by: RADIOLOGY

## 2021-09-30 NOTE — PROGRESS NOTES
Yahirlia Abbasi  9/30/2021  Wt Readings from Last 3 Encounters:   09/30/21 165 lb 2 oz (74.9 kg)   09/17/21 166 lb 14.4 oz (75.7 kg)   09/07/21 164 lb 12.8 oz (74.8 kg)     Body mass index is 25.11 kg/m². Treatment Area:Right breast    Patient was seen today for weekly visit. Comfort Alteration  KPS:90%  Fatigue: Mild    Nutritional Alteration  Anorexia: No   Nausea: Yes /sometimes, Dr Isaias Elizabeth updated  Vomiting: No     Skin Alteration   Sensation:na    Radiation Dermatitis:  None, moisturizing skin at least 2 times daily    Mucous Membrane Alteration  Drainage: No  Lymphedema: No    Emotional  Coping: effective    Sexuality Alteration  na    Injury, potential bleeding or infection: na        Lab Results   Component Value Date    WBC 8.4 08/17/2021     08/17/2021         /78   Pulse 72   Temp 97.6 °F (36.4 °C) (Temporal)   Resp 18   Wt 165 lb 2 oz (74.9 kg)   LMP  (LMP Unknown)   SpO2 98%   BMI 25.11 kg/m²   BP within normal range? yes     Assessment/Plan:  Completed 3/28 fractions; 540/5040 cGy, no other complaints.     Renato Guerrero RN

## 2021-09-30 NOTE — PROGRESS NOTES
DEPARTMENT OF RADIATION ONCOLOGY   ON TREATMENT VISIT       9/30/2021      NAME:  Donn Martinez    YOB: 1960    DIAGNOSIS:     Carcinoma of the right breast post modified radical mastectomy. SUBJECTIVE:     Donn Martinez has now received 540 cGy in 3 fractionsdirected to the right breast  and regional lymph jarett area. Past medical, surgical, social and family histories reviewed and updated as indicated. PAIN: ALLERGIES:  Flagyl [metronidazole], Omeprazole, and Vicodin [hydrocodone-acetaminophen]         Current Outpatient Medications   Medication Sig Dispense Refill    ondansetron (ZOFRAN) 4 MG tablet Take 1 tablet by mouth daily as needed for Nausea or Vomiting 12 tablet 1    allopurinol (ZYLOPRIM) 300 MG tablet Take 1/2 (one-half) tablet by mouth once daily 45 tablet 1    metFORMIN (GLUCOPHAGE) 500 MG tablet Take 1 tablet by mouth 2 times daily (with meals) 180 tablet 1    levothyroxine (SYNTHROID) 50 MCG tablet Take 1 tablet by mouth Daily 90 tablet 1    lansoprazole (PREVACID) 30 MG delayed release capsule Take 1 capsule by mouth daily 90 capsule 1    albuterol sulfate HFA (VENTOLIN HFA) 108 (90 Base) MCG/ACT inhaler Inhale 2 puffs into the lungs every 6 hours as needed for Wheezing or Shortness of Breath 3 Inhaler 1    Probiotic Product (PROBIOTIC PO) Take by mouth        Current Facility-Administered Medications   Medication Dose Route Frequency Provider Last Rate Last Admin    lidocaine-EPINEPHrine 1 %-1:254178 injection 3 mL  3 mL IntraDERmal Once Addison FLORENTINO Hughes             OBJECTIVE:  Alert and fully ambulatory. Pleasant and conversant. Physical Examination:  Constitutional: A well developed, well nourished 59 y.o. female who is alert, oriented, cooperative and in no apparent distress. Chest equal unstructured both breasts   There are no significant changes in the skin on the right side.     There is an area about 1 cm in diameter in the left medial upper

## 2021-10-01 ENCOUNTER — HOSPITAL ENCOUNTER (OUTPATIENT)
Dept: RADIATION ONCOLOGY | Age: 61
Discharge: HOME | End: 2021-10-01
Attending: RADIOLOGY
Payer: COMMERCIAL

## 2021-10-01 PROCEDURE — 77412 RADIATION TX DELIVERY LVL 3: CPT | Performed by: RADIOLOGY

## 2021-10-04 ENCOUNTER — APPOINTMENT (OUTPATIENT)
Dept: RADIATION ONCOLOGY | Age: 61
End: 2021-10-04
Attending: RADIOLOGY
Payer: COMMERCIAL

## 2021-10-05 ENCOUNTER — APPOINTMENT (OUTPATIENT)
Dept: RADIATION ONCOLOGY | Age: 61
End: 2021-10-05
Attending: RADIOLOGY
Payer: COMMERCIAL

## 2021-10-06 ENCOUNTER — HOSPITAL ENCOUNTER (OUTPATIENT)
Dept: RADIATION ONCOLOGY | Age: 61
Discharge: HOME | End: 2021-10-06
Attending: RADIOLOGY
Payer: COMMERCIAL

## 2021-10-06 PROCEDURE — 77412 RADIATION TX DELIVERY LVL 3: CPT | Performed by: RADIOLOGY

## 2021-10-06 PROCEDURE — 77427 RADIATION TX MANAGEMENT X5: CPT | Performed by: RADIOLOGY

## 2021-10-06 PROCEDURE — 77336 RADIATION PHYSICS CONSULT: CPT | Performed by: RADIOLOGY

## 2021-10-07 ENCOUNTER — HOSPITAL ENCOUNTER (OUTPATIENT)
Dept: RADIATION ONCOLOGY | Age: 61
Discharge: HOME | End: 2021-10-07
Attending: RADIOLOGY
Payer: COMMERCIAL

## 2021-10-07 VITALS — BODY MASS INDEX: 25.24 KG/M2 | WEIGHT: 166 LBS

## 2021-10-07 PROCEDURE — 77417 THER RADIOLOGY PORT IMAGE(S): CPT | Performed by: RADIOLOGY

## 2021-10-07 PROCEDURE — 77412 RADIATION TX DELIVERY LVL 3: CPT | Performed by: RADIOLOGY

## 2021-10-07 NOTE — PROGRESS NOTES
DEPARTMENT OF RADIATION ONCOLOGY   ON TREATMENT VISIT       10/7/2021      NAME:  Gill Kayser    YOB: 1960    DIAGNOSIS: Carcinoma of the right breast.  Surgery followed by reconstruction. SUBJECTIVE:     Gill Kayser has now received 1080 cGy in 6 fractions directed to the right breastl and regional lymph jarett area. .      Past medical, surgical, social and family histories reviewed and updated as indicated. PAIN: Asymptomatic    ALLERGIES:  Flagyl [metronidazole], Omeprazole, and Vicodin [hydrocodone-acetaminophen]         Current Outpatient Medications   Medication Sig Dispense Refill    ondansetron (ZOFRAN) 4 MG tablet Take 1 tablet by mouth daily as needed for Nausea or Vomiting 12 tablet 1    allopurinol (ZYLOPRIM) 300 MG tablet Take 1/2 (one-half) tablet by mouth once daily 45 tablet 1    metFORMIN (GLUCOPHAGE) 500 MG tablet Take 1 tablet by mouth 2 times daily (with meals) 180 tablet 1    levothyroxine (SYNTHROID) 50 MCG tablet Take 1 tablet by mouth Daily 90 tablet 1    lansoprazole (PREVACID) 30 MG delayed release capsule Take 1 capsule by mouth daily 90 capsule 1    albuterol sulfate HFA (VENTOLIN HFA) 108 (90 Base) MCG/ACT inhaler Inhale 2 puffs into the lungs every 6 hours as needed for Wheezing or Shortness of Breath 3 Inhaler 1    Probiotic Product (PROBIOTIC PO) Take by mouth        Current Facility-Administered Medications   Medication Dose Route Frequency Provider Last Rate Last Admin    lidocaine-EPINEPHrine 1 %-1:627826 injection 3 mL  3 mL IntraDERmal Once FLORENTINO Razo             OBJECTIVE:  Alert and fully ambulatory. Pleasant and conversant. Physical Examination:{  Constitutional: A well developed, well nourished 59 y.o. female who is alert, oriented, cooperative and in no apparent distress. Chest :  Skin changes in the right breast.  HEENT: Palpable lymph nodes in the regional lymph jarett area. Lungs are clear to auscultation. Cardiovascular: Regular without murmur. Skin:  Warm and dry. No obvious rashes. Vitals:    10/07/21 1149   Weight: 166 lb (75.3 kg)       Wt Readings from Last 3 Encounters:   10/07/21 166 lb (75.3 kg)   09/30/21 165 lb 2 oz (74.9 kg)   09/17/21 166 lb 14.4 oz (75.7 kg)       ASSESSMENT/PLAN:     Patient is tolerating treatments well with expected toxicities. Current and planned dose reviewed. Goals of treatment and potential side effects were reviewed with the patient. Treatment imaging has been personally reviewed for accuracy and precision. Questions answered to apparent satisfaction. Treatments will continue as planned. Thank you for the opportunity to participate in multidisciplinary management of this remarkable and pleasant patient.       Gamaliel Browne MD [facility]    Department of Radiation Oncology  [facility]) [facility]: [phone] (RL[facility]: [phone])  [address] [place] [address]: [phone] (MKE: [phone])  [facility]) [facility]:  [phone] (AMQ:  [phone])

## 2021-10-07 NOTE — PROGRESS NOTES
Yahir HENRY Ayleen  10/7/2021  Wt Readings from Last 3 Encounters:   10/07/21 166 lb (75.3 kg)   09/30/21 165 lb 2 oz (74.9 kg)   09/17/21 166 lb 14.4 oz (75.7 kg)     Body mass index is 25.24 kg/m². Treatment Area:R breast     Patient was seen today for weekly visit. Comfort Alteration  KPS:90%  Fatigue: Mild    Nutritional Alteration  Anorexia: No   Nausea: No   Vomiting: No     Skin Alteration   Sensation:NA     Radiation Dermatitis:  NA    Mucous Membrane Alteration  Drainage: no  Lymphedema: No    Emotional  Coping: effective    Sexuality Alteration  NA    Injury, potential bleeding or infection: NA        Lab Results   Component Value Date    WBC 8.4 08/17/2021     08/17/2021         Wt 166 lb (75.3 kg)   LMP  (LMP Unknown)   BMI 25.24 kg/m²   BP within normal range? yes        Assessment/Plan: Pt completed 6/28 fractions. Pt is tolerating well.      Elaine Gates RN

## 2021-10-08 ENCOUNTER — TELEPHONE (OUTPATIENT)
Dept: CASE MANAGEMENT | Age: 61
End: 2021-10-08

## 2021-10-08 ENCOUNTER — HOSPITAL ENCOUNTER (OUTPATIENT)
Dept: RADIATION ONCOLOGY | Age: 61
Discharge: HOME | End: 2021-10-08
Attending: RADIOLOGY
Payer: COMMERCIAL

## 2021-10-08 PROCEDURE — 77412 RADIATION TX DELIVERY LVL 3: CPT | Performed by: RADIOLOGY

## 2021-10-08 NOTE — TELEPHONE ENCOUNTER
Met with patient briefly following her daily radiation therapy treatment for follow up. Patient has had 7 treatments so far. She appears well and is in good spirits. Upon inquiring, states that she is doing well with the treatments. Provided support and encouragement to apply moisturizing cream to the affected area within the treatment field per Dr. Bambi Perez directions. Aware not to apply 3 hours prior to treatment. Denies any problems with transportation for treatment. Declines any current needs for assistance from NN. Patient appreciative of visit. Will continue to follow as needed. Tanya Rascon, BSW, RN, OCN  Oncology Nurse Navigator

## 2021-10-11 ENCOUNTER — HOSPITAL ENCOUNTER (OUTPATIENT)
Dept: RADIATION ONCOLOGY | Age: 61
Discharge: HOME | End: 2021-10-11
Attending: RADIOLOGY
Payer: COMMERCIAL

## 2021-10-11 PROCEDURE — 77412 RADIATION TX DELIVERY LVL 3: CPT | Performed by: RADIOLOGY

## 2021-10-12 ENCOUNTER — HOSPITAL ENCOUNTER (OUTPATIENT)
Dept: RADIATION ONCOLOGY | Age: 61
Discharge: HOME | End: 2021-10-12
Attending: RADIOLOGY
Payer: COMMERCIAL

## 2021-10-12 DIAGNOSIS — C50.919 MALIGNANT NEOPLASM OF FEMALE BREAST, UNSPECIFIED ESTROGEN RECEPTOR STATUS, UNSPECIFIED LATERALITY, UNSPECIFIED SITE OF BREAST (HCC): Primary | ICD-10-CM

## 2021-10-12 PROCEDURE — 99999 PR OFFICE/OUTPT VISIT,PROCEDURE ONLY: CPT | Performed by: RADIOLOGY

## 2021-10-12 PROCEDURE — 77412 RADIATION TX DELIVERY LVL 3: CPT | Performed by: RADIOLOGY

## 2021-10-12 NOTE — PATIENT INSTRUCTIONS
Continue daily fractionated radiation therapy as scheduled. Please see weekly OTV note and intial consultation letter in [facility] for clinical details. Lico Melissa. Brandy Mireles MD MS Denn[facility] Allisonharmand:  [phone]   FAX: [phone]  [facility]:  [phone]   FAX:    [phone] [address]:  [phone]   FAX:  [phone]  Email: [email or web address]

## 2021-10-12 NOTE — PROGRESS NOTES
planned dose reviewed. Goals of treatment and potential side effects were reviewed with the patient PRN. Treatment imaging has been personally reviewed for accuracy and precision. Questions answered to apparent satisfaction. Treatments will continue as planned. Juan Diego Ferrell.  Ebonie Vasquez MD MS KEYURR  Radiation Oncologist        [facility] ([address] [place] [address]): [phone] /// FAX: [phone]  [facility]): [phone] /// FAX: [phone]  [address] [place] [address]): [phone] /// FAX: [phone]

## 2021-10-13 ENCOUNTER — HOSPITAL ENCOUNTER (OUTPATIENT)
Dept: RADIATION ONCOLOGY | Age: 61
Discharge: HOME | End: 2021-10-13
Attending: RADIOLOGY
Payer: COMMERCIAL

## 2021-10-13 PROCEDURE — 77412 RADIATION TX DELIVERY LVL 3: CPT | Performed by: RADIOLOGY

## 2021-10-13 PROCEDURE — 77336 RADIATION PHYSICS CONSULT: CPT | Performed by: RADIOLOGY

## 2021-10-13 PROCEDURE — 77427 RADIATION TX MANAGEMENT X5: CPT | Performed by: RADIOLOGY

## 2021-10-14 ENCOUNTER — HOSPITAL ENCOUNTER (OUTPATIENT)
Dept: RADIATION ONCOLOGY | Age: 61
Discharge: HOME | End: 2021-10-14
Attending: RADIOLOGY
Payer: COMMERCIAL

## 2021-10-14 PROCEDURE — 77417 THER RADIOLOGY PORT IMAGE(S): CPT | Performed by: RADIOLOGY

## 2021-10-14 PROCEDURE — 77412 RADIATION TX DELIVERY LVL 3: CPT | Performed by: RADIOLOGY

## 2021-10-15 ENCOUNTER — HOSPITAL ENCOUNTER (OUTPATIENT)
Dept: RADIATION ONCOLOGY | Age: 61
Discharge: HOME | End: 2021-10-15
Attending: RADIOLOGY
Payer: COMMERCIAL

## 2021-10-15 PROCEDURE — 77412 RADIATION TX DELIVERY LVL 3: CPT | Performed by: RADIOLOGY

## 2021-10-18 ENCOUNTER — HOSPITAL ENCOUNTER (OUTPATIENT)
Dept: RADIATION ONCOLOGY | Age: 61
Discharge: HOME | End: 2021-10-18
Attending: RADIOLOGY
Payer: COMMERCIAL

## 2021-10-18 PROCEDURE — 77412 RADIATION TX DELIVERY LVL 3: CPT | Performed by: RADIOLOGY

## 2021-10-19 ENCOUNTER — HOSPITAL ENCOUNTER (OUTPATIENT)
Dept: RADIATION ONCOLOGY | Age: 61
Discharge: HOME | End: 2021-10-19
Attending: RADIOLOGY
Payer: COMMERCIAL

## 2021-10-19 VITALS — BODY MASS INDEX: 24.78 KG/M2 | WEIGHT: 163 LBS

## 2021-10-19 PROCEDURE — 77412 RADIATION TX DELIVERY LVL 3: CPT | Performed by: RADIOLOGY

## 2021-10-19 NOTE — PROGRESS NOTES
Yahir ELAINE Ayleen  10/19/2021  Wt Readings from Last 3 Encounters:   10/19/21 163 lb (73.9 kg)   10/07/21 166 lb (75.3 kg)   09/30/21 165 lb 2 oz (74.9 kg)     Body mass index is 24.78 kg/m². Treatment Area: R breast     Patient was seen today for weekly visit. Comfort Alteration  KPS:90%  Fatigue: Mild    Nutritional Alteration  Anorexia: No   Nausea: No   Vomiting: No     Skin Alteration   Sensation:Itch, tender     Radiation Dermatitis:  Yes     Mucous Membrane Alteration  Drainage: No  Lymphedema: No    Emotional  Coping: effective    Sexuality Alteration  na    Injury, potential bleeding or infection: Rash to clavicular area, very itchy. Lab Results   Component Value Date    WBC 8.4 08/17/2021     08/17/2021         Wt 163 lb (73.9 kg)   LMP  (LMP Unknown)   BMI 24.78 kg/m²   BP within normal range? yes         Assessment/Plan: pt has completed 14/28 fractions. Pt has a rash to upper chest area. Discussed hydrocortisone.      Kim Ambriz RN

## 2021-10-19 NOTE — PROGRESS NOTES
DEPARTMENT OF RADIATION ONCOLOGY   ON TREATMENT VISIT       10/19/2021      NAME:  Nirmala Abbasi    YOB: 1960    DIAGNOSIS: #1 (status post surgical staging and reconstruction. SUBJECTIVE:       Barry Ruth has now received 2520 cGy. 14 fractions cGy  directed to the right breast and the regional lymph jarett area. Past medical, surgical, social and family histories reviewed and updated as indicated. PAIN: Complains of itching sensation. Patient has been scratching and has needle marks in the infraclavicular and posterior supra scapular area on the right side. ALLERGIES:  Flagyl [metronidazole], Omeprazole, and Vicodin [hydrocodone-acetaminophen]         Current Outpatient Medications   Medication Sig Dispense Refill    ondansetron (ZOFRAN) 4 MG tablet Take 1 tablet by mouth daily as needed for Nausea or Vomiting 12 tablet 1    allopurinol (ZYLOPRIM) 300 MG tablet Take 1/2 (one-half) tablet by mouth once daily 45 tablet 1    metFORMIN (GLUCOPHAGE) 500 MG tablet Take 1 tablet by mouth 2 times daily (with meals) 180 tablet 1    levothyroxine (SYNTHROID) 50 MCG tablet Take 1 tablet by mouth Daily 90 tablet 1    lansoprazole (PREVACID) 30 MG delayed release capsule Take 1 capsule by mouth daily 90 capsule 1    albuterol sulfate HFA (VENTOLIN HFA) 108 (90 Base) MCG/ACT inhaler Inhale 2 puffs into the lungs every 6 hours as needed for Wheezing or Shortness of Breath 3 Inhaler 1    Probiotic Product (PROBIOTIC PO) Take by mouth        Current Facility-Administered Medications   Medication Dose Route Frequency Provider Last Rate Last Admin    lidocaine-EPINEPHrine 1 %-1:124433 injection 3 mL  3 mL IntraDERmal Once FLORENTINO Miranda             OBJECTIVE:      Alert and fully ambulatory. Pleasant and conversant. Right breast shows 1+ skin erythema with scratch marks in the right infraclavicular and supra scapular area posteriorly on the right side.   There are no signs of secondary infection. A well developed, well nourished 59 y.o. female who is alert, oriented, cooperative. Very uncomfortable from the itching sensation. Vitals:    10/19/21 1142   Weight: 163 lb (73.9 kg)       Wt Readings from Last 3 Encounters:   10/19/21 163 lb (73.9 kg)   10/07/21 166 lb (75.3 kg)   09/30/21 165 lb 2 oz (74.9 kg)       ASSESSMENT/PLAN:   Patient was asked to use Aquaphor generously to the treatment area in addition to hydrocortisone cream to the right infraclavicular and right suprascapular area. She was also recommended to clip her nails because that she seems to have very sharp nails  Patient understood and is agreeable. Patient is tolerating treatments well with expected toxicities. Current and planned dose reviewed. Goals of treatment and potential side effects were reviewed with the patient. Treatment imaging has been personally reviewed for accuracy and precision. Questions answered to apparent satisfaction. Treatments will continue as planned. Thank you for the opportunity to participate in multidisciplinary management of this remarkable and pleasant patient.       Harper Peterson MD [facility]    Department of Radiation Oncology    PHYSICIANS [facility]) [facility]: [phone] (FDW: [phone])  [address] [place] [address]: [phone] (MYQ: [phone])  [facility]:  [phone] (NGV:  [phone])

## 2021-10-20 ENCOUNTER — HOSPITAL ENCOUNTER (OUTPATIENT)
Dept: RADIATION ONCOLOGY | Age: 61
Discharge: HOME | End: 2021-10-20
Attending: RADIOLOGY
Payer: COMMERCIAL

## 2021-10-20 PROCEDURE — 77412 RADIATION TX DELIVERY LVL 3: CPT | Performed by: RADIOLOGY

## 2021-10-20 PROCEDURE — 77427 RADIATION TX MANAGEMENT X5: CPT | Performed by: RADIOLOGY

## 2021-10-20 PROCEDURE — 77336 RADIATION PHYSICS CONSULT: CPT | Performed by: RADIOLOGY

## 2021-10-20 NOTE — PROGRESS NOTES
DEPARTMENT OF RADIATION ONCOLOGY   ON TREATMENT VISIT       10/20/2021      NAME:  Berny Abbasi    YOB: 1960    Diagnosis: Right breast cancer (ER/IL+, Her2-), s/p BCS and SLNE with oncoplastic right breast reduction and elective left breast reduction with excision of SCC to right mid chest, currently undergoing adjuvant XRT    SUBJECTIVE:   Yahir Abbasi has now received 2700 cGy in 15 fractions directed to the right breast/sClav/apex for management of right breast cancer. Patient asked to be seen today. States that she has a rash that she has been applying hydrocortisone cream but without relief of the itching and burning. Rash is in entire radiation field. Patient follows with Dr Ryder Alcantar for medical oncology. Planned endocrine therapy post XRT completion. Past medical, surgical, social and family histories reviewed and updated as indicated. Pain: Denies pain at this time. ALLERGIES:  Flagyl [metronidazole], Omeprazole, and Vicodin [hydrocodone-acetaminophen]         Current Outpatient Medications   Medication Sig Dispense Refill    silver sulfADIAZINE (SILVADENE) 1 % cream Apply topically 3 times daily.  50 g 3    ondansetron (ZOFRAN) 4 MG tablet Take 1 tablet by mouth daily as needed for Nausea or Vomiting 12 tablet 1    allopurinol (ZYLOPRIM) 300 MG tablet Take 1/2 (one-half) tablet by mouth once daily 45 tablet 1    metFORMIN (GLUCOPHAGE) 500 MG tablet Take 1 tablet by mouth 2 times daily (with meals) 180 tablet 1    levothyroxine (SYNTHROID) 50 MCG tablet Take 1 tablet by mouth Daily 90 tablet 1    lansoprazole (PREVACID) 30 MG delayed release capsule Take 1 capsule by mouth daily 90 capsule 1    albuterol sulfate HFA (VENTOLIN HFA) 108 (90 Base) MCG/ACT inhaler Inhale 2 puffs into the lungs every 6 hours as needed for Wheezing or Shortness of Breath 3 Inhaler 1    Probiotic Product (PROBIOTIC PO) Take by mouth        Current Facility-Administered Medications Medication Dose Route Frequency Provider Last Rate Last Admin    lidocaine-EPINEPHrine 1 %-1:608201 injection 3 mL  3 mL IntraDERmal Once FLORENTINO Velasco               OBJECTIVE:  Alert and fully ambulatory. Pleasant and conversant. Wt Readings from Last 3 Encounters:   10/19/21 163 lb (73.9 kg)   10/07/21 166 lb (75.3 kg)   21 165 lb 2 oz (74.9 kg)       Irradiated skin shows brisk erythema with rash appearance to treatment areas. ASSESSMENT/PLAN:     Patient is tolerating treatments well with expected toxicities. Silvadene ordered to be applied TID to any reddened/burned areas. As patient states that hydrocortisone is not helping the itchiness, she can discontinue. May continue to use Benadryl as needed if it helps, per OTC instructions. Encouraged to moisturize skin with Cetaphil (patient's moisturizer of choice) in between Silvadene applications. Current and planned dose reviewed. Goals of treatment and potential side effects were reviewed with the patient. Questions answered to apparent satisfaction. Treatments will continue as planned.       Melodie Gowers, MSN, RN, APRN-CNP  Nurse Practitioner for Radiation Oncology    [address] [place] [address]) [facility]: [phone] (ZAT: [phone])  [address] [place] [address]: [phone] (VQS: [phone])  [facility]) [facility]:  [phone])

## 2021-10-21 ENCOUNTER — HOSPITAL ENCOUNTER (OUTPATIENT)
Dept: RADIATION ONCOLOGY | Age: 61
Discharge: HOME | End: 2021-10-21
Attending: RADIOLOGY
Payer: COMMERCIAL

## 2021-10-21 PROCEDURE — 77417 THER RADIOLOGY PORT IMAGE(S): CPT | Performed by: RADIOLOGY

## 2021-10-21 PROCEDURE — 77412 RADIATION TX DELIVERY LVL 3: CPT | Performed by: RADIOLOGY

## 2021-10-22 ENCOUNTER — HOSPITAL ENCOUNTER (OUTPATIENT)
Dept: RADIATION ONCOLOGY | Age: 61
Discharge: HOME | End: 2021-10-22
Attending: RADIOLOGY
Payer: COMMERCIAL

## 2021-10-22 PROCEDURE — 77412 RADIATION TX DELIVERY LVL 3: CPT | Performed by: RADIOLOGY

## 2021-10-25 ENCOUNTER — HOSPITAL ENCOUNTER (OUTPATIENT)
Dept: RADIATION ONCOLOGY | Age: 61
Discharge: HOME | End: 2021-10-25
Attending: RADIOLOGY
Payer: COMMERCIAL

## 2021-10-25 PROCEDURE — 77412 RADIATION TX DELIVERY LVL 3: CPT | Performed by: RADIOLOGY

## 2021-10-26 ENCOUNTER — HOSPITAL ENCOUNTER (OUTPATIENT)
Dept: RADIATION ONCOLOGY | Age: 61
Discharge: HOME | End: 2021-10-26
Attending: RADIOLOGY
Payer: COMMERCIAL

## 2021-10-26 ENCOUNTER — TELEPHONE (OUTPATIENT)
Dept: CASE MANAGEMENT | Age: 61
End: 2021-10-26

## 2021-10-26 PROCEDURE — 77412 RADIATION TX DELIVERY LVL 3: CPT | Performed by: RADIOLOGY

## 2021-10-27 ENCOUNTER — HOSPITAL ENCOUNTER (OUTPATIENT)
Dept: RADIATION ONCOLOGY | Age: 61
Discharge: HOME | End: 2021-10-27
Attending: RADIOLOGY
Payer: COMMERCIAL

## 2021-10-27 VITALS
WEIGHT: 163 LBS | OXYGEN SATURATION: 98 % | BODY MASS INDEX: 24.78 KG/M2 | DIASTOLIC BLOOD PRESSURE: 81 MMHG | HEART RATE: 87 BPM | SYSTOLIC BLOOD PRESSURE: 129 MMHG | RESPIRATION RATE: 16 BRPM

## 2021-10-27 PROCEDURE — 77336 RADIATION PHYSICS CONSULT: CPT | Performed by: RADIOLOGY

## 2021-10-27 PROCEDURE — 77412 RADIATION TX DELIVERY LVL 3: CPT | Performed by: RADIOLOGY

## 2021-10-27 PROCEDURE — 77427 RADIATION TX MANAGEMENT X5: CPT | Performed by: RADIOLOGY

## 2021-10-27 NOTE — PROGRESS NOTES
DEPARTMENT OF RADIATION ONCOLOGY   ON TREATMENT VISIT       10/27/2021      NAME:  Genia Schilder White    YOB: 1960    DIAGNOSIS: Right breast cancer    SUBJECTIVE:   Diamond Ingram has now received 3600 cGy in 180-cGy daily fractions directed to the right breast and regional lymph node areas for management of the above diagnosis. The patient feels well, denies any cough, low-grade fever, shortness of breath, or other symptoms of radiation pneumonitis. The pruritis improves with hydrocortisone cream and the discomfort improves with Silvadene. Past medical, surgical, social and family histories reviewed and updated as indicated. PAIN: Mild discomfort in the breast    ALLERGIES:  Flagyl [metronidazole], Omeprazole, and Vicodin [hydrocodone-acetaminophen]         Current Outpatient Medications   Medication Sig Dispense Refill    silver sulfADIAZINE (SILVADENE) 1 % cream Apply topically 3 times daily.  50 g 3    ondansetron (ZOFRAN) 4 MG tablet Take 1 tablet by mouth daily as needed for Nausea or Vomiting 12 tablet 1    allopurinol (ZYLOPRIM) 300 MG tablet Take 1/2 (one-half) tablet by mouth once daily 45 tablet 1    metFORMIN (GLUCOPHAGE) 500 MG tablet Take 1 tablet by mouth 2 times daily (with meals) 180 tablet 1    levothyroxine (SYNTHROID) 50 MCG tablet Take 1 tablet by mouth Daily 90 tablet 1    lansoprazole (PREVACID) 30 MG delayed release capsule Take 1 capsule by mouth daily 90 capsule 1    albuterol sulfate HFA (VENTOLIN HFA) 108 (90 Base) MCG/ACT inhaler Inhale 2 puffs into the lungs every 6 hours as needed for Wheezing or Shortness of Breath 3 Inhaler 1    Probiotic Product (PROBIOTIC PO) Take by mouth        Current Facility-Administered Medications   Medication Dose Route Frequency Provider Last Rate Last Admin    lidocaine-EPINEPHrine 1 %-1:710920 injection 3 mL  3 mL IntraDERmal Once FLORENTINO Robertson             OBJECTIVE:  Vitals:  /81, P 87, R 16, 98% RA O2 Sat, Wt. 163 lbs per RN  Physical Examination:  Constitutional: 59 y.o. female who is alert, cooperative and in no apparent distress. The patient looks well and is accompanied by her  and daughter. Inspection shows a rash in the right supraclavicular radiation field (including the exit field on her back) and upper quadrants of the right breast, no bullae or vesicles, with erythema but no moist desquamation. 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 RT is to continue as planned.     Eran Cuevas MD PhD  Radiation Oncologist, [facility] Radiation Oncology

## 2021-10-27 NOTE — PROGRESS NOTES
Yahir HENRY Ayleen  10/27/2021  Wt Readings from Last 3 Encounters:   10/27/21 163 lb (73.9 kg)   10/19/21 163 lb (73.9 kg)   10/07/21 166 lb (75.3 kg)     Body mass index is 24.78 kg/m². Treatment Area:  R breast     Patient was seen today for weekly visit. Comfort Alteration  KPS:80 %  Fatigue: Mild    Nutritional Alteration  Anorexia: No   Nausea: No   Vomiting: No     Skin Alteration   Sensation:itchy, tender     Radiation Dermatitis: rash     Mucous Membrane Alteration  Drainage: No  Lymphedema: No    Emotional  Coping: effective- pt is stressed & anxious regarding finances. Sexuality Alteration  NA     Injury, potential bleeding or infection: NA         Lab Results   Component Value Date    WBC 8.4 08/17/2021     08/17/2021         /81   Pulse 87   Resp 16   Wt 163 lb (73.9 kg)   LMP  (LMP Unknown)   SpO2 98%   BMI 24.78 kg/m²   BP within normal range? yes      Assessment/Plan: Pt has completed 20/28 fractions. Pt is experiencing stress & anxiety related to work, finances & health insurance. She has requested to speak with a [employer] worker & .      Pietro Morales RN

## 2021-10-28 ENCOUNTER — FOLLOWUP TELEPHONE ENCOUNTER (OUTPATIENT)
Dept: INFUSION THERAPY | Age: 61
End: 2021-10-28

## 2021-10-28 ENCOUNTER — HOSPITAL ENCOUNTER (OUTPATIENT)
Dept: RADIATION ONCOLOGY | Age: 61
Discharge: HOME | End: 2021-10-28
Attending: RADIOLOGY
Payer: COMMERCIAL

## 2021-10-28 PROCEDURE — 77412 RADIATION TX DELIVERY LVL 3: CPT | Performed by: RADIOLOGY

## 2021-10-28 PROCEDURE — 77417 THER RADIOLOGY PORT IMAGE(S): CPT | Performed by: RADIOLOGY

## 2021-10-28 NOTE — TELEPHONE ENCOUNTER
I reached out to patient based off of a referral from Sharla Espinosa RN, who informed me that patient has some insurance questions and needs financial assistance. She was provided with what all income would be needed in order to file:    Alexia Barlow [facility]  May bank statement  Husbands social security award letter    She is going to gather this information and bring it in to me Monday to file for assistance. She was also interested in filing for medicaid at that time too. She was placed on my schedule to see at that time. She has no further questions/concerns at this time.  Electronically signed by Harry Bess on 10/28/2021 at 12:25 PM

## 2021-10-29 ENCOUNTER — HOSPITAL ENCOUNTER (OUTPATIENT)
Dept: RADIATION ONCOLOGY | Age: 61
Discharge: HOME | End: 2021-10-29
Attending: RADIOLOGY
Payer: COMMERCIAL

## 2021-10-29 PROCEDURE — 77412 RADIATION TX DELIVERY LVL 3: CPT | Performed by: RADIOLOGY

## 2021-11-01 ENCOUNTER — FOLLOWUP TELEPHONE ENCOUNTER (OUTPATIENT)
Dept: INFUSION THERAPY | Age: 61
End: 2021-11-01

## 2021-11-01 ENCOUNTER — HOSPITAL ENCOUNTER (OUTPATIENT)
Dept: RADIATION ONCOLOGY | Age: 61
Discharge: HOME | End: 2021-11-01
Attending: RADIOLOGY
Payer: COMMERCIAL

## 2021-11-01 PROCEDURE — 77412 RADIATION TX DELIVERY LVL 3: CPT | Performed by: RADIOLOGY

## 2021-11-01 NOTE — TELEPHONE ENCOUNTER
As planned I met with patient and  today to complete the financial assistance application and medicaid application. Application was completed and emailed in on the following accounts:    [id number]   [id number]   [id number]   [id number]   [id number]   [id number]     Accounts were added to my spreadsheet for tracking of determination. Patient was also provided with the contact information for the [place] as she was concerned about them being over income for medicaid. She was going to call and inquire about options and prices there while we wait to hear back from [place]. Medicaid application was faxed in today for review.  Electronically signed by Brina Monsivais on 11/1/2021 at 1:14 PM

## 2021-11-02 ENCOUNTER — HOSPITAL ENCOUNTER (OUTPATIENT)
Dept: RADIATION ONCOLOGY | Age: 61
Discharge: HOME | End: 2021-11-02
Attending: RADIOLOGY
Payer: COMMERCIAL

## 2021-11-02 PROCEDURE — 77412 RADIATION TX DELIVERY LVL 3: CPT | Performed by: RADIOLOGY

## 2021-11-02 NOTE — PROGRESS NOTES
DEPARTMENT OF RADIATION ONCOLOGY   ON TREATMENT VISIT       11/2/2021      NAME:  Jeanette Abbasi    YOB: 1960    DIAGNOSIS: Right breast cancer     SUBJECTIVE:   Joshua Ortiz has now received 3600 cGy in 180-cGy daily fractions directed to the right breast and regional lymph node areas for management of the above diagnosis.     The patient feels well, denies any cough, low-grade fever, shortness of breath, or other symptoms of radiation pneumonitis. The pruritis continues to improve slowly with 3x/day hydrocortisone cream.    Past medical, surgical, social and family histories reviewed and updated as indicated. PAIN: Discomfort in right breast    ALLERGIES:  Flagyl [metronidazole], Omeprazole, and Vicodin [hydrocodone-acetaminophen]         Current Outpatient Medications   Medication Sig Dispense Refill    silver sulfADIAZINE (SILVADENE) 1 % cream Apply topically 3 times daily.  50 g 3    ondansetron (ZOFRAN) 4 MG tablet Take 1 tablet by mouth daily as needed for Nausea or Vomiting 12 tablet 1    allopurinol (ZYLOPRIM) 300 MG tablet Take 1/2 (one-half) tablet by mouth once daily 45 tablet 1    metFORMIN (GLUCOPHAGE) 500 MG tablet Take 1 tablet by mouth 2 times daily (with meals) 180 tablet 1    levothyroxine (SYNTHROID) 50 MCG tablet Take 1 tablet by mouth Daily 90 tablet 1    lansoprazole (PREVACID) 30 MG delayed release capsule Take 1 capsule by mouth daily 90 capsule 1    albuterol sulfate HFA (VENTOLIN HFA) 108 (90 Base) MCG/ACT inhaler Inhale 2 puffs into the lungs every 6 hours as needed for Wheezing or Shortness of Breath 3 Inhaler 1    Probiotic Product (PROBIOTIC PO) Take by mouth        Current Facility-Administered Medications   Medication Dose Route Frequency Provider Last Rate Last Admin    lidocaine-EPINEPHrine 1 %-1:208618 injection 3 mL  3 mL IntraDERmal Once FLORENTINO Donahue             OBJECTIVE:    Physical Examination:  Constitutional: 59 y.o. female who is alert, cooperative and in no apparent distress. The patient looks well and is accompanied by her  and daughter. Inspection of the radiation treatment field shows a faint uniform area of erythema about the size of a palm in the lateral aspect of the right breast, and mottling erythema accentuating skin pores in the right breast upper inner quadrant contiguous with the right supraclav and also present in the right upper back corresponding to the radiation treatment fields. There is no vesicles, bullae, moist desquamation, or evidence of infection. ASSESSMENT/PLAN:   Patient is doing well, tolerating radiation treatment, with slowly improving pruritus on 3 times per day of hydrocortisone cream. I advised her to increase that to 4 times a day. RT is to continue as planned.     Lyman Angelucci, MD PhD  Radiation Oncologist, [facility] Radiation Oncology

## 2021-11-02 NOTE — PROGRESS NOTES
Yahir HENRY Ayleen  11/2/2021  Wt Readings from Last 3 Encounters:   10/27/21 163 lb (73.9 kg)   10/19/21 163 lb (73.9 kg)   10/07/21 166 lb (75.3 kg)     There is no height or weight on file to calculate BMI. Treatment Area: R breast     Patient was seen today for weekly visit. Comfort Alteration  KPS:90 %  Fatigue: Moderate    Nutritional Alteration  Anorexia: No   Nausea: No   Vomiting: No     Skin Alteration   Sensation:Itchy, sore    Radiation Dermatitis:  Yes- redness, rash     Mucous Membrane Alteration  Drainage: No  Lymphedema: No    Emotional  Coping: effective    Sexuality Alteration  absent, NA     Injury, potential bleeding or infection: NA         Lab Results   Component Value Date    WBC 8.4 08/17/2021     08/17/2021         LMP  (LMP Unknown)   BP within normal range? yes      Assessment/Plan: Pt has completed 24/28 fractions. Pt is tolerating fairly well.    Dax Adam RN

## 2021-11-03 ENCOUNTER — HOSPITAL ENCOUNTER (OUTPATIENT)
Dept: RADIATION ONCOLOGY | Age: 61
Discharge: HOME | End: 2021-11-03
Attending: RADIOLOGY
Payer: COMMERCIAL

## 2021-11-03 PROCEDURE — 77427 RADIATION TX MANAGEMENT X5: CPT | Performed by: RADIOLOGY

## 2021-11-03 PROCEDURE — 77412 RADIATION TX DELIVERY LVL 3: CPT | Performed by: RADIOLOGY

## 2021-11-03 PROCEDURE — 77336 RADIATION PHYSICS CONSULT: CPT | Performed by: RADIOLOGY

## 2021-11-04 ENCOUNTER — HOSPITAL ENCOUNTER (OUTPATIENT)
Dept: RADIATION ONCOLOGY | Age: 61
Discharge: HOME | End: 2021-11-04
Attending: RADIOLOGY
Payer: COMMERCIAL

## 2021-11-04 ENCOUNTER — HOSPITAL ENCOUNTER (OUTPATIENT)
Dept: MAMMOGRAPHY | Age: 61
Discharge: HOME | End: 2021-11-06
Payer: COMMERCIAL

## 2021-11-04 DIAGNOSIS — Z17.0 MALIGNANT NEOPLASM OF LOWER-OUTER QUADRANT OF RIGHT BREAST OF FEMALE, ESTROGEN RECEPTOR POSITIVE (HCC): ICD-10-CM

## 2021-11-04 DIAGNOSIS — Z79.811 USE OF AROMATASE INHIBITORS: ICD-10-CM

## 2021-11-04 DIAGNOSIS — C50.511 MALIGNANT NEOPLASM OF LOWER-OUTER QUADRANT OF RIGHT BREAST OF FEMALE, ESTROGEN RECEPTOR POSITIVE (HCC): ICD-10-CM

## 2021-11-04 PROCEDURE — 77080 DXA BONE DENSITY AXIAL: CPT

## 2021-11-04 PROCEDURE — 77417 THER RADIOLOGY PORT IMAGE(S): CPT | Performed by: RADIOLOGY

## 2021-11-04 PROCEDURE — 77412 RADIATION TX DELIVERY LVL 3: CPT | Performed by: RADIOLOGY

## 2021-11-05 ENCOUNTER — HOSPITAL ENCOUNTER (OUTPATIENT)
Dept: RADIATION ONCOLOGY | Age: 61
Discharge: HOME | End: 2021-11-05
Attending: RADIOLOGY
Payer: COMMERCIAL

## 2021-11-05 PROCEDURE — 77412 RADIATION TX DELIVERY LVL 3: CPT | Performed by: RADIOLOGY

## 2021-11-08 ENCOUNTER — HOSPITAL ENCOUNTER (OUTPATIENT)
Dept: RADIATION ONCOLOGY | Age: 61
Discharge: HOME | End: 2021-11-08
Attending: RADIOLOGY
Payer: COMMERCIAL

## 2021-11-08 PROCEDURE — 77412 RADIATION TX DELIVERY LVL 3: CPT | Performed by: RADIOLOGY

## 2021-11-08 NOTE — PROGRESS NOTES
Gallo Portal Ayleen  11/8/2021  11:50 AM          Current Outpatient Medications   Medication Sig Dispense Refill    silver sulfADIAZINE (SILVADENE) 1 % cream Apply topically 3 times daily. 50 g 3    ondansetron (ZOFRAN) 4 MG tablet Take 1 tablet by mouth daily as needed for Nausea or Vomiting 12 tablet 1    allopurinol (ZYLOPRIM) 300 MG tablet Take 1/2 (one-half) tablet by mouth once daily 45 tablet 1    metFORMIN (GLUCOPHAGE) 500 MG tablet Take 1 tablet by mouth 2 times daily (with meals) 180 tablet 1    levothyroxine (SYNTHROID) 50 MCG tablet Take 1 tablet by mouth Daily 90 tablet 1    lansoprazole (PREVACID) 30 MG delayed release capsule Take 1 capsule by mouth daily 90 capsule 1    albuterol sulfate HFA (VENTOLIN HFA) 108 (90 Base) MCG/ACT inhaler Inhale 2 puffs into the lungs every 6 hours as needed for Wheezing or Shortness of Breath 3 Inhaler 1    Probiotic Product (PROBIOTIC PO) Take by mouth        Current Facility-Administered Medications   Medication Dose Route Frequency Provider Last Rate Last Admin    lidocaine-EPINEPHrine 1 %-1:312331 injection 3 mL  3 mL IntraDERmal Once [facility]              This is an up-to-date medication list.    Please take this list to your next care provider, and discard any previous medication lists.

## 2021-11-09 ENCOUNTER — HOSPITAL ENCOUNTER (OUTPATIENT)
Dept: INFUSION THERAPY | Age: 61
Discharge: HOME | End: 2021-11-09
Payer: COMMERCIAL

## 2021-11-09 ENCOUNTER — OFFICE VISIT (OUTPATIENT)
Dept: ONCOLOGY | Age: 61
End: 2021-11-09
Payer: COMMERCIAL

## 2021-11-09 ENCOUNTER — TELEPHONE (OUTPATIENT)
Dept: CASE MANAGEMENT | Age: 61
End: 2021-11-09

## 2021-11-09 ENCOUNTER — TELEPHONE (OUTPATIENT)
Dept: INFUSION THERAPY | Age: 61
End: 2021-11-09

## 2021-11-09 VITALS
SYSTOLIC BLOOD PRESSURE: 140 MMHG | WEIGHT: 165.5 LBS | BODY MASS INDEX: 25.08 KG/M2 | DIASTOLIC BLOOD PRESSURE: 77 MMHG | HEIGHT: 68 IN | HEART RATE: 74 BPM | OXYGEN SATURATION: 98 % | TEMPERATURE: 97.5 F

## 2021-11-09 DIAGNOSIS — Z85.3 PERSONAL HISTORY OF BREAST CANCER: Primary | ICD-10-CM

## 2021-11-09 PROCEDURE — 99212 OFFICE O/P EST SF 10 MIN: CPT

## 2021-11-09 PROCEDURE — 99214 OFFICE O/P EST MOD 30 MIN: CPT | Performed by: INTERNAL MEDICINE

## 2021-11-09 RX ORDER — ANASTROZOLE 1 MG/1
1 TABLET ORAL DAILY
Qty: 30 TABLET | Refills: 0 | Status: SHIPPED
Start: 2021-11-09 | End: 2021-12-10 | Stop reason: SDUPTHER

## 2021-11-09 NOTE — TELEPHONE ENCOUNTER
Met with patient and family during her follow up appointment with Dr. Tri Robles after radiation therapy completion yesterday at [facility]. Patient is familiar me from her treatments at the [facility]. She states that she recently lost her job and will be losing her medical coverage. She met with Adriana Medina, financial navigator, yesterday for assistance. Dr. Tri Robles ordered Arimidex daily for 30 days prior to one month follow up appointment for evaluation. Smith Lofton available to assist patient with free drug program for the next prescription (90 days with 3 RFs). Provided patient with written GoodRx coupon for Arimidex 1mg daily Disp:30 for [employer] 50 for $16.92 per her request and printed copy of the Arimidex prescription. Patient contacted her [address] and instructed them not to fill the prescription. Answered numerous questions regarding the medication, follow up schedule, and medication side effects to her apparent satisfaction. Provided written literature from [employer]: Hormone Therapy for Breast Cancer and [facility]:  Arimidex for her to review. Provided support and encouragement. Declines any further needs for assistance from [facility]. Patient appreciative of visit and information. Will provide Survivorship Care Plan and Treatment Summary at her future appointment at the [facility]. Tanya Kumari, DYLANW, RN, OCN  Oncology Nurse Navigator

## 2021-11-09 NOTE — PROGRESS NOTES
Department of [place] [id number]  Attending Clinic Note    Reason for Visit: Follow-up on a patient with Right Breast Cancer    PCP:  Nick Hines DO    History of Present Illness: The lesion was located in the 8 o'clock position of the right breast    Breast cancer risk factors include mom breast cancer age 52, paternal aunt breast cancer, 48 paternal gm breast cancer age 48    Bilateral Diagnostic Mammogram/Right Breast U/S on 06/16/2021:  Large irregular hypoechoic solid mass seen on ultrasound and mammogram at about the 8 to 9 o'clock position measuring 2.4 x 1.4 x 1.4 cm.      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 Comment: The carcinoma displays highly infiltrative architectural features including single file and single cell patterns of invasion. Strong membranous E-cadherin expression is diffusely present in association with the invasive carcinoma, supportive of ductal origin. No in situ component is identified in the tissue sample. [place] histologic score for the invasive carcinoma corresponds to a tubule score 3, nuclear score 3, mitotic score 1, total score 7, grade 2. Intradepartmental consultation is obtained. Breast Cancer Marker Studies:   Estrogen Receptors (ER):90%   Progesterone Receptors (NC): 30%   Her-2/stone (c-erb B-2) protein expression: Negative/0     CXR 07/06/2021:  No acute process    MRI Bilateral Breast 07/08/2021:  Irregular mass in the right breast 8 o'clock measuring up to 2.4 cm in size is consistent with biopsy proven neoplasm.  Focal, heterogeneous non mass enhancement along the lateral aspect of the mass may be due to recent biopsy vs satellite lesion.   No LN  No evidence of neoplasm in the left breast    Right oncoplastic breast reduction, matching left breast reduction and excision squamous cell carcinoma, right mid chest with Dr Stanislav Cotton and right breast needle localized lumpectomy, blue dye injection, right axillary sentinel node excision, possible right axillary dissection with Dr Donovan Colindres on August 17, 2021  A.  Left breast, reduction mammoplasty:   - Skin and benign breast tissue (288.8 g) showing stromal fibrosis/hyalinosis. Maurice Khan, lesion of mid chest, excision:   - Focal residual squamous cell carcinoma in situ, margins free of tumor;   - Actinic keratosis;   - Dermal scar with chronic inflammation, consistent with changes of previous biopsy site. C.  [place] lymph node #1, biopsy:    - Two of two (2/2) nodes positive for metastatic carcinoma (micrometastasis), see comment. D.  [place] lymph node #2, biopsy:   - Benign node (1) showing fatty replacement. E.  [place] lymph node #3, biopsy:   - Benign node (1) showing fatty replacement. F.  Right breast, central, lumpectomy with needle localization:   - Invasive carcinoma of no special type (ductal), grade 3;   - Changes of previous biopsy site, see comment. G.  [place] lymph node #4, biopsy:   - Benign node showing prominent fatty replacement. H.  Right breast, reduction mammoplasty:   - Skin and benign breast tissue (79.6 g) showing stromal fibrosis/hyalinosis. Comment:A few small foci of metastatic carcinoma (</= 2 mm/each metastasis) in two sentinel lymph nodes in specimen C are noted histologically and further confirmed by immunostaining for pankeratin and GATA3. Comparing with the patient's previous core needle biopsy specimen from the same tumor on 07/16/2021 (see report DRK-), the tumor seen here is much more mitotic active (focally, 3-4 mitotic figures/hpf). Therefore, the provisional Benedicto score of this tumor is updated to 3+3+3=9 (grade 3) from originally reported 3+3+1=7 (grade 2).      CANCER CASE SUMMARY   Procedure: Excision   Specimen Laterality: Right   TUMOR   Tumor Site: Central palpitations. GASTROINTESTINAL: No nausea/vomiting, abdominal pain  GENITOURINARY: No dysuria, urinary frequency, hematuria. NEURO: No syncope, presyncope, headache. Remainder:  ROS NEGATIVE    Past Medical History:      Diagnosis Date    [facility]     Diabetes mellitus ([facility]     Diverticulitis     Frequent UTI     Hiatal hernia     Hypertension     Kidney stones     PONV (postoperative nausea and vomiting)     Thyroid disease      Medications:  Reviewed and reconciled. Allergies: Allergies   Allergen Reactions    Flagyl [Metronidazole] Rash    Omeprazole Diarrhea and Nausea And Vomiting     As well as dizziness    Vicodin [Hydrocodone-Acetaminophen] Nausea And Vomiting     Physical Exam:  BP (!) 140/77   Pulse 74   Temp 97.5 °F (36.4 °C)   Ht 5' 8\" (1.727 m)   Wt 165 lb 8 oz (75.1 kg)   LMP  (LMP Unknown)   SpO2 98%   BMI 25.16 kg/m²   GENERAL: Alert, oriented x 3, not in acute distress. NECK: Radiation dermatitis  EXTREMITIES: Without clubbing, cyanosis, or edema. NEUROLOGIC: No focal deficits. ECOG1    Impression/Plan:  60 y/o female who underwent Right oncoplastic breast reduction, matching left breast reduction and excision squamous cell carcinoma, right mid chest with Dr Alex Santiago and right breast needle localized lumpectomy, blue dye injection, right axillary sentinel node excision, possible right axillary dissection with Dr Jorge Yao on August 17, 2021  A.  Left breast, reduction mammoplasty:   - Skin and benign breast tissue (288.8 g) showing stromal fibrosis/hyalinosis. Henry Paz, lesion of mid chest, excision:   - Focal residual squamous cell carcinoma in situ, margins free of tumor;   - Actinic keratosis;   - Dermal scar with chronic inflammation, consistent with changes of previous biopsy site. C.  [place] lymph node #1, biopsy:    - Two of two (2/2) nodes positive for metastatic carcinoma (micrometastasis), see comment.    D.  [place] lymph node #2, biopsy:   - Benign node (1) showing fatty replacement. E.  [place] lymph node #3, biopsy:   - Benign node (1) showing fatty replacement. F.  Right breast, central, lumpectomy with needle localization:   - Invasive carcinoma of no special type (ductal), grade 3;   - Changes of previous biopsy site, see comment. G.  [place] lymph node #4, biopsy:   - Benign node showing prominent fatty replacement. H.  Right breast, reduction mammoplasty:   - Skin and benign breast tissue (79.6 g) showing stromal fibrosis/hyalinosis. Comment:A few small foci of metastatic carcinoma (</= 2 mm/each metastasis) in two sentinel lymph nodes in specimen C are noted histologically and further confirmed by immunostaining for pankeratin and GATA3. Comparing with the patient's previous core needle biopsy specimen from the same tumor on 07/16/2021 (see report PVW-), the tumor seen here is much more mitotic active (focally, 3-4 mitotic figures/hpf). Therefore, the provisional [place] score of this tumor is updated to 3+3+3=9 (grade 3) from originally reported 3+3+1=7 (grade 2). CANCER CASE SUMMARY   Procedure: Excision   Specimen Laterality: Right   TUMOR   Tumor Site: Central   Histologic Type:  Invasive carcinoma of no special type   Histologic Grade: [place] Histologic Score   Glandular/tubular differentiation: Score 3   Nuclear pleomorphism: Score 3   Mitotic rate: Score 3   Overall grade: Grade 3 (scores of 9)   Tumor Size: 2.2 x 2.2 x 1.4 cm   Tumor Focality: Single focus of invasive carcinoma   Ductal Carcinoma In Situ (DCIS): Not identified   Lobular Carcinoma In Situ (LCIS): Not identified   Lymph-Vascular Invasion: Not identified   Dermal lymphovascular invasion: No skin present   Microcalcifications: Not identified   Treatment effect: No known presurgical therapy   MARGINS   All margins negative for invasive carcinoma   Distance from invasive carcinoma to closest margin: 1 mm   Closest margins to invasive carcinoma: medial (slide F1) and posterior (slide F2)   REGIONAL LYMPH NODES   Tumor present in regional lymph nodes   Number of lymph nodes with macrometastasis (>2 mm): 0   Number of lymph nodes with micrometasteses (0.2-2 mm): 2   Number of lymph nodes with isolated tumor cells: 0   Size of largest jarett metastasis: 2 mm   Extranodal extension: Not identified   Total number of lymph nodes examined: 4   Number of sentinel nodes examined: 4   DISTANT METASTASIS: Not applicable   Pathologic Staging (pTNM, AJCC 8th Edition)   pT2 (sn)pN1mi     Breast Cancer Marker Studies:   Estrogen Receptors (ER):90%   Progesterone Receptors (VT): 30%   Her-2/stone (c-erb B-2) protein expression: Negative/0     Oncotype Dx  Recurrence Score Result-20  No indication for adjuvant chemotherapy  Recommended adjuvant RT followed by adjuvant endocrine therapy     RT was completed on 11/08/2021. DEXA scan 11/04/2021: Osteopenia in LS; Normal in hips. Imaging reviewed. Arimidex 1 mg po daily will be started on 11/09/2021. Side effects reviewed. She agreed to proceed. Ca.VitD while on Arimidex. RTC 1 month for Arimidex toxicity check. Genetics: My-Risk Negative. No clinically significant mutation identified. No VUS identified.      Ye Robles MD   11/9/2021

## 2021-11-12 ENCOUNTER — TELEPHONE (OUTPATIENT)
Dept: ONCOLOGY | Age: 61
End: 2021-11-12

## 2021-11-12 NOTE — TELEPHONE ENCOUNTER
Attempted to contact pt as follow up re: financial concerns. Message left requesting callback.     Jamil lOvera MSW, ÁLVARO-S  Oncology Social Worker

## 2021-11-15 ENCOUNTER — TELEPHONE (OUTPATIENT)
Dept: ONCOLOGY | Age: 61
End: 2021-11-15

## 2021-11-15 ENCOUNTER — FOLLOWUP TELEPHONE ENCOUNTER (OUTPATIENT)
Dept: INFUSION THERAPY | Age: 61
End: 2021-11-15

## 2021-11-15 NOTE — TELEPHONE ENCOUNTER
Attempted to return call to pt; message left requesting callback.     BERNICE Montiel, ÁLVARO-S  Oncology Social Worker

## 2021-11-15 NOTE — TELEPHONE ENCOUNTER
I received patients social security award letter that I scanned in to the Financial assistance team to add to patients accounts for processing.  Electronically signed by Christelle Bruce on 11/15/2021 at 11:30 AM

## 2021-12-02 ENCOUNTER — TELEPHONE (OUTPATIENT)
Dept: CASE MANAGEMENT | Age: 61
End: 2021-12-02

## 2021-12-06 ENCOUNTER — HOSPITAL ENCOUNTER (OUTPATIENT)
Dept: INFUSION THERAPY | Age: 61
Discharge: HOME | End: 2021-12-06
Payer: COMMERCIAL

## 2021-12-06 DIAGNOSIS — Z17.0 MALIGNANT NEOPLASM OF RIGHT BREAST IN FEMALE, ESTROGEN RECEPTOR POSITIVE, UNSPECIFIED SITE OF BREAST (HCC): Primary | ICD-10-CM

## 2021-12-06 DIAGNOSIS — C50.911 MALIGNANT NEOPLASM OF RIGHT BREAST IN FEMALE, ESTROGEN RECEPTOR POSITIVE, UNSPECIFIED SITE OF BREAST (HCC): Primary | ICD-10-CM

## 2021-12-06 LAB
ALBUMIN SERPL-MCNC: 4.4 G/DL (ref 3.5–5.2)
ALP BLD-CCNC: 77 U/L (ref 35–104)
ALT SERPL-CCNC: 15 U/L (ref 0–32)
ANION GAP SERPL CALCULATED.3IONS-SCNC: 13 MMOL/L (ref 7–16)
AST SERPL-CCNC: 17 U/L (ref 0–31)
BASOPHILS ABSOLUTE: 0.05 E9/L (ref 0–0.2)
BASOPHILS RELATIVE PERCENT: 0.7 % (ref 0–2)
BILIRUB SERPL-MCNC: 0.3 MG/DL (ref 0–1.2)
BUN BLDV-MCNC: 16 MG/DL (ref 6–23)
CALCIUM SERPL-MCNC: 9.3 MG/DL (ref 8.6–10.2)
CHLORIDE BLD-SCNC: 102 MMOL/L (ref 98–107)
CO2: 23 MMOL/L (ref 22–29)
CREAT SERPL-MCNC: 0.9 MG/DL (ref 0.5–1)
EOSINOPHILS ABSOLUTE: 0.25 E9/L (ref 0.05–0.5)
EOSINOPHILS RELATIVE PERCENT: 3.5 % (ref 0–6)
GFR AFRICAN AMERICAN: >60
GFR NON-AFRICAN AMERICAN: >60 ML/MIN/1.73
GLUCOSE BLD-MCNC: 177 MG/DL (ref 74–99)
HCT VFR BLD CALC: 40.4 % (ref 34–48)
HEMOGLOBIN: 13.7 G/DL (ref 11.5–15.5)
IMMATURE GRANULOCYTES #: 0.01 E9/L
IMMATURE GRANULOCYTES %: 0.1 % (ref 0–5)
LYMPHOCYTES ABSOLUTE: 1.23 E9/L (ref 1.5–4)
LYMPHOCYTES RELATIVE PERCENT: 17.4 % (ref 20–42)
MCH RBC QN AUTO: 30.3 PG (ref 26–35)
MCHC RBC AUTO-ENTMCNC: 33.9 % (ref 32–34.5)
MCV RBC AUTO: 89.4 FL (ref 80–99.9)
MONOCYTES ABSOLUTE: 0.66 E9/L (ref 0.1–0.95)
MONOCYTES RELATIVE PERCENT: 9.3 % (ref 2–12)
NEUTROPHILS ABSOLUTE: 4.88 E9/L (ref 1.8–7.3)
NEUTROPHILS RELATIVE PERCENT: 69 % (ref 43–80)
PDW BLD-RTO: 11.9 FL (ref 11.5–15)
PLATELET # BLD: 290 E9/L (ref 130–450)
PMV BLD AUTO: 9.4 FL (ref 7–12)
POTASSIUM SERPL-SCNC: 4.3 MMOL/L (ref 3.5–5)
RBC # BLD: 4.52 E12/L (ref 3.5–5.5)
SODIUM BLD-SCNC: 138 MMOL/L (ref 132–146)
TOTAL PROTEIN: 7.1 G/DL (ref 6.4–8.3)
WBC # BLD: 7.1 E9/L (ref 4.5–11.5)

## 2021-12-06 PROCEDURE — 85025 COMPLETE CBC W/AUTO DIFF WBC: CPT

## 2021-12-06 PROCEDURE — 36415 COLL VENOUS BLD VENIPUNCTURE: CPT

## 2021-12-06 PROCEDURE — 80053 COMPREHEN METABOLIC PANEL: CPT

## 2021-12-10 ENCOUNTER — OFFICE VISIT (OUTPATIENT)
Dept: ONCOLOGY | Age: 61
End: 2021-12-10
Payer: COMMERCIAL

## 2021-12-10 ENCOUNTER — HOSPITAL ENCOUNTER (OUTPATIENT)
Dept: RADIATION ONCOLOGY | Age: 61
Discharge: HOME | End: 2021-12-10
Attending: RADIOLOGY
Payer: COMMERCIAL

## 2021-12-10 ENCOUNTER — TELEPHONE (OUTPATIENT)
Dept: CASE MANAGEMENT | Age: 61
End: 2021-12-10

## 2021-12-10 VITALS
DIASTOLIC BLOOD PRESSURE: 79 MMHG | BODY MASS INDEX: 25.39 KG/M2 | HEIGHT: 68 IN | WEIGHT: 167.5 LBS | SYSTOLIC BLOOD PRESSURE: 128 MMHG | OXYGEN SATURATION: 97 % | TEMPERATURE: 95.7 F | HEART RATE: 74 BPM

## 2021-12-10 DIAGNOSIS — C50.911 MALIGNANT NEOPLASM OF RIGHT BREAST IN FEMALE, ESTROGEN RECEPTOR POSITIVE, UNSPECIFIED SITE OF BREAST (HCC): Primary | ICD-10-CM

## 2021-12-10 DIAGNOSIS — Z17.0 MALIGNANT NEOPLASM OF RIGHT BREAST IN FEMALE, ESTROGEN RECEPTOR POSITIVE, UNSPECIFIED SITE OF BREAST (HCC): Primary | ICD-10-CM

## 2021-12-10 DIAGNOSIS — Z85.3 PERSONAL HISTORY OF BREAST CANCER: Primary | ICD-10-CM

## 2021-12-10 PROCEDURE — 99214 OFFICE O/P EST MOD 30 MIN: CPT | Performed by: INTERNAL MEDICINE

## 2021-12-10 PROCEDURE — 99999 PR OFFICE/OUTPT VISIT,PROCEDURE ONLY: CPT | Performed by: NURSE PRACTITIONER

## 2021-12-10 PROCEDURE — 99212 OFFICE O/P EST SF 10 MIN: CPT

## 2021-12-10 RX ORDER — ANASTROZOLE 1 MG/1
1 TABLET ORAL DAILY
Qty: 90 TABLET | Refills: 1 | Status: SHIPPED
Start: 2021-12-10 | End: 2022-03-04 | Stop reason: SDUPTHER

## 2021-12-10 NOTE — PROGRESS NOTES
Department of [place] [id number]  Attending Clinic Note    Reason for Visit: Follow-up on a patient with Right Breast Cancer    PCP:  Jennifer Caceres DO    History of Present Illness: The lesion was located in the 8 o'clock position of the right breast    Breast cancer risk factors include mom breast cancer age 52, paternal aunt breast cancer, 48 paternal gm breast cancer age 48    Bilateral Diagnostic Mammogram/Right Breast U/S on 06/16/2021:  Large irregular hypoechoic solid mass seen on ultrasound and mammogram at about the 8 to 9 o'clock position measuring 2.4 x 1.4 x 1.4 cm.      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 Comment: The carcinoma displays highly infiltrative architectural features including single file and single cell patterns of invasion. Strong membranous E-cadherin expression is diffusely present in association with the invasive carcinoma, supportive of ductal origin. No in situ component is identified in the tissue sample. [place] histologic score for the invasive carcinoma corresponds to a tubule score 3, nuclear score 3, mitotic score 1, total score 7, grade 2. Intradepartmental consultation is obtained. Breast Cancer Marker Studies:   Estrogen Receptors (ER):90%   Progesterone Receptors (WA): 30%   Her-2/stone (c-erb B-2) protein expression: Negative/0     CXR 07/06/2021:  No acute process    MRI Bilateral Breast 07/08/2021:  Irregular mass in the right breast 8 o'clock measuring up to 2.4 cm in size is consistent with biopsy proven neoplasm.  Focal, heterogeneous non mass enhancement along the lateral aspect of the mass may be due to recent biopsy vs satellite lesion.   No LN  No evidence of neoplasm in the left breast    Right oncoplastic breast reduction, matching left breast reduction and excision squamous cell carcinoma, right mid chest with Dr Jose Alfredo Loyola and right breast needle localized lumpectomy, blue dye injection, right axillary sentinel node excision, possible right axillary dissection with Dr Montez Torres on August 17, 2021  A.  Left breast, reduction mammoplasty:   - Skin and benign breast tissue (288.8 g) showing stromal fibrosis/hyalinosis. Donejamala Pun, lesion of mid chest, excision:   - Focal residual squamous cell carcinoma in situ, margins free of tumor;   - Actinic keratosis;   - Dermal scar with chronic inflammation, consistent with changes of previous biopsy site. C.  [place] lymph node #1, biopsy:    - Two of two (2/2) nodes positive for metastatic carcinoma (micrometastasis), see comment. D.  [place] lymph node #2, biopsy:   - Benign node (1) showing fatty replacement. E.  [place] lymph node #3, biopsy:   - Benign node (1) showing fatty replacement. F.  Right breast, central, lumpectomy with needle localization:   - Invasive carcinoma of no special type (ductal), grade 3;   - Changes of previous biopsy site, see comment. G.  [place] lymph node #4, biopsy:   - Benign node showing prominent fatty replacement. H.  Right breast, reduction mammoplasty:   - Skin and benign breast tissue (79.6 g) showing stromal fibrosis/hyalinosis. Comment:A few small foci of metastatic carcinoma (</= 2 mm/each metastasis) in two sentinel lymph nodes in specimen C are noted histologically and further confirmed by immunostaining for pankeratin and GATA3. Comparing with the patient's previous core needle biopsy specimen from the same tumor on 07/16/2021 (see report YUC-), the tumor seen here is much more mitotic active (focally, 3-4 mitotic figures/hpf). Therefore, the provisional Benedicto score of this tumor is updated to 3+3+3=9 (grade 3) from originally reported 3+3+1=7 (grade 2).      CANCER CASE SUMMARY   Procedure: Excision   Specimen Laterality: Right   TUMOR   Tumor Site: Central Histologic Type: Invasive carcinoma of no special type   Histologic Grade: Benedicto Histologic Score   Glandular/tubular differentiation: Score 3   Nuclear pleomorphism: Score 3   Mitotic rate: Score 3   Overall grade: Grade 3 (scores of 9)   Tumor Size: 2.2 x 2.2 x 1.4 cm   Tumor Focality: Single focus of invasive carcinoma   Ductal Carcinoma In Situ (DCIS): Not identified   Lobular Carcinoma In Situ (LCIS): Not identified   Lymph-Vascular Invasion: Not identified   Dermal lymphovascular invasion: No skin present   Microcalcifications: Not identified   Treatment effect: No known presurgical therapy   MARGINS   All margins negative for invasive carcinoma   Distance from invasive carcinoma to closest margin: 1 mm   Closest margins to invasive carcinoma: medial (slide F1) and posterior (slide F2)   REGIONAL LYMPH NODES   Tumor present in regional lymph nodes   Number of lymph nodes with macrometastasis (>2 mm): 0   Number of lymph nodes with micrometasteses (0.2-2 mm): 2   Number of lymph nodes with isolated tumor cells: 0   Size of largest jarett metastasis: 2 mm   Extranodal extension: Not identified   Total number of lymph nodes examined: 4   Number of sentinel nodes examined: 4   DISTANT METASTASIS: Not applicable   Pathologic Staging (pTNM, AJCC 8th Edition)   pT2 (sn)pN1mi     Breast Cancer Marker Studies:   Estrogen Receptors (ER):90%   Progesterone Receptors (LA): 30%   Her-2/stone (c-erb B-2) protein expression: Negative/0     Oncotype Dx  Recurrence Score Result-20  No indication for adjuvant chemotherapy  Recommended adjuvant RT followed by adjuvant endocrine therapy   RT was completed 11/08/2021. Arimidex 1 mg po daily was started on 11/09/2021 with fair tolerance so far. Mild hot flashes mood changes not affecting quality of life. No arthralgias myalgias. Review of Systems;  CONSTITUTIONAL: No fever. Mild hot flashes. Fair appetite and energy level. ENMT: Eyes: No diplopia; Nose: No epistaxis. Mouth: No sore throat. RESPIRATORY: No hemoptysis, shortness of breath, cough. CARDIOVASCULAR: No chest pain, palpitations. GASTROINTESTINAL: No nausea/vomiting, abdominal pain  GENITOURINARY: No dysuria, urinary frequency, hematuria. NEURO: No syncope, presyncope, headache. Psych: mild mood changes  Remainder:  ROS NEGATIVE    Past Medical History:      Diagnosis Date    Cancer ([facility] [address] [facility]     [facility]     Diverticulitis     Frequent UTI     Hiatal hernia     Hypertension     Kidney stones     PONV (postoperative nausea and vomiting)     Thyroid disease      Medications:  Reviewed and reconciled. Allergies: Allergies   Allergen Reactions    Flagyl [Metronidazole] Rash    Omeprazole Diarrhea and Nausea And Vomiting     As well as dizziness    Vicodin [Hydrocodone-Acetaminophen] Nausea And Vomiting     Physical Exam:  /79   Pulse 74   Temp 95.7 °F (35.4 °C)   Ht 5' 8\" (1.727 m)   Wt 167 lb 8 oz (76 kg)   LMP  (LMP Unknown)   SpO2 97%   BMI 25.47 kg/m²   GENERAL: Alert, oriented x 3, not in acute distress. EXTREMITIES: Without clubbing, cyanosis, or edema. NEUROLOGIC: No focal deficits.    ECOG1    Lab Results   Component Value Date    WBC 7.1 12/06/2021    HGB 13.7 12/06/2021    HCT 40.4 12/06/2021    MCV 89.4 12/06/2021     12/06/2021     Lab Results   Component Value Date     12/06/2021    K 4.3 12/06/2021     12/06/2021    CO2 23 12/06/2021    BUN 16 12/06/2021    CREATININE 0.9 12/06/2021    GLUCOSE 177 (H) 12/06/2021    CALCIUM 9.3 12/06/2021    PROT 7.1 12/06/2021    LABALBU 4.4 12/06/2021    BILITOT 0.3 12/06/2021    ALKPHOS 77 12/06/2021    AST 17 12/06/2021    ALT 15 12/06/2021    LABGLOM >60 12/06/2021    GFRAA >60 12/06/2021     Impression/Plan:  60 y/o female who underwent Right oncoplastic breast reduction, matching left breast reduction and excision squamous cell carcinoma, right mid chest with Dr Patsy Libman and right breast needle localized lumpectomy, blue dye injection, right axillary sentinel node excision, possible right axillary dissection with Dr Marilyn Sharma on August 17, 2021  A.  Left breast, reduction mammoplasty:   - Skin and benign breast tissue (288.8 g) showing stromal fibrosis/hyalinosis. [place] Glaze, lesion of mid chest, excision:   - Focal residual squamous cell carcinoma in situ, margins free of tumor;   - Actinic keratosis;   - Dermal scar with chronic inflammation, consistent with changes of previous biopsy site. C.  [place] lymph node #1, biopsy:    - Two of two (2/2) nodes positive for metastatic carcinoma (micrometastasis), see comment. D.  [place] lymph node #2, biopsy:   - Benign node (1) showing fatty replacement. E.  [place] lymph node #3, biopsy:   - Benign node (1) showing fatty replacement. F.  Right breast, central, lumpectomy with needle localization:   - Invasive carcinoma of no special type (ductal), grade 3;   - Changes of previous biopsy site, see comment. G.  [place] lymph node #4, biopsy:   - Benign node showing prominent fatty replacement. H.  Right breast, reduction mammoplasty:   - Skin and benign breast tissue (79.6 g) showing stromal fibrosis/hyalinosis. Comment:A few small foci of metastatic carcinoma (</= 2 mm/each metastasis) in two sentinel lymph nodes in specimen C are noted histologically and further confirmed by immunostaining for pankeratin and GATA3. Comparing with the patient's previous core needle biopsy specimen from the same tumor on 07/16/2021 (see report MKA-), the tumor seen here is much more mitotic active (focally, 3-4 mitotic figures/hpf). Therefore, the provisional Benedicto score of this tumor is updated to 3+3+3=9 (grade 3) from originally reported 3+3+1=7 (grade 2). CANCER CASE SUMMARY   Procedure: Excision   Specimen Laterality: Right   TUMOR   Tumor Site: Central   Histologic Type:  Invasive carcinoma of no special type   Histologic Grade: Darren Xavier Histologic Score   Glandular/tubular differentiation: Score 3   Nuclear pleomorphism: Score 3   Mitotic rate: Score 3   Overall grade: Grade 3 (scores of 9)   Tumor Size: 2.2 x 2.2 x 1.4 cm   Tumor Focality: Single focus of invasive carcinoma   Ductal Carcinoma In Situ (DCIS): Not identified   Lobular Carcinoma In Situ (LCIS): Not identified   Lymph-Vascular Invasion: Not identified   Dermal lymphovascular invasion: No skin present   Microcalcifications: Not identified   Treatment effect: No known presurgical therapy   MARGINS   All margins negative for invasive carcinoma   Distance from invasive carcinoma to closest margin: 1 mm   Closest margins to invasive carcinoma: medial (slide F1) and posterior (slide F2)   REGIONAL LYMPH NODES   Tumor present in regional lymph nodes   Number of lymph nodes with macrometastasis (>2 mm): 0   Number of lymph nodes with micrometasteses (0.2-2 mm): 2   Number of lymph nodes with isolated tumor cells: 0   Size of largest jarett metastasis: 2 mm   Extranodal extension: Not identified   Total number of lymph nodes examined: 4   Number of sentinel nodes examined: 4   DISTANT METASTASIS: Not applicable   Pathologic Staging (pTNM, AJCC 8th Edition)   pT2 (sn)pN1mi     Breast Cancer Marker Studies:   Estrogen Receptors (ER):90%   Progesterone Receptors ([place]): 30%   Her-2/stone (c-erb B-2) protein expression: Negative/0     Oncotype Dx  Recurrence Score Result-20  No indication for adjuvant chemotherapy  Recommended adjuvant RT followed by adjuvant endocrine therapy     RT was completed on 11/08/2021. DEXA scan 11/04/2021: Osteopenia in LS; Normal in hips. Arimidex 1 mg po daily was started on 11/09/2021 with fair tolerance so far. Mild hot flashes mood changes not affecting quality of life. No arthralgias myalgias. Labs reviewed. If worsening hot flashes mood changes can consider Effexor  Continue Arimidex, Ca. VitD    RTC 4 months with labs. Genetics: My-Risk Negative.  No clinically significant mutation identified. No VUS identified.      Nayla Lyons MD   12/10/2021

## 2021-12-10 NOTE — PROGRESS NOTES
Aleksander Abbasi  12/10/2021  10:32 AM      There were no vitals filed for this visit. :    Wt Readings from Last 3 Encounters:   12/10/21 167 lb 8 oz (76 kg)   11/09/21 165 lb 8 oz (75.1 kg)   10/27/21 163 lb (73.9 kg)                Current Outpatient Medications:     anastrozole (ARIMIDEX) 1 MG tablet, Take 1 tablet by mouth daily, Disp: 90 tablet, Rfl: 1    ondansetron (ZOFRAN) 4 MG tablet, Take 1 tablet by mouth daily as needed for Nausea or Vomiting, Disp: 12 tablet, Rfl: 1    allopurinol (ZYLOPRIM) 300 MG tablet, Take 1/2 (one-half) tablet by mouth once daily, Disp: 45 tablet, Rfl: 1    metFORMIN (GLUCOPHAGE) 500 MG tablet, Take 1 tablet by mouth 2 times daily (with meals), Disp: 180 tablet, Rfl: 1    levothyroxine (SYNTHROID) 50 MCG tablet, Take 1 tablet by mouth Daily, Disp: 90 tablet, Rfl: 1    lansoprazole (PREVACID) 30 MG delayed release capsule, Take 1 capsule by mouth daily, Disp: 90 capsule, Rfl: 1    albuterol sulfate HFA (VENTOLIN HFA) 108 (90 Base) MCG/ACT inhaler, Inhale 2 puffs into the lungs every 6 hours as needed for Wheezing or Shortness of Breath, Disp: 3 Inhaler, Rfl: 1    Probiotic Product (PROBIOTIC PO), Take by mouth , Disp: , Rfl:     Current Facility-Administered Medications:     lidocaine-EPINEPHrine 1 %-1:486276 injection 3 mL, 3 mL, IntraDERmal, Once, FLORENTINO Morales      Patient is seen today in follow up for breast cancer. Pt completed RT to R breast on 11/18. Pt states her skin has healed well. Pt follows with Dr. Lilo Hood & takes arimidex. She is tolerating well. FALLS RISK SCREENING ASSESSMENT    Instructions:  Assess the patient and enter the appropriate indicators that are present for fall risk identification. Total the numbers entered and assign a fall risk score from Table 2.  Reassess patient at a minimum every 12 weeks or with status change. Assessment   Date  12/10/2021     1. Mental Ability: confusion/cognitively impaired No - 0       2. Elimination Issues: incontinence, frequency No - 0       3. Ambulatory: use of assistive devices (walker, cane, off-loading devices), attached to equipment (IV pole, oxygen) No - 0     4. Sensory Limitations: dizziness, vertigo, impaired vision No - 0       5. Age Less than 65 years - 0       6. Medication: diuretics, strong analgesics, hypnotics, sedatives, antihypertensive agents   No - 0   7. Falls:  recent history of falls within the last 3 months (not to include slipping or tripping)   No - 0   TOTAL 0    If score of 4 or greater was education given? Yes       TABLE 2   Risk Score Risk Level Plan of Care   0-3 Little or  No Risk 1. Provide assistance as indicated for ambulation activities  2. Reorient confused/cognitively impaired patient  3. Call-light/bell within patient's reach  4. Chair/bed in low position, stretcher/bed with siderails up except when performing patient care activities  5. Educate patient/family/caregiver on falls prevention  6.  Reassess in 12 weeks or with any noted change in patient condition which places them at a risk for a fall   4-6 Moderate Risk 1. Provide assistance as indicated for ambulation activities  2. Reorient confused/cognitively impaired patient  3. Call-light/bell within patient's reach  4. Chair/bed in low position, stretcher/bed with siderails up except when performing patient care activities  5. Educate patient/family/caregiver on falls prevention  6. Falls risk precaution (Yellow sticker Level II) placed on patient chart   7 or   Higher High Risk 1. Place patient in easily observable treatment room  2. Patient attended at all times by family member or staff  3. Provide assistance as indicated for ambulation activities  4. Reorient confused/cognitively impaired patient  5. Call-light/bell within patient's reach  6. Chair/bed in low position, stretcher/bed with siderails up except when performing patient care activities  7.   Educate patient/family/caregiver on falls prevention  8. Falls risk precaution (Yellow sticker Level III) placed on patient chart           MALNUTRITION RISK SCREENING ASSESSMENT    12/10/2021   Patient:  Jose Cruz Whalen  Sex:  female    Instructions:  Assess the patient and enter the appropriate indicators that are present for nutrition risk identification. Total the numbers entered and assign a risk score. Follow the appropriate action for total score listed below. Assessment   Date  12/10/2021     1. Have you lost weight without trying? 0- No     2. Have you been eating poorly because of a decreased appetite? 0- No   3. Do you have a diagnosis of head and neck cancer?       0- No                                                                                    TOTAL 0          Score of 0-1: No action  Score 2 or greater:  · For Non-Diabetic Patient: Recommend adding Ensure Complete 2 x daily and provide patient with Ensure wellness bag with coupons  · For Diabetic Patient: Recommend adding Glucerna Shake 2 x daily and provide patient with Glucerna Wellness bag with coupons  · Route to the dietitian via  Anisha Suazo RN

## 2021-12-10 NOTE — TELEPHONE ENCOUNTER
Patient requested a Good Rx coupon for her new Arimidex prescription that was refilled today. Provided her with a coupon for her requested pharmacy, [facility]. Giselle Denise  RN, ADN, BSE, OCN  Patient Nurse Navigator

## 2021-12-10 NOTE — PROGRESS NOTES
RADIATION ONCOLOGY  4 week follow up         12/10/2021      NAME:  Madeline Abbasi    YOB: 1960    CC: Pt returns today for re-assessment of radiation treatment area. HPI: Radha Denson is a pleasant 59 y.o. female with a diagnosis of right breast cancer (ER/NC+, Her2-), s/p BCS and SLNE with oncoplastic right breast reduction and elective left breast reduction with excision of SCC to right mid chest, s/p adjuvant XRT. The patient underwent a course of radiation therapy to the right breast, which was completed on 11/8/21. She completed 5040 cGy in 28 fractions. States that he skin has healed well since radiation completion. Using moisturizing lotions as needed. Performs monthly self breast exams. Notes \"firmness\" behind her nipple and concerned. States that it has felt that way since surgery. Asking if this is normal. Otherwise no new lumps/ bumps/ discharge from the nipples. Patient follows with Dr Tri Robles for medical oncology. Started on Arimidex and tolerating well. Next appt 4/12/21. Patient follows with NIKKI Rogers. Next appt 2/28/21. Pain: Denies pain at this time. Past medical, surgical, social and family histories reviewed and updated as indicated.     ALLERGIES:  Flagyl [metronidazole], Omeprazole, and Vicodin [hydrocodone-acetaminophen]         Current Outpatient Medications   Medication Sig Dispense Refill    anastrozole (ARIMIDEX) 1 MG tablet Take 1 tablet by mouth daily 90 tablet 1    ondansetron (ZOFRAN) 4 MG tablet Take 1 tablet by mouth daily as needed for Nausea or Vomiting 12 tablet 1    allopurinol (ZYLOPRIM) 300 MG tablet Take 1/2 (one-half) tablet by mouth once daily 45 tablet 1    metFORMIN (GLUCOPHAGE) 500 MG tablet Take 1 tablet by mouth 2 times daily (with meals) 180 tablet 1    levothyroxine (SYNTHROID) 50 MCG tablet Take 1 tablet by mouth Daily 90 tablet 1    lansoprazole (PREVACID) 30 MG delayed release capsule Take 1 capsule by mouth daily 90 capsule 1    albuterol sulfate HFA (VENTOLIN HFA) 108 (90 Base) MCG/ACT inhaler Inhale 2 puffs into the lungs every 6 hours as needed for Wheezing or Shortness of Breath 3 Inhaler 1    Probiotic Product (PROBIOTIC PO) Take by mouth        Current Facility-Administered Medications   Medication Dose Route Frequency Provider Last Rate Last Admin    lidocaine-EPINEPHrine 1 %-1:113787 injection 3 mL  3 mL IntraDERmal Once Nenana Sanam Hughes             Physical Examination:   There were no vitals filed for this visit. Wt Readings from Last 3 Encounters:   12/10/21 167 lb 8 oz (76 kg)   11/09/21 165 lb 8 oz (75.1 kg)   10/27/21 163 lb (73.9 kg)       Alert and fully ambulatory. Pleasant and conversant. Irradiated skin shows mild hyperpigmentation and dryness. Palpable firm mass noted behind right nipple, approximate size of quarter in diameter. HR reg, rhythm reg. Lungs CTA. ASSESSMENT/PLAN:     Right breast cancer (ER/IN+, Her2-), s/p BCS and SLNE with oncoplastic right breast reduction and elective left breast reduction with excision of SCC to right mid chest, s/p adjuvant radiation therapy to the right breast completed on 11/8/21 5040 cGy in 28 fractions. Patient is doing well post-radiation completion. Skin care and sun care reviewed. Signs and symptoms of recurrence reviewed. Encouraged monthly self breast exams. Imaging per med onc/ breast surgery. Palpable mass: Palpable firm mass noted behind right nipple, approximate size of quarter in diameter. Possibly BioZorb vs scar tissue vs recurrence. Ordering US right breast to r/o recurrence. Will ask Cornelio De La Paz RN to coordinate scheduling with [facility]. Will call patient with results, and patient agrees with this plan. Cont to follow with Dr Jem Portillo for medical oncology. Started on Arimidex and tolerating well. Next appt 4/12/21. Cont to follow with JEET Addison-CHARLIE. Next appt 2/28/21.       I discussed follow up plans with Yahir Abbasi. At this time, I will see the patient back in 6 months for a post-radiation completion follow-up visit. Marco Antonio Vargas is to follow up with other providers involved in their care as directed (including but not limited to Medical Oncology, Primary Care, Pulmonary, and Surgery). The patient was given our contact number in the event that if at any time they change their mind and would like to return to the clinic to see either myself or one of the Radiation Oncologists, they can simply call us and we would be happy to see them sooner. Thank you for involving us in the management of this extremely pleasant patient. More than 15 min was in direct contact with pt coordinating/giving care. >50% of the visit was spent in counseling the pt on the following: Follow up care    The nurses notes were reviewed and incorporated into this assessment and plan. Questions answered to apparent satisfaction.       Georgann Spurling, MSN, RN, APRN-CNP  Nurse Practitioner for Radiation Oncology    PHYSICIANS [facility]) [facility]: [phone] (DDM: [phone])  [address] [place] [address]: [phone] (PQO: [phone])  [address] [facility]) [facility]:  [phone] (EUZ:  [phone])

## 2021-12-13 ENCOUNTER — TELEPHONE (OUTPATIENT)
Dept: RADIATION ONCOLOGY | Age: 61
End: 2021-12-13

## 2021-12-13 NOTE — TELEPHONE ENCOUNTER
Left message with [facility] on 12/10 to correlate scheduling of Breast US. Awaiting return call.

## 2021-12-20 ENCOUNTER — HOSPITAL ENCOUNTER (OUTPATIENT)
Dept: GENERAL RADIOLOGY | Age: 61
Discharge: HOME | End: 2021-12-22
Payer: COMMERCIAL

## 2021-12-20 DIAGNOSIS — C50.911 MALIGNANT NEOPLASM OF RIGHT BREAST IN FEMALE, ESTROGEN RECEPTOR POSITIVE, UNSPECIFIED SITE OF BREAST (HCC): ICD-10-CM

## 2021-12-20 DIAGNOSIS — Z17.0 MALIGNANT NEOPLASM OF RIGHT BREAST IN FEMALE, ESTROGEN RECEPTOR POSITIVE, UNSPECIFIED SITE OF BREAST (HCC): ICD-10-CM

## 2021-12-20 PROCEDURE — 76642 ULTRASOUND BREAST LIMITED: CPT

## 2021-12-21 ENCOUNTER — TELEPHONE (OUTPATIENT)
Dept: RADIATION ONCOLOGY | Age: 61
End: 2021-12-21

## 2021-12-21 NOTE — TELEPHONE ENCOUNTER
Left VM for patient to return call to review US Right Breast findings as below:    [address], 12/20/21:  Narrative   FINDINGS:   Targeted right breast ultrasound was performed utilizing high-resolution,   real-time scanning.  There are breast conservation therapy changes in the   right breast retroareolar region.  No suspicious cystic or solid mass   identified.       Impression   Benign findings with no sonographic evidence of malignancy in the right   breast.       Recommendation:       Annual bilateral mammogram is advised with consideration for annual bilateral   screening ultrasound given the patient's breast density.       BIRADS:   BIRADS - CATEGORY 2       Benign Findings.       OVERALL ASSESSMENT - [address] [place] [address], MSN, RN, APRN-CNP  Nurse Practitioner for Radiation Oncology    PHYSICIANS [facility]) [facility]: [phone] (DAM: [phone])  [facility]: [phone] (TL: [phone])  [facility]:  [phone] (SNM:  [phone])

## 2021-12-21 NOTE — TELEPHONE ENCOUNTER
Received VM from patient. States that she is aware of results and that she received them while she was at [facility] yesterday. Pt states that she was very appreciative of phone call to ensure she knew the results. Follow up as scheduled.     Yecenia Thompson, MSN, RN, APRN-CNP  Nurse Practitioner for Radiation Oncology    [address] [place] [address]: [phone] (HOD: [phone])  [address] [place] [address]: [phone] (WVD: [phone])  [facility]:  [phone] (UJC:  [phone])

## 2022-01-25 RX ORDER — VENLAFAXINE HYDROCHLORIDE 37.5 MG/1
37.5 CAPSULE, EXTENDED RELEASE ORAL DAILY
Qty: 30 CAPSULE | Refills: 3 | Status: SHIPPED
Start: 2022-01-25 | End: 2022-02-23 | Stop reason: SINTOL

## 2022-01-26 DIAGNOSIS — E03.9 ACQUIRED HYPOTHYROIDISM: ICD-10-CM

## 2022-01-26 DIAGNOSIS — E11.9 TYPE 2 DIABETES MELLITUS WITHOUT COMPLICATION, WITHOUT LONG-TERM CURRENT USE OF INSULIN (HCC): ICD-10-CM

## 2022-01-26 DIAGNOSIS — K21.9 GASTROESOPHAGEAL REFLUX DISEASE WITHOUT ESOPHAGITIS: ICD-10-CM

## 2022-01-26 RX ORDER — LEVOTHYROXINE SODIUM 0.05 MG/1
50 TABLET ORAL DAILY
Qty: 30 TABLET | Refills: 0 | Status: SHIPPED
Start: 2022-01-26 | End: 2022-03-07 | Stop reason: SDUPTHER

## 2022-01-26 RX ORDER — LANSOPRAZOLE 30 MG/1
30 CAPSULE, DELAYED RELEASE ORAL DAILY
Qty: 30 CAPSULE | Refills: 0 | Status: SHIPPED
Start: 2022-01-26 | End: 2022-03-16 | Stop reason: SDUPTHER

## 2022-01-26 NOTE — TELEPHONE ENCOUNTER
----- Message from Ace Harris sent at 1/25/2022 11:43 AM EST -----  Subject: Refill Request    QUESTIONS  Name of Medication? metFORMIN (GLUCOPHAGE) 500 MG tablet  Patient-reported dosage and instructions? 1 tablet by mouth twice daily   How many days do you have left? 3  Preferred Pharmacy? [address] [facility] [address]  Pharmacy phone number (if available)? [phone]  ---------------------------------------------------------------------------  --------------,  Name of Medication? levothyroxine (SYNTHROID) 50 MCG tablet  Patient-reported dosage and instructions? 1 tablet by mouth daily   How many days do you have left? 3  Preferred Pharmacy? [address] [facility] [address]  Pharmacy phone number (if available)? [phone]  ---------------------------------------------------------------------------  --------------,  Name of Medication? lansoprazole (PREVACID) 30 MG delayed release capsule  Patient-reported dosage and instructions? 1 capsule by mouth daily   How many days do you have left? 3  Preferred Pharmacy? [address] [facility] [address]  Pharmacy phone number (if available)? [phone]  ---------------------------------------------------------------------------  --------------  CALL BACK INFO  What is the best way for the office to contact you? OK to leave message on   voicemail  Preferred Call Back Phone Number?  [phone]

## 2022-02-06 ENCOUNTER — HOSPITAL ENCOUNTER (EMERGENCY)
Age: 62
Discharge: HOME | End: 2022-02-06
Payer: COMMERCIAL

## 2022-02-06 VITALS
RESPIRATION RATE: 16 BRPM | WEIGHT: 167 LBS | OXYGEN SATURATION: 96 % | HEART RATE: 74 BPM | DIASTOLIC BLOOD PRESSURE: 78 MMHG | TEMPERATURE: 96.9 F | BODY MASS INDEX: 25.39 KG/M2 | SYSTOLIC BLOOD PRESSURE: 145 MMHG

## 2022-02-06 DIAGNOSIS — J01.00 ACUTE NON-RECURRENT MAXILLARY SINUSITIS: ICD-10-CM

## 2022-02-06 DIAGNOSIS — J02.9 ACUTE PHARYNGITIS, UNSPECIFIED ETIOLOGY: Primary | ICD-10-CM

## 2022-02-06 LAB
INFLUENZA A BY PCR: NOT DETECTED
INFLUENZA B BY PCR: NOT DETECTED
STREP GRP A PCR: NEGATIVE

## 2022-02-06 PROCEDURE — 87880 STREP A ASSAY W/OPTIC: CPT

## 2022-02-06 PROCEDURE — 99282 EMERGENCY DEPT VISIT SF MDM: CPT

## 2022-02-06 PROCEDURE — 87502 INFLUENZA DNA AMP PROBE: CPT

## 2022-02-06 RX ORDER — IBUPROFEN 600 MG/1
600 TABLET ORAL 3 TIMES DAILY PRN
Qty: 30 TABLET | Refills: 0 | Status: SHIPPED | OUTPATIENT
Start: 2022-02-06 | End: 2022-03-16 | Stop reason: ALTCHOICE

## 2022-02-06 RX ORDER — BENZONATATE 200 MG/1
200 CAPSULE ORAL 3 TIMES DAILY PRN
Qty: 21 CAPSULE | Refills: 0 | Status: SHIPPED | OUTPATIENT
Start: 2022-02-06 | End: 2022-02-13

## 2022-02-06 RX ORDER — AZITHROMYCIN 250 MG/1
TABLET, FILM COATED ORAL
Qty: 1 PACKET | Refills: 0 | Status: SHIPPED | OUTPATIENT
Start: 2022-02-06 | End: 2022-02-16

## 2022-02-06 RX ORDER — GUAIFENESIN 600 MG/1
600 TABLET, EXTENDED RELEASE ORAL 2 TIMES DAILY
Qty: 30 TABLET | Refills: 0 | Status: SHIPPED | OUTPATIENT
Start: 2022-02-06 | End: 2022-02-21

## 2022-02-06 RX ORDER — METHYLPREDNISOLONE 4 MG/1
TABLET ORAL
Qty: 1 KIT | Refills: 0 | Status: SHIPPED | OUTPATIENT
Start: 2022-02-06 | End: 2022-02-12

## 2022-02-06 ASSESSMENT — PAIN DESCRIPTION - DESCRIPTORS: DESCRIPTORS: HEADACHE

## 2022-02-06 ASSESSMENT — PAIN DESCRIPTION - LOCATION: LOCATION: GENERALIZED

## 2022-02-06 ASSESSMENT — PAIN DESCRIPTION - PAIN TYPE: TYPE: ACUTE PAIN

## 2022-02-06 ASSESSMENT — PAIN SCALES - GENERAL: PAINLEVEL_OUTOF10: 4

## 2022-02-06 ASSESSMENT — PAIN DESCRIPTION - FREQUENCY: FREQUENCY: CONTINUOUS

## 2022-02-06 NOTE — ED PROVIDER NOTES
[facility]  Department of Emergency Medicine   ED  Encounter Note  Admit Date/RoomTime: 2022 10:39 AM  ED Room:     NAME: Pablito Olson  : 1960  MRN: [id number]     Chief Complaint:  URI (congested, sore throat, sinus , drainage, not feeling well since thursday)    History of Present Illness       Pablito Olson is a 59 y.o. old female who presents to the emergency department by private vehicle, for fever, chills, nasal congestion, rhinorrhea, sore throat and facial pain, which began 4 day(s) prior to arrival.  Since onset the symptoms have been persistent and mild in severity. The symptoms are associated with intermittent non productive cough. There has been no abdominal pain, decreased appetite, chest tightness, nausea, vomiting, diarrhea, dizziness or wheezing. The patient has been fully vaccinated against COVID-19 and received a Booster vaccine    ROS   Pertinent positives and negatives are stated within HPI, all other systems reviewed and are negative. Past Medical History:  has a past medical history of BRCA1 negative, BRCA2 negative, Cancer ([address] Diabetes mellitus ([facility] [address] Diverticulitis, Frequent UTI, Hiatal hernia, History of therapeutic radiation, Hypertension, Kidney stones, PONV (postoperative nausea and vomiting), and Thyroid disease. Surgical History:  has a past surgical history that includes Hysterectomy; Rectal prolapse repair; ECHO Compl W Dop Color Flow (8/15/2013); Colonoscopy; Endoscopy, colon, diagnostic; Cholecystectomy, laparoscopic (N/A, 2019); US BREAST BIOPSY W LOC DEVICE 1ST LESION RIGHT (Right, 2021); and Breast reduction surgery (Right, 2021). Social History:  reports that she has never smoked. She has never used smokeless tobacco. She reports previous alcohol use. She reports that she does not use drugs.     Family History: family history includes Breast Cancer (age of onset: 52) in her mother; Breast Cancer by PCR Not Detected Not Detected    Influenza B by PCR Not Detected Not Detected       Imaging: All Radiology results interpreted by Radiologist unless otherwise noted. No orders to display       ED Course / Medical Decision Making   Medications - No data to display         Consults:   None    Procedures:   none    Medical Decision Making: At this time the patient is without objective evidence of an acute process requiring hospitalization or inpatient management. They have remained hemodynamically stable throughout their entire ED visit and are stable for discharge with outpatient follow-up. The plan has been discussed in detail and they are aware of the specific conditions for emergent return, as well as the importance of follow-up. Assessment     1. Acute pharyngitis, unspecified etiology    2. Acute non-recurrent maxillary sinusitis      Plan   Discharged home. Patient condition is good    New Medications     Discharge Medication List as of 2/6/2022 12:16 PM      START taking these medications    Details   azithromycin (ZITHROMAX Z-DANAE) 250 MG tablet TAKE 500MG PO DAY ONE. .. 250MG PO DAY TWO THROUGH FIVE DISPENSE 6 TABS NO REFILLS, Disp-1 packet, R-0Normal      benzonatate (TESSALON) 200 MG capsule Take 1 capsule by mouth 3 times daily as needed for Cough, Disp-21 capsule, R-0Normal      ibuprofen (ADVIL;MOTRIN) 600 MG tablet Take 1 tablet by mouth 3 times daily as needed for Pain, Disp-30 tablet, R-0Normal      methylPREDNISolone (MEDROL, DANAE,) 4 MG tablet Take by mouth., Disp-1 kit, R-0Normal      guaiFENesin (MUCINEX) 600 MG extended release tablet Take 1 tablet by mouth 2 times daily for 15 days, Disp-30 tablet, R-0Normal           Electronically signed by JEET Caal CNP   DD: 2/6/22  **This report was transcribed using voice recognition software. Every effort was made to ensure accuracy; however, inadvertent computerized transcription errors may be present.   END OF ED PROVIDER NOTE          Sunita Card, JEET - CNP  02/06/22 1421

## 2022-02-21 ASSESSMENT — ENCOUNTER SYMPTOMS
DIARRHEA: 0
CONSTIPATION: 0
CHOKING: 0
SHORTNESS OF BREATH: 0
VOMITING: 0
EYE DISCHARGE: 0
CHEST TIGHTNESS: 0
BACK PAIN: 0
RHINORRHEA: 0
VOICE CHANGE: 0
BLOOD IN STOOL: 0
SINUS PRESSURE: 0
NAUSEA: 0
ABDOMINAL PAIN: 0
TROUBLE SWALLOWING: 0
COUGH: 0
EYE ITCHING: 0
SORE THROAT: 0
SINUS PAIN: 0
ABDOMINAL DISTENTION: 0
WHEEZING: 0

## 2022-02-21 NOTE — PROGRESS NOTES
Subjective:      Patient ID: Morena Torre is a 59 y.o. female. HPI   The lesion was located in the 8 o'clock position of the right breast     Breast cancer risk factors include mom breast cancer age 52, paternal aunt breast cancer, 48 paternal gm breast cancer age 48     Bilateral Diagnostic Mammogram/Right Breast U/S on 06/16/2021:  Large irregular hypoechoic solid mass seen on ultrasound and mammogram at about the 8 to 9 o'clock position measuring 2.4 x 1.4 x 1.4 cm.       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 Comment: The carcinoma displays highly infiltrative architectural features including single file and single cell patterns of invasion. Strong membranous E-cadherin expression is diffusely present in association with the invasive carcinoma, supportive of ductal origin. No in situ component is identified in the tissue sample. Benedicto histologic score for the invasive carcinoma corresponds to a tubule score 3, nuclear score 3, mitotic score 1, total score 7, grade 2. Intradepartmental consultation is obtained. Breast Cancer Marker Studies:   Estrogen Receptors (ER):90%   Progesterone Receptors ([place]): 30%   Her-2/stone (c-erb B-2) protein expression: Negative/0      CXR 07/06/2021:  No acute process     MRI Bilateral Breast 07/08/2021:  Irregular mass in the right breast 8 o'clock measuring up to 2.4 cm in size is consistent with biopsy proven neoplasm.  Focal, heterogeneous non mass enhancement along the lateral aspect of the mass may be due to recent biopsy vs satellite lesion.   No LN  No evidence of neoplasm in the left breast     Right oncoplastic breast reduction, matching left breast reduction and excision squamous cell carcinoma, right mid chest with Dr Rosetta Lomeli and right breast needle localized lumpectomy, blue dye injection, right axillary sentinel node excision, possible right axillary dissection with Dr Sabina Penny, 2021  A.  Left breast, reduction mammoplasty:   - Skin and benign breast tissue (288.8 g) showing stromal fibrosis/hyalinosis. Harrkaren Deng, lesion of mid chest, excision:   - Focal residual squamous cell carcinoma in situ, margins free of tumor;   - Actinic keratosis;   - Dermal scar with chronic inflammation, consistent with changes of previous biopsy site. C.  [place] lymph node #1, biopsy:    - Two of two (2/2) nodes positive for metastatic carcinoma (micrometastasis), see comment. D.  [place] lymph node #2, biopsy:   - Benign node (1) showing fatty replacement. E.  [place] lymph node #3, biopsy:   - Benign node (1) showing fatty replacement. F.  Right breast, central, lumpectomy with needle localization:   - Invasive carcinoma of no special type (ductal), grade 3;   - Changes of previous biopsy site, see comment. G.  [place] lymph node #4, biopsy:   - Benign node showing prominent fatty replacement. H.  Right breast, reduction mammoplasty:   - Skin and benign breast tissue (79.6 g) showing stromal fibrosis/hyalinosis. Comment:A few small foci of metastatic carcinoma (</= 2 mm/each metastasis) in two sentinel lymph nodes in specimen C are noted histologically and further confirmed by immunostaining for pankeratin and GATA3. Comparing with the patient's previous core needle biopsy specimen from the same tumor on 07/16/2021 (see report REV-), the tumor seen here is much more mitotic active (focally, 3-4 mitotic figures/hpf). Therefore, the provisional Benedicto score of this tumor is updated to 3+3+3=9 (grade 3) from originally reported 3+3+1=7 (grade 2). CANCER CASE SUMMARY   Procedure: Excision   Specimen Laterality: Right   TUMOR   Tumor Site: Central   Histologic Type:  Invasive carcinoma of no special type   Histologic Grade: [place] Histologic Score Glandular/tubular differentiation: Score 3   Nuclear pleomorphism: Score 3   Mitotic rate: Score 3   Overall grade: Grade 3 (scores of 9)   Tumor Size: 2.2 x 2.2 x 1.4 cm   Tumor Focality: Single focus of invasive carcinoma   Ductal Carcinoma In Situ (DCIS): Not identified   Lobular Carcinoma In Situ (LCIS): Not identified   Lymph-Vascular Invasion: Not identified   Dermal lymphovascular invasion: No skin present   Microcalcifications: Not identified   Treatment effect: No known presurgical therapy   MARGINS   All margins negative for invasive carcinoma   Distance from invasive carcinoma to closest margin: 1 mm   Closest margins to invasive carcinoma: medial (slide F1) and posterior (slide F2)   REGIONAL LYMPH NODES   Tumor present in regional lymph nodes   Number of lymph nodes with macrometastasis (>2 mm): 0   Number of lymph nodes with micrometasteses (0.2-2 mm): 2   Number of lymph nodes with isolated tumor cells: 0   Size of largest jarett metastasis: 2 mm   Extranodal extension: Not identified   Total number of lymph nodes examined: 4   Number of sentinel nodes examined: 4   DISTANT METASTASIS: Not applicable   Pathologic Staging (pTNM, AJCC 8th Edition)   pT2 (sn)pN1mi      Breast Cancer Marker Studies:   Estrogen Receptors (ER):90%   Progesterone Receptors ([place]): 30%   Her-2/stone (c-erb B-2) protein expression: Negative/0      Oncotype Dx  Recurrence Score Result-20  -11/08/2021 completed adjuvant RT  -11/09/2021 started endocrine therapy with Arimidex 1 mg by mouth daily per medical oncology, Dr. Les Diaz      Past Medical History:   Diagnosis Date    BRCA1 negative     BRCA2 negative     [facility]     [facility]     Diverticulitis     Frequent UTI     Hiatal hernia     History of therapeutic radiation     Hypertension     Kidney stones     PONV (postoperative nausea and vomiting)     Thyroid disease      Past Surgical History:   Procedure Laterality Date    BREAST REDUCTION SURGERY Right 8/17/2021    RIGHT ONCOPLASTIC BREAST REDUCTION, MATCHING LEFT BREAST REDUCTION AND EXCISION SQUAMOUS CELL CARCINOMA, RIGHT MID CHEST, RIGHT BREAST NEEDLE LOCALIZED LUMPECTOMY BLUE DYE INJECTION, RIGHT AXILLARY SENTINEL NODE EXCISION, POSSIBLE RIGHT AXILLARY DISECTION performed by Bruna Barber MD at [address] [place] [address], LAPAROSCOPIC N/A 6/20/2019    CHOLECYSTECTOMY LAPAROSCOPIC performed by Lavell Coelho MD at [address] [facility] ECHO COMPL W DOP COLOR FLOW  8/15/2013         ENDOSCOPY, COLON, DIAGNOSTIC      HYSTERECTOMY      RECTAL PROLAPSE REPAIR      US BREAST NEEDLE BIOPSY RIGHT Right 6/24/2021    US BREAST NEEDLE BIOPSY RIGHT 6/24/2021 [facility]     Social History     Tobacco Use    Smoking status: Never Smoker    Smokeless tobacco: Never Used   Vaping Use    Vaping Use: Never used   Substance Use Topics    Alcohol use: Not Currently     Comment: Rarely social    Drug use: Never     Allergies   Allergen Reactions    Flagyl [Metronidazole] Rash    Omeprazole Diarrhea and Nausea And Vomiting     As well as dizziness    Vicodin [Hydrocodone-Acetaminophen] Nausea And Vomiting     Current Outpatient Medications on File Prior to Visit   Medication Sig Dispense Refill    ibuprofen (ADVIL;MOTRIN) 600 MG tablet Take 1 tablet by mouth 3 times daily as needed for Pain 30 tablet 0    guaiFENesin (MUCINEX) 600 MG extended release tablet Take 1 tablet by mouth 2 times daily for 15 days 30 tablet 0    metFORMIN (GLUCOPHAGE) 500 MG tablet Take 1 tablet by mouth 2 times daily (with meals) 60 tablet 0    levothyroxine (SYNTHROID) 50 MCG tablet Take 1 tablet by mouth Daily 30 tablet 0    lansoprazole (PREVACID) 30 MG delayed release capsule Take 1 capsule by mouth daily 30 capsule 0    venlafaxine (EFFEXOR XR) 37.5 MG extended release capsule Take 1 capsule by mouth daily 30 capsule 3    anastrozole (ARIMIDEX) 1 MG tablet Take 1 tablet by mouth daily 90 tablet 1    ondansetron (ZOFRAN) 4 MG tablet Take 1 tablet by mouth daily as needed for Nausea or Vomiting 12 tablet 1    allopurinol (ZYLOPRIM) 300 MG tablet Take 1/2 (one-half) tablet by mouth once daily 45 tablet 1    albuterol sulfate HFA (VENTOLIN HFA) 108 (90 Base) MCG/ACT inhaler Inhale 2 puffs into the lungs every 6 hours as needed for Wheezing or Shortness of Breath 3 Inhaler 1    Probiotic Product (PROBIOTIC PO) Take by mouth        Current Facility-Administered Medications on File Prior to Visit   Medication Dose Route Frequency Provider Last Rate Last Admin    lidocaine-EPINEPHrine 1 %-[id number] injection 3 mL  3 mL IntraDERmal Once FLORENTINO Anguiano             Review of Systems   Constitutional: Negative for activity change, appetite change, chills, fatigue, fever and unexpected weight change. Namrata Mcintosh is doing well overall. Tolerating Arimidex fairly well. She has recovered nicely from radiation therapy; some breast related tenderness on the right post treatment. HENT: Negative for congestion, postnasal drip, rhinorrhea, sinus pressure, sinus pain, sore throat, trouble swallowing and voice change. Eyes: Negative for discharge, itching and visual disturbance. Respiratory: Negative for cough, choking, chest tightness, shortness of breath and wheezing. Cardiovascular: Negative for chest pain, palpitations and leg swelling. Gastrointestinal: Negative for abdominal distention, abdominal pain, blood in stool, constipation, diarrhea, nausea and vomiting. Endocrine: Negative for cold intolerance and heat intolerance. Genitourinary: Negative for difficulty urinating, dysuria, frequency and hematuria. Musculoskeletal: Negative for arthralgias, back pain, gait problem, joint swelling, myalgias, neck pain and neck stiffness. Allergic/Immunologic: Negative for environmental allergies and food allergies.    Neurological: Negative for dizziness, seizures, syncope, speech difficulty, weakness, the right upper extremity   Lymphadenopathy:      Cervical: No cervical adenopathy. Right cervical: No superficial, deep or posterior cervical adenopathy. Left cervical: No superficial, deep or posterior cervical adenopathy. Upper Body:      Right upper body: No pectoral adenopathy. Left upper body: No pectoral adenopathy. Skin:     General: Skin is warm and dry. Coloration: Skin is not pale. Findings: No erythema or rash. Neurological:      Mental Status: She is alert and oriented to person, place, and time. Coordination: Coordination normal.   Psychiatric:         Behavior: Behavior normal.         Thought Content: Thought content normal.         Judgment: Judgment normal.       Assessment:    Shadia Davey is a 59 y.o. female presents for follow-up of her right breast invasive ductal carcinoma.    06/24/2021 ultrasound guided core biopsy of right breast 8 o'clock mass and ribbonclip marker placement  Right breast at 8:00, core needle biopsy: Invasive carcinoma of no special type (ductal), grade 2; see comment. Comment: The carcinoma displays highly infiltrative architectural features including single file and single cell patterns of invasion. Strong membranous E-cadherin expression is diffusely present in association with the invasive carcinoma, supportive of ductal origin. No in situ component is identified in the tissue sample. [place] histologic score for the invasive carcinoma corresponds to a tubule score 3, nuclear score 3, mitotic score 1, total score 7, grade 2. Intradepartmental consultation is obtained.      Breast Cancer Marker Studies:   Estrogen Receptors (ER):90%   Progesterone Receptors ([place]): 30%   Her-2/stone (c-erb B-2) protein expression: Negative/0      MRI Bilateral Breast 07/08/2021:  Irregular mass in the right breast 8 o'clock measuring up to 2.4 cm in size is consistent with biopsy proven neoplasm.  Focal, heterogeneous non mass enhancement along the lateral aspect of the mass may be due to recent biopsy vs satellite lesion. No LN  No evidence of neoplasm in the left breast     08/17/2021 underwent right oncoplastic breast reduction, matching left breast reduction and excision squamous cell carcinoma, right mid chest with Dr Shanita Childress and right breast needle localized lumpectomy, blue dye injection, right axillary sentinel node excision, possible right axillary dissection with Dr Allyssa Ace breast, reduction mammoplasty:   - Skin and benign breast tissue (288.8 g) showing stromal fibrosis/hyalinosis. Oneil Guerrero, lesion of mid chest, excision:   - Focal residual squamous cell carcinoma in situ, margins free of tumor;   - Actinic keratosis;   - Dermal scar with chronic inflammation, consistent with changes of previous biopsy site. C.  [place] lymph node #1, biopsy:    - Two of two (2/2) nodes positive for metastatic carcinoma (micrometastasis), see comment. D.  [place] lymph node #2, biopsy:   - Benign node (1) showing fatty replacement. E.  [place] lymph node #3, biopsy:   - Benign node (1) showing fatty replacement. F.  Right breast, central, lumpectomy with needle localization:   - Invasive carcinoma of no special type (ductal), grade 3;   - Changes of previous biopsy site, see comment. G.  [place] lymph node #4, biopsy:   - Benign node showing prominent fatty replacement. H.  Right breast, reduction mammoplasty:   - Skin and benign breast tissue (79.6 g) showing stromal fibrosis/hyalinosis. Comment:A few small foci of metastatic carcinoma (</= 2 mm/each metastasis) in two sentinel lymph nodes in specimen C are noted histologically and further confirmed by immunostaining for pankeratin and GATA3. Comparing with the patient's previous core needle biopsy specimen from the same tumor on 07/16/2021 (see report EDX-), the tumor seen here is much more mitotic active (focally, 3-4 mitotic figures/hpf).  Therefore, the provisional [place] score of this tumor is updated to 3+3+3=9 (grade 3) from originally reported 3+3+1=7 (grade 2). CANCER CASE SUMMARY   Procedure: Excision   Specimen Laterality: Right   TUMOR   Tumor Site: Central   Histologic Type: Invasive carcinoma of no special type   Histologic Grade: Benedicto Histologic Score   Glandular/tubular differentiation: Score 3   Nuclear pleomorphism: Score 3   Mitotic rate: Score 3   Overall grade: Grade 3 (scores of 9)   Tumor Size: 2.2 x 2.2 x 1.4 cm   Tumor Focality: Single focus of invasive carcinoma   Ductal Carcinoma In Situ (DCIS): Not identified; Lobular Carcinoma In Situ (LCIS): Not identified. Lymph-Vascular Invasion: Not identified; Dermal lymphovascular invasion: No skin present. Microcalcifications: Not identified. Treatment effect: No known presurgical therapy   MARGINS All margins negative for invasive carcinoma. REGIONAL LYMPH NODES Tumor present in regional lymph nodes   Number of lymph nodes with macrometastasis (>2 mm): 0   Number of lymph nodes with micrometasteses (0.2-2 mm): 2   Number of lymph nodes with isolated tumor cells: 0   Size of largest jarett metastasis: 2 mm   Extranodal extension: Not identified. Total number of lymph nodes examined: 4; Number of sentinel nodes examined: 4. Pathologic Staging (pTNM, AJCC 8th Edition)   pT2 (sn)pN1mi      Breast Cancer Marker Studies:   Estrogen Receptors (ER):90%   Progesterone Receptors ([place]): 30%   Her-2/stone (c-erb B-2) protein expression: Negative/0      Oncotype Dx  Recurrence Score Result-20  -11/08/2021 completed adjuvant RT  -11/09/2021 started endocrine therapy with Arimidex 1 mg by mouth daily per medical oncology, Dr. Tracee Stevenson  -12/20/2021 Right breast US: Benign, BI-RADS 2.  -02/28/2022 clinical follow up is without evidence of recurrent disease. She has postsurgical changes bilaterally, right more than left. We reviewed NCCN guidelines for breast cancer follow-up on this visit.   We also discussed BSE and the importance of breast massage in detail. She has skin thickening and areas of fat necrosis of the right breast as well as decreased range of motion of the right upper extremity. Will consult occupational therapy for further evaluation and recommendations. She is tolerating her Arimidex well. Discussed limiting alcohol and importance of vitamin D and calcium daily. Will plan to see in July with bilateral screening mammogram same day. Plan:   1. Continue monthly breast/chest wall self examination; detailed instructions reviewed today. Bring any changes to your physician's attention. 2. Continue healthy diet and exercise routinely as tolerated. 3. Maintaining ideal body weight (20-25 BMI) may lead to optimal breast cancer outcomes. 4. Avoid alcohol or limit to less than 3 drinks per week. 5. Repeat mammogram July 2022. 6. Continue Arimidex 1 mg daily at the discretion of medical oncology team.  7. Ca+/VitD while on Arimidex. 8. Continue follow up with Medical Oncology and Primary Care. 9. RTC July with B//L screening mammogram same day-1 year from date of MRI. During today's visit, face-to-face time 25 minutes, greater than 50% in counseling education and coordination of care. All questions were answered to her apparent satisfaction, and she is agreeable to the plan as outlined above. Lizbeth Moreira, RN, MSN, APRN-CNP, [address]  Advanced Oncology Certified Nurse Practitioner  Department of Breast Surgery  [facility] [place]/  Care in collaboration with Dr. Mayela Hernadez.  Te[facility], APRN - CNP

## 2022-02-23 RX ORDER — DULOXETIN HYDROCHLORIDE 30 MG/1
30 CAPSULE, DELAYED RELEASE ORAL DAILY
Qty: 30 CAPSULE | Refills: 0 | Status: SHIPPED | OUTPATIENT
Start: 2022-02-23 | End: 2022-03-23 | Stop reason: SDUPTHER

## 2022-02-23 NOTE — PROGRESS NOTES
Message received from [facility],[address] that patient called in complaining that Effexor was upsetting her stomach and wanted something different for her hot flashes. Dr. Fernie Kirkpatrick recommends low dose gabapentin for next line to prevent hot flashes. Discussed with patient. She was more concerned about mood swings. Hesitant to take gabapentin. Discussed changing from Effexor to Cymbalta for mood. She is agreeable. Can re-evaluate need for gabapentin at next visit.

## 2022-02-28 ENCOUNTER — OFFICE VISIT (OUTPATIENT)
Dept: BREAST CENTER | Age: 62
End: 2022-02-28
Payer: MEDICAID

## 2022-02-28 VITALS
RESPIRATION RATE: 12 BRPM | OXYGEN SATURATION: 98 % | HEIGHT: 68 IN | SYSTOLIC BLOOD PRESSURE: 122 MMHG | DIASTOLIC BLOOD PRESSURE: 74 MMHG | WEIGHT: 163 LBS | TEMPERATURE: 99.9 F | BODY MASS INDEX: 24.71 KG/M2 | HEART RATE: 100 BPM

## 2022-02-28 DIAGNOSIS — Z12.31 VISIT FOR SCREENING MAMMOGRAM: Primary | ICD-10-CM

## 2022-02-28 DIAGNOSIS — Z85.3 PERSONAL HISTORY OF BREAST CANCER: ICD-10-CM

## 2022-02-28 PROCEDURE — 99213 OFFICE O/P EST LOW 20 MIN: CPT | Performed by: NURSE PRACTITIONER

## 2022-02-28 PROCEDURE — G8484 FLU IMMUNIZE NO ADMIN: HCPCS | Performed by: NURSE PRACTITIONER

## 2022-02-28 PROCEDURE — G8427 DOCREV CUR MEDS BY ELIG CLIN: HCPCS | Performed by: NURSE PRACTITIONER

## 2022-02-28 PROCEDURE — 3017F COLORECTAL CA SCREEN DOC REV: CPT | Performed by: NURSE PRACTITIONER

## 2022-02-28 PROCEDURE — 1036F TOBACCO NON-USER: CPT | Performed by: NURSE PRACTITIONER

## 2022-02-28 PROCEDURE — G8420 CALC BMI NORM PARAMETERS: HCPCS | Performed by: NURSE PRACTITIONER

## 2022-03-03 ENCOUNTER — TELEPHONE (OUTPATIENT)
Dept: FAMILY MEDICINE CLINIC | Age: 62
End: 2022-03-03

## 2022-03-03 DIAGNOSIS — E03.9 ACQUIRED HYPOTHYROIDISM: ICD-10-CM

## 2022-03-03 DIAGNOSIS — E11.9 TYPE 2 DIABETES MELLITUS WITHOUT COMPLICATION, WITHOUT LONG-TERM CURRENT USE OF INSULIN (HCC): ICD-10-CM

## 2022-03-03 NOTE — TELEPHONE ENCOUNTER
Pt needs refills on metFORMIN (GLUCOPHAGE) 500 MG tablet and levothyroxine (SYNTHROID) 50 MCG tablet sent to [facility] DR ALYSSA CASTLE [address], [place] -

## 2022-03-04 RX ORDER — ANASTROZOLE 1 MG/1
1 TABLET ORAL DAILY
Qty: 90 TABLET | Refills: 1 | Status: SHIPPED | OUTPATIENT
Start: 2022-03-04 | End: 2022-09-06

## 2022-03-07 RX ORDER — LEVOTHYROXINE SODIUM 0.05 MG/1
50 TABLET ORAL DAILY
Qty: 30 TABLET | Refills: 0 | Status: SHIPPED
Start: 2022-03-07 | End: 2022-03-16 | Stop reason: SDUPTHER

## 2022-03-07 NOTE — TELEPHONE ENCOUNTER
Pt last seen 7/2021. 30 day Rx sent. Pt needs visit for further refills.       Electronically signed by Janay Khan MA on 3/7/22 at 12:07 PM EST

## 2022-03-10 ENCOUNTER — EVALUATION (OUTPATIENT)
Dept: OCCUPATIONAL THERAPY | Age: 62
End: 2022-03-10
Payer: MEDICAID

## 2022-03-10 DIAGNOSIS — Z85.3 PERSONAL HISTORY OF BREAST CANCER: ICD-10-CM

## 2022-03-10 PROCEDURE — 97165 OT EVAL LOW COMPLEX 30 MIN: CPT | Performed by: OCCUPATIONAL THERAPIST

## 2022-03-11 NOTE — PROGRESS NOTES
OCCUPATIONAL THERAPY INITIAL LYMPHEDEMA EVALUATION    [place] [address] [facility] [place] [address]   Phone: [phone]  Fax: [phone]     Date:  3/11/2022  Initial Evaluation Date: 2022     Evaluating Therapist: PINKY Laughlin/L, CLT-GEMMA    Patient Name:  Cody Banks    :  1960    Restrictions/Precautions:  fall risk  Diagnosis:  Personal History of breast cancer (z85.3)       Date of Surgery/Injury: n/a    Insurance/Certification information:  [address] Medicaid - [id number]  Plan of care signed (Y/N): N  Visit# / total visits: Evaluation    Referring Practitioner:  JEET Ramirez CNP  Specific Practitioner Orders: Evaluation and Treatment    Assessment of current deficits   []Pain  []Skin Integrity   [x]Lymphedema   []Functional transfers/mobility   []ADLs   []Strength    []Cognition  []IADLs   []Safety Awareness   []  Motor Endurance    []Fine Motor Coordination   []Balance   []Vision/perception  []Sensation []Gross Motor Coordination  []ROM     OT PLAN OF CARE   OT POC based on physician orders, patient diagnosis and results of clinical assessment    Frequency/Duration:  1-2x per week for 12 treatment sessions from 2022 through 2022  Specific OT Treatment to include:     Plan of Care:     [x]97140-Manual Lymph Drainage and Combined Decongestive Therapy  [x]17660- Skilled Multilayer Short Stretch Compression Bandaging/ Therapeutic Exercise  [x]Skin Care Education [x]HEP including Self MLD Education and/or Self Bandaging  [x]Education for Lymphedema Risks/Precautions     [x] Caregiver Education   []other:      Patient Specific Goal: to have less pain      STG: 3 weeks  1. Patient will demonstrate knowledge and understanding for lymphedema precautions, skin care and self management to decrease progression of lymphedema and risk of infection.   2.  Patient will present with decreased limb volume in the involved extremity from moderate to minimal for improved functional mobility. 3.  Patient will demonstrate compliance with compression therapy for independent management of lymphedema. LT weeks  1. Patient will be independent with self management of lymphedema including understanding garment wear schedule, self compression bandaging, HEP and self care. 2.  Patient will be fitted for appropriate compression garments for long term management of lymphedema. 3.   Patient will present with decreased limb volume in the involved extremity from minimal  to trace  for improved functional mobility. 4.  Patient will maximize pain free range of motion to maximize independence and safety during daily life tasks.             Past medical History:  Past Medical History:   Past Medical History:   Diagnosis Date    BRCA1 negative     BRCA2 negative     [facility]     Diabetes mellitus ([facility]     Diverticulitis     Frequent UTI     Hiatal hernia     History of therapeutic radiation     Hypertension     Kidney stones     PONV (postoperative nausea and vomiting)     Thyroid disease      Past Surgical History:   Past Surgical History:   Procedure Laterality Date    BREAST REDUCTION SURGERY Right 2021    RIGHT ONCOPLASTIC BREAST REDUCTION, MATCHING LEFT BREAST REDUCTION AND EXCISION SQUAMOUS CELL CARCINOMA, RIGHT MID CHEST, RIGHT BREAST NEEDLE LOCALIZED LUMPECTOMY BLUE DYE INJECTION, RIGHT AXILLARY SENTINEL NODE EXCISION, POSSIBLE RIGHT AXILLARY DISECTION performed by Radha Farley MD at [address] [place] [address], LAPAROSCOPIC N/A 2019    CHOLECYSTECTOMY LAPAROSCOPIC performed by Gallito Anthony MD at [address] [place] [address] ECHO COMPL W DOP COLOR FLOW  8/15/2013         ENDOSCOPY, COLON, DIAGNOSTIC      HYSTERECTOMY      RECTAL PROLAPSE REPAIR      US BREAST NEEDLE BIOPSY RIGHT Right 2021    US BREAST NEEDLE BIOPSY RIGHT 2021 [facility]       [x]Surgery: RIGHT ONCOPLASTIC BREAST REDUCTION, MATCHING LEFT BREAST REDUCTION AND EXCISION SQUAMOUS CELL CARCINOMA, RIGHT MID CHEST, RIGHT BREAST NEEDLE LOCALIZED LUMPECTOMY BLUE DYE INJECTION, RIGHT AXILLARY SENTINEL NODE EXCISION, POSSIBLE RIGHT AXILLARY DISECTION  [x]Lymph node removal: 4  [x]Radiation Therapy: completed  []Chemotherapy:   []History of Cellulitis/Infection:   []Family history of lymphedema:   []Previous treatment for lymphedema:   []Current compression garment use:     Reason for Referral: Pt was referred to [facility] due to intractable lymphedema of the right breast/flank; impaired range of motion, unrelieved by elevation/compression; general range of motion. Chief Complaint: increased swelling / pain of breast /flank; decreased range of motion and increased pain with movement  Triggers: none noted at time of evaluation    Home Living: lives with spouse in one floor home with three step entry with rail. Patient does not use an assisted device. Patient has tub/shower combo and shower stall. Patient independent with adl's and iadl's prior .   Patient does not work  Prior Level of Function: independent all aspects    Cognition:   Alert/Oriented x3     IADL History  Homemaking Responsibility: performs all aspects  Shopping Responsibility: performs all aspects  Mode of Transportation: car  Leisure & Hobbies: spending time with family  Work: not at this time      Pain Level: none noted at time of evaluation  Pitting Edema: none noted at time of evaluation  Fibrotic tissue:  Increased through scar areas bilaterally, increased throughout right breast / flank  Skin Integrity: fair at time of evaluation    Lymphedema Upper Extremity Measurements    Measurements are made in 4cm increments and are circumferential.      CM Follow up #4  Left -  Follow up #3  Left -  Follow up #2  Left -  Follow up #1  Left - 10Mar. 2022 Evaluation -   Left -  Follow up #4  Right -  Follow up #3  Right -  Follow up #2  Right -  Follow up #1 Right -  Evaluation - Right - 10Mar. 2022                  wrist    16.25      16   4cm    15.25      16.25   8cm    17      17.75   12cm    19      20.5   16cm    23.5      23.75   20cm    25.5      25   24cm    26.5      25.75   28cm    29      26.25   32cm    30.5      30   36cm    32      33.5   40cm    34      35.5   44cm    34.25      34.5                             plam    21      21.25     Fingers are measured in cm and between mp and pip and between pip and dip each digit. Digit Follow up #4  Left -  Follow up #3  Left -  Follow up #2  Left -  Follow up #1  Left -  Evaluation -  Left -  Follow up #4  Right -  Follow up #3  Right -  Follow up #2  Right -  Follow up #1  Right -  Evaluation - Right -                              First Digit    6.5, 5.5      6.5, 5.5   Second Digit    6.25, 5.5      6.5, 5.5   Third Digit    6, 5      6.25, 5.25   Fourth Digit    5.5, 4.5      5.75, 4.5   Fifth Digit    6.25      6.25                          Cognition:   Alert/Oriented x3     Vision: within functional limits for daily life tasks. Patient has glasses        Hearing: within functional limits for daily life tasks     ADL  Feeding:  independent  Grooming:  independent  Bathing:  independent  UE Dressing:  independent  LE Dressing:  independent  Toileting:  independent  Transfer:  independent    UE ASSESSMENT: Hand Dominance is right handed  ROM within functional limits for daily life tasks with exception of left active/passive shoulder flexion/abduction 0 - 110; right active/passive shoulder flexion/abduction 0 - 95 degrees  Strength within functional limits with exception of bilateral shoulder 3-/5 in all movement patterns    Sensation: within functional limits for light touch with exception of left upper extremity increased numbness / tingling noted.     OT G-codes:  Carrying, Handling, Moving objects   (Current status): CK   (Discharge Goal):  [facility]  Lymphedema Life Impact Total  60 1        Electronically signed by: PINKY Perez/L, CLT-GEMMA      Physician's Certification / Comments      Frequency/Duration 1-2 / week for 12 visits. Certification period From: 10Mar. 2022  To: 2May 2022     I have reviewed the Plan of Care established for skilled therapy services and certify that the services are required and that they will be provided while the patient is under my care. Physician's Comments/Revisions:                   Physicians's Printed Name:  JEET Rogers CNP                                   Physician's Signature:                                                               Date:      Please review Patient's OT evaluation and if you agree sign/date and fax back to us at our [facility] OT Fax: [phone].  Thank you for your referral!

## 2022-03-16 ENCOUNTER — OFFICE VISIT (OUTPATIENT)
Dept: FAMILY MEDICINE CLINIC | Age: 62
End: 2022-03-16
Payer: MEDICAID

## 2022-03-16 VITALS
WEIGHT: 163 LBS | DIASTOLIC BLOOD PRESSURE: 74 MMHG | RESPIRATION RATE: 16 BRPM | HEART RATE: 91 BPM | SYSTOLIC BLOOD PRESSURE: 126 MMHG | HEIGHT: 68 IN | OXYGEN SATURATION: 97 % | TEMPERATURE: 98.8 F | BODY MASS INDEX: 24.71 KG/M2

## 2022-03-16 DIAGNOSIS — C50.911 MALIGNANT NEOPLASM OF RIGHT BREAST IN FEMALE, ESTROGEN RECEPTOR POSITIVE, UNSPECIFIED SITE OF BREAST (HCC): ICD-10-CM

## 2022-03-16 DIAGNOSIS — Z17.0 MALIGNANT NEOPLASM OF RIGHT BREAST IN FEMALE, ESTROGEN RECEPTOR POSITIVE, UNSPECIFIED SITE OF BREAST (HCC): ICD-10-CM

## 2022-03-16 DIAGNOSIS — Z00.00 PHYSICAL EXAM: ICD-10-CM

## 2022-03-16 DIAGNOSIS — K21.9 GASTROESOPHAGEAL REFLUX DISEASE WITHOUT ESOPHAGITIS: ICD-10-CM

## 2022-03-16 DIAGNOSIS — E03.9 ACQUIRED HYPOTHYROIDISM: ICD-10-CM

## 2022-03-16 DIAGNOSIS — E11.9 TYPE 2 DIABETES MELLITUS WITHOUT COMPLICATION, WITHOUT LONG-TERM CURRENT USE OF INSULIN (HCC): Primary | ICD-10-CM

## 2022-03-16 DIAGNOSIS — M10.9 GOUT, UNSPECIFIED CAUSE, UNSPECIFIED CHRONICITY, UNSPECIFIED SITE: ICD-10-CM

## 2022-03-16 LAB
CHP ED QC CHECK: NORMAL
GLUCOSE BLD-MCNC: 202 MG/DL
HBA1C MFR BLD: 7 %

## 2022-03-16 PROCEDURE — 82962 GLUCOSE BLOOD TEST: CPT | Performed by: FAMILY MEDICINE

## 2022-03-16 PROCEDURE — 3017F COLORECTAL CA SCREEN DOC REV: CPT | Performed by: FAMILY MEDICINE

## 2022-03-16 PROCEDURE — G8420 CALC BMI NORM PARAMETERS: HCPCS | Performed by: FAMILY MEDICINE

## 2022-03-16 PROCEDURE — 3051F HG A1C>EQUAL 7.0%<8.0%: CPT | Performed by: FAMILY MEDICINE

## 2022-03-16 PROCEDURE — 2022F DILAT RTA XM EVC RTNOPTHY: CPT | Performed by: FAMILY MEDICINE

## 2022-03-16 PROCEDURE — 1036F TOBACCO NON-USER: CPT | Performed by: FAMILY MEDICINE

## 2022-03-16 PROCEDURE — G8484 FLU IMMUNIZE NO ADMIN: HCPCS | Performed by: FAMILY MEDICINE

## 2022-03-16 PROCEDURE — 83036 HEMOGLOBIN GLYCOSYLATED A1C: CPT | Performed by: FAMILY MEDICINE

## 2022-03-16 PROCEDURE — 99214 OFFICE O/P EST MOD 30 MIN: CPT | Performed by: FAMILY MEDICINE

## 2022-03-16 PROCEDURE — G8427 DOCREV CUR MEDS BY ELIG CLIN: HCPCS | Performed by: FAMILY MEDICINE

## 2022-03-16 RX ORDER — LEVOTHYROXINE SODIUM 0.05 MG/1
50 TABLET ORAL DAILY
Qty: 90 TABLET | Refills: 1 | Status: SHIPPED
Start: 2022-03-16 | End: 2022-04-04

## 2022-03-16 RX ORDER — GLUCOSAMINE HCL/CHONDROITIN SU 500-400 MG
CAPSULE ORAL
Qty: 100 STRIP | Refills: 5 | Status: SHIPPED | OUTPATIENT
Start: 2022-03-16

## 2022-03-16 RX ORDER — BLOOD-GLUCOSE METER
1 KIT MISCELLANEOUS DAILY
Qty: 1 KIT | Refills: 0 | Status: SHIPPED | OUTPATIENT
Start: 2022-03-16

## 2022-03-16 RX ORDER — LANCETS 30 GAUGE
1 EACH MISCELLANEOUS DAILY
Qty: 100 EACH | Refills: 0 | Status: SHIPPED | OUTPATIENT
Start: 2022-03-16

## 2022-03-16 RX ORDER — ALLOPURINOL 300 MG/1
TABLET ORAL
Qty: 45 TABLET | Refills: 1 | Status: SHIPPED
Start: 2022-03-16 | End: 2022-10-28 | Stop reason: SDUPTHER

## 2022-03-16 RX ORDER — LANSOPRAZOLE 30 MG/1
30 CAPSULE, DELAYED RELEASE ORAL DAILY
Qty: 90 CAPSULE | Refills: 1 | Status: SHIPPED | OUTPATIENT
Start: 2022-03-16 | End: 2022-10-07

## 2022-03-16 RX ORDER — PIOGLITAZONEHYDROCHLORIDE 15 MG/1
15 TABLET ORAL DAILY
Qty: 30 TABLET | Refills: 3 | Status: SHIPPED
Start: 2022-03-16 | End: 2022-03-30 | Stop reason: SDUPTHER

## 2022-03-17 ENCOUNTER — TREATMENT (OUTPATIENT)
Dept: OCCUPATIONAL THERAPY | Age: 62
End: 2022-03-17
Payer: MEDICAID

## 2022-03-17 DIAGNOSIS — Z85.3 PERSONAL HISTORY OF BREAST CANCER: ICD-10-CM

## 2022-03-17 PROCEDURE — 97530 THERAPEUTIC ACTIVITIES: CPT

## 2022-03-17 NOTE — PROGRESS NOTES
OCCUPATIONAL THERAPY PROGRESS NOTE  Phone: [phone]  Fax: [phone]     Date:  3/17/2022  Initial Evaluation Date: 2022                                     Evaluating Therapist: Marylouise Leventhal, OTR/L, CLT-GEMMA     Patient Name:  Pablito Olson                       :  1960     Restrictions/Precautions:  fall risk  Diagnosis:  Personal History of breast cancer (z85.3)                                                           Date of Surgery/Injury: n/a     Insurance/Certification information:  [address] Medicaid - [id number]  Plan of care signed (Y/N): N  Visit# / total visits: Evaluation +1 treatment     Referring Practitioner:  JEET Edwards CNP  Specific Practitioner Orders: Evaluation and Treatment      Assessment of current deficits   []Pain  []Skin Integrity   [x]Lymphedema   []Functional transfers/mobility   []ADLs   []Strength    []Cognition  []IADLs   []Safety Awareness   []  Motor Endurance    []Fine Motor Coordination   []Balance   []Vision/perception  []Sensation []Gross Motor Coordination  [x]ROM     OT PLAN OF CARE   OT POC based on physician orders, patient diagnosis and results of clinical assessment    Frequency/Duration: 1-2x per week for 12 treatment sessions from 2022 through 2022  Specific OT Treatment to include:   Plan of Care:     [x]97140-Manual Lymph Drainage and Combined Decongestive Therapy  [x]71853- Skilled Multilayer Short Stretch Compression Bandaging/ Therapeutic Exercise  [x]Skin Care Education [x]HEP including Self MLD Education and/or Self Bandaging  [x]Education for Lymphedema Risks/Precautions     [x] Caregiver Education   []other:      Patient Specific Goal: to have less pain    STG: 3 weeks  1. Patient will demonstrate knowledge and understanding for lymphedema precautions, skin care and self management to decrease progression of lymphedema and risk of infection.   Patient educated on precautions and proper hydration of skin to improve skin care under breast. Will continue to work towards goal with patient. 2.  Patient will present with decreased limb volume in the involved extremity from moderate to minimal for improved functional mobility. Initiated patient and  education on proper steps of sequence, proper technique of stretching the skin, and direction of fluid. Therapist performed two rounds of the sequence to right breast and flank however patient very sore and limited to touch. Patient unable to tolerate any more of the sequence performed this date. Will continue to work with patient to improve completion of sequence. Patient and  also provided handout of sequence. Educated patient to attempt sequence throughout the weekend. 3.  Patient will demonstrate compliance with compression therapy for independent management of lymphedema. Educated patient to obtain compression bra to help reduce fluid through breasts. Provided patient with exercise handout to try to complete some of the exercises with as much movement as possible. Will continue to work with patient to improve progress towards goal.      LT weeks  1. Patient will be independent with self management of lymphedema including understanding garment wear schedule, self compression bandaging, HEP and self care. 2.  Patient will be fitted for appropriate compression garments for long term management of lymphedema. 3.   Patient will present with decreased limb volume in the involved extremity from minimal  to trace  for improved functional mobility. 4.  Patient will maximize pain free range of motion to maximize independence and safety during daily life tasks. Restrictions/Precautions:  [] No blood pressure/blood draw in: [] Left Upper Extremity     [x] Right Upper Extremity        [] Fall Risk       Intervention:   []Other    Pain:  [] No    [x] Yes  Location: through breasts, left flank, and left axilla.    Pain Rating (0-10 pain scale): 7-8/10  Pain Description: sharp, sore, stretching  Interruption of Treatment [x] Yes  [] No - limited ability to tolerate sequence. Came to Clinic:  [] Bandaged    []Unbandaged    [] With Stocking     [] With Sleeve     [] Kinesiotaped    [] [facility]    [] With Alternative Compression:    Skin Integrity:  [x] Normal [] Dry  []Rough []Moist []Rash  Location of problem area/s:  [] Wound: Location/Description   [x] Fibrosis: Location/Description: Increased through scar areas bilaterally, increased throughout right/left breast / flank  [] Keratosis: Location/Description  [] Papillomas: Location/Description  [] Other    Color:  [x] Normal [] Mottled [] Flushed [] Other/Description  Location of problem area/s:    Edema:  Edema Location: right breast  [] 1+ Edema [x] 2+ Edema  [] 3+ Edema [] 4+ Edema  Edema Location: left breast   [x] 1+ Edema [] 2+ Edema  [] 3+ Edema [] 4+ Edema  Edema Location: flank  [x] 1+ Edema [] 2+ Edema  [] 3+ Edema [] 4+ Edema    Subjective: To reduce pain and improve movement. Objective:  [] Measurement [x]Manual Lymph Drainage  []Bandaging   []Kinesiotaping [x] Education    [x]Self Massage   []Self Bandaging [x] Exercise    []Wound Care  [x]Hygiene  [] Other    Assessment:  Knowledge of home program:   [] Good [] Fair  [] Poor  Patient is programming at home:             [] Yes  [] No  Family is assisting with programming: [] Yes  [] No  Home programming is consistent:             [] Yes  [] No  Other:   Response to treatment:  Instructions for patient:   [x] Verbal [x] Written  Instructions addressed: Provided handouts on sequence and exercises to improve tolerance.      Time In: 2:55            Time Out: 3:40                     Timed Code Treatment Minutes: 45 minutes      CODE  Minutes  Units   04460 OT Eval Low     04705 OT Eval Medium     90464 OT Eval High     15151 Fluidotherapy     69371 Manual     39748 Therapeutic Ex     49834 Therapeutic Activity 45 3 92595 ADL/COMP Tech Train     65932 Neuromuscular Re-Ed     89868 OrthoManagementTraining     [address] [facility]     69990 Electrical Stim - Attended     [id number] Iontophoresis     71117 Ultrasound      Other               Plan: Continue to address:  []Bandaging  [] Self Bandaging/Family Assist  []MLD  []Self Massage  []Exercise  []Education  []Obtain referral for garments  []Medical Hold  []Discharge to [facility] [address], [address] [place] [address] Rd

## 2022-03-19 PROBLEM — Z00.00 PHYSICAL EXAM: Status: ACTIVE | Noted: 2022-03-19

## 2022-03-19 ASSESSMENT — ENCOUNTER SYMPTOMS
BACK PAIN: 0
BLOOD IN STOOL: 0
EYE REDNESS: 0
TROUBLE SWALLOWING: 0
EYE DISCHARGE: 0
CHEST TIGHTNESS: 0
COUGH: 0
ANAL BLEEDING: 0
ALLERGIC/IMMUNOLOGIC NEGATIVE: 1
SINUS PAIN: 0
VOICE CHANGE: 0
ABDOMINAL DISTENTION: 0
DIARRHEA: 0
CONSTIPATION: 0
SINUS PRESSURE: 0
EYE PAIN: 0
CHOKING: 0
FACIAL SWELLING: 0
SHORTNESS OF BREATH: 0
STRIDOR: 0
COLOR CHANGE: 0
PHOTOPHOBIA: 0
RHINORRHEA: 0
ABDOMINAL PAIN: 0
EYE ITCHING: 0
NAUSEA: 0
WHEEZING: 0
SORE THROAT: 0
APNEA: 0
RECTAL PAIN: 0

## 2022-03-20 NOTE — PROGRESS NOTES
SUBJECTIVE  Mirela Wahl is a 59 y.o. female. HPI/Chief C/O:  Chief Complaint   Patient presents with    Diabetes     Regular check up    Discuss Medications     Patient wants to discontinue metformin, states it is causing diarrhea. Allergies   Allergen Reactions    Flagyl [Metronidazole] Rash    Omeprazole Diarrhea and Nausea And Vomiting     As well as dizziness    Vicodin [Hydrocodone-Acetaminophen] Nausea And Vomiting     This 59year old female presents for physical exam. Pt has type 2 DM, hypothyroid, GERD, gout, and malignant neoplasm right breast ([address] and lymph edema right upper extremity. Pt wants to stop metformin due to diarrhea. Pt denies chest pain and denies shortness of breath. A1C is 7.0.   ROS:  Review of Systems   Constitutional: Positive for fatigue. Negative for activity change, appetite change, chills, diaphoresis, fever and unexpected weight change. HENT: Negative for congestion, dental problem, drooling, ear discharge, ear pain, facial swelling, hearing loss, mouth sores, nosebleeds, postnasal drip, rhinorrhea, sinus pressure, sinus pain, sneezing, sore throat, tinnitus, trouble swallowing and voice change. Eyes: Negative for photophobia, pain, discharge, redness, itching and visual disturbance. Respiratory: Negative for apnea, cough, choking, chest tightness, shortness of breath, wheezing and stridor. Cardiovascular: Negative. Negative for chest pain, palpitations and leg swelling. Gastrointestinal: Negative for abdominal distention, abdominal pain, anal bleeding, blood in stool, constipation, diarrhea, nausea and rectal pain. Endocrine: Negative. Negative for cold intolerance, heat intolerance, polydipsia, polyphagia and polyuria. Genitourinary: Positive for frequency and urgency. Negative for decreased urine volume, difficulty urinating, dysuria, enuresis and genital sores. Musculoskeletal: Positive for arthralgias and myalgias.  Negative for back pain, gait problem, joint swelling, neck pain and neck stiffness. Skin: Negative. Negative for color change, pallor, rash and wound. Allergic/Immunologic: Negative. Negative for environmental allergies, food allergies and immunocompromised state. Neurological: Negative. Negative for dizziness, tremors, seizures, syncope, facial asymmetry, speech difficulty, weakness, light-headedness, numbness and headaches. Hematological: Negative. Negative for adenopathy. Does not bruise/bleed easily. Psychiatric/Behavioral: Positive for sleep disturbance. Negative for agitation, behavioral problems, confusion, decreased concentration, dysphoric mood, hallucinations, self-injury and suicidal ideas. The patient is nervous/anxious. The patient is not hyperactive.          Past Medical/Surgical Hx;  Reviewed with patient      Diagnosis Date    BRCA1 negative     BRCA2 negative     [facility]     Diabetes mellitus ([facility]     Diverticulitis     Frequent UTI     Hiatal hernia     History of therapeutic radiation     Hypertension     Kidney stones     Lymphedema     right breast    PONV (postoperative nausea and vomiting)     Thyroid disease      Past Surgical History:   Procedure Laterality Date    BREAST REDUCTION SURGERY Right 8/17/2021    RIGHT ONCOPLASTIC BREAST REDUCTION, MATCHING LEFT BREAST REDUCTION AND EXCISION SQUAMOUS CELL CARCINOMA, RIGHT MID CHEST, RIGHT BREAST NEEDLE LOCALIZED LUMPECTOMY BLUE DYE INJECTION, RIGHT AXILLARY SENTINEL NODE EXCISION, POSSIBLE RIGHT AXILLARY DISECTION performed by Summer Wilburn MD at [facility] , LAPAROSCOPIC N/A 6/20/2019    CHOLECYSTECTOMY LAPAROSCOPIC performed by Becky Henson MD at [facility] ECHO COMPL W DOP COLOR FLOW  8/15/2013         ENDOSCOPY, COLON, DIAGNOSTIC      HYSTERECTOMY      RECTAL PROLAPSE REPAIR      US BREAST NEEDLE BIOPSY RIGHT Right 6/24/2021    US BREAST NEEDLE BIOPSY RIGHT 6/24/2021 [facility] Past Family Hx:  Reviewed with patient      Problem Relation Age of Onset    Breast Cancer Mother 52        breast cancer    Prostate Cancer Father     Prostate Cancer Brother     Breast Cancer Maternal Aunt 48        breast cancer    Prostate Cancer Paternal Uncle     Breast Cancer Paternal Grandmother 48        breast cancer    Breast Cancer Paternal Aunt 64       Social Hx:  Reviewed with patient  Social History     Tobacco Use    Smoking status: Never Smoker    Smokeless tobacco: Never Used   Substance Use Topics    Alcohol use: Not Currently     Comment: Rarely social       OBJECTIVE  /74   Pulse 91   Temp 98.8 °F (37.1 °C)   Resp 16   Ht 5' 8\" (1.727 m)   Wt 163 lb (73.9 kg)   LMP  (LMP Unknown)   SpO2 97%   Breastfeeding No   BMI 24.78 kg/m²     Problem List:  Liya Hernandez does not have any pertinent problems on file. PHYS EX:  Physical Exam  Vitals and nursing note reviewed. Constitutional:       General: She is not in acute distress. Appearance: Normal appearance. She is well-developed and normal weight. She is not ill-appearing, toxic-appearing or diaphoretic. HENT:      Head: Normocephalic and atraumatic. Nose: No congestion or rhinorrhea. Mouth/Throat:      Mouth: Mucous membranes are moist.   Eyes:      General: No scleral icterus. Right eye: No discharge. Left eye: No discharge. Extraocular Movements: Extraocular movements intact. Conjunctiva/sclera: Conjunctivae normal.      Pupils: Pupils are equal, round, and reactive to light. Neck:      Thyroid: No thyromegaly. Vascular: No carotid bruit or JVD. Trachea: No tracheal deviation. Cardiovascular:      Rate and Rhythm: Normal rate and regular rhythm. Pulses: Normal pulses. Heart sounds: Normal heart sounds. No murmur heard. No friction rub. No gallop. Pulmonary:      Effort: Pulmonary effort is normal. No respiratory distress.       Breath sounds: Normal breath sounds. No stridor. No wheezing, rhonchi or rales. Chest:      Chest wall: No tenderness. Abdominal:      General: Bowel sounds are normal. There is no distension. Palpations: Abdomen is soft. There is no mass. Tenderness: There is no abdominal tenderness (lower quadrant pains and both flanks). There is no guarding or rebound. Hernia: No hernia is present. Genitourinary:     Vagina: Normal.   Musculoskeletal:         General: Tenderness present. No swelling, deformity or signs of injury. Cervical back: Normal range of motion and neck supple. No rigidity or tenderness. No muscular tenderness. Right lower leg: No edema. Left lower leg: No edema. Comments: Pain and decreased ROM multiple joints. Lymphadenopathy:      Cervical: No cervical adenopathy. Skin:     General: Skin is warm. Coloration: Skin is not jaundiced or pale. Findings: No bruising, erythema, lesion or rash. Neurological:      General: No focal deficit present. Mental Status: She is alert and oriented to person, place, and time. Cranial Nerves: No cranial nerve deficit. Sensory: No sensory deficit. Motor: No weakness or abnormal muscle tone. Coordination: Coordination normal.      Gait: Gait normal.      Deep Tendon Reflexes: Reflexes are normal and symmetric. Reflexes normal.   Psychiatric:         Mood and Affect: Mood normal.         Behavior: Behavior normal.         Thought Content: Thought content normal.         Judgment: Judgment normal.         ASSESSMENT/PLAN  Miguel Snow was seen today for diabetes and discuss medications. Diagnoses and all orders for this visit:    Type 2 diabetes mellitus without complication, without long-term current use of insulin (formerly [facility])  -     POCT Glucose  -     POCT glycosylated hemoglobin (Hb A1C)  -     glucose monitoring (FREESTYLE FREEDOM) kit; 1 kit by Does not apply route daily  -     blood glucose monitor strips;  Test blood sugar daily  -     Lancets MISC; 1 each by Does not apply route daily  -     pioglitazone (ACTOS) 15 MG tablet; Take 1 tablet by mouth daily    Acquired hypothyroidism  -     levothyroxine (SYNTHROID) 50 MCG tablet; Take 1 tablet by mouth Daily    Gastroesophageal reflux disease without esophagitis  -     lansoprazole (PREVACID) 30 MG delayed release capsule; Take 1 capsule by mouth daily    Gout, unspecified cause, unspecified chronicity, unspecified site  -     allopurinol (ZYLOPRIM) 300 MG tablet; Take 1/2 (one-half) tablet by mouth once daily    Physical exam  Pt instructed on ADA diet. Pt instructed if any worse go ED ASAP. Outpatient Encounter Medications as of 3/16/2022   Medication Sig Dispense Refill    NONFORMULARY Vitamin D, vitamin E, Zinc daily.       levothyroxine (SYNTHROID) 50 MCG tablet Take 1 tablet by mouth Daily 90 tablet 1    lansoprazole (PREVACID) 30 MG delayed release capsule Take 1 capsule by mouth daily 90 capsule 1    allopurinol (ZYLOPRIM) 300 MG tablet Take 1/2 (one-half) tablet by mouth once daily 45 tablet 1    glucose monitoring (FREESTYLE FREEDOM) kit 1 kit by Does not apply route daily 1 kit 0    blood glucose monitor strips Test blood sugar daily 100 strip 5    Lancets MISC 1 each by Does not apply route daily 100 each 0    pioglitazone (ACTOS) 15 MG tablet Take 1 tablet by mouth daily 30 tablet 3    anastrozole (ARIMIDEX) 1 MG tablet Take 1 tablet by mouth daily 90 tablet 1    DULoxetine (CYMBALTA) 30 MG extended release capsule Take 1 capsule by mouth daily 30 capsule 0    albuterol sulfate HFA (VENTOLIN HFA) 108 (90 Base) MCG/ACT inhaler Inhale 2 puffs into the lungs every 6 hours as needed for Wheezing or Shortness of Breath 3 Inhaler 1    Probiotic Product (PROBIOTIC PO) Take by mouth       [DISCONTINUED] metFORMIN (GLUCOPHAGE) 500 MG tablet Take 1 tablet by mouth 2 times daily (with meals) 60 tablet 0    [DISCONTINUED] levothyroxine (SYNTHROID) 50 MCG tablet Take 1 tablet by mouth Daily 30 tablet 0    [DISCONTINUED] ibuprofen (ADVIL;MOTRIN) 600 MG tablet Take 1 tablet by mouth 3 times daily as needed for Pain (Patient not taking: Reported on 3/16/2022) 30 tablet 0    [DISCONTINUED] lansoprazole (PREVACID) 30 MG delayed release capsule Take 1 capsule by mouth daily 30 capsule 0    [DISCONTINUED] ondansetron (ZOFRAN) 4 MG tablet Take 1 tablet by mouth daily as needed for Nausea or Vomiting (Patient not taking: Reported on 2/28/2022) 12 tablet 1    [DISCONTINUED] allopurinol (ZYLOPRIM) 300 MG tablet Take 1/2 (one-half) tablet by mouth once daily 45 tablet 1     Facility-Administered Encounter Medications as of 3/16/2022   Medication Dose Route Frequency Provider Last Rate Last Admin    lidocaine-EPINEPHrine 1 %-1:[id number] injection 3 mL  3 mL IntraDERmal Once FLORENTINO Osorio           Return in about 3 months (around 6/16/2022).         Reviewed recent labs related to Yahir's current problems      Discussed importance of regular Health Maintenance follow up  Health Maintenance   Topic    Hepatitis C screen     Diabetic foot exam     HIV screen     DTaP/Tdap/Td vaccine (1 - Tdap)    Shingles Vaccine (1 of 2)    Diabetic retinal exam     COVID-19 Vaccine (3 - Moderna risk 4-dose series)    Flu vaccine (1)    Diabetic microalbuminuria test     Lipid screen     Depression Screen     TSH testing     Breast cancer screen     A1C test (Diabetic or Prediabetic)     Pneumococcal 0-64 years Vaccine (2 of 4 - PPSV23)    Colorectal Cancer Screen     Hepatitis A vaccine     Hib vaccine     Meningococcal (ACWY) vaccine

## 2022-03-22 ENCOUNTER — TREATMENT (OUTPATIENT)
Dept: OCCUPATIONAL THERAPY | Age: 62
End: 2022-03-22
Payer: MEDICAID

## 2022-03-22 ENCOUNTER — TELEPHONE (OUTPATIENT)
Dept: FAMILY MEDICINE CLINIC | Age: 62
End: 2022-03-22

## 2022-03-22 DIAGNOSIS — Z85.3 PERSONAL HISTORY OF BREAST CANCER: ICD-10-CM

## 2022-03-22 PROCEDURE — 97530 THERAPEUTIC ACTIVITIES: CPT

## 2022-03-22 NOTE — TELEPHONE ENCOUNTER
This MA spoke with pt. Pt advised per Dr. Johnnie Craig to give medication time to work, follow diet, and recheck in 2 weeks. Pt verbalized she understood and is amendable with plan.     Electronically signed by Mick Méndez MA on 3/22/22 at 10:27 AM EDT

## 2022-03-22 NOTE — TELEPHONE ENCOUNTER
----- Message from Ramu Sargent sent at 3/21/2022  5:25 PM EDT -----  Subject: Medication Problem    QUESTIONS  Name of Medication? pioglitazone (ACTOS) 15 MG tablet  Patient-reported dosage and instructions? 15 mg twice a day  What question or problem do you have with the medication? Patient states   the medication is not coming down after taking the new medication. Lowest   number was 180+, please call and advise. Preferred Pharmacy? [facility] [address] [place] [address]  Pharmacy phone number (if available)? [phone]  Additional Information for Provider?   ---------------------------------------------------------------------------  --------------  CALL BACK INFO  What is the best way for the office to contact you? OK to leave message on   voicemail  Preferred Call Back Phone Number? [phone]  ---------------------------------------------------------------------------  --------------  SCRIPT ANSWERS  Relationship to Patient?  Self

## 2022-03-22 NOTE — PROGRESS NOTES
OCCUPATIONAL THERAPY PROGRESS NOTE  Phone: [phone]  Fax: [phone]     Date:  3/22/2022  Initial Evaluation Date: 2022                                     Evaluating Therapist: Juan F Hsu OTR/L, [facility]     Patient Name:  Cody Banks                       :  1960     Restrictions/Precautions:  fall risk  Diagnosis:  Personal History of breast cancer (z85.3)                                                           Date of Surgery/Injury: n/a     Insurance/Certification information:  [address] Medicaid - [id number]  Plan of care signed (Y/N): N  Visit# / total visits: Evaluation +1 treatment     Referring Practitioner:  JEET Ramirez CNP  Specific Practitioner Orders: Evaluation and Treatment      Assessment of current deficits   []Pain  []Skin Integrity   [x]Lymphedema   []Functional transfers/mobility   []ADLs   []Strength    []Cognition  []IADLs   []Safety Awareness   []  Motor Endurance    []Fine Motor Coordination   []Balance   []Vision/perception  []Sensation []Gross Motor Coordination  [x]ROM     OT PLAN OF CARE   OT POC based on physician orders, patient diagnosis and results of clinical assessment    Frequency/Duration: 1-2x per week for 12 treatment sessions from 2022 through 2022  Specific OT Treatment to include:   Plan of Care:     [x]97140-Manual Lymph Drainage and Combined Decongestive Therapy  [x]96939- Skilled Multilayer Short Stretch Compression Bandaging/ Therapeutic Exercise  [x]Skin Care Education [x]HEP including Self MLD Education and/or Self Bandaging  [x]Education for Lymphedema Risks/Precautions     [x] Caregiver Education   []other:      Patient Specific Goal: to have less pain    STG: 3 weeks  1. Patient will demonstrate knowledge and understanding for lymphedema precautions, skin care and self management to decrease progression of lymphedema and risk of infection.   Patient educated on precautions and proper hydration of skin to improve skin care under breast. Patient showing increased understanding this date. Will continue to work towards goal with patient. 2.  Patient will present with decreased limb volume in the involved extremity from moderate to minimal for improved functional mobility. Continued education to patient and  on proper steps of sequence, proper technique of stretching the skin, and direction of fluid. Patient stated her and her  performed the sequence and wanted more input on proper technique of sequence. Patient able to tolerate most of the sequence this date and participated in the sequence to completely understand sequence. Patient unable to tolerate any more of the sequence performed this date. Will continue to work with patient to improve completion of sequence. Educated patient to attempt sequence throughout the weekend. 3.  Patient will demonstrate compliance with compression therapy for independent management of lymphedema. Patient able to obtain compression bra this date with patient stating good fit. Patient stated she has completed exercises over the weekend and noted a improvement in ROM. Will continue to work with patient to improve progress towards goal.      LT weeks  1. Patient will be independent with self management of lymphedema including understanding garment wear schedule, self compression bandaging, HEP and self care. 2.  Patient will be fitted for appropriate compression garments for long term management of lymphedema. Patient received compression bra this date. 3.   Patient will present with decreased limb volume in the involved extremity from minimal  to trace  for improved functional mobility. 4.  Patient will maximize pain free range of motion to maximize independence and safety during daily life tasks.            Restrictions/Precautions:  [] No blood pressure/blood draw in: [] Left Upper Extremity     [x] Right Upper Extremity        [] Fall Risk       Intervention:   []Other    Pain:  [] No    [x] Yes  Location: through breasts, left flank, and left axilla. Pain Rating (0-10 pain scale): 5-6/10  Pain Description: sharp, sore, stretching  Interruption of Treatment [x] Yes  [] No - limited ability to tolerate sequence. Came to Clinic:  [] Bandaged    []Unbandaged    [] With Stocking     [] With Sleeve     [] Kinesiotaped    [] [facility]    [] With Alternative Compression:    Skin Integrity:  [x] Normal [] Dry  []Rough []Moist []Rash  Location of problem area/s:  [] Wound: Location/Description   [x] Fibrosis: Location/Description: Increased through scar areas bilaterally, increased throughout right/left breast / flank  [] Keratosis: Location/Description  [] Papillomas: Location/Description  [] Other    Color:  [x] Normal [] Mottled [] Flushed [] Other/Description  Location of problem area/s:    Edema:  Edema Location: right breast  [] 1+ Edema [x] 2+ Edema  [] 3+ Edema [] 4+ Edema  Edema Location: left breast   [x] 1+ Edema [] 2+ Edema  [] 3+ Edema [] 4+ Edema  Edema Location: flank  [x] 1+ Edema [] 2+ Edema  [] 3+ Edema [] 4+ Edema    Subjective: To reduce pain and improve movement. Objective:  [] Measurement [x]Manual Lymph Drainage  []Bandaging   []Kinesiotaping [x] Education    [x]Self Massage   []Self Bandaging [x] Exercise    []Wound Care  [x]Hygiene  [x] Other: Instructions on proper care of compression bra. Assessment:  Knowledge of home program:   [] Good [x] Fair  [] Poor  Patient is programming at home:             [x] Yes  [] No  Family is assisting with programming: [x] Yes  [] No  Home programming is consistent:             [] Yes  [] No  Other:   Response to treatment: patient able to tolerate more of the sequence this date. Instructions for patient:   [x] Verbal [] Written  Instructions addressed: Provided education to patient and  on proper technique and stretching of the skin.     Time In: 2:51           Time Out: 3:51                     Timed Code Treatment Minutes: 60 minutes      CODE  Minutes  Units   73701 OT Eval Low     44522 OT Eval Medium     96895 OT Eval High     39550 Fluidotherapy     27628 Manual     11959 Therapeutic Ex     76105 Therapeutic Activity 60 4   81927 ADL/COMP Tech Train     19507 Neuromuscular Re-Ed     73714 OrthoManagementTraining     00959 Paraffin     81902 Electrical Stim - Attended     [id number] Iontophoresis     89435 Ultrasound      Other               Plan: Continue to address:  []Bandaging  [] Self Bandaging/Family Assist  []MLD  []Self Massage  []Exercise  []Education  []Obtain referral for garments  []Medical Hold  []Discharge to [facility] [address], [address]

## 2022-03-23 DIAGNOSIS — C50.911 MALIGNANT NEOPLASM OF RIGHT BREAST IN FEMALE, ESTROGEN RECEPTOR POSITIVE, UNSPECIFIED SITE OF BREAST (HCC): Primary | ICD-10-CM

## 2022-03-23 DIAGNOSIS — Z17.0 MALIGNANT NEOPLASM OF RIGHT BREAST IN FEMALE, ESTROGEN RECEPTOR POSITIVE, UNSPECIFIED SITE OF BREAST (HCC): Primary | ICD-10-CM

## 2022-03-23 RX ORDER — DULOXETIN HYDROCHLORIDE 30 MG/1
30 CAPSULE, DELAYED RELEASE ORAL DAILY
Qty: 30 CAPSULE | Refills: 0 | Status: SHIPPED | OUTPATIENT
Start: 2022-03-23 | End: 2022-04-25 | Stop reason: SDUPTHER

## 2022-03-24 ENCOUNTER — TELEPHONE (OUTPATIENT)
Dept: OCCUPATIONAL THERAPY | Age: 62
End: 2022-03-24

## 2022-03-24 NOTE — TELEPHONE ENCOUNTER
OCCUPATIONAL THERAPY PROGRESS NOTE  Phone: [phone]  Fax: [phone]     Date: 3/24/2022  Patient Name: Fili Garcia        : 1960       Patient called to cancel appointment stating she does not feel well this date because of her uncontrolled diabetes and headaches. Patient scheduled for next week.        Anish COSTELLO, [facility] Date: 3/24/2022

## 2022-03-29 ENCOUNTER — TREATMENT (OUTPATIENT)
Dept: OCCUPATIONAL THERAPY | Age: 62
End: 2022-03-29
Payer: MEDICAID

## 2022-03-29 DIAGNOSIS — Z85.3 PERSONAL HISTORY OF BREAST CANCER: ICD-10-CM

## 2022-03-29 PROCEDURE — 97530 THERAPEUTIC ACTIVITIES: CPT

## 2022-03-29 NOTE — PROGRESS NOTES
OCCUPATIONAL THERAPY PROGRESS NOTE  Phone: [phone]  Fax: [phone]     Date:  3/29/2022  Initial Evaluation Date: 2022                                     Evaluating Therapist: PINKY Borja/L, CLT-GEMMA     Patient Name:  Mark Gray                       :  1960     Restrictions/Precautions:  fall risk  Diagnosis:  Personal History of breast cancer (z85.3)                                                           Date of Surgery/Injury: n/a     Insurance/Certification information:  [address] Medicaid - [id number]  Plan of care signed (Y/N): N  Visit# / total visits: Evaluation +3 treatment     Referring Practitioner:  Ladena Babinski, APRN - CNP  Specific Practitioner Orders: Evaluation and Treatment      Assessment of current deficits   []Pain  []Skin Integrity   [x]Lymphedema   []Functional transfers/mobility   []ADLs   []Strength    []Cognition  []IADLs   []Safety Awareness   []  Motor Endurance    []Fine Motor Coordination   []Balance   []Vision/perception  []Sensation []Gross Motor Coordination  [x]ROM     OT PLAN OF CARE   OT POC based on physician orders, patient diagnosis and results of clinical assessment    Frequency/Duration: 1-2x per week for 12 treatment sessions from 2022 through 2022  Specific OT Treatment to include:   Plan of Care:     [x]97140-Manual Lymph Drainage and Combined Decongestive Therapy  [x]30201- Skilled Multilayer Short Stretch Compression Bandaging/ Therapeutic Exercise  [x]Skin Care Education [x]HEP including Self MLD Education and/or Self Bandaging  [x]Education for Lymphedema Risks/Precautions     [x] Caregiver Education   []other:      Patient Specific Goal: to have less pain    STG: 3 weeks  1. Patient will demonstrate knowledge and understanding for lymphedema precautions, skin care and self management to decrease progression of lymphedema and risk of infection.   Patient showing increased education on proper precautions and improving skin care. Will continue to work towards goal with patient. 2.  Patient will present with decreased limb volume in the involved extremity from moderate to minimal for improved functional mobility. Patient arrived with reduction noted in fibrotic tissue with patient stating her  and her complete sequence nightly. Continued to educate patient and  on proper steps of sequence, proper technique of stretching the skin, and direction of fluid. Patient able to tolerate more of the sequence this date. Patient unable to tolerate any more of the sequence this date after 35 minutes of the sequence performed. Will continue to work with patient to improve completion of sequence. Educated patient to attempt sequence everyday. 3.  Patient will demonstrate compliance with compression therapy for independent management of lymphedema. Patient has compression bra with good fit noted. Will continue to work with patient to improve progress towards goal.      LT weeks  1. Patient will be independent with self management of lymphedema including understanding garment wear schedule, self compression bandaging, HEP and self care. Progressing towards goal.   2.  Patient will be fitted for appropriate compression garments for long term management of lymphedema. Patient received compression bra. Progressing towards goal.   3.   Patient will present with decreased limb volume in the involved extremity from minimal  to trace  for improved functional mobility. 4.  Patient will maximize pain free range of motion to maximize independence and safety during daily life tasks. Restrictions/Precautions:  [] No blood pressure/blood draw in: [] Left Upper Extremity     [x] Right Upper Extremity        [] Fall Risk       Intervention:   []Other    Pain:  [] No    [x] Yes - reduced this date. Location: through breasts, left flank, and left axilla.    Pain Rating (0-10 pain scale): 4/10  Pain Description: sharp, sore, stretching  Interruption of Treatment [x] Yes  [] No - limited ability to tolerate sequence. Came to Clinic:  [] Bandaged    []Unbandaged    [] With Stocking     [] With Sleeve     [] Kinesiotaped    [] [facility]    [] With Alternative Compression:    Skin Integrity:  [x] Normal [] Dry  []Rough []Moist []Rash  Location of problem area/s:  [] Wound: Location/Description   [x] Fibrosis: Location/Description: scar areas bilaterally, fibrotic tissue throughout right/left breast / flank  [] Keratosis: Location/Description  [] Papillomas: Location/Description  [] Other    Color:  [x] Normal [] Mottled [] Flushed [] Other/Description  Location of problem area/s:    Edema:  Edema Location: right breast  [] 1+ Edema [x] 2+ Edema  [] 3+ Edema [] 4+ Edema  Edema Location: left breast   [x] 1+ Edema [] 2+ Edema  [] 3+ Edema [] 4+ Edema  Edema Location: flank  [x] 1+ Edema [] 2+ Edema  [] 3+ Edema [] 4+ Edema    Subjective: To reduce pain and improve movement. Objective:  [] Measurement [x]Manual Lymph Drainage  []Bandaging   []Kinesiotaping [x] Education    [x]Self Massage   []Self Bandaging [x] Exercise    []Wound Care  [x]Hygiene  [] Other:     Assessment:  Knowledge of home program:   [x] Good [] Fair  [] Poor  Patient is programming at home:             [x] Yes  [] No  Family is assisting with programming: [x] Yes  [] No  Home programming is consistent:             [x] Yes  [] No  Other:   Response to treatment: Patient able to tolerate more of the sequence this date. Instructions for patient:   [x] Verbal [] Written  Instructions addressed: Provided education to patient and  on proper technique and stretching of the skin.     Time In: 2:55           Time Out: 3:55                   Timed Code Treatment Minutes: 60 minutes      CODE  Minutes  Units   86334 OT Eval Low     83756 OT Eval Medium     58771 OT Eval High     63286 Fluidotherapy     69998 Manual     33820 Therapeutic Ex 02235 Therapeutic Activity 60 4   67230 ADL/COMP Tech Train     [id number] Neuromuscular Re-Ed     [id number] OrthoManagementTraining     [id number] Paraffin     [id number] Electrical Stim - Attended     [id number] Iontophoresis     [id number] Ultrasound      Other               Plan: Continue to address:  []Bandaging  [] Self Bandaging/Family Assist  []MLD  []Self Massage  []Exercise  []Education  []Obtain referral for garments  []Medical Hold  []Discharge to [address] [place] [address], [address] [place] [address] Rd

## 2022-03-30 DIAGNOSIS — E11.9 TYPE 2 DIABETES MELLITUS WITHOUT COMPLICATION, WITHOUT LONG-TERM CURRENT USE OF INSULIN (HCC): ICD-10-CM

## 2022-03-30 RX ORDER — PIOGLITAZONEHYDROCHLORIDE 15 MG/1
15 TABLET ORAL 2 TIMES DAILY
Qty: 180 TABLET | Refills: 1 | Status: SHIPPED
Start: 2022-03-30 | End: 2022-09-26

## 2022-03-30 NOTE — TELEPHONE ENCOUNTER
Sin Every called in she needs refill on pioglitazone (ACTOS) 15 MG tablet sent to [facility] [address] ALYSSA CASTLE [address] [place], [place].  Pt said doctor changed her dose last visit she takes 15 mg 2 times at day one in the morning and one at night

## 2022-03-31 ENCOUNTER — TREATMENT (OUTPATIENT)
Dept: OCCUPATIONAL THERAPY | Age: 62
End: 2022-03-31
Payer: MEDICAID

## 2022-03-31 DIAGNOSIS — Z85.3 PERSONAL HISTORY OF BREAST CANCER: ICD-10-CM

## 2022-03-31 PROCEDURE — 97530 THERAPEUTIC ACTIVITIES: CPT

## 2022-03-31 NOTE — PROGRESS NOTES
OCCUPATIONAL THERAPY PROGRESS NOTE  Phone: [phone]  Fax: [phone]     Date:  3/31/2022  Initial Evaluation Date: 2022                                     Evaluating Therapist: DEANNA Brady CLT-LANA     Patient Name:  Vicenta Pitts                       :  1960     Restrictions/Precautions:  fall risk  Diagnosis:  Personal History of breast cancer (z85.3)                                                           Date of Surgery/Injury: n/a     Insurance/Certification information:  [address] [facility] Medicaid - [id number]  Plan of care signed (Y/N): N  Visit# / total visits: Evaluation +4 treatment     Referring Practitioner:  JEET Dorman CNP  Specific Practitioner Orders: Evaluation and Treatment      Assessment of current deficits   []Pain  []Skin Integrity   [x]Lymphedema   []Functional transfers/mobility   []ADLs   []Strength    []Cognition  []IADLs   []Safety Awareness   []  Motor Endurance    []Fine Motor Coordination   []Balance   []Vision/perception  []Sensation []Gross Motor Coordination  [x]ROM     OT PLAN OF CARE   OT POC based on physician orders, patient diagnosis and results of clinical assessment    Frequency/Duration: 1-2x per week for 12 treatment sessions from 2022 through 2022  Specific OT Treatment to include:   Plan of Care:     [x]97140-Manual Lymph Drainage and Combined Decongestive Therapy  [x]32683- Skilled Multilayer Short Stretch Compression Bandaging/ Therapeutic Exercise  [x]Skin Care Education [x]HEP including Self MLD Education and/or Self Bandaging  [x]Education for Lymphedema Risks/Precautions     [x] Caregiver Education   []other:      Patient Specific Goal: to have less pain    STG: 3 weeks  1. Patient will demonstrate knowledge and understanding for lymphedema precautions, skin care and self management to decrease progression of lymphedema and risk of infection.   Patient showing increased education on proper precautions and improving skin care. Will continue to work towards goal with patient. 2.  Patient will present with decreased limb volume in the involved extremity from moderate to minimal for improved functional mobility. Patient arrived with reduction noted in fibrotic tissue with patient stating her  and her complete sequence nightly. Continued to educate patient and  on proper steps of sequence. Patient able to tolerate more of the sequence this date. Patient unable to tolerate any more of the sequence this date after 35 minutes of the sequence performed. Will continue to work with patient to improve completion of sequence. Educated patient to attempt sequence everyday. 3.  Patient will demonstrate compliance with compression therapy for independent management of lymphedema. Patient has compression bra with good fit noted. Will continue to work with patient to improve progress towards goal.      LT weeks  1. Patient will be independent with self management of lymphedema including understanding garment wear schedule, self compression bandaging, HEP and self care. Progressing towards goal.   2.  Patient will be fitted for appropriate compression garments for long term management of lymphedema. Patient received compression bra. Progressing towards goal.   3.   Patient will present with decreased limb volume in the involved extremity from minimal  to trace  for improved functional mobility. 4.  Patient will maximize pain free range of motion to maximize independence and safety during daily life tasks. Patient stated she has been completing exercises each day and with use of theraband. Pt educated on towel exercises on door to improve ROM through axilla. Will continue to work with patient to improve ROM.        Restrictions/Precautions:  [] No blood pressure/blood draw in: [] Left Upper Extremity     [x] Right Upper Extremity        [] Fall Risk       Intervention: []Other    Pain:  [] No    [x] Yes - reduced this date. Location: through breasts, left flank, and left axilla. Pain Rating (0-10 pain scale): 4/10  Pain Description: sharp, sore, stretching  Interruption of Treatment [x] Yes  [] No - limited ability to tolerate sequence. Came to Clinic:  [] Bandaged    []Unbandaged    [] With Stocking     [] With Sleeve     [] Kinesiotaped    [] [facility]    [x] With Alternative Compression: Compression bra    Skin Integrity:  [x] Normal [] Dry  []Rough []Moist []Rash  Location of problem area/s:  [] Wound: Location/Description   [x] Fibrosis: Location/Description: scar areas bilaterally, fibrotic tissue throughout right/left breast / flank  [] Keratosis: Location/Description  [] Papillomas: Location/Description  [] Other    Color:  [x] Normal [] Mottled [] Flushed [] Other/Description  Location of problem area/s:    Edema:  Edema Location: right breast  [] 1+ Edema [x] 2+ Edema  [] 3+ Edema [] 4+ Edema  Edema Location: left breast   [x] 1+ Edema [] 2+ Edema  [] 3+ Edema [] 4+ Edema  Edema Location: flank  [x] 1+ Edema [] 2+ Edema  [] 3+ Edema [] 4+ Edema    Subjective: To reduce pain and improve movement. Objective:  [] Measurement [x]Manual Lymph Drainage  []Bandaging   []Kinesiotaping [x] Education    [x]Self Massage   []Self Bandaging [x] Exercise    []Wound Care  [x]Hygiene  [] Other:     Assessment:  Knowledge of home program:   [x] Good [] Fair  [] Poor  Patient is programming at home:             [x] Yes  [] No  Family is assisting with programming: [x] Yes  [] No  Home programming is consistent:             [x] Yes  [] No  Other:   Response to treatment: Patient able to tolerate more of the sequence this date. Instructions for patient:   [x] Verbal [] Written  Instructions addressed: Provided education to patient and  on proper technique of door exercises.      Time In: 2:49           Time Out: 3:49                  Timed Code Treatment Minutes: 91 minutes      CODE  Minutes  Units   68609 OT Eval Low     38662 OT Eval Medium     89065 OT Eval High     65730 Fluidotherapy     70079 Manual     01538 Therapeutic Ex     88452 Therapeutic Activity 60 4   03438 ADL/COMP Tech Train     37563 Neuromuscular Re-Ed     49116 OrthoManagementTraining     93139 Paraffin     51975 Electrical Stim - Attended     [id number] Iontophoresis     58157 Ultrasound      Other               Plan: Continue to address:  []Bandaging  [] Self Bandaging/Family Assist  [x]MLD  [x]Self Massage  [x]Exercise  [x]Education  [x]Obtain referral for garments  []Medical Hold  []Discharge to [facility], [address] [place] [address] Rd

## 2022-04-04 DIAGNOSIS — E03.9 ACQUIRED HYPOTHYROIDISM: ICD-10-CM

## 2022-04-04 RX ORDER — LEVOTHYROXINE SODIUM 50 UG/1
TABLET ORAL
Qty: 30 TABLET | Refills: 0 | Status: SHIPPED | OUTPATIENT
Start: 2022-04-04

## 2022-04-05 ENCOUNTER — TREATMENT (OUTPATIENT)
Dept: OCCUPATIONAL THERAPY | Age: 62
End: 2022-04-05
Payer: MEDICAID

## 2022-04-05 DIAGNOSIS — Z85.3 PERSONAL HISTORY OF BREAST CANCER: ICD-10-CM

## 2022-04-05 PROCEDURE — 97530 THERAPEUTIC ACTIVITIES: CPT

## 2022-04-05 NOTE — PROGRESS NOTES
OCCUPATIONAL THERAPY PROGRESS NOTE  Phone: [phone]  Fax: [phone]     Date:  2022  Initial Evaluation Date: 2022                                     Evaluating Therapist: DEANNA Pedro CLT-LANA     Patient Name:  Shena Doran                       :  1960     Restrictions/Precautions:  fall risk  Diagnosis:  Personal History of breast cancer (z85.3)                                                           Date of Surgery/Injury: n/a     Insurance/Certification information:  [address] Medicaid - [id number]  Plan of care signed (Y/N): N  Visit# / total visits: Evaluation +5 treatment     Referring Practitioner:  JEET Bynum CNP  Specific Practitioner Orders: Evaluation and Treatment      Assessment of current deficits   []Pain  []Skin Integrity   [x]Lymphedema   []Functional transfers/mobility   []ADLs   []Strength    []Cognition  []IADLs   []Safety Awareness   []  Motor Endurance    []Fine Motor Coordination   []Balance   []Vision/perception  []Sensation []Gross Motor Coordination  [x]ROM     OT PLAN OF CARE   OT POC based on physician orders, patient diagnosis and results of clinical assessment    Frequency/Duration: 1-2x per week for 12 treatment sessions from 2022 through 2022  Specific OT Treatment to include:   Plan of Care:     [x]97140-Manual Lymph Drainage and Combined Decongestive Therapy  [x]25487- Skilled Multilayer Short Stretch Compression Bandaging/ Therapeutic Exercise  [x]Skin Care Education [x]HEP including Self MLD Education and/or Self Bandaging  [x]Education for Lymphedema Risks/Precautions     [x] Caregiver Education   []other:      Patient Specific Goal: to have less pain    STG: 3 weeks  1. Patient will demonstrate knowledge and understanding for lymphedema precautions, skin care and self management to decrease progression of lymphedema and risk of infection.   Patient showing increased education on proper precautions and improving skin care. Will continue to work towards goal with patient. 2.  Patient will present with decreased limb volume in the involved extremity from moderate to minimal for improved functional mobility. Patient arrived with reduction noted in fibrotic tissue with patient stating her  and her complete sequence throughout the week and weekend. Patient also stating they are increasing the time of sequence due to less pain when performing sequence. Continued to educate patient and  on proper steps of sequence. Patient able to tolerate more of the sequence this date. Patient unable to tolerate any more of the sequence this date after 40 minutes of the sequence performed. Will continue to work with patient to improve completion of sequence. Educated patient to attempt sequence everyday. 3.  Patient will demonstrate compliance with compression therapy for independent management of lymphedema. Patient has compression bra with good fit noted. Will continue to work with patient to improve progress towards goal.      LT weeks  1. Patient will be independent with self management of lymphedema including understanding garment wear schedule, self compression bandaging, HEP and self care. Progressing towards goal.   2.  Patient will be fitted for appropriate compression garments for long term management of lymphedema. Patient received compression bra. Progressing towards goal.   3.   Patient will present with decreased limb volume in the involved extremity from minimal  to trace  for improved functional mobility. 4.  Patient will maximize pain free range of motion to maximize independence and safety during daily life tasks. Patient stated she has been completing exercises each day and with use of theraband. Pt educated on towel exercises on door to improve ROM through axilla.  Therapist attempted to work through axilla and some manipulation exercises however patient unable to tolerate due to too much pain throughout shoulder, breast, and axilla. Will continue to work with patient to improve ROM. Restrictions/Precautions:  [] No blood pressure/blood draw in: [] Left Upper Extremity     [x] Right Upper Extremity        [] Fall Risk       Intervention:   []Other    Pain:  [] No    [x] Yes - reduced this date. Location: through breasts, left flank, and left axilla. Pain Rating (0-10 pain scale): 6/10  Pain Description: sharp, sore, stretching  Interruption of Treatment [x] Yes  [] No - limited ability to tolerate sequence. Came to Clinic:  [] Bandaged    []Unbandaged    [] With Stocking     [] With Sleeve     [] Kinesiotaped    [] [facility]    [x] With Alternative Compression: Compression bra    Skin Integrity:  [x] Normal [] Dry  []Rough []Moist []Rash  Location of problem area/s:  [] Wound: Location/Description   [x] Fibrosis: Location/Description: scar areas bilaterally, fibrotic tissue throughout right/left breast / flank  [] Keratosis: Location/Description  [] Papillomas: Location/Description  [] Other    Color:  [x] Normal [] Mottled [] Flushed [] Other/Description  Location of problem area/s:    Edema:  Edema Location: right breast  [] 1+ Edema [x] 2+ Edema  [] 3+ Edema [] 4+ Edema  Edema Location: left breast   [x] 1+ Edema [] 2+ Edema  [] 3+ Edema [] 4+ Edema  Edema Location: flank  [x] 1+ Edema [] 2+ Edema  [] 3+ Edema [] 4+ Edema    Subjective: To reduce pain and improve movement. Objective:  [] Measurement [x]Manual Lymph Drainage  []Bandaging   []Kinesiotaping [x] Education    [x]Self Massage   []Self Bandaging [x] Exercise    []Wound Care  [x]Hygiene  [x] Other: Attempted ROM and manipulation exercises through axilla.       Assessment:  Knowledge of home program:   [x] Good [] Fair  [] Poor  Patient is programming at home:             [x] Yes  [] No  Family is assisting with programming: [x] Yes  [] No  Home programming is consistent:             [x] Yes  [] No  Other:   Response to treatment: Patient able to tolerate more of the sequence this date. Instructions for patient:   [x] Verbal [] Written  Instructions addressed: Provided education to patient and  on proper technique of door exercises.      Time In: 2:50           Time Out: 3:50                  Timed Code Treatment Minutes: 60 minutes      CODE  Minutes  Units   18361 OT Eval Low     69460 OT Eval Medium     55836 OT Eval High     25644 Fluidotherapy     29596 Manual     02933 Therapeutic Ex     60400 Therapeutic Activity 60 4   69100 ADL/COMP Tech Train     18403 Neuromuscular Re-Ed     43680 OrthoManagementTraining     82167 Paraffin     80899 Electrical Stim - Attended     [id number] Iontophoresis     92365 Ultrasound      Other               Plan: Continue to address:  []Bandaging  [] Self Bandaging/Family Assist  [x]MLD  [x]Self Massage  [x]Exercise  [x]Education  [x]Obtain referral for garments  []Medical Hold  []Discharge to [address] [place] [address] Rd

## 2022-04-07 ENCOUNTER — TREATMENT (OUTPATIENT)
Dept: OCCUPATIONAL THERAPY | Age: 62
End: 2022-04-07
Payer: MEDICAID

## 2022-04-07 DIAGNOSIS — Z17.0 MALIGNANT NEOPLASM OF RIGHT BREAST IN FEMALE, ESTROGEN RECEPTOR POSITIVE, UNSPECIFIED SITE OF BREAST (HCC): ICD-10-CM

## 2022-04-07 DIAGNOSIS — C50.911 MALIGNANT NEOPLASM OF RIGHT BREAST IN FEMALE, ESTROGEN RECEPTOR POSITIVE, UNSPECIFIED SITE OF BREAST (HCC): ICD-10-CM

## 2022-04-07 DIAGNOSIS — Z85.3 PERSONAL HISTORY OF BREAST CANCER: ICD-10-CM

## 2022-04-07 PROCEDURE — 97530 THERAPEUTIC ACTIVITIES: CPT

## 2022-04-07 NOTE — PROGRESS NOTES
OCCUPATIONAL THERAPY PROGRESS NOTE  Phone: [phone]  Fax: [phone]     Date:  2022  Initial Evaluation Date: 2022                                     Evaluating Therapist: Don Galeazzi, OTR/L, CLT-LANA     Patient Name:  Colleen Moss                       :  1960     Restrictions/Precautions:  fall risk  Diagnosis:  Personal History of breast cancer (z85.3)                                                           Date of Surgery/Injury: n/a     Insurance/Certification information:  [address] Medicaid - [id number]  Plan of care signed (Y/N): N  Visit# / total visits: Evaluation +6 treatment     Referring Practitioner:  JEET Fink CNP  Specific Practitioner Orders: Evaluation and Treatment      Assessment of current deficits   []Pain  []Skin Integrity   [x]Lymphedema   []Functional transfers/mobility   []ADLs   []Strength    []Cognition  []IADLs   []Safety Awareness   []  Motor Endurance    []Fine Motor Coordination   []Balance   []Vision/perception  []Sensation []Gross Motor Coordination  [x]ROM     OT PLAN OF CARE   OT POC based on physician orders, patient diagnosis and results of clinical assessment    Frequency/Duration: 1-2x per week for 12 treatment sessions from 2022 through 2022  Specific OT Treatment to include:   Plan of Care:     [x]97140-Manual Lymph Drainage and Combined Decongestive Therapy  [x]73007- Skilled Multilayer Short Stretch Compression Bandaging/ Therapeutic Exercise  [x]Skin Care Education [x]HEP including Self MLD Education and/or Self Bandaging  [x]Education for Lymphedema Risks/Precautions     [x] Caregiver Education   []other:      Patient Specific Goal: to have less pain    STG: 3 weeks  1. Patient will demonstrate knowledge and understanding for lymphedema precautions, skin care and self management to decrease progression of lymphedema and risk of infection.   Patient showing increased education on proper precautions and improving skin care. Will continue to work towards goal with patient. 2.  Patient will present with decreased limb volume in the involved extremity from moderate to minimal for improved functional mobility. Patient arrived with reduction noted in fibrotic tissue. Patient stating she completes the manual lymphatic massage however unable to do for a long period of time due to soreness. Patient continuing to state they are increasing the time of sequence due to less pain when performing sequence. Patient unable to tolerate any more of the sequence this date after 30 minutes of the sequence performed. Will continue to work with patient to improve completion of sequence. Educated patient to attempt sequence everyday. 3.  Patient will demonstrate compliance with compression therapy for independent management of lymphedema. Patient has compression bra with good fit noted. Will continue to work with patient to improve progress towards goal.      LT weeks  1. Patient will be independent with self management of lymphedema including understanding garment wear schedule, self compression bandaging, HEP and self care. Progressing towards goal.   2.  Patient will be fitted for appropriate compression garments for long term management of lymphedema. Patient received compression bra. Progressing towards goal.   3.   Patient will present with decreased limb volume in the involved extremity from minimal  to trace  for improved functional mobility. 4.  Patient will maximize pain free range of motion to maximize independence and safety during daily life tasks. Patient stated she has been completing exercises each day and with use of theraband. Pt educated on towel exercises on door to improve ROM through axilla. Patient did not therapist to complete ROM exercises this date due to amount of pain from last treatment. Will continue to work with patient to improve ROM.        Restrictions/Precautions:  [] No blood pressure/blood draw in: [] Left Upper Extremity     [x] Right Upper Extremity        [] Fall Risk       Intervention:   []Other    Pain:  [] No    [x] Yes - reduced this date. Location: through breasts, left flank, and left axilla. Pain Rating (0-10 pain scale): 6/10  Pain Description: sharp, sore, stretching  Interruption of Treatment [x] Yes  [] No - limited ability to tolerate sequence. Came to Clinic:  [] Bandaged    []Unbandaged    [] With Stocking     [] With Sleeve     [] Kinesiotaped    [] [facility]    [x] With Alternative Compression: Compression bra    Skin Integrity:  [x] Normal [] Dry  []Rough []Moist []Rash  Location of problem area/s:  [] Wound: Location/Description   [x] Fibrosis: Location/Description: scar areas bilaterally, fibrotic tissue throughout right/left breast / flank  [] Keratosis: Location/Description  [] Papillomas: Location/Description  [] Other    Color:  [x] Normal [] Mottled [] Flushed [] Other/Description  Location of problem area/s:    Edema:  Edema Location: right breast  [] 1+ Edema [x] 2+ Edema  [] 3+ Edema [] 4+ Edema  Edema Location: left breast   [x] 1+ Edema [] 2+ Edema  [] 3+ Edema [] 4+ Edema  Edema Location: flank  [x] 1+ Edema [] 2+ Edema  [] 3+ Edema [] 4+ Edema    Subjective: To reduce pain and improve movement. Objective:  [] Measurement [x]Manual Lymph Drainage  []Bandaging   []Kinesiotaping [x] Education    [x]Self Massage   []Self Bandaging [x] Exercise    []Wound Care  [x]Hygiene  [] Other: Attempted ROM and manipulation exercises through axilla. Assessment:  Knowledge of home program:   [x] Good [] Fair  [] Poor  Patient is programming at home:             [x] Yes  [] No  Family is assisting with programming: [x] Yes  [] No  Home programming is consistent:             [x] Yes  [] No  Other:   Response to treatment: Patient still having trouble tolerating sequence.    Instructions for patient:   [x] Verbal [] Written  Instructions addressed: Provided education to patient to complete ROM exercises daily to increase movement in axilla and shoulder. Due to patient not allowing therapist to improve ROM at this time.      Time In: 2:48           Time Out: 3:30                 Timed Code Treatment Minutes: 42 minutes      CODE  Minutes  Units   60972 OT Eval Low     28858 OT Eval Medium     55985 OT Eval High     77458 Fluidotherapy     49050 Manual     69229 Therapeutic Ex     83347 Therapeutic Activity 42 3   88715 ADL/COMP Tech Train     88552 Neuromuscular Re-Ed     78383 OrthoManagementTraining     75982 [facility]     63670 Electrical Stim - Attended     [id number] Iontophoresis     35254 Ultrasound      Other               Plan: Continue to address:  []Bandaging  [] Self Bandaging/Family Assist  [x]MLD  [x]Self Massage  [x]Exercise  [x]Education  [x]Obtain referral for garments  []Medical Hold  []Discharge to [address] [place] [address] Rd

## 2022-04-11 ENCOUNTER — HOSPITAL ENCOUNTER (OUTPATIENT)
Dept: INFUSION THERAPY | Age: 62
Discharge: HOME | End: 2022-04-11
Payer: MEDICAID

## 2022-04-11 DIAGNOSIS — C50.911 MALIGNANT NEOPLASM OF RIGHT BREAST IN FEMALE, ESTROGEN RECEPTOR POSITIVE, UNSPECIFIED SITE OF BREAST (HCC): Primary | ICD-10-CM

## 2022-04-11 DIAGNOSIS — Z17.0 MALIGNANT NEOPLASM OF RIGHT BREAST IN FEMALE, ESTROGEN RECEPTOR POSITIVE, UNSPECIFIED SITE OF BREAST (HCC): Primary | ICD-10-CM

## 2022-04-11 DIAGNOSIS — Z79.811 USE OF AROMATASE INHIBITORS: Primary | ICD-10-CM

## 2022-04-11 LAB
ALBUMIN SERPL-MCNC: 4.2 G/DL (ref 3.5–5.2)
ALP BLD-CCNC: 64 U/L (ref 35–104)
ALT SERPL-CCNC: 10 U/L (ref 0–32)
ANION GAP SERPL CALCULATED.3IONS-SCNC: 12 MMOL/L (ref 7–16)
AST SERPL-CCNC: 14 U/L (ref 0–31)
BASOPHILS ABSOLUTE: 0.04 E9/L (ref 0–0.2)
BASOPHILS RELATIVE PERCENT: 0.9 % (ref 0–2)
BILIRUB SERPL-MCNC: 0.5 MG/DL (ref 0–1.2)
BUN BLDV-MCNC: 21 MG/DL (ref 6–23)
CALCIUM SERPL-MCNC: 9.3 MG/DL (ref 8.6–10.2)
CHLORIDE BLD-SCNC: 100 MMOL/L (ref 98–107)
CO2: 24 MMOL/L (ref 22–29)
CREAT SERPL-MCNC: 0.9 MG/DL (ref 0.5–1)
EOSINOPHILS ABSOLUTE: 0.3 E9/L (ref 0.05–0.5)
EOSINOPHILS RELATIVE PERCENT: 6.7 % (ref 0–6)
GFR AFRICAN AMERICAN: >60
GFR NON-AFRICAN AMERICAN: >60 ML/MIN/1.73
GLUCOSE BLD-MCNC: 162 MG/DL (ref 74–99)
HCT VFR BLD CALC: 40.6 % (ref 34–48)
HEMOGLOBIN: 13.3 G/DL (ref 11.5–15.5)
IMMATURE GRANULOCYTES #: 0.01 E9/L
IMMATURE GRANULOCYTES %: 0.2 % (ref 0–5)
LYMPHOCYTES ABSOLUTE: 1.33 E9/L (ref 1.5–4)
LYMPHOCYTES RELATIVE PERCENT: 29.6 % (ref 20–42)
MCH RBC QN AUTO: 30.7 PG (ref 26–35)
MCHC RBC AUTO-ENTMCNC: 32.8 % (ref 32–34.5)
MCV RBC AUTO: 93.8 FL (ref 80–99.9)
MONOCYTES ABSOLUTE: 0.46 E9/L (ref 0.1–0.95)
MONOCYTES RELATIVE PERCENT: 10.2 % (ref 2–12)
NEUTROPHILS ABSOLUTE: 2.36 E9/L (ref 1.8–7.3)
NEUTROPHILS RELATIVE PERCENT: 52.4 % (ref 43–80)
PDW BLD-RTO: 11.7 FL (ref 11.5–15)
PLATELET # BLD: 312 E9/L (ref 130–450)
PMV BLD AUTO: 9.6 FL (ref 7–12)
POTASSIUM SERPL-SCNC: 4.2 MMOL/L (ref 3.5–5)
RBC # BLD: 4.33 E12/L (ref 3.5–5.5)
SODIUM BLD-SCNC: 136 MMOL/L (ref 132–146)
TOTAL PROTEIN: 7 G/DL (ref 6.4–8.3)
WBC # BLD: 4.5 E9/L (ref 4.5–11.5)

## 2022-04-11 PROCEDURE — 80053 COMPREHEN METABOLIC PANEL: CPT

## 2022-04-11 PROCEDURE — 36415 COLL VENOUS BLD VENIPUNCTURE: CPT

## 2022-04-11 PROCEDURE — 85025 COMPLETE CBC W/AUTO DIFF WBC: CPT

## 2022-04-12 ENCOUNTER — TREATMENT (OUTPATIENT)
Dept: OCCUPATIONAL THERAPY | Age: 62
End: 2022-04-12
Payer: MEDICAID

## 2022-04-12 ENCOUNTER — TELEPHONE (OUTPATIENT)
Dept: CASE MANAGEMENT | Age: 62
End: 2022-04-12

## 2022-04-12 ENCOUNTER — OFFICE VISIT (OUTPATIENT)
Dept: ONCOLOGY | Age: 62
End: 2022-04-12
Payer: MEDICAID

## 2022-04-12 VITALS — HEIGHT: 68 IN | TEMPERATURE: 98.1 F | HEART RATE: 66 BPM | WEIGHT: 163 LBS | BODY MASS INDEX: 24.71 KG/M2

## 2022-04-12 DIAGNOSIS — Z85.3 PERSONAL HISTORY OF BREAST CANCER: ICD-10-CM

## 2022-04-12 DIAGNOSIS — Z79.811 USE OF AROMATASE INHIBITORS: Primary | ICD-10-CM

## 2022-04-12 PROCEDURE — 1036F TOBACCO NON-USER: CPT | Performed by: INTERNAL MEDICINE

## 2022-04-12 PROCEDURE — G8420 CALC BMI NORM PARAMETERS: HCPCS | Performed by: INTERNAL MEDICINE

## 2022-04-12 PROCEDURE — 99214 OFFICE O/P EST MOD 30 MIN: CPT | Performed by: INTERNAL MEDICINE

## 2022-04-12 PROCEDURE — 3017F COLORECTAL CA SCREEN DOC REV: CPT | Performed by: INTERNAL MEDICINE

## 2022-04-12 PROCEDURE — 97530 THERAPEUTIC ACTIVITIES: CPT

## 2022-04-12 PROCEDURE — 99212 OFFICE O/P EST SF 10 MIN: CPT

## 2022-04-12 PROCEDURE — G8427 DOCREV CUR MEDS BY ELIG CLIN: HCPCS | Performed by: INTERNAL MEDICINE

## 2022-04-12 NOTE — PROGRESS NOTES
OCCUPATIONAL THERAPY PROGRESS NOTE  Phone: [phone]  Fax: [phone]     Date:  2022  Initial Evaluation Date: 2022                                     Evaluating Therapist: DEANNA Moreira CLT-LANA     Patient Name:  Barbara Cruz                       :  1960     Restrictions/Precautions:  fall risk  Diagnosis:  Personal History of breast cancer (z85.3)                                                           Date of Surgery/Injury: n/a     Insurance/Certification information:  [address] Medicaid - [id number]  Plan of care signed (Y/N): N  Visit# / total visits: Evaluation +7 treatment     Referring Practitioner:  JEET Soriano CNP  Specific Practitioner Orders: Evaluation and Treatment      Assessment of current deficits   []Pain  []Skin Integrity   [x]Lymphedema   []Functional transfers/mobility   []ADLs   []Strength    []Cognition  []IADLs   []Safety Awareness   []  Motor Endurance    []Fine Motor Coordination   []Balance   []Vision/perception  []Sensation []Gross Motor Coordination  [x]ROM     OT PLAN OF CARE   OT POC based on physician orders, patient diagnosis and results of clinical assessment    Frequency/Duration: 1-2x per week for 12 treatment sessions from 2022 through 2022  Specific OT Treatment to include:   Plan of Care:     [x]97140-Manual Lymph Drainage and Combined Decongestive Therapy  [x]79160- Skilled Multilayer Short Stretch Compression Bandaging/ Therapeutic Exercise  [x]Skin Care Education [x]HEP including Self MLD Education and/or Self Bandaging  [x]Education for Lymphedema Risks/Precautions     [x] Caregiver Education   []other:      Patient Specific Goal: to have less pain    STG: 3 weeks  1. Patient will demonstrate knowledge and understanding for lymphedema precautions, skin care and self management to decrease progression of lymphedema and risk of infection.   Patient showing increased education on proper precautions of lymphedema. Will continue to work towards goal with patient. 2.  Patient will present with decreased limb volume in the involved extremity from moderate to minimal for improved functional mobility. Patient arrived with reduction noted in fibrotic tissue through breast. Patient stating she completes the manual lymphatic massage however unable to do for a long period of time due to soreness. Patient able to improve tolerance this date with sequence. Will continue to work with patient to improve completion of sequence. Educated patient to attempt sequence everyday. 3.  Patient will demonstrate compliance with compression therapy for independent management of lymphedema. Patient has compression bra with good fit noted. Will continue to work with patient to improve progress towards goal.      LT weeks  1. Patient will be independent with self management of lymphedema including understanding garment wear schedule, self compression bandaging, HEP and self care. Progressing towards goal.   2.  Patient will be fitted for appropriate compression garments for long term management of lymphedema. Patient received compression bra. Progressing towards goal.   3.   Patient will present with decreased limb volume in the involved extremity from minimal  to trace  for improved functional mobility. 4.  Patient will maximize pain free range of motion to maximize independence and safety during daily life tasks. Patient stated she has been completing exercises almost daily and with use of theraband. Pt educated on towel exercises on door to improve ROM through axilla. Continued education on door exercises with patient stated she has been working on the door exercises. Will continue to work with patient to improve ROM.        Restrictions/Precautions:  [] No blood pressure/blood draw in: [] Left Upper Extremity     [x] Right Upper Extremity        [] Fall Risk       Intervention:   []Other    Pain:  [] No [x] Yes - reduced this date. Location: through breasts, left flank, and left axilla. Pain Rating (0-10 pain scale): 5/10  Pain Description: sharp, sore, stretching  Interruption of Treatment [x] Yes  [] No - limited ability to tolerate sequence. Came to Clinic:  [] Bandaged    []Unbandaged    [] With Stocking     [] With Sleeve     [] Kinesiotaped    [] [facility]    [x] With Alternative Compression: Compression bra    Skin Integrity:  [x] Normal [] Dry  []Rough []Moist []Rash  Location of problem area/s:  [] Wound: Location/Description   [x] Fibrosis: Location/Description: scar areas bilaterally, fibrotic tissue throughout right/left breast / flank  [] Keratosis: Location/Description  [] Papillomas: Location/Description  [] Other    Color:  [x] Normal [] Mottled [] Flushed [] Other/Description  Location of problem area/s:    Edema:  Edema Location: right breast  [] 1+ Edema [x] 2+ Edema  [] 3+ Edema [] 4+ Edema  Edema Location: left breast   [x] 1+ Edema [] 2+ Edema  [] 3+ Edema [] 4+ Edema  Edema Location: flank  [x] 1+ Edema [] 2+ Edema  [] 3+ Edema [] 4+ Edema    Subjective: To reduce pain and improve movement. Objective:  [] Measurement [x]Manual Lymph Drainage  []Bandaging   []Kinesiotaping [x] Education    [x]Self Massage   []Self Bandaging [x] Exercise    []Wound Care  [x]Hygiene  [] Other: Attempted ROM and manipulation exercises through axilla. Assessment:  Knowledge of home program:   [x] Good [] Fair  [] Poor  Patient is programming at home:             [x] Yes  [] No  Family is assisting with programming: [x] Yes  [] No  Home programming is consistent:             [x] Yes  [] No  Other:   Response to treatment: Patient still having trouble tolerating sequence. Instructions for patient:   [x] Verbal [] Written  Instructions addressed: Provided education to patient to complete ROM exercises daily to increase movement in axilla and shoulder.  Due to patient not allowing therapist to improve ROM at this time.      Time In: 1057           Time Out: 1157                Timed Code Treatment Minutes: 60 minutes      CODE  Minutes  Units   63805 OT Eval Low     71980 OT Eval Medium     53010 OT Eval High     47500 Fluidotherapy     12319 Manual     94632 Therapeutic Ex     20975 Therapeutic Activity 60 4   00531 ADL/COMP Tech Train     72792 Neuromuscular Re-Ed     68940 OrthoManagementTraining     91147 [place]     53716 Electrical Stim - Attended     [id number] Iontophoresis     61481 Ultrasound      Other               Plan: Continue to address:  []Bandaging  [] Self Bandaging/Family Assist  [x]MLD  [x]Self Massage  [x]Exercise  [x]Education  [x]Obtain referral for garments  []Medical Hold  []Discharge to [address], [address] [place] [address] Rd

## 2022-04-12 NOTE — TELEPHONE ENCOUNTER
Met with patient and her  during her follow up appointment with Dr. Charline Ramos today. Patient completed her active treatment in November 2021 for her Stage IIA (T2, N1mi, M0) ER/ID+, Her2- right breast cancer. She appears well and is in good spirits. States that she is doing well besides some mild fatigue. Reports that her appetite is good and she is sleeping well. Upon inquiring, states that she is doing well with the Arimidex medication daily. Reports occasional hot flashes (1/day) and no increase in baseline joint pain side. Reviewed daily Calcium and Vitamin D supplement recommendations and NCCN DEXA Scan guidelines while on hormone therapy for breast cancer. Provided support and encouragement. Instructed patient in detail on her Cancer Treatment Summary and Survivorship Care Plan. Provided with a written copy. Aware that a copy will be sent to her PCP as well. Provided written copies of Patient Resource Booklet: Cancer Survivorship, Nutrition Following Cancer Treatment: Oncology Nutrition Guide, [facility] support group pamphlet, and Thriving and Surviving Post-Cancer Treatment: Nutritional and Emotional Support Services packet. Patient aware of mammogram due annually on 7/11/2022 during her follow up appointment with Tyson Nino CNP. Her next follow up appointment with Dr. Charline Ramos will be scheduled 8/9/2022. Completed patient's lymphedema upper extremity measurements and charted in patient's flow sheet. Patient is established with OT and has an appointment this afternoon. Instructed patient to call with any questions or concerns. Verbally agreed. Patient appreciative of visit and information. Tanya Degroot, DYLANW, RN, OCN  Oncology Nurse Navigator

## 2022-04-12 NOTE — PROGRESS NOTES
Department of [place] [address]  Attending Clinic Note    Reason for Visit: Follow-up on a patient with Right Breast Cancer    PCP:  Trent Charles DO    History of Present Illness: The lesion was located in the 8 o'clock position of the right breast    Breast cancer risk factors include mom breast cancer age 52, paternal aunt breast cancer, 48 paternal gm breast cancer age 48    Bilateral Diagnostic Mammogram/Right Breast U/S on 06/16/2021:  Large irregular hypoechoic solid mass seen on ultrasound and mammogram at about the 8 to 9 o'clock position measuring 2.4 x 1.4 x 1.4 cm.      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 Comment: The carcinoma displays highly infiltrative architectural features including single file and single cell patterns of invasion. Strong membranous E-cadherin expression is diffusely present in association with the invasive carcinoma, supportive of ductal origin. No in situ component is identified in the tissue sample. [place] histologic score for the invasive carcinoma corresponds to a tubule score 3, nuclear score 3, mitotic score 1, total score 7, grade 2. Intradepartmental consultation is obtained. Breast Cancer Marker Studies:   Estrogen Receptors (ER):90%   Progesterone Receptors (IL): 30%   Her-2/stone (c-erb B-2) protein expression: Negative/0     CXR 07/06/2021:  No acute process    MRI Bilateral Breast 07/08/2021:  Irregular mass in the right breast 8 o'clock measuring up to 2.4 cm in size is consistent with biopsy proven neoplasm.  Focal, heterogeneous non mass enhancement along the lateral aspect of the mass may be due to recent biopsy vs satellite lesion.   No LN  No evidence of neoplasm in the left breast    Right oncoplastic breast reduction, matching left breast reduction and excision squamous cell carcinoma, right mid chest with Dr Oniel Ochoa and right breast needle localized lumpectomy, blue dye injection, right axillary sentinel node excision, possible right axillary dissection with Dr Mayank Bryant on August 17, 2021  A.  Left breast, reduction mammoplasty:   - Skin and benign breast tissue (288.8 g) showing stromal fibrosis/hyalinosis. Rafael Sizer, lesion of mid chest, excision:   - Focal residual squamous cell carcinoma in situ, margins free of tumor;   - Actinic keratosis;   - Dermal scar with chronic inflammation, consistent with changes of previous biopsy site. C.  [place] lymph node #1, biopsy:    - Two of two (2/2) nodes positive for metastatic carcinoma (micrometastasis), see comment. D.  [place] lymph node #2, biopsy:   - Benign node (1) showing fatty replacement. E.  [place] lymph node #3, biopsy:   - Benign node (1) showing fatty replacement. F.  Right breast, central, lumpectomy with needle localization:   - Invasive carcinoma of no special type (ductal), grade 3;   - Changes of previous biopsy site, see comment. G.  [place] lymph node #4, biopsy:   - Benign node showing prominent fatty replacement. H.  Right breast, reduction mammoplasty:   - Skin and benign breast tissue (79.6 g) showing stromal fibrosis/hyalinosis. Comment:A few small foci of metastatic carcinoma (</= 2 mm/each metastasis) in two sentinel lymph nodes in specimen C are noted histologically and further confirmed by immunostaining for pankeratin and GATA3. Comparing with the patient's previous core needle biopsy specimen from the same tumor on 07/16/2021 (see report JVJ-), the tumor seen here is much more mitotic active (focally, 3-4 mitotic figures/hpf). Therefore, the provisional Benedicto score of this tumor is updated to 3+3+3=9 (grade 3) from originally reported 3+3+1=7 (grade 2).      CANCER CASE SUMMARY   Procedure: Excision   Specimen Laterality: Right   TUMOR   Tumor Site: Central Histologic Type: Invasive carcinoma of no special type   Histologic Grade: Benedicto Histologic Score   Glandular/tubular differentiation: Score 3   Nuclear pleomorphism: Score 3   Mitotic rate: Score 3   Overall grade: Grade 3 (scores of 9)   Tumor Size: 2.2 x 2.2 x 1.4 cm   Tumor Focality: Single focus of invasive carcinoma   Ductal Carcinoma In Situ (DCIS): Not identified   Lobular Carcinoma In Situ (LCIS): Not identified   Lymph-Vascular Invasion: Not identified   Dermal lymphovascular invasion: No skin present   Microcalcifications: Not identified   Treatment effect: No known presurgical therapy   MARGINS   All margins negative for invasive carcinoma   Distance from invasive carcinoma to closest margin: 1 mm   Closest margins to invasive carcinoma: medial (slide F1) and posterior (slide F2)   REGIONAL LYMPH NODES   Tumor present in regional lymph nodes   Number of lymph nodes with macrometastasis (>2 mm): 0   Number of lymph nodes with micrometasteses (0.2-2 mm): 2   Number of lymph nodes with isolated tumor cells: 0   Size of largest jarett metastasis: 2 mm   Extranodal extension: Not identified   Total number of lymph nodes examined: 4   Number of sentinel nodes examined: 4   DISTANT METASTASIS: Not applicable   Pathologic Staging (pTNM, AJCC 8th Edition)   pT2 (sn)pN1mi     Breast Cancer Marker Studies:   Estrogen Receptors (ER):90%   Progesterone Receptors (MT): 30%   Her-2/stone (c-erb B-2) protein expression: Negative/0     Oncotype Dx  Recurrence Score Result-20  No indication for adjuvant chemotherapy  Recommended adjuvant RT followed by adjuvant endocrine therapy   RT was completed 11/08/2021. Arimidex 1 mg po daily was started on 11/09/2021 with fair tolerance so far. Today 04/12/2022; tolerating Arimidex well. Mild hot flashes manageable. Working with OT Lymphedema clinic    Review of Systems;  CONSTITUTIONAL: No fever. Mild hot flashes manageable. Fair appetite and energy level.    ENMT: Eyes: No diplopia; Nose: No epistaxis. Mouth: No sore throat. RESPIRATORY: No hemoptysis, shortness of breath, cough. CARDIOVASCULAR: No chest pain, palpitations. GASTROINTESTINAL: No nausea/vomiting, abdominal pain  GENITOURINARY: No dysuria, urinary frequency, hematuria. NEURO: No syncope, presyncope, headache. Psych: mild mood changes  Remainder:  ROS NEGATIVE    Past Medical History:      Diagnosis Date    BRCA1 negative     BRCA2 negative     Cancer (HCC)     Diabetes mellitus ([facility] [address]     Diverticulitis     Frequent UTI     Hiatal hernia     History of therapeutic radiation     Hypertension     Kidney stones     Lymphedema     right breast    PONV (postoperative nausea and vomiting)     Thyroid disease      Medications:  Reviewed and reconciled. Allergies: Allergies   Allergen Reactions    Flagyl [Metronidazole] Rash    Omeprazole Diarrhea and Nausea And Vomiting     As well as dizziness    Vicodin [Hydrocodone-Acetaminophen] Nausea And Vomiting     Physical Exam:  Pulse 66   Temp 98.1 °F (36.7 °C)   Ht 5' 8\" (1.727 m)   Wt 163 lb (73.9 kg)   LMP  (LMP Unknown)   BMI 24.78 kg/m²   GENERAL: Alert, oriented x 3, not in acute distress. LUNGS: CTA [place]  CVS: RRR  EXTREMITIES: Without clubbing, cyanosis, or edema. NEUROLOGIC: No focal deficits.    ECOG1    Lab Results   Component Value Date    WBC 4.5 04/11/2022    HGB 13.3 04/11/2022    HCT 40.6 04/11/2022    MCV 93.8 04/11/2022     04/11/2022     Lab Results   Component Value Date     04/11/2022    K 4.2 04/11/2022     04/11/2022    CO2 24 04/11/2022    BUN 21 04/11/2022    CREATININE 0.9 04/11/2022    GLUCOSE 162 (H) 04/11/2022    CALCIUM 9.3 04/11/2022    PROT 7.0 04/11/2022    LABALBU 4.2 04/11/2022    BILITOT 0.5 04/11/2022    ALKPHOS 64 04/11/2022    AST 14 04/11/2022    ALT 10 04/11/2022    LABGLOM >60 04/11/2022    GFRAA >60 04/11/2022     Impression/Plan:  60 y/o female who underwent Right oncoplastic breast reduction, matching left breast reduction and excision squamous cell carcinoma, right mid chest with Dr Guero Wu and right breast needle localized lumpectomy, blue dye injection, right axillary sentinel node excision, possible right axillary dissection with Dr Sarah San on August 17, 2021  A.  Left breast, reduction mammoplasty:   - Skin and benign breast tissue (288.8 g) showing stromal fibrosis/hyalinosis. Evertt Gondola, lesion of mid chest, excision:   - Focal residual squamous cell carcinoma in situ, margins free of tumor;   - Actinic keratosis;   - Dermal scar with chronic inflammation, consistent with changes of previous biopsy site. C.  [place] lymph node #1, biopsy:    - Two of two (2/2) nodes positive for metastatic carcinoma (micrometastasis), see comment. D.  [place] lymph node #2, biopsy:   - Benign node (1) showing fatty replacement. E.  [place] lymph node #3, biopsy:   - Benign node (1) showing fatty replacement. F.  Right breast, central, lumpectomy with needle localization:   - Invasive carcinoma of no special type (ductal), grade 3;   - Changes of previous biopsy site, see comment. G.  [place] lymph node #4, biopsy:   - Benign node showing prominent fatty replacement. H.  Right breast, reduction mammoplasty:   - Skin and benign breast tissue (79.6 g) showing stromal fibrosis/hyalinosis. Comment:A few small foci of metastatic carcinoma (</= 2 mm/each metastasis) in two sentinel lymph nodes in specimen C are noted histologically and further confirmed by immunostaining for pankeratin and GATA3. Comparing with the patient's previous core needle biopsy specimen from the same tumor on 07/16/2021 (see report N-), the tumor seen here is much more mitotic active (focally, 3-4 mitotic figures/hpf). Therefore, the provisional Benedicto score of this tumor is updated to 3+3+3=9 (grade 3) from originally reported 3+3+1=7 (grade 2).      CANCER CASE SUMMARY   Procedure: Excision   Specimen Laterality: Right   TUMOR   Tumor Site: Central   Histologic Type: Invasive carcinoma of no special type   Histologic Grade: [place] Histologic Score   Glandular/tubular differentiation: Score 3   Nuclear pleomorphism: Score 3   Mitotic rate: Score 3   Overall grade: Grade 3 (scores of 9)   Tumor Size: 2.2 x 2.2 x 1.4 cm   Tumor Focality: Single focus of invasive carcinoma   Ductal Carcinoma In Situ (DCIS): Not identified   Lobular Carcinoma In Situ (LCIS): Not identified   Lymph-Vascular Invasion: Not identified   Dermal lymphovascular invasion: No skin present   Microcalcifications: Not identified   Treatment effect: No known presurgical therapy   MARGINS   All margins negative for invasive carcinoma   Distance from invasive carcinoma to closest margin: 1 mm   Closest margins to invasive carcinoma: medial (slide F1) and posterior (slide F2)   REGIONAL LYMPH NODES   Tumor present in regional lymph nodes   Number of lymph nodes with macrometastasis (>2 mm): 0   Number of lymph nodes with micrometasteses (0.2-2 mm): 2   Number of lymph nodes with isolated tumor cells: 0   Size of largest jarett metastasis: 2 mm   Extranodal extension: Not identified   Total number of lymph nodes examined: 4   Number of sentinel nodes examined: 4   DISTANT METASTASIS: Not applicable   Pathologic Staging (pTNM, AJCC 8th Edition)   pT2 (sn)pN1mi     Breast Cancer Marker Studies:   Estrogen Receptors (ER):90%   Progesterone Receptors (NE): 30%   Her-2/stone (c-erb B-2) protein expression: Negative/0     Oncotype Dx  Recurrence Score Result-20  No indication for adjuvant chemotherapy  Recommended adjuvant RT followed by adjuvant endocrine therapy     RT was completed on 11/08/2021. DEXA scan 11/04/2021: Osteopenia in LS; Normal in hips. Arimidex 1 mg po daily was started on 11/09/2021 with fair tolerance so far.      Right Breast U/S 12/20/2021 Benign findings with no sonographic evidence of malignancy in the right breast  Imaging

## 2022-04-18 PROBLEM — Z00.00 PHYSICAL EXAM: Status: RESOLVED | Noted: 2022-03-19 | Resolved: 2022-04-18

## 2022-04-19 ENCOUNTER — TREATMENT (OUTPATIENT)
Dept: OCCUPATIONAL THERAPY | Age: 62
End: 2022-04-19
Payer: MEDICAID

## 2022-04-19 DIAGNOSIS — Z85.3 PERSONAL HISTORY OF BREAST CANCER: ICD-10-CM

## 2022-04-19 PROCEDURE — 97530 THERAPEUTIC ACTIVITIES: CPT

## 2022-04-19 NOTE — PROGRESS NOTES
OCCUPATIONAL THERAPY PROGRESS NOTE  Phone: [phone]  Fax: [phone]     Date:  2022  Initial Evaluation Date: 2022                                     Evaluating Therapist: DEANNA Menchaca CLT-LANA     Patient Name:  Amy Mcgrath                       :  1960     Restrictions/Precautions:  fall risk  Diagnosis:  Personal History of breast cancer (z85.3)                                                           Date of Surgery/Injury: n/a     Insurance/Certification information:  [address] [facility] - [id number]  Plan of care signed (Y/N): N  Visit# / total visits: Evaluation +8 treatment     Referring Practitioner:  JEET Damon CNP  Specific Practitioner Orders: Evaluation and Treatment      Assessment of current deficits   []Pain  []Skin Integrity   [x]Lymphedema   []Functional transfers/mobility   []ADLs   []Strength    []Cognition  []IADLs   []Safety Awareness   []  Motor Endurance    []Fine Motor Coordination   []Balance   []Vision/perception  []Sensation []Gross Motor Coordination  [x]ROM     OT PLAN OF CARE   OT POC based on physician orders, patient diagnosis and results of clinical assessment    Frequency/Duration: 1-2x per week for 12 treatment sessions from 2022 through 2022  Specific OT Treatment to include:   Plan of Care:     [x]97140-Manual Lymph Drainage and Combined Decongestive Therapy  [x]82109- Skilled Multilayer Short Stretch Compression Bandaging/ Therapeutic Exercise  [x]Skin Care Education [x]HEP including Self MLD Education and/or Self Bandaging  [x]Education for Lymphedema Risks/Precautions     [x] Caregiver Education   []other:      Patient Specific Goal: to have less pain    STG: 3 weeks  1. Patient will demonstrate knowledge and understanding for lymphedema precautions, skin care and self management to decrease progression of lymphedema and risk of infection.   Patient continuing to show increased knowledge of precautions of lymphedema. Will continue to work towards goal with patient. 2.  Patient will present with decreased limb volume in the involved extremity from moderate to minimal for improved functional mobility. Patient arrived with continued reduction noted in fibrotic tissue through breast. Patient stated she has been trying to perform sequence and exercises since last visit. Patient able to improve tolerance this date with sequence. Will continue to work with patient to improve completion of sequence. Educated patient to attempt sequence everyday. 3.  Patient will demonstrate compliance with compression therapy for independent management of lymphedema. Patient has compression bra with good fit noted. Will continue to work with patient to improve progress towards goal.      LT weeks  1. Patient will be independent with self management of lymphedema including understanding garment wear schedule, self compression bandaging, HEP and self care. Progressing towards goal.   2.  Patient will be fitted for appropriate compression garments for long term management of lymphedema. Patient received compression bra. Progressing towards goal.   3.   Patient will present with decreased limb volume in the involved extremity from minimal  to trace  for improved functional mobility. 4.  Patient will maximize pain free range of motion to maximize independence and safety during daily life tasks. Patient stated she has been completing exercises almost daily and performing towel exercises on door/wall. Will continue to work with patient to improve ROM. Restrictions/Precautions:  [] No blood pressure/blood draw in: [] Left Upper Extremity     [x] Right Upper Extremity        [] Fall Risk       Intervention:   []Other    Pain:  [] No    [x] Yes - reduced this date. Location: through breasts, left flank, and left axilla.    Pain Rating (0-10 pain scale): 4/10  Pain Description: sharp, sore, stretching  Interruption of Treatment [x] Yes  [] No - limited ability to tolerate sequence. Came to Clinic:  [] Bandaged    []Unbandaged    [] With Stocking     [] With Sleeve     [] Kinesiotaped    [] [facility]    [x] With Alternative Compression: Compression bra    Skin Integrity:  [x] Normal [] Dry  []Rough []Moist []Rash  Location of problem area/s:  [] Wound: Location/Description   [x] Fibrosis: Location/Description: scar areas bilaterally, fibrotic tissue throughout right/left breast / flank  [] Keratosis: Location/Description  [] Papillomas: Location/Description  [] Other    Color:  [x] Normal [] Mottled [] Flushed [] Other/Description  Location of problem area/s:    Edema:  Edema Location: right breast  [] 1+ Edema [x] 2+ Edema  [] 3+ Edema [] 4+ Edema  Edema Location: left breast   [x] 1+ Edema [] 2+ Edema  [] 3+ Edema [] 4+ Edema  Edema Location: flank  [x] 1+ Edema [] 2+ Edema  [] 3+ Edema [] 4+ Edema    Subjective: To reduce pain and improve movement. Objective:  [] Measurement [x]Manual Lymph Drainage  []Bandaging   []Kinesiotaping [x] Education    [x]Self Massage   []Self Bandaging [x] Exercise    []Wound Care  [x]Hygiene  [] Other: Attempted ROM and manipulation exercises through axilla. Assessment:  Knowledge of home program:   [x] Good [] Fair  [] Poor  Patient is programming at home:             [x] Yes  [] No  Family is assisting with programming: [x] Yes  [] No  Home programming is consistent:             [x] Yes  [] No  Other:   Response to treatment: Patient still having trouble tolerating sequence and manipulation through axilla. Instructions for patient:   [x] Verbal [] Written  Instructions addressed: Provided education to patient to complete ROM exercises daily to increase movement in axilla and shoulder, due to patient not allowing therapist to improve ROM at this time.      Time In: 12:57          Time Out: 1:35               Timed Code Treatment Minutes: 38 minutes      CODE  Minutes  Units   47818 OT Eval Low     66896 OT Eval Medium     42769 OT Eval High     48461 Fluidotherapy     14949 Manual     72022 Therapeutic Ex     02819 Therapeutic Activity 38 3   06123 ADL/COMP Tech Train     31642 Neuromuscular Re-Ed     56653 OrthoManagementTraining     87265 Paraffin     23298 Electrical Stim - Attended     [id number] Iontophoresis     08526 Ultrasound      Other               Plan: Continue to address:  []Bandaging  [] Self Bandaging/Family Assist  [x]MLD  [x]Self Massage  [x]Exercise  [x]Education  [x]Obtain referral for garments  []Medical Hold  []Discharge to [facility], [address] [place] [address] Rd

## 2022-04-21 ENCOUNTER — TREATMENT (OUTPATIENT)
Dept: OCCUPATIONAL THERAPY | Age: 62
End: 2022-04-21
Payer: MEDICAID

## 2022-04-21 DIAGNOSIS — Z17.0 MALIGNANT NEOPLASM OF RIGHT BREAST IN FEMALE, ESTROGEN RECEPTOR POSITIVE, UNSPECIFIED SITE OF BREAST (HCC): ICD-10-CM

## 2022-04-21 DIAGNOSIS — C50.911 MALIGNANT NEOPLASM OF RIGHT BREAST IN FEMALE, ESTROGEN RECEPTOR POSITIVE, UNSPECIFIED SITE OF BREAST (HCC): ICD-10-CM

## 2022-04-21 PROCEDURE — 97530 THERAPEUTIC ACTIVITIES: CPT

## 2022-04-21 NOTE — PROGRESS NOTES
OCCUPATIONAL THERAPY PROGRESS NOTE  Phone: [phone]  Fax: [phone]     Date:  2022  Initial Evaluation Date: 2022                                     Evaluating Therapist: PINKY Currie/BHARAT HENRY     Patient Name:  Nicole Elmore                       :  1960     Restrictions/Precautions:  fall risk  Diagnosis:  Personal History of breast cancer (z85.3)                                                           Date of Surgery/Injury: n/a     Insurance/Certification information:  [address] Medicaid - [id number]  Plan of care signed (Y/N): N  Visit# / total visits: Evaluation +9 treatment     Referring Practitioner:  JEET Rowe CNP  Specific Practitioner Orders: Evaluation and Treatment      Assessment of current deficits   []Pain  []Skin Integrity   [x]Lymphedema   []Functional transfers/mobility   []ADLs   []Strength    []Cognition  []IADLs   []Safety Awareness   []  Motor Endurance    []Fine Motor Coordination   []Balance   []Vision/perception  []Sensation []Gross Motor Coordination  [x]ROM     OT PLAN OF CARE   OT POC based on physician orders, patient diagnosis and results of clinical assessment    Frequency/Duration: 1-2x per week for 12 treatment sessions from 2022 through 2022  Specific OT Treatment to include:   Plan of Care:     [x]97140-Manual Lymph Drainage and Combined Decongestive Therapy  [x]76725- Skilled Multilayer Short Stretch Compression Bandaging/ Therapeutic Exercise  [x]Skin Care Education [x]HEP including Self MLD Education and/or Self Bandaging  [x]Education for Lymphedema Risks/Precautions     [x] Caregiver Education   []other:      Patient Specific Goal: to have less pain    STG: 3 weeks  1. Patient will demonstrate knowledge and understanding for lymphedema precautions, skin care and self management to decrease progression of lymphedema and risk of infection. Patient aware of precautions of lymphedema.  Will continue to work towards goal with patient. 2.  Patient will present with decreased limb volume in the involved extremity from moderate to minimal for improved functional mobility. Patient arrived with some fibrotic tissue noted through breast. Patient stated she has been trying to perform sequence and exercises however mostly on the weekends because she is too sore through the week. Patient able to improve tolerance this date with sequence. Educated patient to attempt sequence everyday or every other day. Will continue to work with patient to improve completion of sequence. 3.  Patient will demonstrate compliance with compression therapy for independent management of lymphedema. Patient has compression bra with good fit noted. Will continue to work with patient to improve progress towards goal.      LT weeks  1. Patient will be independent with self management of lymphedema including understanding garment wear schedule, self compression bandaging, HEP and self care. Progressing towards goal.   2.  Patient will be fitted for appropriate compression garments for long term management of lymphedema. Patient received compression bra. Progressing towards goal.   3.   Patient will present with decreased limb volume in the involved extremity from minimal  to trace  for improved functional mobility. Patient showing minimal reduction in limb volume due to patient unable to tolerate ROM exercises. 4.  Patient will maximize pain free range of motion to maximize independence and safety during daily life tasks. Patient stated she has been completing exercises and performing towel exercises on door/wall. Will continue to work with patient to improve ROM. Restrictions/Precautions:  [] No blood pressure/blood draw in: [] Left Upper Extremity     [x] Right Upper Extremity        [] Fall Risk       Intervention:   []Other    Pain:  [] No    [x] Yes - reduced this date.    Location: through breasts, left flank, and left axilla. Pain Rating (0-10 pain scale): 4/10  Pain Description: sharp, sore, stretching  Interruption of Treatment [x] Yes  [] No - limited ability to tolerate sequence. Came to Clinic:  [] Bandaged    []Unbandaged    [] With Stocking     [] With Sleeve     [] Kinesiotaped    [] [facility]    [x] With Alternative Compression: Compression bra    Skin Integrity:  [x] Normal [] Dry  []Rough []Moist []Rash  Location of problem area/s:  [] Wound: Location/Description   [x] Fibrosis: Location/Description: scar areas bilaterally, fibrotic tissue throughout right/left breast / flank  [] Keratosis: Location/Description  [] Papillomas: Location/Description  [] Other    Color:  [x] Normal [] Mottled [] Flushed [] Other/Description  Location of problem area/s:    Edema:  Edema Location: right breast  [] 1+ Edema [x] 2+ Edema  [] 3+ Edema [] 4+ Edema  Edema Location: left breast   [x] 1+ Edema [] 2+ Edema  [] 3+ Edema [] 4+ Edema  Edema Location: flank  [] 1+ Edema [x] 2+ Edema  [] 3+ Edema [] 4+ Edema    Subjective: To reduce pain and improve movement. Objective:  [] Measurement [x]Manual Lymph Drainage  []Bandaging   []Kinesiotaping [x] Education    [x]Self Massage   []Self Bandaging [x] Exercise    []Wound Care  [x]Hygiene  [x] Other: Attempted ROM and manipulation exercises through axilla. Assessment:  Knowledge of home program:   [x] Good [] Fair  [] Poor  Patient is programming at home:             [x] Yes  [] No  Family is assisting with programming: [x] Yes  [] No  Home programming is consistent:             [x] Yes  [] No  Other:   Response to treatment: Patient still having trouble tolerating sequence and manipulation through right axilla. Instructions for patient:   [x] Verbal [] Written  Instructions addressed: Provided education to patient to complete ROM exercises daily to increase movement in axilla and shoulder, due to patient not allowing therapist to improve ROM at this time. Time In: 12:54          Time Out: 1:54               Timed Code Treatment Minutes: 60 minutes      CODE  Minutes  Units   88555 OT Eval Low     70811 OT Eval Medium     49882 OT Eval High     16927 Fluidotherapy     69083 Manual     81855 Therapeutic Ex     52840 Therapeutic Activity 60 4   86837 ADL/COMP Tech Train     75997 Neuromuscular Re-Ed     76913 OrthoManagementTraining     08463 Paraffin     14431 Electrical Stim - Attended     [id number] Iontophoresis     40886 Ultrasound      Other               Plan: Continue to address:  []Bandaging  [] Self Bandaging/Family Assist  [x]MLD  [x]Self Massage  [x]Exercise  [x]Education  [x]Obtain referral for garments  []Medical Hold  []Discharge to [address] [facility], [address] [place] [address] Rd

## 2022-04-25 DIAGNOSIS — Z17.0 MALIGNANT NEOPLASM OF RIGHT BREAST IN FEMALE, ESTROGEN RECEPTOR POSITIVE, UNSPECIFIED SITE OF BREAST (HCC): ICD-10-CM

## 2022-04-25 DIAGNOSIS — C50.911 MALIGNANT NEOPLASM OF RIGHT BREAST IN FEMALE, ESTROGEN RECEPTOR POSITIVE, UNSPECIFIED SITE OF BREAST (HCC): ICD-10-CM

## 2022-04-25 RX ORDER — DULOXETIN HYDROCHLORIDE 30 MG/1
30 CAPSULE, DELAYED RELEASE ORAL DAILY
Qty: 30 CAPSULE | Refills: 0 | Status: SHIPPED | OUTPATIENT
Start: 2022-04-25 | End: 2022-05-26

## 2022-04-28 ENCOUNTER — TREATMENT (OUTPATIENT)
Dept: OCCUPATIONAL THERAPY | Age: 62
End: 2022-04-28

## 2022-04-28 NOTE — PROGRESS NOTES
OCCUPATIONAL THERAPY PROGRESS NOTE  Phone: [phone]  Fax: [phone]     Date:  2022  Initial Evaluation Date: 2022                                     Evaluating Therapist: DEANNA Tripp CLT-LANA     Patient Name:  Honorio Roque                       :  1960     Restrictions/Precautions:  fall risk  Diagnosis:  Personal History of breast cancer (z85.3)                                                           Date of Surgery/Injury: n/a     Insurance/Certification information:  [address] Medicaid - [id number]  Plan of care signed (Y/N): N  Visit# / total visits: Evaluation +10 treatment     Referring Practitioner:  JEET Lantigua CNP  Specific Practitioner Orders: Evaluation and Treatment      Assessment of current deficits   []Pain  []Skin Integrity   [x]Lymphedema   []Functional transfers/mobility   []ADLs   []Strength    []Cognition  []IADLs   []Safety Awareness   []  Motor Endurance    []Fine Motor Coordination   []Balance   []Vision/perception  []Sensation []Gross Motor Coordination  [x]ROM     OT PLAN OF CARE   OT POC based on physician orders, patient diagnosis and results of clinical assessment    Frequency/Duration: 1-2x per week for 12 treatment sessions from 2022 through 2022  Specific OT Treatment to include:   Plan of Care:     [x]97140-Manual Lymph Drainage and Combined Decongestive Therapy  [x]78403- Skilled Multilayer Short Stretch Compression Bandaging/ Therapeutic Exercise  [x]Skin Care Education [x]HEP including Self MLD Education and/or Self Bandaging  [x]Education for Lymphedema Risks/Precautions     [x] Caregiver Education   []other:      Patient Specific Goal: to have less pain    STG: 3 weeks  1. Patient will demonstrate knowledge and understanding for lymphedema precautions, skin care and self management to decrease progression of lymphedema and risk of infection.   Patient aware of precautions of lymphedema however having difficulty staying on tract of her diet. Education provided from evaluating therapist to improve diet and reduce sodium intake. Will continue to work towards goal with patient. 2.  Patient will present with decreased limb volume in the involved extremity from moderate to minimal for improved functional mobility. Patient arrived with some fibrotic tissue noted through breast and stated she was walking her dog when her dog pulled on her arm. Ever since that episode pt has been hurting. Patient stated she has been trying to perform sequence and exercises since last treatment. Educated patient to attempt sequence and exercises everyday. Will continue on 1 month follow up visit to see if patient has improved. 3.  Patient will demonstrate compliance with compression therapy for independent management of lymphedema. Patient has compression bra with good fit noted. Educated patient on trying to purchase another compression bra, for one to wear and one to wash.      LT weeks  1. Patient will be independent with self management of lymphedema including understanding garment wear schedule, self compression bandaging, HEP and self care. Progressing towards goal.   2.  Patient will be fitted for appropriate compression garments for long term management of lymphedema. Patient received compression bra. Progressing towards goal.   3.   Patient will present with decreased limb volume in the involved extremity from minimal  to trace  for improved functional mobility. Patient continuing to show minimal reduction in limb volume due to patient unable to tolerate ROM exercises. 4.  Patient will maximize pain free range of motion to maximize independence and safety during daily life tasks. Patient stated she has been completing exercises and performing towel exercises on door/wall. Will continue to work with patient to improve ROM.        Restrictions/Precautions:  [] No blood pressure/blood draw in: [] Left Upper Extremity     [x] Right Upper Extremity        [] Fall Risk       Intervention:   []Other    Pain:  [] No    [x] Yes - reduced this date. Location: through breasts, left flank, and left axilla. Pain Rating (0-10 pain scale): 4/10  Pain Description: sharp, sore, stretching  Interruption of Treatment [x] Yes  [] No - limited ability to tolerate sequence. Came to Clinic:  [] Bandaged    []Unbandaged    [] With Stocking     [] With Sleeve     [] Kinesiotaped    [] [facility]    [x] With Alternative Compression: Compression bra    Skin Integrity:  [x] Normal [] Dry  []Rough []Moist []Rash  Location of problem area/s:  [] Wound: Location/Description   [x] Fibrosis: Location/Description: scar areas bilaterally, fibrotic tissue throughout right/left breast / flank  [] Keratosis: Location/Description  [] Papillomas: Location/Description  [] Other    Color:  [x] Normal [] Mottled [] Flushed [] Other/Description  Location of problem area/s:    Edema:  Edema Location: right breast  [] 1+ Edema [x] 2+ Edema  [] 3+ Edema [] 4+ Edema  Edema Location: left breast   [x] 1+ Edema [] 2+ Edema  [] 3+ Edema [] 4+ Edema  Edema Location: flank  [] 1+ Edema [x] 2+ Edema  [] 3+ Edema [] 4+ Edema    Subjective: To reduce pain and improve movement. Objective:  [] Measurement [x]Manual Lymph Drainage  []Bandaging   []Kinesiotaping [x] Education    [x]Self Massage   []Self Bandaging [x] Exercise    []Wound Care  [x]Hygiene  [x] Other: Attempted ROM and manipulation exercises through axilla. Assessment:  Knowledge of home program:   [] Good [x] Fair  [] Poor  Patient is programming at home:             [] Yes  [] No  Family is assisting with programming: [x] Yes  [] No  Home programming is consistent:             [] Yes  [x] No  Other:   Response to treatment: Patient still having trouble tolerating exercises and self massage.    Instructions for patient:   [x] Verbal [] Written  Instructions addressed: Provided education to patient to complete ROM exercises daily to increase movement in axilla and shoulder, due to patient not allowing therapist to improve ROM at this time.      Time In: 3:15          Time Out: 3:45               Timed Code Treatment Minutes: 30 minutes      CODE  Minutes  Units   09474 OT Eval Low     87513 OT Eval Medium     56817 OT Eval High     34887 Fluidotherapy     19914 Manual     49193 Therapeutic Ex     50114 Therapeutic Activity 15 1   40708 ADL/COMP Tech Train     45793 Neuromuscular Re-Ed     93028 OrthoManagementTraining     95871 [facility]     05561 Electrical Stim - Attended     [id number] Iontophoresis     02983 Ultrasound      Other               Plan: Continue to address:  []Bandaging  [] Self Bandaging/Family Assist  [x]MLD  [x]Self Massage  [x]Exercise  [x]Education  [x]Obtain referral for garments  []Medical Hold  []Discharge to [facility] [address]

## 2022-05-23 NOTE — PROGRESS NOTES
Patient called with continued aches and depressed mood on Cymbalta 30mg po daily. Discussed increasing dose to 60mg daily. If no improvement in 4-6 weeks, encouraged PCP follow up.

## 2022-05-26 DIAGNOSIS — C50.911 MALIGNANT NEOPLASM OF RIGHT BREAST IN FEMALE, ESTROGEN RECEPTOR POSITIVE, UNSPECIFIED SITE OF BREAST (HCC): ICD-10-CM

## 2022-05-26 DIAGNOSIS — Z17.0 MALIGNANT NEOPLASM OF RIGHT BREAST IN FEMALE, ESTROGEN RECEPTOR POSITIVE, UNSPECIFIED SITE OF BREAST (HCC): ICD-10-CM

## 2022-05-26 RX ORDER — DULOXETIN HYDROCHLORIDE 60 MG/1
60 CAPSULE, DELAYED RELEASE ORAL DAILY
Qty: 90 CAPSULE | Refills: 1 | Status: SHIPPED
Start: 2022-05-26 | End: 2022-07-19 | Stop reason: SINTOL

## 2022-05-31 ENCOUNTER — TREATMENT (OUTPATIENT)
Dept: OCCUPATIONAL THERAPY | Age: 62
End: 2022-05-31
Payer: MEDICAID

## 2022-05-31 DIAGNOSIS — Z85.3 PERSONAL HISTORY OF BREAST CANCER: ICD-10-CM

## 2022-05-31 PROCEDURE — 97530 THERAPEUTIC ACTIVITIES: CPT | Performed by: OCCUPATIONAL THERAPIST

## 2022-05-31 NOTE — PROGRESS NOTES
[address] [facility]  [address]              Phone: [phone]             Fax: [phone]     Date:  2022  Initial Evaluation Date: 2022  Evaluating Therapist: Jamie Hart OT R/L,     Patient Name:  Cristina Castillo    :  1960    Restrictions/Precautions:  fall risk  Diagnosis:  Personal History of breast cancer (z85.3)         Date of Surgery/Injury: n/a    Insurance/Certification information:  [address] Medicaid - [id number]  Plan of care signed (Y/N): N  Visit# / total visits: Visit #11  Total Visits to date:  6 Cancels/No-shows to date: 1    Referring Practitioner:  JEET Winter - CNP  Specific Practitioner Orders: Evaluation and Treatment    Assessment of current deficits   []Pain  []Skin Integrity   [x]Lymphedema   []Functional transfers/mobility   []ADLs   []Strength    []Cognition  []IADLs   []Safety Awareness   []  Motor Endurance    []Fine Motor Coordination   []Balance   []Vision/perception  []Sensation []Gross Motor Coordination  []ROM     OT PLAN OF CARE   OT POC based on physician orders, patient diagnosis and results of clinical assessment    Frequency/Duration:  1-2x per week for 12 treatment sessions from 2022 through 2022  Specific OT Treatment to include:     Plan of Care:     [x]97140-Manual Lymph Drainage and Combined Decongestive Therapy  [x]73549- Skilled Multilayer Short Stretch Compression Bandaging/ Therapeutic Exercise  [x]Skin Care Education [x]HEP including Self MLD Education and/or Self Bandaging  [x]Education for Lymphedema Risks/Precautions     [x] Caregiver Education   []other:      Patient Specific Goal: to have less pain    Goals Status:    STG: 3 weeks  1.  Patient will demonstrate knowledge and understanding for lymphedema precautions, skin care and self management to decrease progression of lymphedema and risk of infection. Therapist continued patient education regarding lymphedema precautions and skin care / self management. Patient able to verbalize and demonstrate understanding. Goal met. 2.  Patient will present with decreased limb volume in the involved extremity from moderate to minimal for improved functional mobility. Therapist continued patient education regarding manual lymphatic drainage massage sequence for breast / upper extremity involvement. Patient stated she does not perform sequence as much. Therapist educated patient and son on benefits of performing manual lymphatic drainage massage. Therapist took measurements and recorded below. Patient with increase bilateral upper extremities. Patient progressing toward goal.    3.  Patient will demonstrate compliance with compression therapy for independent management of lymphedema. Therapist continued patient education regarding compression garment. Patient utilizes compression bra or sports bra daily. Patient goal met.     LT weeks  1.  Patient will be independent with self management of lymphedema including understanding garment wear schedule, self compression bandaging, HEP and self care. Therapist continued patient education regarding self management of her lymphedema. Therapist discussed current home program, eating lifestyle, exercise program, compression use / care of garment, and general self care. Patient able to verbalize understanding. Progressing towards goal.   2.  Patient will be fitted for appropriate compression garments for long term management of lymphedema. Therapist continued patient education regarding compression garment received. Patient current garment fits well and works as intended. Patient goal met. 3.   Patient will present with decreased limb volume in the involved extremity from minimal  to trace  for improved functional mobility. Patient goal not met.   4.  Patient will maximize pain free range of motion to maximize independence and safety during daily life tasks.    Therapist continued patient education regarding range of motion exercises. Patient stated she performs main exercises consisting of about thee of twelve exercises provided by this therapist.  Therapist discussed importance to perform all exercises one to two times daily. Patient currently increased range of motion on affected extremity and decreased on unaffected extremity. Patient right active / passive shoulder flexion/abduction 0 - 127 degrees; left active / passive shoulder flexion / abduction 0 - 115 degrees. Therapist discussed current exercise program and provided changes to assist in maximizing shoulder active / passive range of motion. Patient goal partially met. Significant Findings At Last Visit/Comments:    Patient continues to make steady gains toward goals.   Patient current measurements as follows:    Lymphedema Upper Extremity Measurements     Measurements are made in 4cm increments and are circumferential.       CM Follow up #4  Left -  Follow up #3  Left - 31May 2022 Follow up #2  Left - 28Apr. 2022 Follow up #1  Left - 10Mar. 2022 Evaluation -   Left -  Follow up #4  Right -  Follow up #3  Right -  Follow up #2  Right - 31May 2022 Follow up #1 Right - 28Apr. 2022 Evaluation - Right - 10Mar. 2022                             wrist    15.75  16 16.25        16.75  16 16   4cm    15.25  15 15.25        15.75  16 16.25   8cm    17.5  16.75 17        17.5  17.5 17.75   12cm    20.75  19.25 19        20.25  20.75 20.5   16cm    24.5  23.25 23.5        24  24.25 23.75   20cm    26.5  25.5 25.5        25  25 25   24cm    27.75  25.7529.5 26.5        27.5  26 25.75   28cm    32  31.5 29        32.25  30 26.25   32cm    34  33.5 30.5        34  32.25 30   36cm    35.75  34.75 32        35.25  33.5 33.5   40cm    36.5  35 34        35.5  35.5 35.5   44cm       34.25          35 34.5                                                   plam    20.25  20.25 21        21  20.5 21.25      Fingers are measured in cm and between mp and pip and between pip and dip each digit.     Digit Follow up #4  Left -  Follow up #3  Left -  Follow up #2  Left -  Follow up #1  Left -  Evaluation -  Left -  Follow up #4  Right -  Follow up #3  Right -  Follow up #2  Right -  Follow up #1  Right -  Evaluation - Right -                                                    First Digit    6.5, 5.25  6.25, 5 6.5, 5.5        6.5, 5.5  6.5, 5.25 6.5, 5.5   Second Digit    5.75, 5.25  6.25, 5.25 6.25, 5.5        6.5, 5.75  6.5, 5.25 6.5, 5.5   Third Digit    ring, 5.5  ring, 5 6, 5        5.75, 5.25  6.25, 5.25 6.25, 5.25   Fourth Digit    5.5, 4.5  5.25, 4.25 5.5, 4.5        7.75, 4.5  5.75, 4.75 5.75, 4.5   Fifth Digit    6.25  6 6.25        6.25  6.5 6.25                                        Restrictions/Precautions:  [] No blood pressure/blood draw in: [] Left Upper Extremity     [] Right Upper Extremity        [x] Fall Risk       Intervention:   []Other    Subjective: patient attended treatment session with son. No new issues noted      Rehab Potential: [] Excellent [x] Good [] Fair  [] Poor     Goal Status:  [] Achieved [x] Partially Achieved  [] Not Achieved     Patient Status: [x] Patient and therapist reviewed and agree on current goals and plan of care             Time In: 0800            Time Out: 0845                      Timed Code Treatment Minutes: 45 minutes      CODE  Minutes  Units   32421 OT Eval Low     70032 OT Eval Medium     15142 OT Eval High     68668 Fluidotherapy     64899 Manual     63935 Therapeutic Ex     48687 Therapeutic Activity 45 3   79011 ADL/COMP Tech Train     [id number] Neuromuscular Re-Ed     99975 OrthoManagementTraining     36024 Paraffin     80046 Electrical Stim - Attended     85711 Iontophoresis     98332 Ultrasound      Other       45 3     Electronically signed by:  Brynn Clarke, PINKY/L, BHARAT      Physician's Certification / Comments Frequency/Duration 1-2 / week for 12 visits. Certification period From: 31May 2022  To: 23Aug. 2022     I have reviewed the Plan of Care established for skilled therapy services and certify that the services are required and that they will be provided while the patient is under my care. Physician's Comments/Revisions:                   Physicians's Printed Name:  JEET Chan CNP                                   Physician's Signature:                                                               Date:      Please review Patient's OT evaluation and if you agree sign/date and fax back to us at our [facility] OT Fax: [phone].  Thank you for your referral!

## 2022-06-10 ENCOUNTER — HOSPITAL ENCOUNTER (OUTPATIENT)
Dept: RADIATION ONCOLOGY | Age: 62
Discharge: HOME | End: 2022-06-10
Attending: RADIOLOGY
Payer: MEDICAID

## 2022-06-10 VITALS
TEMPERATURE: 98.4 F | HEART RATE: 83 BPM | BODY MASS INDEX: 25.79 KG/M2 | DIASTOLIC BLOOD PRESSURE: 70 MMHG | RESPIRATION RATE: 18 BRPM | OXYGEN SATURATION: 98 % | SYSTOLIC BLOOD PRESSURE: 130 MMHG | WEIGHT: 169.6 LBS

## 2022-06-10 PROCEDURE — 99213 OFFICE O/P EST LOW 20 MIN: CPT | Performed by: RADIOLOGY

## 2022-06-10 PROCEDURE — 99212 OFFICE O/P EST SF 10 MIN: CPT

## 2022-06-10 NOTE — PROGRESS NOTES
Yahir Abbasi  6/10/2022  10:38 AM      Vitals:    06/10/22 1036   BP: 130/70   Pulse: 83   Resp: 18   Temp: 98.4 °F (36.9 °C)   SpO2: 98%    : Wt Readings from Last 3 Encounters:   06/10/22 169 lb 9.6 oz (76.9 kg)   04/12/22 163 lb (73.9 kg)   03/16/22 163 lb (73.9 kg)                Current Outpatient Medications:     DULoxetine (CYMBALTA) 60 MG extended release capsule, Take 1 capsule by mouth daily, Disp: 90 capsule, Rfl: 1    EUTHYROX 50 MCG tablet, Take 1 tablet by mouth once daily, Disp: 30 tablet, Rfl: 0    pioglitazone (ACTOS) 15 MG tablet, Take 1 tablet by mouth in the morning and at bedtime, Disp: 180 tablet, Rfl: 1    NONFORMULARY, Vitamin D, vitamin E, Zinc daily. , Disp: , Rfl:     lansoprazole (PREVACID) 30 MG delayed release capsule, Take 1 capsule by mouth daily, Disp: 90 capsule, Rfl: 1    allopurinol (ZYLOPRIM) 300 MG tablet, Take 1/2 (one-half) tablet by mouth once daily, Disp: 45 tablet, Rfl: 1    glucose monitoring (FREESTYLE FREEDOM) kit, 1 kit by Does not apply route daily, Disp: 1 kit, Rfl: 0    blood glucose monitor strips, Test blood sugar daily, Disp: 100 strip, Rfl: 5    Lancets MISC, 1 each by Does not apply route daily, Disp: 100 each, Rfl: 0    anastrozole (ARIMIDEX) 1 MG tablet, Take 1 tablet by mouth daily, Disp: 90 tablet, Rfl: 1    albuterol sulfate HFA (VENTOLIN HFA) 108 (90 Base) MCG/ACT inhaler, Inhale 2 puffs into the lungs every 6 hours as needed for Wheezing or Shortness of Breath, Disp: 3 Inhaler, Rfl: 1    Probiotic Product (PROBIOTIC PO), Take by mouth , Disp: , Rfl:     Current Facility-Administered Medications:     lidocaine-EPINEPHrine 1 %-1:860468 injection 3 mL, 3 mL, IntraDERmal, Once, FLORENTINO Condon      Patient is seen today in follow up for right breast cancer. Pt completed RT to R breast on 11/18/21 for 28fx;5040cGy. Pt states her skin has healed well.  Pt follows with Dr. Pablo Asher, last seen on 04/12/22 & takes arimidex and is tolerating well. Patient states she is going to the Lymphedema clinic in [place] and wearing a compression bra. Patient states she continues to use lotion to her skin as needed. No other questions or concerns for nursing at this time. FALLS RISK SCREENING ASSESSMENT    Instructions:  Assess the patient and enter the appropriate indicators that are present for fall risk identification. Total the numbers entered and assign a fall risk score from Table 2.  Reassess patient at a minimum every 12 weeks or with status change. Assessment   Date  6/10/2022     1. Mental Ability: confusion/cognitively impaired No - 0       2. Elimination Issues: incontinence, frequency No - 0       3. Ambulatory: use of assistive devices (walker, cane, off-loading devices), attached to equipment (IV pole, oxygen) No - 0     4. Sensory Limitations: dizziness, vertigo, impaired vision No - 0       5. Age Less than 65 years - 0       6. Medication: diuretics, strong analgesics, hypnotics, sedatives, antihypertensive agents   No - 0   7. Falls:  recent history of falls within the last 3 months (not to include slipping or tripping)   No - 0   TOTAL 0    If score of 4 or greater was education given? na       TABLE 2   Risk Score Risk Level Plan of Care   0-3 Little or  No Risk 1. Provide assistance as indicated for ambulation activities  2. Reorient confused/cognitively impaired patient  3. Call-light/bell within patient's reach  4. Chair/bed in low position, stretcher/bed with siderails up except when performing patient care activities  5. Educate patient/family/caregiver on falls prevention  6.  Reassess in 12 weeks or with any noted change in patient condition which places them at a risk for a fall   4-6 Moderate Risk 1. Provide assistance as indicated for ambulation activities  2. Reorient confused/cognitively impaired patient  3. Call-light/bell within patient's reach  4.   Chair/bed in low position, stretcher/bed with siderails up except when performing patient care activities  5. Educate patient/family/caregiver on falls prevention  6. Falls risk precaution (Yellow sticker Level II) placed on patient chart   7 or   Higher High Risk 1. Place patient in easily observable treatment room  2. Patient attended at all times by family member or staff  3. Provide assistance as indicated for ambulation activities  4. Reorient confused/cognitively impaired patient  5. Call-light/bell within patient's reach  6. Chair/bed in low position, stretcher/bed with siderails up except when performing patient care activities  7. Educate patient/family/caregiver on falls prevention  8. Falls risk precaution (Yellow sticker Level III) placed on patient chart           MALNUTRITION RISK SCREENING ASSESSMENT    6/10/2022   Patient:  Jasmin Nolasco  Sex:  female    Instructions:  Assess the patient and enter the appropriate indicators that are present for nutrition risk identification. Total the numbers entered and assign a risk score. Follow the appropriate action for total score listed below. Assessment   Date  6/10/2022     1. Have you lost weight without trying? 0- No     2. Have you been eating poorly because of a decreased appetite? 0- No   3. Do you have a diagnosis of head and neck cancer?       0- No                                                                                    TOTAL 0          Score of 0-1: No action  Score 2 or greater:  · For Non-Diabetic Patient: Recommend adding Ensure Complete 2 x daily and provide patient with Ensure wellness bag with coupons  · For Diabetic Patient: Recommend adding Glucerna Shake 2 x daily and provide patient with Glucerna Wellness bag with coupons  · Route to the dietitian via Su Arora RN

## 2022-06-10 NOTE — PROGRESS NOTES
bedtime, Disp: 180 tablet, Rfl: 1    NONFORMULARY, Vitamin D, vitamin E, Zinc daily. , Disp: , Rfl:     lansoprazole (PREVACID) 30 MG delayed release capsule, Take 1 capsule by mouth daily, Disp: 90 capsule, Rfl: 1    allopurinol (ZYLOPRIM) 300 MG tablet, Take 1/2 (one-half) tablet by mouth once daily, Disp: 45 tablet, Rfl: 1    glucose monitoring (FREESTYLE FREEDOM) kit, 1 kit by Does not apply route daily, Disp: 1 kit, Rfl: 0    blood glucose monitor strips, Test blood sugar daily, Disp: 100 strip, Rfl: 5    Lancets MISC, 1 each by Does not apply route daily, Disp: 100 each, Rfl: 0    anastrozole (ARIMIDEX) 1 MG tablet, Take 1 tablet by mouth daily, Disp: 90 tablet, Rfl: 1    albuterol sulfate HFA (VENTOLIN HFA) 108 (90 Base) MCG/ACT inhaler, Inhale 2 puffs into the lungs every 6 hours as needed for Wheezing or Shortness of Breath, Disp: 3 Inhaler, Rfl: 1    Probiotic Product (PROBIOTIC PO), Take by mouth , Disp: , Rfl:     Current Facility-Administered Medications:     lidocaine-EPINEPHrine 1 %-1:292692 injection 3 mL, 3 mL, IntraDERmal, Once, FLORENTINO Machado    REVIEW OF SYSTEMS: Obtained from the patient, chart review and nursing assessment. 12 system review negative except     PHYSICAL EXAMINATION:    Vitals:    06/10/22 1036   BP: 130/70   Pulse: 83   Resp: 18   Temp: 98.4 °F (36.9 °C)   TempSrc: Temporal   SpO2: 98%   Weight: 169 lb 9.6 oz (76.9 kg)       Wt Readings from Last 3 Encounters:   06/10/22 169 lb 9.6 oz (76.9 kg)   04/12/22 163 lb (73.9 kg)   03/16/22 163 lb (73.9 kg)       KARNOSKY PERFORMACE STATUS: 100    Constitutional: A well developed, well nourished 57 y.o. female who is alert, oriented, cooperative and in no apparent distress. Neck is without adenopathy. Heart has a regular rate and rhythm and lungs are clear bilaterally. Right breast reveals some mild diffuse edema. Skin is completely healed. There is post reduction mammoplasty changes bilaterally.   No suspicious induration or nodularity. No axillary adenopathy or arm edema. Pain on percussion to the axial or appendicular skeleton. TUMOR MARKERS: No results found for: PSA, CEA, , QX4600,     IMAGING REVIEWED:  No results found. ASSESSMENT/PLAN:  Stage IIA (pT2, pN1mi(sn), cM0, G3, ER+, MD+, HER2-) right breast cancer s/p BCS with oncoplastic reduction    She is 7 months status post adjuvant whole breast and regional jarett radiation and is clinically doing well without evidence of recurrent disease. At this point we will plan on seeing her for follow-up again in 6 months or sooner should problems develop. I asked Annamiriamhector Mabel  to contact us at any time for any questions or concerns. Thank you for the opportunity to participate in multidisciplinary management of this  pleasant patient. A total of 15 minutes was spent on this visit reviewing previous notes, previous diagnostic labs and x-rays, assessing the patient, counseling the patient regarding their diagnosis and further follow-up needs and documenting this in the medical record. Soy Becker M.D.     Department of Radiation Oncology        [facility] ([address] [place] [address]) [facility]: [phone] (MLI: [phone])  [address] [place] [address]: [phone] (NKW: [phone])  [facility]:  [phone] (TL:  [phone])

## 2022-06-16 ENCOUNTER — HOSPITAL ENCOUNTER (EMERGENCY)
Age: 62
Discharge: HOME | End: 2022-06-16
Payer: MEDICAID

## 2022-06-16 VITALS
TEMPERATURE: 97.1 F | RESPIRATION RATE: 20 BRPM | SYSTOLIC BLOOD PRESSURE: 132 MMHG | HEART RATE: 98 BPM | BODY MASS INDEX: 25.01 KG/M2 | WEIGHT: 165 LBS | HEIGHT: 68 IN | DIASTOLIC BLOOD PRESSURE: 76 MMHG | OXYGEN SATURATION: 98 %

## 2022-06-16 DIAGNOSIS — K02.9 PAIN DUE TO DENTAL CARIES: Primary | ICD-10-CM

## 2022-06-16 PROCEDURE — 99211 OFF/OP EST MAY X REQ PHY/QHP: CPT

## 2022-06-16 RX ORDER — CHLORHEXIDINE GLUCONATE 0.12 MG/ML
15 RINSE ORAL 2 TIMES DAILY
Qty: 420 ML | Refills: 0 | Status: SHIPPED | OUTPATIENT
Start: 2022-06-16 | End: 2022-06-30

## 2022-06-16 RX ORDER — IBUPROFEN 800 MG/1
800 TABLET ORAL EVERY 6 HOURS PRN
Qty: 21 TABLET | Refills: 0 | Status: SHIPPED | OUTPATIENT
Start: 2022-06-16

## 2022-06-16 RX ORDER — AMOXICILLIN AND CLAVULANATE POTASSIUM 875; 125 MG/1; MG/1
1 TABLET, FILM COATED ORAL 2 TIMES DAILY
Qty: 20 TABLET | Refills: 0 | Status: SHIPPED | OUTPATIENT
Start: 2022-06-16 | End: 2022-06-26

## 2022-06-16 ASSESSMENT — PAIN DESCRIPTION - ORIENTATION: ORIENTATION: RIGHT;LOWER

## 2022-06-16 ASSESSMENT — PAIN SCALES - GENERAL: PAINLEVEL_OUTOF10: 5

## 2022-06-16 ASSESSMENT — PAIN DESCRIPTION - LOCATION: LOCATION: TEETH

## 2022-06-16 ASSESSMENT — PAIN DESCRIPTION - DESCRIPTORS: DESCRIPTORS: ACHING;DISCOMFORT;SHOOTING;SORE

## 2022-06-16 ASSESSMENT — PAIN - FUNCTIONAL ASSESSMENT: PAIN_FUNCTIONAL_ASSESSMENT: 0-10

## 2022-06-16 ASSESSMENT — PAIN DESCRIPTION - FREQUENCY: FREQUENCY: INTERMITTENT

## 2022-06-16 ASSESSMENT — PAIN DESCRIPTION - PAIN TYPE: TYPE: ACUTE PAIN

## 2022-06-16 NOTE — ED PROVIDER NOTES
HPI: Richa Mullins 57 y.o. female with a past medical history of   Past Medical History:   Diagnosis Date    BRCA1 negative     BRCA2 negative     [facility]     Diabetes mellitus ([facility]     Diverticulitis     Frequent UTI     Hiatal hernia     History of therapeutic radiation     Hypertension     Kidney stones     Lymphedema     right breast    PONV (postoperative nausea and vomiting)     Thyroid disease     presents with a complaint of dental pain. The patient states this pain has been gradual in onset, persistent, moderate in severity and worse today which is what prompted the visit. Pain has not been relieved with any OTC medications. Patient denies any unilateral facial swelling. Patient is able to handle their own secretions and drink fluids without difficulty. Patient denies any fever. The patient also denies any history of dental trauma. Denies difficulty breathing or swallowing. The location of the pain and appears to be isolated over tooth number 27. Into the stated tooth which is been present for the past few days states she called multiple dentist today is unable to get an appointment emergently she does have an appointment scheduled in 2 weeks. Denies any fever has been tolerating oral fluids well.     ROS:   Pertinent positives and negatives are stated within HPI, all other systems reviewed and are negative.  --------------------------------------------- PAST HISTORY ---------------------------------------------  Past Medical History:  has a past medical history of BRCA1 negative, BRCA2 negative, Cancer ([facility] Diabetes mellitus ([facility] Diverticulitis, Frequent UTI, Hiatal hernia, History of therapeutic radiation, Hypertension, Kidney stones, Lymphedema, PONV (postoperative nausea and vomiting), and Thyroid disease. Past Surgical History:  has a past surgical history that includes Hysterectomy; Rectal prolapse repair; ECHO Compl W Dop Color Flow (8/15/2013);  Colonoscopy; Endoscopy, colon, diagnostic; Cholecystectomy, laparoscopic (N/A, 6/20/2019); US BREAST BIOPSY W LOC DEVICE 1ST LESION RIGHT (Right, 6/24/2021); and Breast reduction surgery (Right, 8/17/2021). Social History:  reports that she has never smoked. She has never used smokeless tobacco. She reports previous alcohol use. She reports that she does not use drugs. Family History: family history includes Breast Cancer (age of onset: 52) in her mother; Breast Cancer (age of onset: 48) in her paternal grandmother; Breast Cancer (age of onset: 48) in her maternal aunt; Breast Cancer (age of onset: 64) in her paternal aunt; Prostate Cancer in her brother, father, and paternal uncle. The patients home medications have been reviewed. Allergies: Flagyl [metronidazole], Omeprazole, and Vicodin [hydrocodone-acetaminophen]    ------------------------- NURSING NOTES AND VITALS REVIEWED ---------------------------   The nursing notes within the ED encounter and vital signs as below have been reviewed by myself. /76   Pulse 98   Temp 97.1 °F (36.2 °C) (Infrared)   Resp 20   Ht 5' 8\" (1.727 m)   Wt 165 lb (74.8 kg)   LMP  (LMP Unknown)   SpO2 98%   BMI 25.09 kg/m²   Oxygen Saturation Interpretation: Normal    The patients available past medical records and past encounters were reviewed. Physical exam:  Constitutional: The patient is comfortable, alert and oriented x3, well appearing, non toxic in NAD. Head: Atraumatic and normocephalic. Eyes: No discharge from the eyes the sclerae are normal.  ENT: The oropharynx is normal. No pharyngeal erythema, uvular edema, tonsillar exudates, asymmetry or trismus. Mouth is normal to inspection  With the exception of a pain on percussion of the tooth noted above. There is no evidence of facial asymmetry or abscess formation. Floor of the mouth is soft. No tenderness in the submental or submandibular space. No tongue elevation.   Significant dental caries noted stated tooth no evidence of abscess. Neck: The neck demonstrates normal range of motion. No meningeals signs are present. No stridor. Respiratory/chest: The chest is nontender. Breath sounds are normal. no respiratory distress is noted  Cardiovascular: Heart shows a regular rate and rhythm no murmurs no rubs no gallops. Skin: The skin exam shows no evidence of rashes  Neuro: GCS is 15  Lymphatic: No cervical lymphadenopathy       -------------------------------------------------- RESULTS -------------------------------------------------  I have personally reviewed all laboratory and imaging results for this patient. Results are listed below. LABS:  No results found for this visit on 06/16/22. RADIOLOGY:  Interpreted by Radiologist.  No orders to display               Medical Decision Making: Exam and history c/w  dental pain without evidence of gross infection. At this time we will  the patient on following up in dental clinic and provide pain relief.       Impression:   1) Dental Pain  2) Dental Caries    Disposition: Discharge  Condition: Stable               JEET Mason - CNP  06/16/22 [id number]

## 2022-07-14 ASSESSMENT — ENCOUNTER SYMPTOMS
COUGH: 0
CHEST TIGHTNESS: 0
SHORTNESS OF BREATH: 0
BACK PAIN: 0

## 2022-07-14 NOTE — PROGRESS NOTES
Subjective: This note was copied forward from the last encounter. Essential components for this patient record were reviewed and verified on this visit including:  recent hospitalizations, recent imaging, PMH, PSH, FH, SOC HX, Allergies, and Medications were reviewed and updated as appropriate. In addition, the assessment and plan were copied from prior office note and updated accordingly. Patient ID: Urbano Romero is a 57 y.o. female. HPI     The lesion was located in the 8 o'clock position of the right breast     Breast cancer risk factors include mom breast cancer age 52, paternal aunt breast cancer, 48 paternal gm breast cancer age 48     Bilateral Diagnostic Mammogram/Right Breast U/S on 06/16/2021:  Large irregular hypoechoic solid mass seen on ultrasound and mammogram at about the 8 to 9 o'clock position measuring 2.4 x 1.4 x 1.4 cm.       06/24/2021 Ultrasound guided core biopsy of right breast 8 o'clock mass and ribbonclip marker placement:  Right breast at 8:00, core needle biopsy: Invasive carcinoma of no special type (ductal), grade 2; see comment. Comment: The carcinoma displays highly infiltrative architectural features including single file and single cell patterns of invasion. Strong membranous E-cadherin expression is diffusely present in association with the invasive carcinoma, supportive of ductal origin. No in situ component is identified in the tissue sample. Benedicto histologic score for the invasive carcinoma corresponds to a tubule score 3, nuclear score 3, mitotic score 1, total score 7, grade 2. Intradepartmental consultation is obtained.      Breast Cancer Marker Studies:   Estrogen Receptors (ER):90%   Progesterone Receptors (NC): 30%   Her-2/stone (c-erb B-2) protein expression: Negative/0      CXR 07/06/2021:  No acute process     07/08/2021 MRI Bilateral Breast:  Irregular mass in the right breast 8 o'clock measuring up to 2.4 cm in size is consistent with biopsy 3-4 mitotic figures/hpf). Therefore, the provisional Benedicto score of this tumor is updated to 3+3+3=9 (grade 3) from originally reported 3+3+1=7 (grade 2). CANCER CASE SUMMARY   Procedure: Excision   Specimen Laterality: Right   TUMOR   Tumor Site: Central   Histologic Type:  Invasive carcinoma of no special type   Histologic Grade: Benedicto Histologic Score   Glandular/tubular differentiation: Score 3   Nuclear pleomorphism: Score 3   Mitotic rate: Score 3   Overall grade: Grade 3 (scores of 9)   Tumor Size: 2.2 x 2.2 x 1.4 cm   Tumor Focality: Single focus of invasive carcinoma   Ductal Carcinoma In Situ (DCIS): Not identified   Lobular Carcinoma In Situ (LCIS): Not identified   Lymph-Vascular Invasion: Not identified   Dermal lymphovascular invasion: No skin present   Microcalcifications: Not identified   Treatment effect: No known presurgical therapy   MARGINS   All margins negative for invasive carcinoma   Distance from invasive carcinoma to closest margin: 1 mm   Closest margins to invasive carcinoma: medial (slide F1) and posterior (slide F2)   REGIONAL LYMPH NODES   Tumor present in regional lymph nodes   Number of lymph nodes with macrometastasis (>2 mm): 0   Number of lymph nodes with micrometasteses (0.2-2 mm): 2   Number of lymph nodes with isolated tumor cells: 0   Size of largest jarett metastasis: 2 mm   Extranodal extension: Not identified   Total number of lymph nodes examined: 4   Number of sentinel nodes examined: 4   DISTANT METASTASIS: Not applicable   Pathologic Staging (pTNM, AJCC 8th Edition) pT2 (sn)pN1mi      Breast Cancer Marker Studies:   Estrogen Receptors (ER):90%   Progesterone Receptors (MI): 30%   Her-2/stone (c-erb B-2) protein expression: Negative/0      Oncotype Dx  Recurrence Score Result-20  11/08/2021 completed adjuvant RT  11/09/2021 started endocrine therapy with Arimidex 1 mg by mouth daily per medical oncology, Dr. Salomón Aly    Review of Systems   Constitutional: Negative for activity change, appetite change, chills, fatigue, fever and unexpected weight change. Doing fairly well. Is scheduled to start Effexor tonight for mood swings related to AI. To follow-up with Dr. Lilo Hood in 1 month. She has completed occupational therapy and will be going back for a follow-up appointment soon. Respiratory:  Negative for cough, chest tightness and shortness of breath. Cardiovascular:  Negative for chest pain, palpitations and leg swelling. Musculoskeletal:  Negative for arthralgias, back pain, gait problem and myalgias. Psychiatric/Behavioral:  Positive for agitation. Negative for confusion and decreased concentration. The patient is not nervous/anxious. Mood swings and agitation with Arimidex. To start Effexor tonight     Objective:   Physical Exam  Vitals and nursing note reviewed. Constitutional:       General: She is not in acute distress. Appearance: She is well-developed. She is not diaphoretic. Comments: ECOG remains stable at 0; pleasant and conversant. HENT:      Head: Normocephalic and atraumatic. Mouth/Throat:      Pharynx: No oropharyngeal exudate. Eyes:      General: No scleral icterus. Right eye: No discharge. Left eye: No discharge. Conjunctiva/sclera: Conjunctivae normal.   Neck:      Thyroid: No thyromegaly. Vascular: No JVD. Trachea: No tracheal deviation. Cardiovascular:      Rate and Rhythm: Normal rate and regular rhythm. Heart sounds: No murmur heard. No friction rub. No gallop. Pulmonary:      Effort: Pulmonary effort is normal. No respiratory distress or retractions. Breath sounds: Normal breath sounds. No stridor. No wheezing or rales. Chest:      Chest wall: No mass, lacerations, deformity, swelling, tenderness or edema. Breasts:     Breasts are symmetrical.      Right: Mass (Post surgical fat necrosis bilaterally) present.  No inverted nipple, nipple discharge, skin change or tenderness. Left: Mass present. No inverted nipple, nipple discharge, skin change or tenderness. Comments: Postoperative changes of the bilateral breasts; no clinically suspicious findings. No evidence of recurrent disease. Skin to right breast with post radiation changes; improved post occupational therapy. Abdominal:      General: There is no distension. Palpations: Abdomen is soft. Tenderness: There is no abdominal tenderness. There is no guarding or rebound. Musculoskeletal:         General: No tenderness or deformity. Normal range of motion. Right shoulder: Normal.      Left shoulder: Normal.      Cervical back: Normal range of motion and neck supple. Comments: She has decreased range of motion of the right upper extremity   Lymphadenopathy:      Cervical: No cervical adenopathy. Right cervical: No superficial, deep or posterior cervical adenopathy. Left cervical: No superficial, deep or posterior cervical adenopathy. Upper Body:      Right upper body: No pectoral adenopathy. Left upper body: No pectoral adenopathy. Skin:     General: Skin is warm and dry. Coloration: Skin is not pale. Findings: No erythema or rash. Neurological:      Mental Status: She is alert and oriented to person, place, and time. Coordination: Coordination normal.   Psychiatric:         Behavior: Behavior normal.         Thought Content: Thought content normal.         Judgment: Judgment normal.     Assessment:    Serena Covington is a 57 y.o. extremely pleasant pleasant female presents for follow-up of her right breast invasive ductal carcinoma.    06/24/2021 ultrasound guided core biopsy of right breast 8 o'clock mass and ribbonclip marker placement  Right breast at 8:00, core needle biopsy: Invasive carcinoma of no special type (ductal), grade 2; see comment.    Comment: The carcinoma displays highly infiltrative architectural features including single file special type (ductal), grade 3;  Changes of previous biopsy site, see comment. G.  [place] lymph node #4, biopsy: Benign node showing prominent fatty replacement. H.  Right breast, reduction mammoplasty: - Skin and benign breast tissue (79.6 g) showing stromal fibrosis/hyalinosis. Comment:A few small foci of metastatic carcinoma (</= 2 mm/each metastasis) in two sentinel lymph nodes in specimen C are noted histologically and further confirmed by immunostaining for pankeratin and GATA3. Comparing with the patient's previous core needle biopsy specimen from the same tumor on 07/16/2021 (see report [facility]-), the tumor seen here is much more mitotic active (focally, 3-4 mitotic figures/hpf). Therefore, the provisional Benedicto score of this tumor is updated to 3+3+3=9 (grade 3) from originally reported 3+3+1=7 (grade 2). CANCER CASE SUMMARY   Procedure: Excision   Specimen Laterality: Right. Tumor Site: Central.  Histologic Type: Invasive carcinoma of no special type   Overall grade: Grade 3 (scores of 9). Tumor Size: 2.2 x 2.2 x 1.4 cm. Tumor Focality: Single focus of invasive carcinoma   Ductal Carcinoma In Situ (DCIS): Not identified; Lobular Carcinoma In Situ (LCIS): Not identified. Lymph-Vascular Invasion: Not identified; Dermal lymphovascular invasion: No skin present. Microcalcifications: Not identified. Treatment effect: No known presurgical therapy   MARGINS All margins negative for invasive carcinoma. REGIONAL LYMPH NODES Tumor present in regional lymph nodes   Number of lymph nodes with macrometastasis (>2 mm): 0   Number of lymph nodes with micrometasteses (0.2-2 mm): 2   Number of lymph nodes with isolated tumor cells: 0   Size of largest jarett metastasis: 2 mm   Extranodal extension: Not identified. Total number of lymph nodes examined: 4; Number of sentinel nodes examined: 4.     Pathologic Staging (pTNM, AJCC 8th Edition):  pT2 (sn)pN1mi      Breast Cancer Marker Studies: Estrogen Receptors (ER):90%   Progesterone Receptors ([place]): 30%   Her-2/stone (c-erb B-2) protein expression: Negative/0      Oncotype Dx  Recurrence Score Result-20  11/08/2021 completed adjuvant RT  11/09/2021 started endocrine therapy with Arimidex 1 mg by mouth daily per medical oncology, Dr. Eddy Duran  12/20/2021 Right breast US: Benign, BI-RADS 2.  02/28/2022 clinical follow up is without evidence of recurrent disease. She has postsurgical changes bilaterally, right more than left. We reviewed NCCN guidelines for breast cancer follow-up on this visit. We also discussed BSE and the importance of breast massage in detail. She has skin thickening and areas of fat necrosis of the right breast as well as decreased range of motion of the right upper extremity. Will consult occupational therapy for further evaluation and recommendations. She is tolerating her Arimidex well. Discussed limiting alcohol and importance of vitamin D and calcium daily. Will plan to see in July with bilateral screening mammogram same day.  07/19/2022-Bilateral screening [id number], BI-RADS 2. Recommendation from radiology notes that she may benefit from intermittent screening breast MRI. Clinically, she is without evidence of recurrent disease. She has significant post surgical changes bilaterally. Right breast lymphedema is improved post occupational therapy. At this time, we will plan to see her back in 6 months with complete breast ultrasounds. Will revisit issue of breast MRI on that visit. We reviewed the importance of breast self-examination and calling in the event she notices any change. We will follow-up with occupational therapy as directed. To start Effexor tonight for AI induced mood swings and follow-up with medical oncology in 1 month. Plan:     Continue monthly breast/chest wall self examination; detailed instructions reviewed today. Bring any changes to your physician's attention.   Continue healthy diet and exercise routinely as tolerated. Maintaining ideal body weight (20-25 BMI) may lead to optimal breast cancer outcomes. Avoid alcohol or limit to less than 3 drinks per week. Repeat mammogram July 2023 (not ordered). Continue Arimidex 1 mg daily at the discretion of medical oncology team.  Ca+/VitD while on Arimidex. Continue follow up with Medical Oncology, primary care, and all specialties as directed. RTC 6 months for with B/L complete breast US and clinical exam.  Consider bilateral breast MRI intermittent screening as per radiology recommendations in the future. I spent a total of 28 minutes on the date of the service which included preparing to see the patient, face-to-face patient care, completing clinical documentation, obtaining and/or reviewing separately obtained history, performing a medically appropriate examination, counseling and educating the patient/family/caregiver, ordering medications, tests, or procedures, communicating with other HCPs (not separately reported), independently interpreting results (not separately reported), communicating results to the patient/family/caregiver and care coordination (not separately reported). This document is generated, in part, by voice recognition software and thus syntax and grammatical errors are possible. Marry Escalera, RN, MSN, APRN-CNP, [address] [place]  Advanced Oncology Certified Nurse Practitioner  Department of Breast Surgery  [facility]/  [facility] in collaboration with Dr. Lucius Sharma.  Tere/Dr. Samira Morales/Dr. Jessica Juarez APRN-CNP

## 2022-07-19 ENCOUNTER — OFFICE VISIT (OUTPATIENT)
Dept: BREAST CENTER | Age: 62
End: 2022-07-19
Payer: MEDICAID

## 2022-07-19 ENCOUNTER — HOSPITAL ENCOUNTER (OUTPATIENT)
Dept: GENERAL RADIOLOGY | Age: 62
Discharge: HOME | End: 2022-07-21
Payer: MEDICAID

## 2022-07-19 VITALS — HEIGHT: 67 IN | BODY MASS INDEX: 25.43 KG/M2 | WEIGHT: 162 LBS

## 2022-07-19 VITALS
SYSTOLIC BLOOD PRESSURE: 150 MMHG | HEART RATE: 78 BPM | HEIGHT: 68 IN | WEIGHT: 167 LBS | OXYGEN SATURATION: 100 % | DIASTOLIC BLOOD PRESSURE: 92 MMHG | BODY MASS INDEX: 25.31 KG/M2 | RESPIRATION RATE: 18 BRPM | TEMPERATURE: 98.1 F

## 2022-07-19 DIAGNOSIS — C50.911 MALIGNANT NEOPLASM OF RIGHT BREAST IN FEMALE, ESTROGEN RECEPTOR POSITIVE, UNSPECIFIED SITE OF BREAST (HCC): Primary | ICD-10-CM

## 2022-07-19 DIAGNOSIS — Z12.31 VISIT FOR SCREENING MAMMOGRAM: ICD-10-CM

## 2022-07-19 DIAGNOSIS — R92.2 DENSE BREAST TISSUE ON MAMMOGRAM: ICD-10-CM

## 2022-07-19 DIAGNOSIS — Z17.0 MALIGNANT NEOPLASM OF RIGHT BREAST IN FEMALE, ESTROGEN RECEPTOR POSITIVE, UNSPECIFIED SITE OF BREAST (HCC): Primary | ICD-10-CM

## 2022-07-19 DIAGNOSIS — Z85.3 HISTORY OF BREAST CANCER: Primary | ICD-10-CM

## 2022-07-19 DIAGNOSIS — Z12.39 ENCOUNTER FOR BREAST CANCER SCREENING USING NON-MAMMOGRAM MODALITY: ICD-10-CM

## 2022-07-19 PROCEDURE — 77063 BREAST TOMOSYNTHESIS BI: CPT

## 2022-07-19 PROCEDURE — 99213 OFFICE O/P EST LOW 20 MIN: CPT | Performed by: NURSE PRACTITIONER

## 2022-07-19 PROCEDURE — G8419 CALC BMI OUT NRM PARAM NOF/U: HCPCS | Performed by: NURSE PRACTITIONER

## 2022-07-19 PROCEDURE — 3017F COLORECTAL CA SCREEN DOC REV: CPT | Performed by: NURSE PRACTITIONER

## 2022-07-19 PROCEDURE — G8427 DOCREV CUR MEDS BY ELIG CLIN: HCPCS | Performed by: NURSE PRACTITIONER

## 2022-07-19 PROCEDURE — 1036F TOBACCO NON-USER: CPT | Performed by: NURSE PRACTITIONER

## 2022-07-19 PROCEDURE — 99214 OFFICE O/P EST MOD 30 MIN: CPT | Performed by: NURSE PRACTITIONER

## 2022-07-19 RX ORDER — VENLAFAXINE HYDROCHLORIDE 37.5 MG/1
37.5 CAPSULE, EXTENDED RELEASE ORAL DAILY
Qty: 30 CAPSULE | Refills: 0 | Status: SHIPPED | OUTPATIENT
Start: 2022-07-19 | End: 2022-08-16 | Stop reason: SDUPTHER

## 2022-07-19 NOTE — PROGRESS NOTES
This MA spoke with pt - pt scheduled 8/1 at 9:15am.    Electronically signed by Natalie Pinto MA on 7/19/22 at 4:17 PM EDT

## 2022-08-01 ENCOUNTER — OFFICE VISIT (OUTPATIENT)
Dept: FAMILY MEDICINE CLINIC | Age: 62
End: 2022-08-01
Payer: MEDICAID

## 2022-08-01 VITALS
WEIGHT: 170 LBS | DIASTOLIC BLOOD PRESSURE: 76 MMHG | HEIGHT: 68 IN | BODY MASS INDEX: 25.76 KG/M2 | OXYGEN SATURATION: 97 % | SYSTOLIC BLOOD PRESSURE: 120 MMHG | TEMPERATURE: 97.5 F | HEART RATE: 73 BPM | RESPIRATION RATE: 18 BRPM

## 2022-08-01 DIAGNOSIS — E78.2 MIXED HYPERLIPIDEMIA: ICD-10-CM

## 2022-08-01 DIAGNOSIS — E11.9 TYPE 2 DIABETES MELLITUS WITHOUT COMPLICATION, WITHOUT LONG-TERM CURRENT USE OF INSULIN (HCC): ICD-10-CM

## 2022-08-01 DIAGNOSIS — C50.911 MALIGNANT NEOPLASM OF RIGHT BREAST IN FEMALE, ESTROGEN RECEPTOR POSITIVE, UNSPECIFIED SITE OF BREAST (HCC): ICD-10-CM

## 2022-08-01 DIAGNOSIS — Z17.0 MALIGNANT NEOPLASM OF RIGHT BREAST IN FEMALE, ESTROGEN RECEPTOR POSITIVE, UNSPECIFIED SITE OF BREAST (HCC): ICD-10-CM

## 2022-08-01 DIAGNOSIS — C50.911 MALIGNANT NEOPLASM OF RIGHT BREAST IN FEMALE, ESTROGEN RECEPTOR POSITIVE, UNSPECIFIED SITE OF BREAST (HCC): Primary | ICD-10-CM

## 2022-08-01 DIAGNOSIS — R53.83 FATIGUE, UNSPECIFIED TYPE: ICD-10-CM

## 2022-08-01 DIAGNOSIS — E03.9 ACQUIRED HYPOTHYROIDISM: ICD-10-CM

## 2022-08-01 DIAGNOSIS — R73.01 IFG (IMPAIRED FASTING GLUCOSE): ICD-10-CM

## 2022-08-01 DIAGNOSIS — Z17.0 MALIGNANT NEOPLASM OF RIGHT BREAST IN FEMALE, ESTROGEN RECEPTOR POSITIVE, UNSPECIFIED SITE OF BREAST (HCC): Primary | ICD-10-CM

## 2022-08-01 LAB
ALBUMIN SERPL-MCNC: 4.3 G/DL (ref 3.5–5.2)
ALP BLD-CCNC: 66 U/L (ref 35–104)
ALT SERPL-CCNC: 11 U/L (ref 0–32)
ANION GAP SERPL CALCULATED.3IONS-SCNC: 14 MMOL/L (ref 7–16)
AST SERPL-CCNC: 21 U/L (ref 0–31)
BASOPHILS ABSOLUTE: 0.06 E9/L (ref 0–0.2)
BASOPHILS RELATIVE PERCENT: 1.4 % (ref 0–2)
BILIRUB SERPL-MCNC: 0.4 MG/DL (ref 0–1.2)
BUN BLDV-MCNC: 24 MG/DL (ref 6–23)
CALCIUM SERPL-MCNC: 9.2 MG/DL (ref 8.6–10.2)
CHLORIDE BLD-SCNC: 105 MMOL/L (ref 98–107)
CHOLESTEROL, TOTAL: 248 MG/DL (ref 0–199)
CO2: 21 MMOL/L (ref 22–29)
CREAT SERPL-MCNC: 0.9 MG/DL (ref 0.5–1)
EOSINOPHILS ABSOLUTE: 0.23 E9/L (ref 0.05–0.5)
EOSINOPHILS RELATIVE PERCENT: 5.3 % (ref 0–6)
GFR AFRICAN AMERICAN: >60
GFR NON-AFRICAN AMERICAN: >60 ML/MIN/1.73
GLUCOSE BLD-MCNC: 148 MG/DL (ref 74–99)
HBA1C MFR BLD: 6.8 % (ref 4–5.6)
HCT VFR BLD CALC: 37.9 % (ref 34–48)
HDLC SERPL-MCNC: 61 MG/DL
HEMOGLOBIN: 12.4 G/DL (ref 11.5–15.5)
IMMATURE GRANULOCYTES #: 0.01 E9/L
IMMATURE GRANULOCYTES %: 0.2 % (ref 0–5)
LDL CHOLESTEROL CALCULATED: 155 MG/DL (ref 0–99)
LYMPHOCYTES ABSOLUTE: 1.41 E9/L (ref 1.5–4)
LYMPHOCYTES RELATIVE PERCENT: 32.4 % (ref 20–42)
MCH RBC QN AUTO: 32.1 PG (ref 26–35)
MCHC RBC AUTO-ENTMCNC: 32.7 % (ref 32–34.5)
MCV RBC AUTO: 98.2 FL (ref 80–99.9)
MONOCYTES ABSOLUTE: 0.44 E9/L (ref 0.1–0.95)
MONOCYTES RELATIVE PERCENT: 10.1 % (ref 2–12)
NEUTROPHILS ABSOLUTE: 2.2 E9/L (ref 1.8–7.3)
NEUTROPHILS RELATIVE PERCENT: 50.6 % (ref 43–80)
PDW BLD-RTO: 12.4 FL (ref 11.5–15)
PLATELET # BLD: 281 E9/L (ref 130–450)
PMV BLD AUTO: 10.4 FL (ref 7–12)
POTASSIUM SERPL-SCNC: 4.5 MMOL/L (ref 3.5–5)
RBC # BLD: 3.86 E12/L (ref 3.5–5.5)
SODIUM BLD-SCNC: 140 MMOL/L (ref 132–146)
TOTAL PROTEIN: 6.9 G/DL (ref 6.4–8.3)
TRIGL SERPL-MCNC: 162 MG/DL (ref 0–149)
TSH SERPL DL<=0.05 MIU/L-ACNC: 1.64 UIU/ML (ref 0.27–4.2)
VLDLC SERPL CALC-MCNC: 32 MG/DL
WBC # BLD: 4.4 E9/L (ref 4.5–11.5)

## 2022-08-01 PROCEDURE — 99214 OFFICE O/P EST MOD 30 MIN: CPT | Performed by: FAMILY MEDICINE

## 2022-08-01 PROCEDURE — 3044F HG A1C LEVEL LT 7.0%: CPT | Performed by: FAMILY MEDICINE

## 2022-08-01 SDOH — ECONOMIC STABILITY: FOOD INSECURITY: WITHIN THE PAST 12 MONTHS, YOU WORRIED THAT YOUR FOOD WOULD RUN OUT BEFORE YOU GOT MONEY TO BUY MORE.: NEVER TRUE

## 2022-08-01 SDOH — ECONOMIC STABILITY: FOOD INSECURITY: WITHIN THE PAST 12 MONTHS, THE FOOD YOU BOUGHT JUST DIDN'T LAST AND YOU DIDN'T HAVE MONEY TO GET MORE.: NEVER TRUE

## 2022-08-01 ASSESSMENT — PATIENT HEALTH QUESTIONNAIRE - PHQ9
SUM OF ALL RESPONSES TO PHQ QUESTIONS 1-9: 0
SUM OF ALL RESPONSES TO PHQ9 QUESTIONS 1 & 2: 0
SUM OF ALL RESPONSES TO PHQ QUESTIONS 1-9: 0
SUM OF ALL RESPONSES TO PHQ QUESTIONS 1-9: 0
1. LITTLE INTEREST OR PLEASURE IN DOING THINGS: 0
SUM OF ALL RESPONSES TO PHQ QUESTIONS 1-9: 0
2. FEELING DOWN, DEPRESSED OR HOPELESS: 0

## 2022-08-01 ASSESSMENT — SOCIAL DETERMINANTS OF HEALTH (SDOH): HOW HARD IS IT FOR YOU TO PAY FOR THE VERY BASICS LIKE FOOD, HOUSING, MEDICAL CARE, AND HEATING?: NOT HARD AT ALL

## 2022-08-02 PROBLEM — E78.2 MIXED HYPERLIPIDEMIA: Status: ACTIVE | Noted: 2022-08-02

## 2022-08-02 RX ORDER — ROSUVASTATIN CALCIUM 20 MG/1
20 TABLET, COATED ORAL NIGHTLY
Qty: 30 TABLET | Refills: 3 | Status: CANCELLED | OUTPATIENT
Start: 2022-08-02

## 2022-08-02 ASSESSMENT — ENCOUNTER SYMPTOMS
SINUS PAIN: 0
CHOKING: 0
CHEST TIGHTNESS: 0
WHEEZING: 0
FACIAL SWELLING: 0
NAUSEA: 0
CONSTIPATION: 0
EYE REDNESS: 0
TROUBLE SWALLOWING: 0
DIARRHEA: 0
BLOOD IN STOOL: 0
EYE DISCHARGE: 0
COUGH: 0
ABDOMINAL PAIN: 0
APNEA: 0
EYE ITCHING: 0
ABDOMINAL DISTENTION: 0
STRIDOR: 0
COLOR CHANGE: 0
SORE THROAT: 0
VOICE CHANGE: 0
ALLERGIC/IMMUNOLOGIC NEGATIVE: 1
SINUS PRESSURE: 0
EYE PAIN: 0
RHINORRHEA: 0
RECTAL PAIN: 0
PHOTOPHOBIA: 0
ANAL BLEEDING: 0
BACK PAIN: 0
SHORTNESS OF BREATH: 0

## 2022-08-03 NOTE — PROGRESS NOTES
swelling, neck pain and neck stiffness. Skin: Negative. Negative for color change, pallor, rash and wound. Allergic/Immunologic: Negative. Negative for environmental allergies, food allergies and immunocompromised state. Neurological: Negative. Negative for dizziness, tremors, seizures, syncope, facial asymmetry, speech difficulty, weakness, light-headedness, numbness and headaches. Hematological: Negative. Negative for adenopathy. Does not bruise/bleed easily. Psychiatric/Behavioral:  Positive for sleep disturbance. Negative for agitation, behavioral problems, confusion, decreased concentration, dysphoric mood, hallucinations, self-injury and suicidal ideas. The patient is nervous/anxious. The patient is not hyperactive.        Past Medical/Surgical Hx;  Reviewed with patient      Diagnosis Date    BRCA1 negative     BRCA2 negative     Breast cancer ([facility] [address]     Cancer ([facility] [address]     Diabetes mellitus ([facility] [address]     Diverticulitis     Frequent UTI     Hiatal hernia     History of therapeutic radiation     Hypertension     Kidney stones     Lymphedema     right breast    PONV (postoperative nausea and vomiting)     Thyroid disease      Past Surgical History:   Procedure Laterality Date    BREAST REDUCTION SURGERY Right 8/17/2021    RIGHT ONCOPLASTIC BREAST REDUCTION, MATCHING LEFT BREAST REDUCTION AND EXCISION SQUAMOUS CELL CARCINOMA, RIGHT MID CHEST, RIGHT BREAST NEEDLE LOCALIZED LUMPECTOMY BLUE DYE INJECTION, RIGHT AXILLARY SENTINEL NODE EXCISION, POSSIBLE RIGHT AXILLARY DISECTION performed by Taran Sultana MD at [address] [place] [address], LAPAROSCOPIC N/A 6/20/2019    CHOLECYSTECTOMY LAPAROSCOPIC performed by Kennedy Severin, MD at [facility] [address]  8/15/2013         ENDOSCOPY, COLON, DIAGNOSTIC      HYSTERECTOMY (CERVIX STATUS UNKNOWN)      RECTAL PROLAPSE REPAIR      US BREAST NEEDLE BIOPSY RIGHT Right 6/24/2021    US BREAST NEEDLE BIOPSY RIGHT 6/24/2021 [facility] Past Family Hx:  Reviewed with patient      Problem Relation Age of Onset    Breast Cancer Mother 52        breast cancer    Prostate Cancer Father     Prostate Cancer Brother     Breast Cancer Maternal Aunt 48        breast cancer    Prostate Cancer Paternal Uncle     Breast Cancer Paternal Grandmother 48        breast cancer    Breast Cancer Paternal Aunt 64       Social Hx:  Reviewed with patient  Social History     Tobacco Use    Smoking status: Never    Smokeless tobacco: Never   Substance Use Topics    Alcohol use: Not Currently     Comment: Rarely social       OBJECTIVE  /76   Pulse 73   Temp 97.5 °F (36.4 °C) (Temporal)   Resp 18   Ht 5' 8\" (1.727 m)   Wt 170 lb (77.1 kg)   LMP  (LMP Unknown)   SpO2 97%   Breastfeeding No   BMI 25.85 kg/m²     Problem List:  Sundeep Nicolas does not have any pertinent problems on file. PHYS EX:  Physical Exam  Vitals and nursing note reviewed. Constitutional:       General: She is not in acute distress. Appearance: Normal appearance. She is well-developed. She is obese. She is not ill-appearing, toxic-appearing or diaphoretic. Comments: Patient has morbid obesity. Patient instructed on low calorie, healthy ADA diet. HENT:      Head: Normocephalic and atraumatic. Nose: No congestion or rhinorrhea. Mouth/Throat:      Mouth: Mucous membranes are moist.   Eyes:      General: No scleral icterus. Right eye: No discharge. Left eye: No discharge. Extraocular Movements: Extraocular movements intact. Conjunctiva/sclera: Conjunctivae normal.      Pupils: Pupils are equal, round, and reactive to light. Neck:      Thyroid: No thyromegaly. Vascular: No carotid bruit or JVD. Trachea: No tracheal deviation. Cardiovascular:      Rate and Rhythm: Normal rate and regular rhythm. Pulses: Normal pulses. Heart sounds: Normal heart sounds. No murmur heard. No friction rub. No gallop.    Pulmonary: Effort: Pulmonary effort is normal. No respiratory distress. Breath sounds: Normal breath sounds. No stridor. No wheezing, rhonchi or rales. Chest:      Chest wall: No tenderness. Abdominal:      General: Bowel sounds are normal. There is no distension. Palpations: Abdomen is soft. There is no mass. Tenderness: There is no abdominal tenderness (lower quadrant pains and both flanks). There is no guarding or rebound. Hernia: No hernia is present. Genitourinary:     Vagina: Normal.   Musculoskeletal:         General: Tenderness present. No swelling, deformity or signs of injury. Cervical back: Normal range of motion and neck supple. No rigidity or tenderness. No muscular tenderness. Right lower leg: No edema. Left lower leg: No edema. Comments: Pain and decreased ROM multiple joints. Lymphadenopathy:      Cervical: No cervical adenopathy. Skin:     General: Skin is warm. Coloration: Skin is not jaundiced or pale. Findings: No bruising, erythema, lesion or rash. Neurological:      General: No focal deficit present. Mental Status: She is alert and oriented to person, place, and time. Cranial Nerves: No cranial nerve deficit. Sensory: No sensory deficit. Motor: No weakness or abnormal muscle tone. Coordination: Coordination normal.      Gait: Gait normal.      Deep Tendon Reflexes: Reflexes are normal and symmetric. Reflexes normal.   Psychiatric:         Mood and Affect: Mood normal.         Behavior: Behavior normal.         Thought Content: Thought content normal.         Judgment: Judgment normal.       ASSESSMENT/PLAN  Estelle Goodell was seen today for hypothyroidism. Diagnoses and all orders for this visit:    Malignant neoplasm of right breast in female, estrogen receptor positive, unspecified site of breast ([facility] [address]  -     CBC with Auto Differential; Future  -     Comprehensive Metabolic Panel; Future  Stable. Arimidex, oncology. Type 2 diabetes mellitus without complication, without long-term current use of insulin (HCC)  -     CBC with Auto Differential; Future  -     Comprehensive Metabolic Panel; Future  Stable. Actos, ADA diet. Acquired hypothyroidism  -     CBC with Auto Differential; Future  -     Comprehensive Metabolic Panel; Future  Controlled. Euthyrox. Mixed hyperlipidemia  -     CBC with Auto Differential; Future  -     Comprehensive Metabolic Panel; Future  -     Lipid Panel; Future  Not controlled. Pt instructed on low chol. Diet. IFG (impaired fasting glucose)  -     CBC with Auto Differential; Future  -     Comprehensive Metabolic Panel; Future  -     Hemoglobin A1C; Future    Fatigue, unspecified type  -     CBC with Auto Differential; Future  -     Comprehensive Metabolic Panel; Future  -     TSH; Future    Pt instructed if any worse go ED ASAP. Outpatient Encounter Medications as of 8/1/2022   Medication Sig Dispense Refill    venlafaxine (EFFEXOR XR) 37.5 MG extended release capsule Take 1 capsule by mouth in the morning. 30 capsule 0    ibuprofen (ADVIL;MOTRIN) 800 MG tablet Take 1 tablet by mouth every 6 hours as needed for Pain 21 tablet 0    EUTHYROX 50 MCG tablet Take 1 tablet by mouth once daily 30 tablet 0    pioglitazone (ACTOS) 15 MG tablet Take 1 tablet by mouth in the morning and at bedtime 180 tablet 1    NONFORMULARY Vitamin D, vitamin E, Zinc daily.       lansoprazole (PREVACID) 30 MG delayed release capsule Take 1 capsule by mouth daily 90 capsule 1    allopurinol (ZYLOPRIM) 300 MG tablet Take 1/2 (one-half) tablet by mouth once daily 45 tablet 1    glucose monitoring (FREESTYLE FREEDOM) kit 1 kit by Does not apply route daily 1 kit 0    blood glucose monitor strips Test blood sugar daily 100 strip 5    Lancets MISC 1 each by Does not apply route daily 100 each 0    anastrozole (ARIMIDEX) 1 MG tablet Take 1 tablet by mouth daily 90 tablet 1    albuterol sulfate HFA (VENTOLIN HFA) 108 (90 Base) MCG/ACT inhaler Inhale 2 puffs into the lungs every 6 hours as needed for Wheezing or Shortness of Breath 3 Inhaler 1    Probiotic Product (PROBIOTIC PO) Take by mouth        Facility-Administered Encounter Medications as of 8/1/2022   Medication Dose Route Frequency Provider Last Rate Last Admin    lidocaine-EPINEPHrine 1 %-[id number] injection 3 mL  3 mL IntraDERmal Once FLORENTINO Miranda           Return in about 6 months (around 2/1/2023).         Reviewed recent labs related to Yahir's current problems      Discussed importance of regular Health Maintenance follow up  Health Maintenance   Topic    Diabetic foot exam     HIV screen     Hepatitis C screen     DTaP/Tdap/Td vaccine (1 - Tdap)    Shingles vaccine (1 of 2)    Pneumococcal 0-64 years Vaccine (2 - PCV)    Diabetic retinal exam     COVID-19 Vaccine (3 - Moderna risk series)    Diabetic microalbuminuria test     Flu vaccine (1)    Breast cancer screen     A1C test (Diabetic or Prediabetic)     Lipids     Depression Screen     Colorectal Cancer Screen     Hepatitis A vaccine     Hib vaccine     Meningococcal (ACWY) vaccine

## 2022-08-05 RX ORDER — ROSUVASTATIN CALCIUM 20 MG/1
20 TABLET, COATED ORAL NIGHTLY
Qty: 30 TABLET | Refills: 3 | Status: SHIPPED | OUTPATIENT
Start: 2022-08-05

## 2022-08-16 DIAGNOSIS — Z17.0 MALIGNANT NEOPLASM OF RIGHT BREAST IN FEMALE, ESTROGEN RECEPTOR POSITIVE, UNSPECIFIED SITE OF BREAST (HCC): ICD-10-CM

## 2022-08-16 DIAGNOSIS — C50.911 MALIGNANT NEOPLASM OF RIGHT BREAST IN FEMALE, ESTROGEN RECEPTOR POSITIVE, UNSPECIFIED SITE OF BREAST (HCC): ICD-10-CM

## 2022-08-16 RX ORDER — VENLAFAXINE HYDROCHLORIDE 75 MG/1
75 CAPSULE, EXTENDED RELEASE ORAL DAILY
Qty: 30 CAPSULE | Refills: 3 | Status: SHIPPED | OUTPATIENT
Start: 2022-08-16 | End: 2022-10-07

## 2022-09-02 ENCOUNTER — HOSPITAL ENCOUNTER (OUTPATIENT)
Dept: INFUSION THERAPY | Age: 62
Discharge: HOME | End: 2022-09-02
Payer: MEDICAID

## 2022-09-02 DIAGNOSIS — Z17.0 MALIGNANT NEOPLASM OF RIGHT BREAST IN FEMALE, ESTROGEN RECEPTOR POSITIVE, UNSPECIFIED SITE OF BREAST (HCC): Primary | ICD-10-CM

## 2022-09-02 DIAGNOSIS — C50.911 MALIGNANT NEOPLASM OF RIGHT BREAST IN FEMALE, ESTROGEN RECEPTOR POSITIVE, UNSPECIFIED SITE OF BREAST (HCC): Primary | ICD-10-CM

## 2022-09-02 LAB
ALBUMIN SERPL-MCNC: 4.2 G/DL (ref 3.5–5.2)
ALP BLD-CCNC: 79 U/L (ref 35–104)
ALT SERPL-CCNC: 10 U/L (ref 0–32)
ANION GAP SERPL CALCULATED.3IONS-SCNC: 10 MMOL/L (ref 7–16)
AST SERPL-CCNC: 15 U/L (ref 0–31)
BASOPHILS ABSOLUTE: 0.04 E9/L (ref 0–0.2)
BASOPHILS RELATIVE PERCENT: 0.8 % (ref 0–2)
BILIRUB SERPL-MCNC: 0.4 MG/DL (ref 0–1.2)
BUN BLDV-MCNC: 16 MG/DL (ref 6–23)
CALCIUM SERPL-MCNC: 9.5 MG/DL (ref 8.6–10.2)
CHLORIDE BLD-SCNC: 101 MMOL/L (ref 98–107)
CO2: 26 MMOL/L (ref 22–29)
CREAT SERPL-MCNC: 0.9 MG/DL (ref 0.5–1)
EOSINOPHILS ABSOLUTE: 0.27 E9/L (ref 0.05–0.5)
EOSINOPHILS RELATIVE PERCENT: 5.3 % (ref 0–6)
GFR AFRICAN AMERICAN: >60
GFR NON-AFRICAN AMERICAN: >60 ML/MIN/1.73
GLUCOSE BLD-MCNC: 145 MG/DL (ref 74–99)
HCT VFR BLD CALC: 38.9 % (ref 34–48)
HEMOGLOBIN: 13.1 G/DL (ref 11.5–15.5)
IMMATURE GRANULOCYTES #: 0.01 E9/L
IMMATURE GRANULOCYTES %: 0.2 % (ref 0–5)
LYMPHOCYTES ABSOLUTE: 1.31 E9/L (ref 1.5–4)
LYMPHOCYTES RELATIVE PERCENT: 25.6 % (ref 20–42)
MCH RBC QN AUTO: 32.5 PG (ref 26–35)
MCHC RBC AUTO-ENTMCNC: 33.7 % (ref 32–34.5)
MCV RBC AUTO: 96.5 FL (ref 80–99.9)
MONOCYTES ABSOLUTE: 0.42 E9/L (ref 0.1–0.95)
MONOCYTES RELATIVE PERCENT: 8.2 % (ref 2–12)
NEUTROPHILS ABSOLUTE: 3.07 E9/L (ref 1.8–7.3)
NEUTROPHILS RELATIVE PERCENT: 59.9 % (ref 43–80)
PDW BLD-RTO: 12 FL (ref 11.5–15)
PLATELET # BLD: 293 E9/L (ref 130–450)
PMV BLD AUTO: 9.5 FL (ref 7–12)
POTASSIUM SERPL-SCNC: 4.3 MMOL/L (ref 3.5–5)
RBC # BLD: 4.03 E12/L (ref 3.5–5.5)
SODIUM BLD-SCNC: 137 MMOL/L (ref 132–146)
TOTAL PROTEIN: 7 G/DL (ref 6.4–8.3)
WBC # BLD: 5.1 E9/L (ref 4.5–11.5)

## 2022-09-02 PROCEDURE — 36415 COLL VENOUS BLD VENIPUNCTURE: CPT

## 2022-09-02 PROCEDURE — 85025 COMPLETE CBC W/AUTO DIFF WBC: CPT

## 2022-09-02 PROCEDURE — 80053 COMPREHEN METABOLIC PANEL: CPT

## 2022-09-06 ENCOUNTER — OFFICE VISIT (OUTPATIENT)
Dept: ONCOLOGY | Age: 62
End: 2022-09-06
Payer: MEDICAID

## 2022-09-06 VITALS
WEIGHT: 171.9 LBS | HEART RATE: 68 BPM | BODY MASS INDEX: 26.05 KG/M2 | SYSTOLIC BLOOD PRESSURE: 139 MMHG | OXYGEN SATURATION: 96 % | HEIGHT: 68 IN | DIASTOLIC BLOOD PRESSURE: 78 MMHG | TEMPERATURE: 97.5 F

## 2022-09-06 DIAGNOSIS — C50.911 MALIGNANT NEOPLASM OF RIGHT BREAST IN FEMALE, ESTROGEN RECEPTOR POSITIVE, UNSPECIFIED SITE OF BREAST (HCC): Primary | ICD-10-CM

## 2022-09-06 DIAGNOSIS — Z17.0 MALIGNANT NEOPLASM OF RIGHT BREAST IN FEMALE, ESTROGEN RECEPTOR POSITIVE, UNSPECIFIED SITE OF BREAST (HCC): Primary | ICD-10-CM

## 2022-09-06 PROCEDURE — G8427 DOCREV CUR MEDS BY ELIG CLIN: HCPCS | Performed by: INTERNAL MEDICINE

## 2022-09-06 PROCEDURE — 99214 OFFICE O/P EST MOD 30 MIN: CPT | Performed by: INTERNAL MEDICINE

## 2022-09-06 PROCEDURE — G8419 CALC BMI OUT NRM PARAM NOF/U: HCPCS | Performed by: INTERNAL MEDICINE

## 2022-09-06 PROCEDURE — 1036F TOBACCO NON-USER: CPT | Performed by: INTERNAL MEDICINE

## 2022-09-06 PROCEDURE — 3017F COLORECTAL CA SCREEN DOC REV: CPT | Performed by: INTERNAL MEDICINE

## 2022-09-06 PROCEDURE — 99212 OFFICE O/P EST SF 10 MIN: CPT

## 2022-09-06 RX ORDER — ANASTROZOLE 1 MG/1
TABLET ORAL
Qty: 30 TABLET | Refills: 0 | Status: SHIPPED
Start: 2022-09-06 | End: 2022-09-21

## 2022-09-06 NOTE — PROGRESS NOTES
Department of [place] [id number]  Attending Clinic Note    Reason for Visit: Follow-up on a patient with Right Breast Cancer    PCP:  Francy Barlow DO    History of Present Illness: The lesion was located in the 8 o'clock position of the right breast    Breast cancer risk factors include mom breast cancer age 52, paternal aunt breast cancer, 48 paternal gm breast cancer age 48    Bilateral Diagnostic Mammogram/Right Breast U/S on 06/16/2021:  Large irregular hypoechoic solid mass seen on ultrasound and mammogram at about the 8 to 9 o'clock position measuring 2.4 x 1.4 x 1.4 cm. 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 Comment: The carcinoma displays highly infiltrative architectural features including single file and single cell patterns of invasion. Strong membranous E-cadherin expression is diffusely present in association with the invasive carcinoma, supportive of ductal origin. No in situ component is identified in the tissue sample. Benedicto histologic score for the invasive carcinoma corresponds to a tubule score 3, nuclear score 3, mitotic score 1, total score 7, grade 2. Intradepartmental consultation is obtained. Breast Cancer Marker Studies:   Estrogen Receptors (ER):90%   Progesterone Receptors (AL): 30%   Her-2/stone (c-erb B-2) protein expression: Negative/0     CXR 07/06/2021:  No acute process    MRI Bilateral Breast 07/08/2021:  Irregular mass in the right breast 8 o'clock measuring up to 2.4 cm in size is consistent with biopsy proven neoplasm. Focal, heterogeneous non mass enhancement along the lateral aspect of the mass may be due to recent biopsy vs satellite lesion.   No LN  No evidence of neoplasm in the left breast    Right oncoplastic breast reduction, matching left breast reduction and excision squamous cell carcinoma, right mid chest with Dr Sarah Milligan and right breast needle localized lumpectomy, blue dye injection, right axillary sentinel node excision, possible right axillary dissection with Dr Naomi Swift on August 17, 2021  A. Left breast, reduction mammoplasty:   - Skin and benign breast tissue (288.8 g) showing stromal fibrosis/hyalinosis. B.  Skin, lesion of mid chest, excision:   - Focal residual squamous cell carcinoma in situ, margins free of tumor;   - Actinic keratosis;   - Dermal scar with chronic inflammation, consistent with changes of previous biopsy site. C.  [place] lymph node #1, biopsy:    - Two of two (2/2) nodes positive for metastatic carcinoma (micrometastasis), see comment. D.  [place] lymph node #2, biopsy:   - Benign node (1) showing fatty replacement. E.  [place] lymph node #3, biopsy:   - Benign node (1) showing fatty replacement. F.  Right breast, central, lumpectomy with needle localization:   - Invasive carcinoma of no special type (ductal), grade 3;   - Changes of previous biopsy site, see comment. G.  [place] lymph node #4, biopsy:   - Benign node showing prominent fatty replacement. H.  Right breast, reduction mammoplasty:   - Skin and benign breast tissue (79.6 g) showing stromal fibrosis/hyalinosis. Comment:A few small foci of metastatic carcinoma (</= 2 mm/each metastasis) in two sentinel lymph nodes in specimen C are noted histologically and further confirmed by immunostaining for pankeratin and GATA3. Comparing with the patient's previous core needle biopsy specimen from the same tumor on 07/16/2021 (see report NQT-), the tumor seen here is much more mitotic active (focally, 3-4 mitotic figures/hpf). Therefore, the provisional Benedicto score of this tumor is updated to 3+3+3=9 (grade 3) from originally reported 3+3+1=7 (grade 2).      CANCER CASE SUMMARY   Procedure: Excision   Specimen Laterality: Right   TUMOR   Tumor Site: Central Histologic Type: Invasive carcinoma of no special type   Histologic Grade: Benedicto Histologic Score   Glandular/tubular differentiation: Score 3   Nuclear pleomorphism: Score 3   Mitotic rate: Score 3   Overall grade: Grade 3 (scores of 9)   Tumor Size: 2.2 x 2.2 x 1.4 cm   Tumor Focality: Single focus of invasive carcinoma   Ductal Carcinoma In Situ (DCIS): Not identified   Lobular Carcinoma In Situ (LCIS): Not identified   Lymph-Vascular Invasion: Not identified   Dermal lymphovascular invasion: No skin present   Microcalcifications: Not identified   Treatment effect: No known presurgical therapy   MARGINS   All margins negative for invasive carcinoma   Distance from invasive carcinoma to closest margin: 1 mm   Closest margins to invasive carcinoma: medial (slide F1) and posterior (slide F2)   REGIONAL LYMPH NODES   Tumor present in regional lymph nodes   Number of lymph nodes with macrometastasis (>2 mm): 0   Number of lymph nodes with micrometasteses (0.2-2 mm): 2   Number of lymph nodes with isolated tumor cells: 0   Size of largest jarett metastasis: 2 mm   Extranodal extension: Not identified   Total number of lymph nodes examined: 4   Number of sentinel nodes examined: 4   DISTANT METASTASIS: Not applicable   Pathologic Staging (pTNM, AJCC 8th Edition)   pT2 (sn)pN1mi     Breast Cancer Marker Studies:   Estrogen Receptors (ER):90%   Progesterone Receptors (MD): 30%   Her-2/stone (c-erb B-2) protein expression: Negative/0     Oncotype Dx  Recurrence Score Result-20  No indication for adjuvant chemotherapy  Recommended adjuvant RT followed by adjuvant endocrine therapy   RT was completed 11/08/2021. Arimidex 1 mg po daily was started on 11/09/2021 with fair tolerance so far. Today 09/06/2022; she continues to tolerate Arimidex well. Mild fatigue not affecting quality of life. Mild hot flashes manageable. Having a dental procedure tomorrow    Review of Systems;  CONSTITUTIONAL: No fever.  Mild hot flashes manageable. Fair appetite. Mild fatigue. ENMT: Eyes: No diplopia; Nose: No epistaxis. Mouth: No sore throat. RESPIRATORY: No hemoptysis, shortness of breath, cough. CARDIOVASCULAR: No chest pain, palpitations. GASTROINTESTINAL: No nausea/vomiting, abdominal pain  GENITOURINARY: No dysuria, urinary frequency, hematuria. NEURO: No syncope, presyncope, headache. Remainder:  ROS NEGATIVE    Past Medical History:      Diagnosis Date    BRCA1 negative     BRCA2 negative     Breast cancer ([facility]     Cancer ([facility]     Diabetes mellitus ([facility]     Diverticulitis     Frequent UTI     Hiatal hernia     History of therapeutic radiation     Hypertension     Kidney stones     Lymphedema     right breast    PONV (postoperative nausea and vomiting)     Thyroid disease      Medications:  Reviewed and reconciled. Allergies: Allergies   Allergen Reactions    Flagyl [Metronidazole] Rash    Omeprazole Diarrhea and Nausea And Vomiting     As well as dizziness    Vicodin [Hydrocodone-Acetaminophen] Nausea And Vomiting     Physical Exam:  Pulse 68   Temp 97.5 °F (36.4 °C)   Ht 5' 8\" (1.727 m)   Wt 171 lb 14.4 oz (78 kg)   LMP  (LMP Unknown)   SpO2 96%   BMI 26.14 kg/m²   GENERAL: Alert, oriented x 3, not in acute distress. LUNGS: CTA [place]  CVS: RRR  EXTREMITIES: Without clubbing, cyanosis, or edema. NEUROLOGIC: No focal deficits.    ECOG1    Lab Results   Component Value Date    WBC 5.1 09/02/2022    HGB 13.1 09/02/2022    HCT 38.9 09/02/2022    MCV 96.5 09/02/2022     09/02/2022     Lab Results   Component Value Date     09/02/2022    K 4.3 09/02/2022     09/02/2022    CO2 26 09/02/2022    BUN 16 09/02/2022    CREATININE 0.9 09/02/2022    GLUCOSE 145 (H) 09/02/2022    CALCIUM 9.5 09/02/2022    PROT 7.0 09/02/2022    LABALBU 4.2 09/02/2022    BILITOT 0.4 09/02/2022    ALKPHOS 79 09/02/2022    AST 15 09/02/2022    ALT 10 09/02/2022    LABGLOM >60 09/02/2022    GFRAA >60 09/02/2022 Impression/Plan:  58 y/o female who underwent Right oncoplastic breast reduction, matching left breast reduction and excision squamous cell carcinoma, right mid chest with Dr Andreina Morales and right breast needle localized lumpectomy, blue dye injection, right axillary sentinel node excision, possible right axillary dissection with Dr Jasvir Vallejo on August 17, 2021  A. Left breast, reduction mammoplasty:   - Skin and benign breast tissue (288.8 g) showing stromal fibrosis/hyalinosis. B.  Skin, lesion of mid chest, excision:   - Focal residual squamous cell carcinoma in situ, margins free of tumor;   - Actinic keratosis;   - Dermal scar with chronic inflammation, consistent with changes of previous biopsy site. C.  [place] lymph node #1, biopsy:    - Two of two (2/2) nodes positive for metastatic carcinoma (micrometastasis), see comment. D.  [place] lymph node #2, biopsy:   - Benign node (1) showing fatty replacement. E.  [place] lymph node #3, biopsy:   - Benign node (1) showing fatty replacement. F.  Right breast, central, lumpectomy with needle localization:   - Invasive carcinoma of no special type (ductal), grade 3;   - Changes of previous biopsy site, see comment. G.  [place] lymph node #4, biopsy:   - Benign node showing prominent fatty replacement. H.  Right breast, reduction mammoplasty:   - Skin and benign breast tissue (79.6 g) showing stromal fibrosis/hyalinosis. Comment:A few small foci of metastatic carcinoma (</= 2 mm/each metastasis) in two sentinel lymph nodes in specimen C are noted histologically and further confirmed by immunostaining for pankeratin and GATA3. Comparing with the patient's previous core needle biopsy specimen from the same tumor on 07/16/2021 (see report NQU-), the tumor seen here is much more mitotic active (focally, 3-4 mitotic figures/hpf).  Therefore, the provisional Benedicto score of this tumor is updated to 3+3+3=9 (grade 3) from originally reported 3+3+1=7 (grade 2). CANCER CASE SUMMARY   Procedure: Excision   Specimen Laterality: Right   TUMOR   Tumor Site: Central   Histologic Type: Invasive carcinoma of no special type   Histologic Grade: Benedicto Histologic Score   Glandular/tubular differentiation: Score 3   Nuclear pleomorphism: Score 3   Mitotic rate: Score 3   Overall grade: Grade 3 (scores of 9)   Tumor Size: 2.2 x 2.2 x 1.4 cm   Tumor Focality: Single focus of invasive carcinoma   Ductal Carcinoma In Situ (DCIS): Not identified   Lobular Carcinoma In Situ (LCIS): Not identified   Lymph-Vascular Invasion: Not identified   Dermal lymphovascular invasion: No skin present   Microcalcifications: Not identified   Treatment effect: No known presurgical therapy   MARGINS   All margins negative for invasive carcinoma   Distance from invasive carcinoma to closest margin: 1 mm   Closest margins to invasive carcinoma: medial (slide F1) and posterior (slide F2)   REGIONAL LYMPH NODES   Tumor present in regional lymph nodes   Number of lymph nodes with macrometastasis (>2 mm): 0   Number of lymph nodes with micrometasteses (0.2-2 mm): 2   Number of lymph nodes with isolated tumor cells: 0   Size of largest jarett metastasis: 2 mm   Extranodal extension: Not identified   Total number of lymph nodes examined: 4   Number of sentinel nodes examined: 4   DISTANT METASTASIS: Not applicable   Pathologic Staging (pTNM, AJCC 8th Edition)   pT2 (sn)pN1mi     Breast Cancer Marker Studies:   Estrogen Receptors (ER):90%   Progesterone Receptors (IL): 30%   Her-2/stone (c-erb B-2) protein expression: Negative/0     Oncotype Dx  Recurrence Score Result-20  No indication for adjuvant chemotherapy  Recommended adjuvant RT followed by adjuvant endocrine therapy     RT was completed on 11/08/2021. DEXA scan 11/04/2021: Osteopenia in LS; Normal in hips. Arimidex 1 mg po daily was started on 11/09/2021 with fair tolerance so far.    Mild fatigue not affecting quality of life  Hot flashes controlled on Effexor    Right Breast U/S 12/20/2021 Benign findings with no sonographic evidence of malignancy in the right breast    Bilateral Screening Mammogram 07/19/2022. No evidence of malignancy  Imaging reviewed. Labs reviewed. CARLY  Continue Arimidex, Ca. VitD    RTC 4 months with labs. Genetics: My-Risk Negative. No clinically significant mutation identified. No VUS identified.      Ana M Medina MD   9/6/2022

## 2022-09-21 RX ORDER — ANASTROZOLE 1 MG/1
TABLET ORAL
Qty: 30 TABLET | Refills: 0 | Status: SHIPPED
Start: 2022-09-21 | End: 2022-09-27 | Stop reason: SDUPTHER

## 2022-09-26 DIAGNOSIS — E11.9 TYPE 2 DIABETES MELLITUS WITHOUT COMPLICATION, WITHOUT LONG-TERM CURRENT USE OF INSULIN (HCC): ICD-10-CM

## 2022-09-26 RX ORDER — PIOGLITAZONEHYDROCHLORIDE 15 MG/1
15 TABLET ORAL 2 TIMES DAILY
Qty: 60 TABLET | Refills: 0 | Status: SHIPPED
Start: 2022-09-26 | End: 2022-10-28 | Stop reason: SDUPTHER

## 2022-09-27 DIAGNOSIS — Z17.0 MALIGNANT NEOPLASM OF RIGHT BREAST IN FEMALE, ESTROGEN RECEPTOR POSITIVE, UNSPECIFIED SITE OF BREAST (HCC): Primary | ICD-10-CM

## 2022-09-27 DIAGNOSIS — C50.911 MALIGNANT NEOPLASM OF RIGHT BREAST IN FEMALE, ESTROGEN RECEPTOR POSITIVE, UNSPECIFIED SITE OF BREAST (HCC): Primary | ICD-10-CM

## 2022-09-27 DIAGNOSIS — Z79.811 USE OF AROMATASE INHIBITORS: ICD-10-CM

## 2022-09-27 RX ORDER — ANASTROZOLE 1 MG/1
TABLET ORAL
Qty: 90 TABLET | Refills: 1 | Status: SHIPPED | OUTPATIENT
Start: 2022-09-27

## 2022-10-07 ENCOUNTER — OFFICE VISIT (OUTPATIENT)
Dept: FAMILY MEDICINE CLINIC | Age: 62
End: 2022-10-07
Payer: MEDICAID

## 2022-10-07 VITALS
HEIGHT: 68 IN | SYSTOLIC BLOOD PRESSURE: 118 MMHG | RESPIRATION RATE: 18 BRPM | BODY MASS INDEX: 25.61 KG/M2 | DIASTOLIC BLOOD PRESSURE: 75 MMHG | HEART RATE: 78 BPM | WEIGHT: 169 LBS | TEMPERATURE: 97 F | OXYGEN SATURATION: 98 %

## 2022-10-07 DIAGNOSIS — E11.9 TYPE 2 DIABETES MELLITUS WITHOUT COMPLICATION, WITHOUT LONG-TERM CURRENT USE OF INSULIN (HCC): ICD-10-CM

## 2022-10-07 DIAGNOSIS — R30.0 DYSURIA: ICD-10-CM

## 2022-10-07 DIAGNOSIS — Z17.0 MALIGNANT NEOPLASM OF RIGHT BREAST IN FEMALE, ESTROGEN RECEPTOR POSITIVE, UNSPECIFIED SITE OF BREAST (HCC): ICD-10-CM

## 2022-10-07 DIAGNOSIS — E03.9 ACQUIRED HYPOTHYROIDISM: ICD-10-CM

## 2022-10-07 DIAGNOSIS — E78.2 MIXED HYPERLIPIDEMIA: ICD-10-CM

## 2022-10-07 DIAGNOSIS — Z76.89 ENCOUNTER TO ESTABLISH CARE: Primary | ICD-10-CM

## 2022-10-07 DIAGNOSIS — E55.9 VITAMIN D DEFICIENCY: ICD-10-CM

## 2022-10-07 DIAGNOSIS — C50.911 MALIGNANT NEOPLASM OF RIGHT BREAST IN FEMALE, ESTROGEN RECEPTOR POSITIVE, UNSPECIFIED SITE OF BREAST (HCC): ICD-10-CM

## 2022-10-07 DIAGNOSIS — Z23 NEED FOR INFLUENZA VACCINATION: ICD-10-CM

## 2022-10-07 LAB
BILIRUBIN, POC: NEGATIVE
BLOOD URINE, POC: NORMAL
CLARITY, POC: NORMAL
COLOR, POC: YELLOW
GLUCOSE URINE, POC: NEGATIVE
KETONES, POC: NEGATIVE
LEUKOCYTE EST, POC: NORMAL
NITRITE, POC: POSITIVE
PH, POC: 6
PROTEIN, POC: NORMAL
SPECIFIC GRAVITY, POC: >=1.03
UROBILINOGEN, POC: NORMAL

## 2022-10-07 PROCEDURE — 99214 OFFICE O/P EST MOD 30 MIN: CPT | Performed by: NEUROMUSCULOSKELETAL MEDICINE & OMM

## 2022-10-07 PROCEDURE — 81002 URINALYSIS NONAUTO W/O SCOPE: CPT | Performed by: NEUROMUSCULOSKELETAL MEDICINE & OMM

## 2022-10-07 PROCEDURE — 3044F HG A1C LEVEL LT 7.0%: CPT | Performed by: NEUROMUSCULOSKELETAL MEDICINE & OMM

## 2022-10-07 RX ORDER — DULOXETIN HYDROCHLORIDE 60 MG/1
CAPSULE, DELAYED RELEASE ORAL
COMMUNITY
Start: 2022-07-23 | End: 2022-10-07

## 2022-10-07 RX ORDER — NITROFURANTOIN MACROCRYSTALS 100 MG/1
100 CAPSULE ORAL 2 TIMES DAILY
Qty: 14 CAPSULE | Refills: 0 | Status: SHIPPED | OUTPATIENT
Start: 2022-10-07 | End: 2022-10-14

## 2022-10-07 ASSESSMENT — ENCOUNTER SYMPTOMS
BLOOD IN STOOL: 0
COUGH: 0
SHORTNESS OF BREATH: 0
ABDOMINAL PAIN: 1
VOMITING: 0
CHOKING: 0
DIARRHEA: 0
BACK PAIN: 0
NAUSEA: 0
CONSTIPATION: 0
CHEST TIGHTNESS: 0

## 2022-10-07 NOTE — ASSESSMENT & PLAN NOTE
Most recent lipid panel reviewed from August, 2022 blood work. We will continue her on Crestor 20 mg daily.

## 2022-10-07 NOTE — ASSESSMENT & PLAN NOTE
Patient is in remission but continues to see Dr. Mike Timmons regarding her hormone treatment therapy.

## 2022-10-07 NOTE — ASSESSMENT & PLAN NOTE
Last hemoglobin A1c in August was 6.8. We will continue same medical regimen which consist of Actos 15 mg 1 by mouth twice a day.

## 2022-10-07 NOTE — PROGRESS NOTES
Mayra Abbasi (:  1960) is a 57 y.o. female,New patient, here for evaluation of the following chief complaint(s):  Established New Doctor (Prev PCP Dr Jose Oates), Urinary Frequency (X 1 week//), Dysuria (X 1 week/), and Spasms (Bladder/)         ASSESSMENT/PLAN:  1. Encounter to establish care  -     CBC with Auto Differential; Future  2. Need for influenza vaccination  Assessment & Plan:  Patient did refuse flu shot today. 3. Dysuria  Assessment & Plan:   Patient's urinalysis was positive for nitrates and showed small leukocytes. We will send urinalysis for culture and sensitivity. In the meantime we will place her on Macrobid/Macrodantin 100 mg 1 by mouth twice a day for 7 days will dispense 14 with no refills. Orders:  -     POCT Urinalysis no Micro  -     Culture, Urine; Future  -     nitrofurantoin (MACRODANTIN) 100 MG capsule; Take 1 capsule by mouth in the morning and 1 capsule in the evening. Do all this for 7 days. , Disp-14 capsule, R-0Normal  4. Type 2 diabetes mellitus without complication, without long-term current use of insulin (HCC)  Assessment & Plan:  Last hemoglobin A1c in August was 6.8. We will continue same medical regimen which consist of Actos 15 mg 1 by mouth twice a day. Orders:  -     Comprehensive Metabolic Panel; Future  -     Hemoglobin A1C; Future  5. Malignant neoplasm of right breast in female, estrogen receptor positive, unspecified site of breast [facility])  Assessment & Plan:  Patient is in remission but continues to see Dr. Clemente Desir regarding her hormone treatment therapy. 6. Mixed hyperlipidemia  Assessment & Plan:  Most recent lipid panel reviewed from  blood work. We will continue her on Crestor 20 mg daily. Orders:  -     Lipid Panel; Future  7. Acquired hypothyroidism  Assessment & Plan:  Last TSH noted in 2022 was 2.64. Continue same dose of Euthyrox 50 mcg daily  Orders:  -     TSH; Future  8.  Vitamin D deficiency  -     Vitamin D 25 Hydroxy; Future      Return in about 6 months (around 4/7/2023). Subjective   SUBJECTIVE/OBJECTIVE:  HPI 77-year-old female here to establish care. Patient's previous PCP was Dr. Oscar Chandler. She also presents with a 1 week history of burning with urination, frequency, and bladder spasms with hesitancy. Patient's had previous urinary tract infections and is done well with the Macrobid/Macrodantin. Urinalysis done in the office did was positive for nitrates and showed small leukocytes. We will send the urine for culture and sensitivity and place the patient on Macrodantin 100 mg 1 by mouth twice a day for 7 days and will dispense 14 with no refills. Patient has a history of hypothyroidism, hyperlipidemia, vitamin D deficiency, history of right-sided breast cancer which is now resolved and is in remission. Patient still does see Dr. Lisa Aguilar oncologist for her hormone replacement treatment. Patient also has type 2 diabetes most recent hemoglobin A1c was 6.8 on August 1, 2022. I did order fasting blood work consisting of vitamin D, TSH, hemoglobin A1c, CMP, CBC, is to be done 2 weeks prior to her next office visit in April 2023. Review of Systems   Constitutional:  Negative for activity change, appetite change, chills, diaphoresis, fatigue, fever and unexpected weight change. HENT:  Negative for dental problem. Eyes:  Negative for visual disturbance. Respiratory:  Negative for cough, choking, chest tightness and shortness of breath. Cardiovascular:  Negative for chest pain. Gastrointestinal:  Positive for abdominal pain (slight abdominal pain with urinary frequency, and burning. ). Negative for blood in stool, constipation, diarrhea, nausea and vomiting. Genitourinary:  Positive for difficulty urinating, dysuria (patient with 7-10 day history of burning with urination, increased frequency, and abdominal pain.), frequency, hematuria and urgency.  Negative for decreased urine volume and dyspareunia. Musculoskeletal:  Negative for back pain and neck pain. Skin:  Negative for rash. Neurological:  Positive for dizziness and headaches (headaches with dizziness since the urinary symptoms began. ). Negative for weakness and light-headedness. Psychiatric/Behavioral:  Negative for sleep disturbance. Objective   /75 (Site: Left Upper Arm, Position: Sitting, Cuff Size: Large Adult)   Pulse 78   Temp 97 °F (36.1 °C) (Temporal)   Resp 18   Ht 5' 8\" (1.727 m)   Wt 169 lb (76.7 kg)   LMP  (LMP Unknown)   SpO2 98%   BMI 25.70 kg/m²     Physical Exam  Vitals and nursing note reviewed. Constitutional:       Appearance: Normal appearance. HENT:      Head: Normocephalic and atraumatic. Eyes:      General: No scleral icterus. Right eye: No discharge. Left eye: No discharge. Conjunctiva/sclera: Conjunctivae normal.   Cardiovascular:      Rate and Rhythm: Normal rate and regular rhythm. Pulses: Normal pulses. Heart sounds: Normal heart sounds. No friction rub. No gallop. Pulmonary:      Effort: Pulmonary effort is normal. No respiratory distress. Breath sounds: Normal breath sounds. No stridor. No wheezing, rhonchi or rales. Abdominal:      General: There is no distension. Palpations: Abdomen is soft. There is no mass. Tenderness: There is no abdominal tenderness. There is right CVA tenderness. There is no guarding or rebound. Hernia: No hernia is present. Musculoskeletal:      Right lower leg: No edema. Left lower leg: No edema. Lymphadenopathy:      Cervical: No cervical adenopathy. Neurological:      Mental Status: She is alert and oriented to person, place, and time.    Psychiatric:         Mood and Affect: Mood normal.         Behavior: Behavior normal.           Past Medical History:   Diagnosis Date    BRCA1 negative     BRCA2 negative     Breast cancer ([facility]     Cancer ([facility]     Diabetes mellitus ([facility] Diverticulitis     Frequent UTI     Hiatal hernia     History of therapeutic radiation     Hypertension     Kidney stones     Lymphedema     right breast    PONV (postoperative nausea and vomiting)     Thyroid disease         Past Surgical History:   Procedure Laterality Date    BREAST REDUCTION SURGERY Right 8/17/2021    RIGHT ONCOPLASTIC BREAST REDUCTION, MATCHING LEFT BREAST REDUCTION AND EXCISION SQUAMOUS CELL CARCINOMA, RIGHT MID CHEST, RIGHT BREAST NEEDLE LOCALIZED LUMPECTOMY BLUE DYE INJECTION, RIGHT AXILLARY SENTINEL NODE EXCISION, POSSIBLE RIGHT AXILLARY DISECTION performed by Abel Sinha MD at [address] [facility], LAPAROSCOPIC N/A 6/20/2019    CHOLECYSTECTOMY LAPAROSCOPIC performed by Madeline Lawrence MD at [address] [facility]      ECHO COMPL [address] [facility]   8/15/2013         ENDOSCOPY, COLON, DIAGNOSTIC      HYSTERECTOMY (CERVIX STATUS UNKNOWN)      RECTAL PROLAPSE REPAIR      US BREAST NEEDLE BIOPSY RIGHT Right 6/24/2021    US BREAST NEEDLE BIOPSY RIGHT 6/24/2021 [facility]        The 10-year ASCVD risk score (Reyna SNOW, et al., 2019) is: 8.8%    Values used to calculate the score:      Age: 57 years      Sex: Female      Is Non- : No      Diabetic: Yes      Tobacco smoker: No      Systolic Blood Pressure: 257 mmHg      Is BP treated: No      HDL Cholesterol: 61 mg/dL      Total Cholesterol: 248 mg/dL     Educational materials and/or home exercises printed for patient's review and were included inpatient instructions on his/her After Visit Summary and given to patient at the end of visit.     regarding above diagnosis, including possible risks and complications,  especially if left uncontrolled. Counseled regarding the possible side effects, risks, benefits and alternatives to treatment; patient and/or guardian verbalizes understanding, agrees, feels comfortable with and wishes to proceed withabove treatment plan.      Advised patient to call with any new medication issues, and read all Rx infofrom pharmacy to assure aware of all possible risks and side effects of medication before taking. age and gender appropriate health screening exams and vaccinations. Advised patient regarding importance of keeping up with recommended health maintenance and to schedule as soon as possible if overdue, as this isimportant in assessing for undiagnosed pathology, especially cancer, as well as protecting against potentially harmful/life threatening disease. Patient and/or guardian verbalizes understanding andagrees with above counseling, assessment and plan. All questions answered. An electronic signature was used to authenticate this note.     --Polo Hammond, DO

## 2022-10-09 LAB
ORGANISM: ABNORMAL
URINE CULTURE, ROUTINE: ABNORMAL

## 2022-10-12 ENCOUNTER — TELEPHONE (OUTPATIENT)
Dept: FAMILY MEDICINE CLINIC | Age: 62
End: 2022-10-12

## 2022-10-26 DIAGNOSIS — E11.9 TYPE 2 DIABETES MELLITUS WITHOUT COMPLICATION, WITHOUT LONG-TERM CURRENT USE OF INSULIN (HCC): ICD-10-CM

## 2022-10-26 DIAGNOSIS — M10.9 GOUT, UNSPECIFIED CAUSE, UNSPECIFIED CHRONICITY, UNSPECIFIED SITE: ICD-10-CM

## 2022-10-28 RX ORDER — ALLOPURINOL 300 MG/1
TABLET ORAL
Qty: 45 TABLET | Refills: 1 | Status: SHIPPED | OUTPATIENT
Start: 2022-10-28

## 2022-10-28 RX ORDER — PIOGLITAZONEHYDROCHLORIDE 15 MG/1
15 TABLET ORAL 2 TIMES DAILY
Qty: 60 TABLET | Refills: 0 | Status: SHIPPED
Start: 2022-10-28 | End: 2022-11-28 | Stop reason: SDUPTHER

## 2022-11-04 DIAGNOSIS — E03.9 ACQUIRED HYPOTHYROIDISM: ICD-10-CM

## 2022-11-04 RX ORDER — LEVOTHYROXINE SODIUM 0.05 MG/1
50 TABLET ORAL DAILY
Qty: 90 TABLET | Refills: 1 | Status: SHIPPED | OUTPATIENT
Start: 2022-11-04 | End: 2023-05-03

## 2022-11-06 PROBLEM — Z23 NEED FOR INFLUENZA VACCINATION: Status: RESOLVED | Noted: 2022-10-07 | Resolved: 2022-11-06

## 2022-11-08 RX ORDER — ROSUVASTATIN CALCIUM 20 MG/1
20 TABLET, COATED ORAL NIGHTLY
Qty: 30 TABLET | Refills: 3 | Status: SHIPPED | OUTPATIENT
Start: 2022-11-08

## 2022-11-28 ENCOUNTER — TELEPHONE (OUTPATIENT)
Dept: FAMILY MEDICINE CLINIC | Age: 62
End: 2022-11-28

## 2022-11-28 DIAGNOSIS — E11.9 TYPE 2 DIABETES MELLITUS WITHOUT COMPLICATION, WITHOUT LONG-TERM CURRENT USE OF INSULIN (HCC): ICD-10-CM

## 2022-11-28 RX ORDER — PIOGLITAZONEHYDROCHLORIDE 15 MG/1
15 TABLET ORAL 2 TIMES DAILY
Qty: 60 TABLET | Refills: 5 | Status: SHIPPED | OUTPATIENT
Start: 2022-11-28 | End: 2023-05-27

## 2022-12-07 ENCOUNTER — HOSPITAL ENCOUNTER (OUTPATIENT)
Dept: RADIATION ONCOLOGY | Age: 62
Discharge: HOME | End: 2022-12-07
Attending: RADIOLOGY
Payer: MEDICAID

## 2022-12-07 VITALS
TEMPERATURE: 98.2 F | SYSTOLIC BLOOD PRESSURE: 113 MMHG | DIASTOLIC BLOOD PRESSURE: 77 MMHG | BODY MASS INDEX: 26.63 KG/M2 | WEIGHT: 175.13 LBS | HEART RATE: 81 BPM | OXYGEN SATURATION: 97 % | RESPIRATION RATE: 20 BRPM

## 2022-12-07 DIAGNOSIS — C50.511 MALIGNANT NEOPLASM OF LOWER-OUTER QUADRANT OF RIGHT BREAST OF FEMALE, ESTROGEN RECEPTOR POSITIVE (HCC): Primary | ICD-10-CM

## 2022-12-07 DIAGNOSIS — Z92.3 S/P RADIATION THERAPY > 12 WKS AGO: ICD-10-CM

## 2022-12-07 DIAGNOSIS — Z17.0 MALIGNANT NEOPLASM OF LOWER-OUTER QUADRANT OF RIGHT BREAST OF FEMALE, ESTROGEN RECEPTOR POSITIVE (HCC): Primary | ICD-10-CM

## 2022-12-07 PROCEDURE — 99213 OFFICE O/P EST LOW 20 MIN: CPT | Performed by: NURSE PRACTITIONER

## 2022-12-07 NOTE — PROGRESS NOTES
Radiation Oncology   6 Month Follow Up Note    12/07/2022    Yahir Abbasi  Vitals:    12/07/22 1111   BP: 113/77   Pulse: 81   Resp: 20   Temp: 98.2 °F (36.8 °C)   SpO2: 97%     Wt Readings from Last 1 Encounters:   12/07/22 175 lb 2 oz (79.4 kg)     CC: Patient is here for 6 month Radiation Oncology follow-up visit. HPI:  Jm Urbano is a pleasant 57 y.o. female with a diagnosis of invasive ductal carcinoma of the lower-outer quadrant of the right breast, Gr 3. ER positive, MI positive and HER2 negative. S/p right oncoplastic breast reduction, matching left breast reduction, excision squamous cell carcinoma skin lesion right mid chest, right breast needle localized lumpectomy, blue dye injection, right axillary SLN biopsy on 08/17/2021. Pathologic staging pT2(sn)pN1mi. Oncotype DX score: 20. No indication for adjuvant chemotherapy. S/p adjuvant XRT directed to the right breast completed 11/08/2021. The right whole breast received 5040 cGy/ 28  fractions with 4500 cGy to sclav + apex. The patient started on Arimidex 11/09/2021 per Medical Oncology. The patient seen today in 6 month Radiation Oncology follow-up visit accompanied by her  Juan C Gil into the exam room. The patient denies skin complaints or pain complaints to treatment site. The patient reports lymphedema to right breast, she previously attended the lymphedema clinic, she admits she is not performing her home MLD exercises as instructed. The patient is performing self breast examinations, she denies new lumps, bumps, skin rashes or nipple discharge. The patient denies fevers or chills. No nausea, vomiting or diarrhea. No change in appetite. The patient continues on Arimdex, reports mild tolerable side effects of hot flashes, myalgias/ arthralgias and headache. She reports all side effects with less occurrence and less intensity then when she inially started the medication.       Patient is following with:    Sharad Pettit Dr. Jessy Cota. Next appointment 01/10/2023. Savanah Art Dr. Holly Murguia, APRN-CNP. Next breast clinic appointment 01/20/2023. Reconstructive Surgery- Dr. Rj Sparrow, DO. Next appointment 04/07/2023.      Past Medical History:   Diagnosis Date    BRCA1 negative     BRCA2 negative     Breast cancer ([facility]     Cancer ([facility]     Diabetes mellitus ([facility]     Diverticulitis     Frequent UTI     Hiatal hernia     History of therapeutic radiation     Hypertension     Kidney stones     Lymphedema     right breast    PONV (postoperative nausea and vomiting)     Thyroid disease        Past Surgical History:   Procedure Laterality Date    BREAST REDUCTION SURGERY Right 8/17/2021    RIGHT ONCOPLASTIC BREAST REDUCTION, MATCHING LEFT BREAST REDUCTION AND EXCISION SQUAMOUS CELL CARCINOMA, RIGHT MID CHEST, RIGHT BREAST NEEDLE LOCALIZED LUMPECTOMY BLUE DYE INJECTION, RIGHT AXILLARY SENTINEL NODE EXCISION, POSSIBLE RIGHT AXILLARY DISECTION performed by Nuha Moss MD at [address] [place] [address], LAPAROSCOPIC N/A 6/20/2019    CHOLECYSTECTOMY LAPAROSCOPIC performed by Ramírez Marie MD at [facility]  8/15/2013         ENDOSCOPY, COLON, DIAGNOSTIC      HYSTERECTOMY (CERVIX STATUS UNKNOWN)      RECTAL PROLAPSE REPAIR      US BREAST NEEDLE BIOPSY RIGHT Right 6/24/2021    US BREAST NEEDLE BIOPSY RIGHT 6/24/2021 [facility]         Social History     Socioeconomic History    Marital status:      Spouse name: Brenda Prieto     Number of children: 3    Years of education: diploma     Highest education level: Not on file   Occupational History    Not on file   Tobacco Use    Smoking status: Never    Smokeless tobacco: Never   Vaping Use    Vaping Use: Never used   Substance and Sexual Activity    Alcohol use: Not Currently     Comment: Rarely social    Drug use: Never    Sexual activity: Not Currently   Other Topics Concern    Not on file   Social History Narrative    Not on file     Social Determinants of Health     Financial Resource Strain: Low Risk     Difficulty of Paying Living Expenses: Not hard at all   Food Insecurity: No Food Insecurity    Worried About Running Out of Food in the Last Year: Never true    Ran Out of Food in the Last Year: Never true   Transportation Needs: Not on file   Physical Activity: Not on file   Stress: Not on file   Social Connections: Not on file   Intimate Partner Violence: Not on file   Housing Stability: Not on file         Family History   Problem Relation Age of Onset    Breast Cancer Mother 52        breast cancer    Prostate Cancer Father     Prostate Cancer Brother     Breast Cancer Maternal Aunt 48        breast cancer    Prostate Cancer Paternal Uncle     Breast Cancer Paternal Grandmother 48        breast cancer    Breast Cancer Paternal Aunt 64       Allergies:   Flagyl [metronidazole], Omeprazole, and Vicodin [hydrocodone-acetaminophen]      Current Outpatient Medications   Medication Sig Dispense Refill    pioglitazone (ACTOS) 15 MG tablet Take 1 tablet by mouth in the morning and at bedtime 60 tablet 5    rosuvastatin (CRESTOR) 20 MG tablet Take 1 tablet by mouth nightly 30 tablet 3    levothyroxine (SYNTHROID) 50 MCG tablet Take 1 tablet by mouth Daily 90 tablet 1    allopurinol (ZYLOPRIM) 300 MG tablet Take 1/2 (one-half) tablet by mouth once daily 45 tablet 1    anastrozole (ARIMIDEX) 1 MG tablet Take 1 tablet by mouth once daily 90 tablet 1    ibuprofen (ADVIL;MOTRIN) 800 MG tablet Take 1 tablet by mouth every 6 hours as needed for Pain 21 tablet 0    EUTHYROX 50 MCG tablet Take 1 tablet by mouth once daily 30 tablet 0    NONFORMULARY Vitamin D, vitamin E, Zinc daily.       glucose monitoring (FREESTYLE FREEDOM) kit 1 kit by Does not apply route daily 1 kit 0    blood glucose monitor strips Test blood sugar daily 100 strip 5    Lancets MISC 1 each by Does not apply route daily 100 each 0 Probiotic Product (PROBIOTIC PO) Take by mouth       venlafaxine (EFFEXOR XR) 75 MG extended release capsule Take 1 capsule by mouth in the morning. 30 capsule 3    lansoprazole (PREVACID) 30 MG delayed release capsule Take 1 capsule by mouth daily 90 capsule 1     No current facility-administered medications for this encounter. REVIEW OF SYSTEMS:    Constitutional:  No fever, chills or rigors. Eyes: No changes in vision. No eye discharge or pain  ENT: No headaches, hearing loss or vertigo. No mouth sores or sore throat. No change in taste or smell. Cardiovascular: No chest discomfort or palpitations. No loss of consciousness or phlebitis. Respiratory: No SOB, wheezing or pleuritic pain. No productive coughing or hemoptysis. Gastrointestinal: No abdominal pain, appetite loss, blood in stools. No change in bowel habits. No hematemesis   Genitourinary: Patient acknowledges no dysuria, trouble voiding, or hematuria. No increased frequency. Musculoskeletal: No gait disturbance, weakness or joint complaints. Integumentary: No rash or pruritis. Neurological: No headache, diplopia, change in muscle strength, numbness or tingling. No change in gait, balance, coordination, mood, affect, memory, mentation, behavior. Psychiatric: No anxiety, or depression. Endocrine: No temperature intolerance. No excessive thirst, fluid intake, or urination. No tremor. Hematologic/Lymphatic: No abnormal bruising or bleeding, blood clots or swollen lymph nodes. Allergic/Immunologic: No nasal congestion or hives. PHYSICAL EXAMINATION:   Vitals:    12/07/22 1111   BP: 113/77   Site: Left Upper Arm   Position: Sitting   Cuff Size: Medium Adult   Pulse: 81   Resp: 20   Temp: 98.2 °F (36.8 °C)   TempSrc: Temporal   SpO2: 97%   Weight: 175 lb 2 oz (79.4 kg)       Body mass index is 26.63 kg/m². Appearance: Well-developed, well-nourished 57year old female. Skin: Warm and dry, no rashes or hives.    Head: Normocephalic, atraumatic  Eyes: EOMI, no conjunctival erythema  ENMT: No pharyngeal erythema, MMM, no rhinorrhea. Neck: Supple, no elevated JVP  Lungs: Clear to auscultation bilaterally. No wheezes, rales or rhonchi. Cardiac: Regular rate and rhythm, +S1S2, no murmurs apparent  Abdomen: Soft, round and non-tender. Bowel sounds present x 4. Extremities: Moves all extremities x 4, no lower extremity edema  Neurologic: Alert and oriented x 3. No focal motor deficits apparent         IMAGIN2022 Bilateral Mammogram (ordered per Breast Clinic CNP)      IMPRESSION:   No mammographic evidence of malignancy in either breast.       RECOMMENDATION:       Bilateral mammogram is advised in 1 year. Patient may benefit from   intermittent screening breast MRI. BIRADS:   BIRADS - CATEGORY 2       Benign Findings. Normal interval follow-up is recommended in 12 months. OVERALL ASSESSMENT - BENIGN       ASSESSMENT/PLAN:    IDC of the lower-outer quadrant of the right breast, Gr 3. ER positive, NE positive and HER2 negative. S/p right oncoplastic breast reduction, matching left breast reduction, excision squamous cell carcinoma skin lesion right mid chest, right breast needle localized lumpectomy, blue dye injection, right axillary SLN biopsy on 2021. Pathologic staging pT2(sn)pN1mi. Oncotype DX score: 20. No indication for adjuvant chemotherapy. S/p adjuvant XRT directed to the right breast completed 2021. The right whole breast received 5040 cGy/ 28  fractions with 4500 cGy to sclav + apex. The patient started on Arimidex 2021. Most recent Mammogram 2022 with no mammographic evidence of malignancy in either breast.     The patient is doing well post XRT completion. Right breast lymphedema, the patient strongly encourage to resume MLD exercises and to wear compression bra. Continue routine SBEs, report any new finding to healthcare provider.  Continue Arimidex as directed per Medical Oncology. TSH followed per PCP. Most recent TSH 08/01/2022 within normal limits. Breast imaging per Medical Oncology and Breast Clinic. I discussed follow up plans with Yahir Abbasi. Continue Oncology follow-up with primary Medical Oncologist- Dr. Radha Mckeon. See Radiation Oncology only as needed. Yahir Abbasi instructed to follow up with other physicians involved in their care as directed (including but not limited to Medical Oncology, Breast Surgery/ Clinic and Primary Care). The patient was given our contact number in the event that if at any time they change their mind and would like to return to the clinic to see either myself or one of the Radiation Oncologists, they can simply call us and we would be happy to see them. Thank you for involving us in the management of this extremely pleasant patient. More than 25 min was in direct contact with pt coordinating/giving care. >50% of the visit was spent in counseling the pt on the following: Follow up care    The nurses notes were reviewed and incorporated into this assessment and plan.           Alireza Shankar, MSN, APRN-CNP  Certified Nurse Practitioner for [address] [place] [address] Linear: [phone]/ F: [phone]   [facility] Linear: [phone] / F: [phone]

## 2022-12-12 DIAGNOSIS — C50.911 MALIGNANT NEOPLASM OF RIGHT BREAST IN FEMALE, ESTROGEN RECEPTOR POSITIVE, UNSPECIFIED SITE OF BREAST (HCC): ICD-10-CM

## 2022-12-12 DIAGNOSIS — Z17.0 MALIGNANT NEOPLASM OF RIGHT BREAST IN FEMALE, ESTROGEN RECEPTOR POSITIVE, UNSPECIFIED SITE OF BREAST (HCC): ICD-10-CM

## 2022-12-12 RX ORDER — VENLAFAXINE HYDROCHLORIDE 75 MG/1
75 CAPSULE, EXTENDED RELEASE ORAL DAILY
Qty: 30 CAPSULE | Refills: 3 | Status: SHIPPED | OUTPATIENT
Start: 2022-12-12 | End: 2023-01-11

## 2023-01-05 DIAGNOSIS — Z79.811 USE OF AROMATASE INHIBITORS: Primary | ICD-10-CM

## 2023-01-06 ENCOUNTER — HOSPITAL ENCOUNTER (OUTPATIENT)
Dept: INFUSION THERAPY | Age: 63
Discharge: HOME | End: 2023-01-06
Payer: MEDICAID

## 2023-01-06 DIAGNOSIS — Z79.811 USE OF AROMATASE INHIBITORS: ICD-10-CM

## 2023-01-06 LAB
ALBUMIN SERPL-MCNC: 4.2 G/DL (ref 3.5–5.2)
ALP BLD-CCNC: 73 U/L (ref 35–104)
ALT SERPL-CCNC: 10 U/L (ref 0–32)
ANION GAP SERPL CALCULATED.3IONS-SCNC: 10 MMOL/L (ref 7–16)
AST SERPL-CCNC: 13 U/L (ref 0–31)
BASOPHILS ABSOLUTE: 0.04 E9/L (ref 0–0.2)
BASOPHILS RELATIVE PERCENT: 0.8 % (ref 0–2)
BILIRUB SERPL-MCNC: 0.3 MG/DL (ref 0–1.2)
BUN BLDV-MCNC: 19 MG/DL (ref 6–23)
CALCIUM SERPL-MCNC: 9.5 MG/DL (ref 8.6–10.2)
CHLORIDE BLD-SCNC: 101 MMOL/L (ref 98–107)
CO2: 26 MMOL/L (ref 22–29)
CREAT SERPL-MCNC: 0.9 MG/DL (ref 0.5–1)
EOSINOPHILS ABSOLUTE: 0.31 E9/L (ref 0.05–0.5)
EOSINOPHILS RELATIVE PERCENT: 6 % (ref 0–6)
GFR SERPL CREATININE-BSD FRML MDRD: >60 ML/MIN/1.73
GLUCOSE BLD-MCNC: 166 MG/DL (ref 74–99)
HCT VFR BLD CALC: 38.4 % (ref 34–48)
HEMOGLOBIN: 12.9 G/DL (ref 11.5–15.5)
IMMATURE GRANULOCYTES #: 0.01 E9/L
IMMATURE GRANULOCYTES %: 0.2 % (ref 0–5)
LYMPHOCYTES ABSOLUTE: 1.59 E9/L (ref 1.5–4)
LYMPHOCYTES RELATIVE PERCENT: 30.7 % (ref 20–42)
MCH RBC QN AUTO: 32.6 PG (ref 26–35)
MCHC RBC AUTO-ENTMCNC: 33.6 % (ref 32–34.5)
MCV RBC AUTO: 97 FL (ref 80–99.9)
MONOCYTES ABSOLUTE: 0.44 E9/L (ref 0.1–0.95)
MONOCYTES RELATIVE PERCENT: 8.5 % (ref 2–12)
NEUTROPHILS ABSOLUTE: 2.79 E9/L (ref 1.8–7.3)
NEUTROPHILS RELATIVE PERCENT: 53.8 % (ref 43–80)
PDW BLD-RTO: 11.9 FL (ref 11.5–15)
PLATELET # BLD: 274 E9/L (ref 130–450)
PMV BLD AUTO: 9.5 FL (ref 7–12)
POTASSIUM SERPL-SCNC: 4.4 MMOL/L (ref 3.5–5)
RBC # BLD: 3.96 E12/L (ref 3.5–5.5)
SODIUM BLD-SCNC: 137 MMOL/L (ref 132–146)
TOTAL PROTEIN: 6.8 G/DL (ref 6.4–8.3)
WBC # BLD: 5.2 E9/L (ref 4.5–11.5)

## 2023-01-06 PROCEDURE — 80053 COMPREHEN METABOLIC PANEL: CPT

## 2023-01-06 PROCEDURE — 85025 COMPLETE CBC W/AUTO DIFF WBC: CPT

## 2023-01-06 PROCEDURE — 36415 COLL VENOUS BLD VENIPUNCTURE: CPT

## 2023-01-10 ENCOUNTER — OFFICE VISIT (OUTPATIENT)
Dept: ONCOLOGY | Age: 63
End: 2023-01-10
Payer: MEDICAID

## 2023-01-10 VITALS
HEART RATE: 85 BPM | WEIGHT: 177.1 LBS | BODY MASS INDEX: 26.84 KG/M2 | SYSTOLIC BLOOD PRESSURE: 143 MMHG | HEIGHT: 68 IN | OXYGEN SATURATION: 97 % | DIASTOLIC BLOOD PRESSURE: 87 MMHG | TEMPERATURE: 97.9 F

## 2023-01-10 DIAGNOSIS — Z12.31 SCREENING MAMMOGRAM FOR HIGH-RISK PATIENT: Primary | ICD-10-CM

## 2023-01-10 PROCEDURE — G8419 CALC BMI OUT NRM PARAM NOF/U: HCPCS | Performed by: INTERNAL MEDICINE

## 2023-01-10 PROCEDURE — G8484 FLU IMMUNIZE NO ADMIN: HCPCS | Performed by: INTERNAL MEDICINE

## 2023-01-10 PROCEDURE — 99213 OFFICE O/P EST LOW 20 MIN: CPT | Performed by: INTERNAL MEDICINE

## 2023-01-10 PROCEDURE — G8427 DOCREV CUR MEDS BY ELIG CLIN: HCPCS | Performed by: INTERNAL MEDICINE

## 2023-01-10 PROCEDURE — 3017F COLORECTAL CA SCREEN DOC REV: CPT | Performed by: INTERNAL MEDICINE

## 2023-01-10 PROCEDURE — 1036F TOBACCO NON-USER: CPT | Performed by: INTERNAL MEDICINE

## 2023-01-10 PROCEDURE — 99213 OFFICE O/P EST LOW 20 MIN: CPT

## 2023-01-10 NOTE — PROGRESS NOTES
[place] [address]  Attending Clinic Note    Reason for Visit: Follow-up on a patient with Right Breast Cancer    PCP:  [address] [place] [address] [place], DO    History of Present Illness: The lesion was located in the 8 o'clock position of the right breast    Breast cancer risk factors include mom breast cancer age 52, paternal aunt breast cancer, 48 paternal gm breast cancer age 48    Bilateral Diagnostic Mammogram/Right Breast U/S on 06/16/2021:  Large irregular hypoechoic solid mass seen on ultrasound and mammogram at about the 8 to 9 o'clock position measuring 2.4 x 1.4 x 1.4 cm. 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 Comment: The carcinoma displays highly infiltrative architectural features including single file and single cell patterns of invasion. Strong membranous E-cadherin expression is diffusely present in association with the invasive carcinoma, supportive of ductal origin. No in situ component is identified in the tissue sample. [place] histologic score for the invasive carcinoma corresponds to a tubule score 3, nuclear score 3, mitotic score 1, total score 7, grade 2. Intradepartmental consultation is obtained. Breast Cancer Marker Studies:   Estrogen Receptors (ER):90%   Progesterone Receptors (KS): 30%   Her-2/stone (c-erb B-2) protein expression: Negative/0     CXR 07/06/2021:  No acute process    MRI Bilateral Breast 07/08/2021:  Irregular mass in the right breast 8 o'clock measuring up to 2.4 cm in size is consistent with biopsy proven neoplasm. Focal, heterogeneous non mass enhancement along the lateral aspect of the mass may be due to recent biopsy vs satellite lesion.   No LN  No evidence of neoplasm in the left breast    Right oncoplastic breast reduction, matching left breast reduction and excision squamous cell carcinoma, right mid chest with Dr Betty Shaikh and right breast needle localized lumpectomy, blue dye injection, right axillary sentinel node excision, possible right axillary dissection with Dr Donovan Kay on August 17, 2021  A. Left breast, reduction mammoplasty:   - Skin and benign breast tissue (288.8 g) showing stromal fibrosis/hyalinosis. B.  Skin, lesion of mid chest, excision:   - Focal residual squamous cell carcinoma in situ, margins free of tumor;   - Actinic keratosis;   - Dermal scar with chronic inflammation, consistent with changes of previous biopsy site. C.  [place] lymph node #1, biopsy:    - Two of two (2/2) nodes positive for metastatic carcinoma (micrometastasis), see comment. D.  [place] lymph node #2, biopsy:   - Benign node (1) showing fatty replacement. E.  [place] lymph node #3, biopsy:   - Benign node (1) showing fatty replacement. F.  Right breast, central, lumpectomy with needle localization:   - Invasive carcinoma of no special type (ductal), grade 3;   - Changes of previous biopsy site, see comment. G.  [place] lymph node #4, biopsy:   - Benign node showing prominent fatty replacement. H.  Right breast, reduction mammoplasty:   - Skin and benign breast tissue (79.6 g) showing stromal fibrosis/hyalinosis. Comment:A few small foci of metastatic carcinoma (</= 2 mm/each metastasis) in two sentinel lymph nodes in specimen C are noted histologically and further confirmed by immunostaining for pankeratin and GATA3. Comparing with the patient's previous core needle biopsy specimen from the same tumor on 07/16/2021 (see report O-), the tumor seen here is much more mitotic active (focally, 3-4 mitotic figures/hpf). Therefore, the provisional [place] score of this tumor is updated to 3+3+3=9 (grade 3) from originally reported 3+3+1=7 (grade 2).      CANCER CASE SUMMARY   Procedure: Excision   Specimen Laterality: Right   TUMOR   Tumor Site: Central Histologic Type: Invasive carcinoma of no special type   Histologic Grade: [place] Histologic Score   Glandular/tubular differentiation: Score 3   Nuclear pleomorphism: Score 3   Mitotic rate: Score 3   Overall grade: Grade 3 (scores of 9)   Tumor Size: 2.2 x 2.2 x 1.4 cm   Tumor Focality: Single focus of invasive carcinoma   Ductal Carcinoma In Situ (DCIS): Not identified   Lobular Carcinoma In Situ (LCIS): Not identified   Lymph-Vascular Invasion: Not identified   Dermal lymphovascular invasion: No skin present   Microcalcifications: Not identified   Treatment effect: No known presurgical therapy   MARGINS   All margins negative for invasive carcinoma   Distance from invasive carcinoma to closest margin: 1 mm   Closest margins to invasive carcinoma: medial (slide F1) and posterior (slide F2)   REGIONAL LYMPH NODES   Tumor present in regional lymph nodes   Number of lymph nodes with macrometastasis (>2 mm): 0   Number of lymph nodes with micrometasteses (0.2-2 mm): 2   Number of lymph nodes with isolated tumor cells: 0   Size of largest jarett metastasis: 2 mm   Extranodal extension: Not identified   Total number of lymph nodes examined: 4   Number of sentinel nodes examined: 4   DISTANT METASTASIS: Not applicable   Pathologic Staging (pTNM, AJCC 8th Edition)   pT2 (sn)pN1mi     Breast Cancer Marker Studies:   Estrogen Receptors (ER):90%   Progesterone Receptors (ME): 30%   Her-2/stone (c-erb B-2) protein expression: Negative/0     Oncotype Dx  Recurrence Score Result-20  No indication for adjuvant chemotherapy  Recommended adjuvant RT followed by adjuvant endocrine therapy   RT was completed 11/08/2021. Arimidex 1 mg po daily was started on 11/09/2021 with fair tolerance so far. Today 01/10/2023: No fever. Mild hot flashes. Fair appetite. Mild fatigue. No chest pain or SOB. No N.V. Mild arthralgias. She continues to tolerate Arimidex well. Review of Systems;  CONSTITUTIONAL: No fever. Mild hot flashes. Fair appetite. Mild fatigue. ENMT: Eyes: No diplopia; Nose: No epistaxis. Mouth: No sore throat. RESPIRATORY: No hemoptysis, shortness of breath, cough. CARDIOVASCULAR: No chest pain, palpitations. GASTROINTESTINAL: No nausea/vomiting, abdominal pain  GENITOURINARY: No dysuria, urinary frequency, hematuria. NEURO: No syncope, presyncope, headache. MSK: Mild arthralgias. Remainder:  ROS NEGATIVE    Past Medical History:      Diagnosis Date    BRCA1 negative     BRCA2 negative     Breast cancer ([facility]     Cancer ([facility]     Diabetes mellitus ([facility]     Diverticulitis     Frequent UTI     Hiatal hernia     History of therapeutic radiation     Hypertension     Kidney stones     Lymphedema     right breast    PONV (postoperative nausea and vomiting)     Thyroid disease      Medications:  Reviewed and reconciled. Allergies: Allergies   Allergen Reactions    Flagyl [Metronidazole] Rash    Omeprazole Diarrhea and Nausea And Vomiting     As well as dizziness    Vicodin [Hydrocodone-Acetaminophen] Nausea And Vomiting     Physical Exam:  Pulse 85   Temp 97.9 °F (36.6 °C)   Ht 5' 8\" (1.727 m)   Wt 177 lb 1.6 oz (80.3 kg)   LMP  (LMP Unknown)   SpO2 97%   BMI 26.93 kg/m²   GENERAL: Alert, oriented x 3, not in acute distress. LUNGS: CTA [place]  CVS: RRR  EXTREMITIES: Without clubbing, cyanosis, or edema. NEUROLOGIC: No focal deficits.    ECOG1    Lab Results   Component Value Date    WBC 5.2 01/06/2023    HGB 12.9 01/06/2023    HCT 38.4 01/06/2023    MCV 97.0 01/06/2023     01/06/2023     Lab Results   Component Value Date     01/06/2023    K 4.4 01/06/2023     01/06/2023    CO2 26 01/06/2023    BUN 19 01/06/2023    CREATININE 0.9 01/06/2023    GLUCOSE 166 (H) 01/06/2023    CALCIUM 9.5 01/06/2023    PROT 6.8 01/06/2023    LABALBU 4.2 01/06/2023    BILITOT 0.3 01/06/2023    ALKPHOS 73 01/06/2023    AST 13 01/06/2023    ALT 10 01/06/2023    LABGLOM >60 01/06/2023    GFRAA >60 09/02/2022 Impression/Plan:  58 y/o female who underwent Right oncoplastic breast reduction, matching left breast reduction and excision squamous cell carcinoma, right mid chest with Dr Ldyia Mak and right breast needle localized lumpectomy, blue dye injection, right axillary sentinel node excision, possible right axillary dissection with Dr Griselda Gan on August 17, 2021  A. Left breast, reduction mammoplasty:   - Skin and benign breast tissue (288.8 g) showing stromal fibrosis/hyalinosis. B.  Skin, lesion of mid chest, excision:   - Focal residual squamous cell carcinoma in situ, margins free of tumor;   - Actinic keratosis;   - Dermal scar with chronic inflammation, consistent with changes of previous biopsy site. C.  [place] lymph node #1, biopsy:    - Two of two (2/2) nodes positive for metastatic carcinoma (micrometastasis), see comment. D.  [place] lymph node #2, biopsy:   - Benign node (1) showing fatty replacement. E.  [place] lymph node #3, biopsy:   - Benign node (1) showing fatty replacement. F.  Right breast, central, lumpectomy with needle localization:   - Invasive carcinoma of no special type (ductal), grade 3;   - Changes of previous biopsy site, see comment. G.  [place] lymph node #4, biopsy:   - Benign node showing prominent fatty replacement. H.  Right breast, reduction mammoplasty:   - Skin and benign breast tissue (79.6 g) showing stromal fibrosis/hyalinosis. Comment:A few small foci of metastatic carcinoma (</= 2 mm/each metastasis) in two sentinel lymph nodes in specimen C are noted histologically and further confirmed by immunostaining for pankeratin and GATA3. Comparing with the patient's previous core needle biopsy specimen from the same tumor on 07/16/2021 (see report IIC-), the tumor seen here is much more mitotic active (focally, 3-4 mitotic figures/hpf).  Therefore, the provisional [place] score of this tumor is updated to 3+3+3=9 (grade 3) from originally reported 3+3+1=7 (grade 2). CANCER CASE SUMMARY   Procedure: Excision   Specimen Laterality: Right   TUMOR   Tumor Site: Central   Histologic Type: Invasive carcinoma of no special type   Histologic Grade: [place] Histologic Score   Glandular/tubular differentiation: Score 3   Nuclear pleomorphism: Score 3   Mitotic rate: Score 3   Overall grade: Grade 3 (scores of 9)   Tumor Size: 2.2 x 2.2 x 1.4 cm   Tumor Focality: Single focus of invasive carcinoma   Ductal Carcinoma In Situ (DCIS): Not identified   Lobular Carcinoma In Situ (LCIS): Not identified   Lymph-Vascular Invasion: Not identified   Dermal lymphovascular invasion: No skin present   Microcalcifications: Not identified   Treatment effect: No known presurgical therapy   MARGINS   All margins negative for invasive carcinoma   Distance from invasive carcinoma to closest margin: 1 mm   Closest margins to invasive carcinoma: medial (slide F1) and posterior (slide F2)   REGIONAL LYMPH NODES   Tumor present in regional lymph nodes   Number of lymph nodes with macrometastasis (>2 mm): 0   Number of lymph nodes with micrometasteses (0.2-2 mm): 2   Number of lymph nodes with isolated tumor cells: 0   Size of largest jarett metastasis: 2 mm   Extranodal extension: Not identified   Total number of lymph nodes examined: 4   Number of sentinel nodes examined: 4   DISTANT METASTASIS: Not applicable   Pathologic Staging (pTNM, AJCC 8th Edition)   pT2 (sn)pN1mi     Breast Cancer Marker Studies:   Estrogen Receptors (ER):90%   Progesterone Receptors (OR): 30%   Her-2/stone (c-erb B-2) protein expression: Negative/0     Oncotype Dx  Recurrence Score Result-20  No indication for adjuvant chemotherapy  Recommended adjuvant RT followed by adjuvant endocrine therapy     RT was completed on 11/08/2021. DEXA scan 11/04/2021: Osteopenia in LS; Normal in hips. Arimidex 1 mg po daily was started on 11/09/2021 with fair tolerance so far.      Right Breast U/S 12/20/2021 Benign findings with no sonographic evidence of malignancy in the right breast    Bilateral Screening Mammogram 07/19/2022. No evidence of malignancy    Labs reviewed. CARLY  Continue Arimidex, Ca. VitD. Mammogram ordered July 2023  Recommended Tylenol for arthralgias    RTC July 2023 with prior mammogram    Genetics: My-Risk Negative. No clinically significant mutation identified. No VUS identified.      Donny Castle MD   1/10/2023

## 2023-01-11 ASSESSMENT — ENCOUNTER SYMPTOMS
BACK PAIN: 0
SHORTNESS OF BREATH: 0
CHEST TIGHTNESS: 0

## 2023-01-11 NOTE — PROGRESS NOTES
Subjective: This note was copied forward from the last encounter. Essential components for this patient record were reviewed and verified on this visit including:  recent hospitalizations, recent imaging, PMH, PSH, FH, SOC HX, Allergies, and Medications were reviewed and updated as appropriate. In addition, the assessment and plan were copied from prior office note and updated accordingly. Patient ID: Jasmin Nolasco is a 57 y.o. female. HPI   The lesion was located in the 8 o'clock position of the right breast     Breast cancer risk factors include mom breast cancer age 52, paternal aunt breast cancer, 48 paternal gm breast cancer age 48     06/16/2021 Bilateral Diagnostic Mammogram/Right Breast U/S:  Large irregular hypoechoic solid mass seen on ultrasound and mammogram at about the 8 to 9 o'clock position measuring 2.4 x 1.4 x 1.4 cm.       06/24/2021 Ultrasound guided core biopsy of right breast 8 o'clock mass and ribbonclip marker placement:  Right breast at 8:00, core needle biopsy: Invasive carcinoma of no special type (ductal), grade 2; see comment. Comment: The carcinoma displays highly infiltrative architectural features including single file and single cell patterns of invasion. Strong membranous E-cadherin expression is diffusely present in association with the invasive carcinoma, supportive of ductal origin. No in situ component is identified in the tissue sample. Buckner histologic score for the invasive carcinoma corresponds to a tubule score 3, nuclear score 3, mitotic score 1, total score 7, grade 2. Intradepartmental consultation is obtained.      Breast Cancer Marker Studies:   Estrogen Receptors (ER):90%   Progesterone Receptors (CO): 30%   Her-2/stone (c-erb B-2) protein expression: Negative/0      CXR 07/06/2021:  No acute process     07/08/2021 MRI Bilateral Breast:  Irregular mass in the right breast 8 o'clock measuring up to 2.4 cm in size is consistent with biopsy proven neoplasm. Focal, heterogeneous non mass enhancement along the lateral aspect of the mass may be due to recent biopsy vs satellite lesion. No LN. No evidence of neoplasm in the left breast     08/17/221 Right oncoplastic breast reduction, matching left breast reduction and excision squamous cell carcinoma, right mid chest with Dr Rupali Watkins and right breast needle localized lumpectomy, blue dye injection, right axillary sentinel node excision, possible right axillary dissection with Dr Cassi Pimentel. Left breast, reduction mammoplasty:   - Skin and benign breast tissue (288.8 g) showing stromal fibrosis/hyalinosis. B.  Skin, lesion of mid chest, excision:   - Focal residual squamous cell carcinoma in situ, margins free of tumor;   - Actinic keratosis;   - Dermal scar with chronic inflammation, consistent with changes of previous biopsy site. C.  [place] lymph node #1, biopsy:    - Two of two (2/2) nodes positive for metastatic carcinoma (micrometastasis), see comment. D.  [place] lymph node #2, biopsy:   - Benign node (1) showing fatty replacement. E.  [place] lymph node #3, biopsy:   - Benign node (1) showing fatty replacement. F.  Right breast, central, lumpectomy with needle localization:   - Invasive carcinoma of no special type (ductal), grade 3;   - Changes of previous biopsy site, see comment. G.  [place] lymph node #4, biopsy:   - Benign node showing prominent fatty replacement. H.  Right breast, reduction mammoplasty:   - Skin and benign breast tissue (79.6 g) showing stromal fibrosis/hyalinosis. Comment:A few small foci of metastatic carcinoma (</= 2 mm/each metastasis) in two sentinel lymph nodes in specimen C are noted histologically and further confirmed by immunostaining for pankeratin and GATA3.      Comparing with the patient's previous core needle biopsy specimen from the same tumor on 07/16/2021 (see report WSQ-), the tumor seen here is much more mitotic active (focally, 3-4 mitotic figures/hpf). Therefore, the provisional Geff score of this tumor is updated to 3+3+3=9 (grade 3) from originally reported 3+3+1=7 (grade 2). CANCER CASE SUMMARY   Procedure: Excision   Specimen Laterality: Right   TUMOR   Tumor Site: Central   Histologic Type:  Invasive carcinoma of no special type   Histologic Grade: Benedicto Histologic Score   Glandular/tubular differentiation: Score 3   Nuclear pleomorphism: Score 3   Mitotic rate: Score 3   Overall grade: Grade 3 (scores of 9)   Tumor Size: 2.2 x 2.2 x 1.4 cm   Tumor Focality: Single focus of invasive carcinoma   Ductal Carcinoma In Situ (DCIS): Not identified   Lobular Carcinoma In Situ (LCIS): Not identified   Lymph-Vascular Invasion: Not identified   Dermal lymphovascular invasion: No skin present   Microcalcifications: Not identified   Treatment effect: No known presurgical therapy   MARGINS   All margins negative for invasive carcinoma   Distance from invasive carcinoma to closest margin: 1 mm   Closest margins to invasive carcinoma: medial (slide F1) and posterior (slide F2)   REGIONAL LYMPH NODES   Tumor present in regional lymph nodes   Number of lymph nodes with macrometastasis (>2 mm): 0   Number of lymph nodes with micrometasteses (0.2-2 mm): 2   Number of lymph nodes with isolated tumor cells: 0   Size of largest jarett metastasis: 2 mm   Extranodal extension: Not identified   Total number of lymph nodes examined: 4   Number of sentinel nodes examined: 4   DISTANT METASTASIS: Not applicable   Pathologic Staging (pTNM, AJCC 8th Edition) pT2 (sn)pN1mi      Breast Cancer Marker Studies:   Estrogen Receptors (ER):90%   Progesterone Receptors (AK): 30%   Her-2/stone (c-erb B-2) protein expression: Negative/0      Oncotype Dx  Recurrence Score Result-20  11/08/2021 completed adjuvant RT  11/09/2021 started endocrine therapy with Arimidex 1 mg by mouth daily per medical oncology, Dr. Addison Bamberger    Review of Systems   Constitutional:  Negative for activity change, appetite change, chills, fatigue, fever and unexpected weight change. [facility] access hospital is doing fairly well. Mood swings controlled with Effexor. HENT:  Positive for postnasal drip. Respiratory:  Positive for cough (Chronic, related to PND). Negative for chest tightness and shortness of breath. Cardiovascular:  Negative for chest pain and palpitations. Musculoskeletal:  Positive for arthralgias. Negative for back pain, gait problem and myalgias. Scattered arthralgias with Arimidex. Not interfering with her quality of life. Psychiatric/Behavioral:  Negative for agitation, confusion and decreased concentration. The patient is not nervous/anxious. Mood swings and agitation with Arimidex are improved with addition of Effexor. Objective:   Physical Exam  Vitals and nursing note reviewed. Constitutional:       General: She is not in acute distress. Appearance: She is well-developed. She is not diaphoretic. Comments: ECOG 0. No apparent distress. Accompanied by her . HENT:      Head: Normocephalic and atraumatic. Mouth/Throat:      Pharynx: No oropharyngeal exudate. Eyes:      General: No scleral icterus. Right eye: No discharge. Left eye: No discharge. Conjunctiva/sclera: Conjunctivae normal.   Neck:      Thyroid: No thyromegaly. Vascular: No JVD. Trachea: No tracheal deviation. Cardiovascular:      Rate and Rhythm: Normal rate and regular rhythm. Heart sounds: No murmur heard. No friction rub. No gallop. Pulmonary:      Effort: Pulmonary effort is normal. No respiratory distress or retractions. Breath sounds: Normal breath sounds. No stridor. No wheezing or rales. Chest:      Chest wall: No mass, lacerations, deformity, swelling, tenderness or edema. Breasts:     Breasts are symmetrical.      Right: Mass (Post surgical fat necrosis bilaterally) present.  No inverted nipple, nipple discharge, skin change or tenderness. Left: Mass present. No inverted nipple, nipple discharge, skin change or tenderness. Comments: She has bilateral, firm nodular densities and associated extremely dense breast tissue bilaterally. No convincing evidence of recurrent disease on the right. Abdominal:      General: There is no distension. Palpations: Abdomen is soft. Tenderness: There is no abdominal tenderness. There is no guarding or rebound. Musculoskeletal:         General: No tenderness or deformity. Normal range of motion. Right shoulder: Normal.      Left shoulder: Normal.      Cervical back: Normal range of motion and neck supple. Comments: She has improved range of motion of the right upper extremity   Lymphadenopathy:      Cervical: No cervical adenopathy. Right cervical: No superficial, deep or posterior cervical adenopathy. Left cervical: No superficial, deep or posterior cervical adenopathy. Upper Body:      Right upper body: No pectoral adenopathy. Left upper body: No pectoral adenopathy. Skin:     General: Skin is warm and dry. Coloration: Skin is not pale. Findings: No erythema or rash. Neurological:      Mental Status: She is alert and oriented to person, place, and time. Coordination: Coordination normal.   Psychiatric:         Behavior: Behavior normal.         Thought Content: Thought content normal.         Judgment: Judgment normal.     Assessment:    Bon Priest is a 57 y.o. extremely pleasant pleasant female presents for follow-up of her right breast invasive ductal carcinoma.    06/24/2021 ultrasound guided core biopsy of right breast 8 o'clock mass and ribbonclip marker placement  Right breast at 8:00, core needle biopsy: Invasive carcinoma of no special type (ductal), grade 2; see comment. Comment: The carcinoma displays highly infiltrative architectural features including single file and single cell patterns of invasion.  Strong membranous E-cadherin expression is diffusely present in association with the invasive carcinoma, supportive of ductal origin. No in situ component is identified in the tissue sample. Chilton histologic score for the invasive carcinoma corresponds to a tubule score 3, nuclear score 3, mitotic score 1, total score 7, grade 2. Intradepartmental consultation is obtained. Breast Cancer Marker Studies:   Estrogen Receptors (ER):90%   Progesterone Receptors (HI): 30%   Her-2/stone (c-erb B-2) protein expression: Negative/0      07/08/2021 MRI Bilateral Breast:  Irregular mass in the right breast 8 o'clock measuring up to 2.4 cm in size is consistent with biopsy proven neoplasm. Focal, heterogeneous non mass enhancement along the lateral aspect of the mass may be due to recent biopsy vs satellite lesion. No LN. No evidence of neoplasm in the left breast     08/17/2021 underwent right oncoplastic breast reduction, matching left breast reduction and excision squamous cell carcinoma, right mid chest with Dr Kandace Borrero and right breast needle localized lumpectomy, blue dye injection, right axillary sentinel node excision, possible right axillary dissection with Dr Maurice Kwan. Left breast, reduction mammoplasty: Skin and benign breast tissue (288.8 g) showing stromal fibrosis/hyalinosis. B.  Skin, lesion of mid chest, excision: Focal residual squamous cell carcinoma in situ, margins free of tumor; Actinic keratosis; Dermal scar with chronic inflammation, consistent with changes of previous biopsy site. C.  [place] lymph node #1, biopsy: Two of two (2/2) nodes positive for metastatic carcinoma (micrometastasis), see comment. D.  [place] lymph node #2, biopsy: Benign node (1) showing fatty replacement. E.  [place] lymph node #3, biopsy: Benign node (1) showing fatty replacement.    F.  Right breast, central, lumpectomy with needle localization: Invasive carcinoma of no special type (ductal), grade 3;  Changes of previous biopsy site, see comment. G.  [place] lymph node #4, biopsy: Benign node showing prominent fatty replacement. H.  Right breast, reduction mammoplasty: - Skin and benign breast tissue (79.6 g) showing stromal fibrosis/hyalinosis. Comment:A few small foci of metastatic carcinoma (</= 2 mm/each metastasis) in two sentinel lymph nodes in specimen C are noted histologically and further confirmed by immunostaining for pankeratin and GATA3. Comparing with the patient's previous core needle biopsy specimen from the same tumor on 07/16/2021 (see report PQL-), the tumor seen here is much more mitotic active (focally, 3-4 mitotic figures/hpf). Therefore, the provisional Benedicto score of this tumor is updated to 3+3+3=9 (grade 3) from originally reported 3+3+1=7 (grade 2). CANCER CASE SUMMARY   Procedure: Excision   Specimen Laterality: Right. Tumor Site: Central.  Histologic Type: Invasive carcinoma of no special type   Overall grade: Grade 3 (scores of 9). Tumor Size: 2.2 x 2.2 x 1.4 cm. Tumor Focality: Single focus of invasive carcinoma   Ductal Carcinoma In Situ (DCIS): Not identified; Lobular Carcinoma In Situ (LCIS): Not identified. Lymph-Vascular Invasion: Not identified; Dermal lymphovascular invasion: No skin present. Microcalcifications: Not identified. Treatment effect: No known presurgical therapy   MARGINS All margins negative for invasive carcinoma. REGIONAL LYMPH NODES Tumor present in regional lymph nodes   Number of lymph nodes with macrometastasis (>2 mm): 0   Number of lymph nodes with micrometasteses (0.2-2 mm): 2   Number of lymph nodes with isolated tumor cells: 0   Size of largest jarett metastasis: 2 mm   Extranodal extension: Not identified. Total number of lymph nodes examined: 4; Number of sentinel nodes examined: 4.     Pathologic Staging (pTNM, AJCC 8th Edition):  pT2 (sn)pN1mi      Breast Cancer Marker Studies:   Estrogen Receptors (ER):90%   Progesterone Receptors (ME): 30%   Her-2/stone (c-erb B-2) protein expression: Negative/0      Oncotype Dx  Recurrence Score Result-20  11/08/2021 completed adjuvant RT  11/09/2021 started endocrine therapy with Arimidex 1 mg by mouth daily per medical oncology, Dr. Lobito Cardoza  12/20/2021 Right breast US: Benign, BI-RADS 2.  02/28/2022 clinical follow up is without evidence of recurrent disease. She has postsurgical changes bilaterally, right more than left. We reviewed NCCN guidelines for breast cancer follow-up on this visit. We also discussed BSE and the importance of breast massage in detail. She has skin thickening and areas of fat necrosis of the right breast as well as decreased range of motion of the right upper extremity. Will consult occupational therapy for further evaluation and recommendations. She is tolerating her Arimidex well. Discussed limiting alcohol and importance of vitamin D and calcium daily. Will plan to see in July with bilateral screening mammogram same day.  07/19/2022-Bilateral screening [id number], BI-RADS 2. Recommendation from radiology notes that she may benefit from intermittent screening breast MRI. Clinically, she is without evidence of recurrent disease. She has significant post surgical changes bilaterally. Right breast lymphedema is improved post occupational therapy. At this time, we will plan to see her back in 6 months with complete breast ultrasounds. Will revisit issue of breast MRI on that visit. We reviewed the importance of breast self-examination and calling in the event she notices any change. We will follow-up with occupational therapy as directed. To start Effexor tonight for AI induced mood swings and follow-up with medical oncology in 1 month. 01/20/2023 Bilateral complete breast Ultrasounds: Benign, BI-RADS 2. Clinical follow-up is without convincing evidence of recurrent disease.   She has extremely dense breast tissue bilaterally as well as bilateral, multiple, mobile breast masses consistent with postoperative changes. We reviewed her imaging, including her BI-RADS result. We also discussed her breast density and radiology recommendations for intermittent screening with a breast MRI secondary to her dense breast tissue and significant postsurgical changes bilaterally. We will plan to see in 6 months with a bilateral breast MRI at that time and as needed. Continues to tolerate Arimidex fairly well and follows routinely with medical oncology. Plan:     Continue monthly breast/chest wall self examination; detailed instructions reviewed today. Bring any changes to your physician's attention. Continue healthy diet and exercise routinely as tolerated. Maintaining ideal body weight (20-25 BMI) may lead to optimal breast cancer outcomes. Avoid alcohol or limit to less than 3 drinks per week. Repeat mammogram July 2023 (not ordered). Continue Arimidex 1 mg daily at the discretion of medical oncology team.  Ca+/VitD while on Arimidex. Continue follow up with Medical Oncology, primary care, and all specialties as directed. RTC 6 months with MRI of the bilateral breasts. Labs per medical oncology, no need for BUN/creatinine. I spent a total of 28 minutes on the date of the service which included preparing to see the patient, face-to-face patient care, completing clinical documentation, obtaining and/or reviewing separately obtained history, performing a medically appropriate examination, counseling and educating the patient/family/caregiver, ordering medications, tests, or procedures, communicating with other HCPs (not separately reported), independently interpreting results (not separately reported), communicating results to the patient/family/caregiver and care coordination (not separately reported). This document is generated, in part, by voice recognition software and thus syntax and grammatical errors are possible.     Nasreen Murphy Perla Mejia, RN, MSN, APRN-CNP, [address] [place]  Advanced Oncology Certified Nurse Practitioner  Department of Breast Surgery  [facility]/  Care in collaboration with Dr. Den Echavarria.  Tere/Dr. Charli Morales/Dr. Chela Leong APRN-CNP

## 2023-01-20 ENCOUNTER — HOSPITAL ENCOUNTER (OUTPATIENT)
Dept: GENERAL RADIOLOGY | Age: 63
End: 2023-01-20
Payer: MEDICAID

## 2023-01-20 ENCOUNTER — OFFICE VISIT (OUTPATIENT)
Dept: BREAST CENTER | Age: 63
End: 2023-01-20
Payer: MEDICAID

## 2023-01-20 ENCOUNTER — APPOINTMENT (OUTPATIENT)
Dept: GENERAL RADIOLOGY | Age: 63
End: 2023-01-20
Payer: MEDICAID

## 2023-01-20 VITALS
BODY MASS INDEX: 26.83 KG/M2 | HEIGHT: 68 IN | SYSTOLIC BLOOD PRESSURE: 142 MMHG | HEART RATE: 74 BPM | TEMPERATURE: 98.4 F | WEIGHT: 177 LBS | OXYGEN SATURATION: 97 % | RESPIRATION RATE: 14 BRPM | DIASTOLIC BLOOD PRESSURE: 80 MMHG

## 2023-01-20 DIAGNOSIS — R92.2 DENSE BREAST TISSUE ON MAMMOGRAM: ICD-10-CM

## 2023-01-20 DIAGNOSIS — R92.2 DENSE BREAST: ICD-10-CM

## 2023-01-20 DIAGNOSIS — Z85.3 PERSONAL HISTORY OF BREAST CANCER: Primary | ICD-10-CM

## 2023-01-20 DIAGNOSIS — Z98.890 H/O BREAST SURGERY: ICD-10-CM

## 2023-01-20 DIAGNOSIS — Z12.39 ENCOUNTER FOR BREAST CANCER SCREENING USING NON-MAMMOGRAM MODALITY: ICD-10-CM

## 2023-01-20 PROCEDURE — 76641 ULTRASOUND BREAST COMPLETE: CPT

## 2023-01-20 PROCEDURE — 99213 OFFICE O/P EST LOW 20 MIN: CPT | Performed by: NURSE PRACTITIONER

## 2023-01-20 ASSESSMENT — ENCOUNTER SYMPTOMS: COUGH: 1

## 2023-01-20 NOTE — PATIENT INSTRUCTIONS
MRI and office visit 6 months. Office will call when closer to time to have done. Any questions or concerns, please contact the office at [phone].

## 2023-02-15 ENCOUNTER — HOSPITAL ENCOUNTER (EMERGENCY)
Age: 63
Discharge: HOME | End: 2023-02-15
Payer: MEDICAID

## 2023-02-15 VITALS
DIASTOLIC BLOOD PRESSURE: 84 MMHG | OXYGEN SATURATION: 100 % | WEIGHT: 170 LBS | BODY MASS INDEX: 25.85 KG/M2 | RESPIRATION RATE: 18 BRPM | SYSTOLIC BLOOD PRESSURE: 130 MMHG | TEMPERATURE: 97.6 F | HEART RATE: 104 BPM

## 2023-02-15 DIAGNOSIS — R05.9 COUGH, UNSPECIFIED TYPE: ICD-10-CM

## 2023-02-15 DIAGNOSIS — J01.90 ACUTE SINUSITIS, RECURRENCE NOT SPECIFIED, UNSPECIFIED LOCATION: Primary | ICD-10-CM

## 2023-02-15 DIAGNOSIS — R30.0 DYSURIA: ICD-10-CM

## 2023-02-15 DIAGNOSIS — J02.9 ACUTE PHARYNGITIS, UNSPECIFIED ETIOLOGY: ICD-10-CM

## 2023-02-15 LAB
BACTERIA: ABNORMAL /HPF
BILIRUBIN URINE: NEGATIVE
BLOOD, URINE: ABNORMAL
CLARITY: CLEAR
COLOR: YELLOW
GLUCOSE URINE: 500 MG/DL
KETONES, URINE: NEGATIVE MG/DL
LEUKOCYTE ESTERASE, URINE: ABNORMAL
NITRITE, URINE: NEGATIVE
PH UA: 6.5 (ref 5–9)
PROTEIN UA: NEGATIVE MG/DL
RBC UA: ABNORMAL /HPF (ref 0–2)
SPECIFIC GRAVITY UA: 1.01 (ref 1–1.03)
UROBILINOGEN, URINE: 0.2 E.U./DL
WBC UA: ABNORMAL /HPF (ref 0–5)

## 2023-02-15 PROCEDURE — 99211 OFF/OP EST MAY X REQ PHY/QHP: CPT

## 2023-02-15 PROCEDURE — 81001 URINALYSIS AUTO W/SCOPE: CPT

## 2023-02-15 PROCEDURE — 87088 URINE BACTERIA CULTURE: CPT

## 2023-02-15 RX ORDER — AMOXICILLIN AND CLAVULANATE POTASSIUM 500; 125 MG/1; MG/1
1 TABLET, FILM COATED ORAL 3 TIMES DAILY
Qty: 30 TABLET | Refills: 0 | Status: SHIPPED | OUTPATIENT
Start: 2023-02-15 | End: 2023-02-25

## 2023-02-15 RX ORDER — GUAIFENESIN AND DEXTROMETHORPHAN HYDROBROMIDE 1200; 60 MG/1; MG/1
1 TABLET, EXTENDED RELEASE ORAL EVERY 12 HOURS PRN
Qty: 12 TABLET | Refills: 0 | Status: SHIPPED | OUTPATIENT
Start: 2023-02-15

## 2023-02-15 RX ORDER — PREDNISONE 10 MG/1
TABLET ORAL
Qty: 14 TABLET | Refills: 0 | Status: SHIPPED | OUTPATIENT
Start: 2023-02-15

## 2023-02-15 ASSESSMENT — PAIN - FUNCTIONAL ASSESSMENT: PAIN_FUNCTIONAL_ASSESSMENT: NONE - DENIES PAIN

## 2023-02-15 NOTE — ED PROVIDER NOTES
[facility] URGENT CARE  EMERGENCY DEPARTMENT ENCOUNTER        NAME: Nadege Washington  :  1960  MRN:  [id number]  Date of evaluation: 2/15/2023  Provider: Arnaldo Balderrama PA-C  PCP: Carolyn Gilliam DO  Note Started : 12:09 PM EST 2/15/23    Chief Complaint: Pharyngitis, Otalgia, Chest Pain, Headache (Body aches, started 2 days ago to get worse), and Urinary Tract Infection (Frequency, burning, urgency, slight odor, started a few days ago)      Is a 64-year-old female that presents to urgent care complaining of cough and cold symptoms for the past several days. She has been having a sore throat body aches chest congestion cough earache sinus pain pressure and dysuria as well. She denies any back pain at this time. No nausea or vomiting. No diarrhea or constipation. On first contact patient she appears to be in no acute distress. Review of Systems  Pertinent positives and negatives are stated within HPI, all other systems reviewed and are negative. Allergies: Flagyl [metronidazole], Omeprazole, and Vicodin [hydrocodone-acetaminophen]     --------------------------------------------- PAST HISTORY ---------------------------------------------  Past Medical History:  has a past medical history of BRCA1 negative, BRCA2 negative, Breast cancer ([facility] [address] Cancer ([facility] [address] Diabetes mellitus ([facility] [address] Diverticulitis, Frequent UTI, Hiatal hernia, History of therapeutic radiation, Hypertension, Kidney stones, Lymphedema, PONV (postoperative nausea and vomiting), and Thyroid disease. Past Surgical History:  has a past surgical history that includes Hysterectomy; Rectal prolapse repair; ECHO Compl W Dop Color Flow (8/15/2013); Colonoscopy; Endoscopy, colon, diagnostic; Cholecystectomy, laparoscopic (N/A, 2019); US BREAST BIOPSY W LOC DEVICE 1ST LESION RIGHT (Right, 2021); and Breast reduction surgery (Right, 2021). Social History:  reports that she has never smoked.  She has never used smokeless tobacco. She reports that she does not currently use alcohol. She reports that she does not use drugs. Family History: family history includes Breast Cancer (age of onset: 52) in her mother; Breast Cancer (age of onset: 48) in her paternal grandmother; Breast Cancer (age of onset: 48) in her maternal aunt; Breast Cancer (age of onset: 64) in her paternal aunt; Prostate Cancer in her brother, father, and paternal uncle. The patients home medications have been reviewed. The nursing notes within the ED encounter have been reviewed. ------------------------------------------------SCREENINGS----------------------------------------------                        CIWA Assessment  BP: 130/84  Heart Rate: (!) 104           ---------------------------------------------PHYSICAL EXAM --------------------------------------------    Vitals:    02/15/23 1208   BP: 130/84   Pulse: (!) 104   Resp: 18   Temp: 97.6 °F (36.4 °C)   SpO2: 100%   Weight: 170 lb (77.1 kg)     Oxygen Saturation Interpretation: Normal     Physical Exam  Vitals and nursing note reviewed. Constitutional:       Appearance: She is well-developed. HENT:      Head: Normocephalic and atraumatic. Jaw: There is normal jaw occlusion. Right Ear: Hearing and external ear normal. Tympanic membrane is bulging. Left Ear: Hearing and external ear normal. Tympanic membrane is bulging. Nose: Congestion and rhinorrhea present. Right Sinus: Frontal sinus tenderness present. Left Sinus: Frontal sinus tenderness present. Mouth/Throat:      Mouth: Mucous membranes are moist. No angioedema. Pharynx: Oropharynx is clear. Uvula midline. No pharyngeal swelling, oropharyngeal exudate, posterior oropharyngeal erythema or uvula swelling. Tonsils: No tonsillar exudate or tonsillar abscesses. Comments: Oropharynx is patent. Eyes:      Pupils: Pupils are equal, round, and reactive to light. Cardiovascular:      Rate and Rhythm: Regular rhythm. Tachycardia present. Heart sounds: Normal heart sounds. No murmur heard. Pulmonary:      Effort: Pulmonary effort is normal. No respiratory distress. Breath sounds: Normal breath sounds. No wheezing or rales. Musculoskeletal:      Cervical back: Normal range of motion and neck supple. Lymphadenopathy:      Cervical: No cervical adenopathy. Skin:     General: Skin is warm and dry. Neurological:      General: No focal deficit present. Mental Status: She is alert and oriented to person, place, and time. Cranial Nerves: No cranial nerve deficit.       Coordination: Coordination normal.       -------------------------------------------------- RESULTS -------------------------------------------------  Results for orders placed or performed during the hospital encounter of 02/15/23   Urinalysis   Result Value Ref Range    Color, UA Yellow Straw/Yellow    Clarity, UA Clear Clear    Glucose, Ur 500 (A) Negative mg/dL    Bilirubin Urine Negative Negative    Ketones, Urine Negative Negative mg/dL    Specific Gravity, UA 1.015 1.005 - 1.030    Blood, Urine TRACE (A) Negative    pH, UA 6.5 5.0 - 9.0    Protein, UA Negative Negative mg/dL    Urobilinogen, Urine 0.2 <2.0 E.U./dL    Nitrite, Urine Negative Negative    Leukocyte Esterase, Urine TRACE (A) Negative   Microscopic Urinalysis   Result Value Ref Range    WBC, UA 1-3 0 - 5 /HPF    RBC, UA NONE 0 - 2 /HPF    Bacteria, UA MANY (A) None Seen /HPF     No orders to display       Labs Reviewed   URINALYSIS - Abnormal; Notable for the following components:       Result Value    Glucose, Ur 500 (*)     Blood, Urine TRACE (*)     Leukocyte Esterase, Urine TRACE (*)     All other components within normal limits   MICROSCOPIC URINALYSIS - Abnormal; Notable for the following components:    Bacteria, UA MANY (*)     All other components within normal limits   CULTURE, URINE       When ordered only abnormal lab results are displayed. All other labs were within normal range or not returned as of this dictation. Interpretation per the Radiologist below, if available at the time of this note:    No orders to display     No results found. No results found.     -------------------------------------------------PROCEDURES--------------------------------------------  Unless otherwise noted below, none      Procedures      ---EMERGENCY DEPARTMENT COURSE and DIFFERENTIAL DIAGNOSIS/MDM---  (CC/HPI Summary, DDx, ED Course, and Reassessment:) (Disposition Considerations (include 1 Tests not done, Admit vs D/C, Shared Decision Making, Pt Expectation of Test or Tx., Consults, Social Determinants, Chronic Conditiions, Records reviewed)    MDM  Number of Diagnoses or Management Options  Acute pharyngitis, unspecified etiology  Acute sinusitis, recurrence not specified, unspecified location  Cough, unspecified type  Dysuria  Diagnosis management comments: No acute distress. She definitely has sinus upper respiratory sore throat issues. She has a cough but her lungs are clear and we discussed treatment options. I will place her on a respiratory antibiotic Augmentin also she has been having some burning with urination she may have a urinary tract infection so the Augmentin will cover for this I did tell her to follow-up with her primary care provider to check the urine culture also to help with the pain and inflammation since she cannot take any ibuprofen I we will put her on a steroid for couple days as well. Instructions given.          DISCHARGE MEDICATIONS:  New Prescriptions    AMOXICILLIN-CLAVULANATE (AUGMENTIN) 500-125 MG PER TABLET    Take 1 tablet by mouth 3 times daily for 10 days    DEXTROMETHORPHAN-GUAIFENESIN (MUCINEX DM MAXIMUM STRENGTH)  MG TB12    Take 1 tablet by mouth every 12 hours as needed (cough)    PREDNISONE (DELTASONE) 10 MG TABLET    Take 40mg by mouth daily x 2 days, then 20mg by mouth daily x 2 days then 10mg by mouth daily x 2 days. DISCONTINUED MEDICATIONS:  Discontinued Medications    No medications on file       PATIENT REFERRED TO:  Tristian Moore,   [address]  [address]  [place] [address]  [phone]    Schedule an appointment as soon as possible for a visit       --------------------------------- ADDITIONAL PROVIDER NOTES ---------------------------------  I have spoken with the patient and discussed todays results, in addition to providing specific details for the plan of care and counseling regarding the diagnosis and prognosis. Their questions are answered at this time and they are agreeable with the plan. This patient is stable for discharge. I have shared the specific conditions for return, as well as the importance of follow-up. * NOTE: (Please note that portions of this note were completed with a voice recognition program.  Efforts were made to edit the dictations but occasionally words are mis-transcribed. )    --------------------------------- IMPRESSION AND DISPOSITION ---------------------------------    IMPRESSION  1. Acute sinusitis, recurrence not specified, unspecified location    2. Acute pharyngitis, unspecified etiology    3. Dysuria    4. Cough, unspecified type        DISPOSITION Decision To Discharge 02/15/2023 12:52:37 PM    Disposition: Discharge to home  Patient condition is good    I am the Primary Clinician of Record.      Adrianna Fritz PA-C (electronically signed) on 2/15/2023 at 12:57 PM         Adrianna Fritz PA-C  02/15/23 4095

## 2023-02-18 LAB — URINE CULTURE, ROUTINE: NORMAL

## 2023-04-17 ENCOUNTER — OFFICE VISIT (OUTPATIENT)
Dept: FAMILY MEDICINE CLINIC | Age: 63
End: 2023-04-17
Payer: MEDICAID

## 2023-04-17 VITALS
DIASTOLIC BLOOD PRESSURE: 77 MMHG | RESPIRATION RATE: 20 BRPM | BODY MASS INDEX: 27.04 KG/M2 | TEMPERATURE: 98.8 F | SYSTOLIC BLOOD PRESSURE: 115 MMHG | HEART RATE: 84 BPM | HEIGHT: 68 IN | WEIGHT: 178.4 LBS | OXYGEN SATURATION: 96 %

## 2023-04-17 DIAGNOSIS — M10.9 GOUT, UNSPECIFIED CAUSE, UNSPECIFIED CHRONICITY, UNSPECIFIED SITE: ICD-10-CM

## 2023-04-17 DIAGNOSIS — E78.2 MIXED HYPERLIPIDEMIA: ICD-10-CM

## 2023-04-17 DIAGNOSIS — R05.1 ACUTE COUGH: Primary | ICD-10-CM

## 2023-04-17 DIAGNOSIS — J02.9 SORE THROAT: ICD-10-CM

## 2023-04-17 DIAGNOSIS — E11.9 TYPE 2 DIABETES MELLITUS WITHOUT COMPLICATION, WITHOUT LONG-TERM CURRENT USE OF INSULIN (HCC): ICD-10-CM

## 2023-04-17 DIAGNOSIS — E03.9 ACQUIRED HYPOTHYROIDISM: ICD-10-CM

## 2023-04-17 DIAGNOSIS — K21.9 GASTROESOPHAGEAL REFLUX DISEASE WITHOUT ESOPHAGITIS: ICD-10-CM

## 2023-04-17 LAB — S PYO AG THROAT QL: NORMAL

## 2023-04-17 PROCEDURE — 87880 STREP A ASSAY W/OPTIC: CPT | Performed by: NEUROMUSCULOSKELETAL MEDICINE & OMM

## 2023-04-17 PROCEDURE — G8419 CALC BMI OUT NRM PARAM NOF/U: HCPCS | Performed by: NEUROMUSCULOSKELETAL MEDICINE & OMM

## 2023-04-17 PROCEDURE — 2022F DILAT RTA XM EVC RTNOPTHY: CPT | Performed by: NEUROMUSCULOSKELETAL MEDICINE & OMM

## 2023-04-17 PROCEDURE — 3017F COLORECTAL CA SCREEN DOC REV: CPT | Performed by: NEUROMUSCULOSKELETAL MEDICINE & OMM

## 2023-04-17 PROCEDURE — 99214 OFFICE O/P EST MOD 30 MIN: CPT | Performed by: NEUROMUSCULOSKELETAL MEDICINE & OMM

## 2023-04-17 PROCEDURE — 1036F TOBACCO NON-USER: CPT | Performed by: NEUROMUSCULOSKELETAL MEDICINE & OMM

## 2023-04-17 PROCEDURE — G8427 DOCREV CUR MEDS BY ELIG CLIN: HCPCS | Performed by: NEUROMUSCULOSKELETAL MEDICINE & OMM

## 2023-04-17 PROCEDURE — 3046F HEMOGLOBIN A1C LEVEL >9.0%: CPT | Performed by: NEUROMUSCULOSKELETAL MEDICINE & OMM

## 2023-04-17 PROCEDURE — 83036 HEMOGLOBIN GLYCOSYLATED A1C: CPT | Performed by: NEUROMUSCULOSKELETAL MEDICINE & OMM

## 2023-04-17 RX ORDER — LEVOTHYROXINE SODIUM 0.05 MG/1
50 TABLET ORAL DAILY
Qty: 90 TABLET | Refills: 3 | Status: SHIPPED | OUTPATIENT
Start: 2023-04-17 | End: 2023-10-14

## 2023-04-17 RX ORDER — BLOOD-GLUCOSE METER
1 KIT MISCELLANEOUS DAILY
Qty: 1 KIT | Refills: 0 | Status: SHIPPED | OUTPATIENT
Start: 2023-04-17

## 2023-04-17 RX ORDER — LANSOPRAZOLE 30 MG/1
30 CAPSULE, DELAYED RELEASE ORAL DAILY
Qty: 90 CAPSULE | Refills: 1 | Status: CANCELLED | OUTPATIENT
Start: 2023-04-17 | End: 2023-10-14

## 2023-04-17 RX ORDER — LANCETS 30 GAUGE
1 EACH MISCELLANEOUS DAILY
Qty: 100 EACH | Refills: 0 | Status: SHIPPED | OUTPATIENT
Start: 2023-04-17

## 2023-04-17 RX ORDER — PIOGLITAZONEHYDROCHLORIDE 15 MG/1
15 TABLET ORAL 2 TIMES DAILY
Qty: 180 TABLET | Refills: 1 | Status: SHIPPED | OUTPATIENT
Start: 2023-04-17 | End: 2023-10-14

## 2023-04-17 RX ORDER — GLUCOSAMINE HCL/CHONDROITIN SU 500-400 MG
CAPSULE ORAL
Qty: 100 STRIP | Refills: 5 | Status: SHIPPED | OUTPATIENT
Start: 2023-04-17

## 2023-04-17 RX ORDER — ROSUVASTATIN CALCIUM 20 MG/1
20 TABLET, COATED ORAL NIGHTLY
Qty: 90 TABLET | Refills: 1 | Status: SHIPPED | OUTPATIENT
Start: 2023-04-17

## 2023-04-17 RX ORDER — LEVOTHYROXINE SODIUM 0.05 MG/1
50 TABLET ORAL DAILY
Qty: 30 TABLET | Refills: 0 | Status: CANCELLED | OUTPATIENT
Start: 2023-04-17

## 2023-04-17 RX ORDER — ALLOPURINOL 300 MG/1
TABLET ORAL
Qty: 45 TABLET | Refills: 1 | Status: SHIPPED | OUTPATIENT
Start: 2023-04-17

## 2023-04-17 RX ORDER — BROMPHENIRAMINE MALEATE, PSEUDOEPHEDRINE HYDROCHLORIDE, AND DEXTROMETHORPHAN HYDROBROMIDE 2; 30; 10 MG/5ML; MG/5ML; MG/5ML
5 SYRUP ORAL 4 TIMES DAILY PRN
Qty: 120 ML | Refills: 0 | Status: SHIPPED | OUTPATIENT
Start: 2023-04-17

## 2023-04-17 RX ORDER — FLUTICASONE PROPIONATE 50 MCG
2 SPRAY, SUSPENSION (ML) NASAL DAILY
Qty: 48 G | Refills: 1 | Status: SHIPPED | OUTPATIENT
Start: 2023-04-17

## 2023-04-17 SDOH — ECONOMIC STABILITY: FOOD INSECURITY: WITHIN THE PAST 12 MONTHS, THE FOOD YOU BOUGHT JUST DIDN'T LAST AND YOU DIDN'T HAVE MONEY TO GET MORE.: NEVER TRUE

## 2023-04-17 SDOH — ECONOMIC STABILITY: HOUSING INSECURITY
IN THE LAST 12 MONTHS, WAS THERE A TIME WHEN YOU DID NOT HAVE A STEADY PLACE TO SLEEP OR SLEPT IN A SHELTER (INCLUDING NOW)?: NO

## 2023-04-17 SDOH — ECONOMIC STABILITY: FOOD INSECURITY: WITHIN THE PAST 12 MONTHS, YOU WORRIED THAT YOUR FOOD WOULD RUN OUT BEFORE YOU GOT MONEY TO BUY MORE.: NEVER TRUE

## 2023-04-17 SDOH — ECONOMIC STABILITY: INCOME INSECURITY: HOW HARD IS IT FOR YOU TO PAY FOR THE VERY BASICS LIKE FOOD, HOUSING, MEDICAL CARE, AND HEATING?: NOT HARD AT ALL

## 2023-04-17 ASSESSMENT — ENCOUNTER SYMPTOMS
ABDOMINAL PAIN: 0
CHEST TIGHTNESS: 0
ABDOMINAL DISTENTION: 0
COUGH: 1
BLOOD IN STOOL: 0
RHINORRHEA: 1
DIARRHEA: 0
NAUSEA: 0
SHORTNESS OF BREATH: 0
SINUS PAIN: 1
SINUS PRESSURE: 1
CHOKING: 0
CONSTIPATION: 0
TROUBLE SWALLOWING: 0
SORE THROAT: 1
WHEEZING: 0
BACK PAIN: 0
VOMITING: 0
VOICE CHANGE: 0

## 2023-04-17 ASSESSMENT — PATIENT HEALTH QUESTIONNAIRE - PHQ9
SUM OF ALL RESPONSES TO PHQ QUESTIONS 1-9: 0
SUM OF ALL RESPONSES TO PHQ9 QUESTIONS 1 & 2: 0
1. LITTLE INTEREST OR PLEASURE IN DOING THINGS: 0
2. FEELING DOWN, DEPRESSED OR HOPELESS: 0
SUM OF ALL RESPONSES TO PHQ QUESTIONS 1-9: 0

## 2023-04-17 NOTE — PROGRESS NOTES
Darci Primrose White (:  1960) is a 57 y.o. female,Established patient, here for evaluation of the following chief complaint(s):  Cough (X 2 weeks/), Congestion, and Pharyngitis         ASSESSMENT/PLAN:  1. Acute cough  Assessment & Plan:   Prescription written for Bromfed-DM 5 cc every 6 hours as needed for cough congestion. Orders:  -     brompheniramine-pseudoephedrine-DM (BROMFED DM) 2-30-10 MG/5ML syrup; Take 5 mLs by mouth 4 times daily as needed for Congestion or Cough, Disp-120 mL, R-0Normal  -     fluticasone (FLONASE) 50 MCG/ACT nasal spray; 2 sprays by Each Nostril route daily, Disp-48 g, R-1Normal  2. Sore throat  Assessment & Plan:  Patient sore throat likely secondary to postnasal drip. I also did write a prescription for Flonase nasal spray 2 sprays each nostril daily. Patient's rapid strep was negative at today's visit. Orders:  -     POCT rapid strep A  3. Type 2 diabetes mellitus without complication, without long-term current use of insulin [facility])  Assessment & Plan:  Patient had no hemoglobin A1c since 2022. We will check hemoglobin A1c, fasting blood work within the next 1 to 2 weeks. Orders:  -     pioglitazone (ACTOS) 15 MG tablet; Take 1 tablet by mouth in the morning and at bedtime, Disp-180 tablet, R-1Normal  -     Lancets MISC; DAILY Starting Mon 2023, Disp-100 each, R-0, Normal  -     glucose monitoring (FREESTYLE FREEDOM) kit; DAILY Starting Mon 2023, Disp-1 kit, R-0, Normal  -     blood glucose monitor strips; Test blood sugar daily, Disp-100 strip, R-5, Normal  -     POCT glycosylated hemoglobin (Hb A1C)  4. Acquired hypothyroidism  Assessment & Plan:   Patient currently on Synthroid 50 mcg 1 daily. We will check TSH with upcoming fasting blood work and treat accordingly. Orders:  -     levothyroxine (SYNTHROID) 50 MCG tablet; Take 1 tablet by mouth Daily, Disp-90 tablet, R-3Normal  5.  Gastroesophageal reflux disease without esophagitis  Assessment &

## 2023-04-17 NOTE — ASSESSMENT & PLAN NOTE
Stable, patient currently on Crestor 20 mg daily. We will check lipid panel with fasting blood work in the near future.

## 2023-04-17 NOTE — ASSESSMENT & PLAN NOTE
Patient currently on Synthroid 50 mcg 1 daily. We will check TSH with upcoming fasting blood work and treat accordingly.

## 2023-04-17 NOTE — ASSESSMENT & PLAN NOTE
Patient sore throat likely secondary to postnasal drip. I also did write a prescription for Flonase nasal spray 2 sprays each nostril daily. Patient's rapid strep was negative at today's visit.

## 2023-04-17 NOTE — ASSESSMENT & PLAN NOTE
Patient takes allopurinol 300 mg half a tablet once daily. Prescription electronically sent to pharmacy today, [address] in [facility].

## 2023-04-17 NOTE — ASSESSMENT & PLAN NOTE
Advised the patient to stop using her proton pump inhibitor, Prevacid due to symptoms not being persistent and frequently encountered.

## 2023-04-17 NOTE — ASSESSMENT & PLAN NOTE
Patient had no hemoglobin A1c since August 2022. We will check hemoglobin A1c, fasting blood work within the next 1 to 2 weeks.

## 2023-04-26 DIAGNOSIS — C50.911 MALIGNANT NEOPLASM OF RIGHT BREAST IN FEMALE, ESTROGEN RECEPTOR POSITIVE, UNSPECIFIED SITE OF BREAST (HCC): ICD-10-CM

## 2023-04-26 DIAGNOSIS — Z17.0 MALIGNANT NEOPLASM OF RIGHT BREAST IN FEMALE, ESTROGEN RECEPTOR POSITIVE, UNSPECIFIED SITE OF BREAST (HCC): ICD-10-CM

## 2023-04-26 DIAGNOSIS — Z79.811 USE OF AROMATASE INHIBITORS: ICD-10-CM

## 2023-04-27 ENCOUNTER — HOSPITAL ENCOUNTER (OUTPATIENT)
Age: 63
Discharge: HOME | End: 2023-04-27
Payer: MEDICAID

## 2023-04-27 LAB
ALBUMIN SERPL-MCNC: 4.1 G/DL (ref 3.5–5.2)
ALP SERPL-CCNC: 72 U/L (ref 35–104)
ALT SERPL-CCNC: 10 U/L (ref 0–32)
ANION GAP SERPL CALCULATED.3IONS-SCNC: 10 MMOL/L (ref 7–16)
AST SERPL-CCNC: 14 U/L (ref 0–31)
BASOPHILS # BLD: 0.05 E9/L (ref 0–0.2)
BASOPHILS NFR BLD: 1 % (ref 0–2)
BILIRUB SERPL-MCNC: 0.4 MG/DL (ref 0–1.2)
BUN SERPL-MCNC: 19 MG/DL (ref 6–23)
CALCIUM SERPL-MCNC: 9.5 MG/DL (ref 8.6–10.2)
CHLORIDE SERPL-SCNC: 103 MMOL/L (ref 98–107)
CHOLESTEROL, TOTAL: 152 MG/DL (ref 0–199)
CO2 SERPL-SCNC: 25 MMOL/L (ref 22–29)
CREAT SERPL-MCNC: 0.9 MG/DL (ref 0.5–1)
EOSINOPHIL # BLD: 0.34 E9/L (ref 0.05–0.5)
EOSINOPHIL NFR BLD: 6.5 % (ref 0–6)
ERYTHROCYTE [DISTWIDTH] IN BLOOD BY AUTOMATED COUNT: 12.4 FL (ref 11.5–15)
GLUCOSE SERPL-MCNC: 202 MG/DL (ref 74–99)
HBA1C MFR BLD: 8.3 % (ref 4–5.6)
HCT VFR BLD AUTO: 40.2 % (ref 34–48)
HDLC SERPL-MCNC: 64 MG/DL
HGB BLD-MCNC: 13.4 G/DL (ref 11.5–15.5)
IMM GRANULOCYTES # BLD: 0.01 E9/L
IMM GRANULOCYTES NFR BLD: 0.2 % (ref 0–5)
LDLC SERPL CALC-MCNC: 60 MG/DL (ref 0–99)
LYMPHOCYTES # BLD: 1.63 E9/L (ref 1.5–4)
LYMPHOCYTES NFR BLD: 31 % (ref 20–42)
MCH RBC QN AUTO: 31.7 PG (ref 26–35)
MCHC RBC AUTO-ENTMCNC: 33.3 % (ref 32–34.5)
MCV RBC AUTO: 95 FL (ref 80–99.9)
MONOCYTES # BLD: 0.46 E9/L (ref 0.1–0.95)
MONOCYTES NFR BLD: 8.7 % (ref 2–12)
NEUTROPHILS # BLD: 2.77 E9/L (ref 1.8–7.3)
NEUTS SEG NFR BLD: 52.6 % (ref 43–80)
PLATELET # BLD AUTO: 286 E9/L (ref 130–450)
PMV BLD AUTO: 9.3 FL (ref 7–12)
POTASSIUM SERPL-SCNC: 4.1 MMOL/L (ref 3.5–5)
PROT SERPL-MCNC: 6.9 G/DL (ref 6.4–8.3)
RBC # BLD AUTO: 4.23 E12/L (ref 3.5–5.5)
SODIUM SERPL-SCNC: 138 MMOL/L (ref 132–146)
TRIGL SERPL-MCNC: 138 MG/DL (ref 0–149)
TSH SERPL-MCNC: 2.76 UIU/ML (ref 0.27–4.2)
VITAMIN D 25-HYDROXY: 30 NG/ML (ref 30–100)
VLDLC SERPL CALC-MCNC: 28 MG/DL
WBC # BLD: 5.3 E9/L (ref 4.5–11.5)

## 2023-04-27 PROCEDURE — 82306 VITAMIN D 25 HYDROXY: CPT

## 2023-04-27 PROCEDURE — 83036 HEMOGLOBIN GLYCOSYLATED A1C: CPT

## 2023-04-27 PROCEDURE — 84443 ASSAY THYROID STIM HORMONE: CPT

## 2023-04-27 PROCEDURE — 85025 COMPLETE CBC W/AUTO DIFF WBC: CPT

## 2023-04-27 PROCEDURE — 80053 COMPREHEN METABOLIC PANEL: CPT

## 2023-04-27 PROCEDURE — 80061 LIPID PANEL: CPT

## 2023-04-27 PROCEDURE — 36415 COLL VENOUS BLD VENIPUNCTURE: CPT

## 2023-04-27 RX ORDER — ANASTROZOLE 1 MG/1
TABLET ORAL
Qty: 90 TABLET | Refills: 0 | Status: SHIPPED | OUTPATIENT
Start: 2023-04-27

## 2023-05-17 PROBLEM — J02.9 SORE THROAT: Status: RESOLVED | Noted: 2023-04-17 | Resolved: 2023-05-17

## 2023-05-18 ENCOUNTER — TELEPHONE (OUTPATIENT)
Dept: FAMILY MEDICINE CLINIC | Age: 63
End: 2023-05-18

## 2023-05-18 ENCOUNTER — OFFICE VISIT (OUTPATIENT)
Dept: PRIMARY CARE CLINIC | Age: 63
End: 2023-05-18
Payer: MEDICAID

## 2023-05-18 VITALS — SYSTOLIC BLOOD PRESSURE: 125 MMHG | HEART RATE: 88 BPM | DIASTOLIC BLOOD PRESSURE: 81 MMHG | TEMPERATURE: 96.9 F

## 2023-05-18 DIAGNOSIS — N39.0 URINARY TRACT INFECTION IN FEMALE: ICD-10-CM

## 2023-05-18 DIAGNOSIS — N39.0 URINARY TRACT INFECTION IN FEMALE: Primary | ICD-10-CM

## 2023-05-18 LAB
BILIRUBIN, POC: NEGATIVE
BLOOD URINE, POC: NORMAL
CLARITY, POC: NORMAL
COLOR, POC: YELLOW
GLUCOSE URINE, POC: NEGATIVE
KETONES, POC: NEGATIVE
LEUKOCYTE EST, POC: NORMAL
NITRITE, POC: NEGATIVE
PH, POC: 5.5
PROTEIN, POC: NORMAL
SPECIFIC GRAVITY, POC: >=1.03
UROBILINOGEN, POC: 0.2

## 2023-05-18 PROCEDURE — 1036F TOBACCO NON-USER: CPT | Performed by: NURSE PRACTITIONER

## 2023-05-18 PROCEDURE — 81002 URINALYSIS NONAUTO W/O SCOPE: CPT | Performed by: NURSE PRACTITIONER

## 2023-05-18 PROCEDURE — 99213 OFFICE O/P EST LOW 20 MIN: CPT | Performed by: NURSE PRACTITIONER

## 2023-05-18 PROCEDURE — G8427 DOCREV CUR MEDS BY ELIG CLIN: HCPCS | Performed by: NURSE PRACTITIONER

## 2023-05-18 PROCEDURE — 3017F COLORECTAL CA SCREEN DOC REV: CPT | Performed by: NURSE PRACTITIONER

## 2023-05-18 PROCEDURE — G8419 CALC BMI OUT NRM PARAM NOF/U: HCPCS | Performed by: NURSE PRACTITIONER

## 2023-05-18 RX ORDER — NITROFURANTOIN 25; 75 MG/1; MG/1
100 CAPSULE ORAL 2 TIMES DAILY
Qty: 20 CAPSULE | Refills: 0 | Status: SHIPPED | OUTPATIENT
Start: 2023-05-18 | End: 2023-05-28

## 2023-05-18 RX ORDER — PHENAZOPYRIDINE HYDROCHLORIDE 100 MG/1
100 TABLET, FILM COATED ORAL 3 TIMES DAILY PRN
Qty: 12 TABLET | Refills: 0 | Status: SHIPPED | OUTPATIENT
Start: 2023-05-18 | End: 2024-05-17

## 2023-05-18 NOTE — PROGRESS NOTES
Chief Complaint:   Urinary Tract Infection      History of Present Illness   Source of history provided by:  patient. Marielos Cervantes is a 57 y.o. old female who has a past medical history of:   Past Medical History:   Diagnosis Date    BRCA1 negative     BRCA2 negative     Breast cancer ([facility] [address]     Cancer ([facility] [address]     Diabetes mellitus ([facility] [address]     Diverticulitis     Frequent UTI     Hiatal hernia     History of therapeutic radiation     Hypertension     Kidney stones     Lymphedema     right breast    PONV (postoperative nausea and vomiting)     Thyroid disease         Pt presents to the ready care for dysuria/frequency/body aches. Pt states the symptoms have progressed over the past few days. No flank pain. Denies any vaginal discharge, vaginal bleeding, vomiting, diarrhea, or lethargy. No LMP recorded (lmp unknown). Patient has had a hysterectomy. ROS    Unless otherwise stated in this report or unable to obtain because of the patient's clinical or mental status as evidenced by the medical record, this patients's positive and negative responses for Review of Systems, constitutional, psych, eyes, ENT, cardiovascular, respiratory, gastrointestinal, neurological, genitourinary, musculoskeletal, integument systems and systems related to the presenting problem are either stated in the preceding or were not pertinent or were negative for the symptoms and/or complaints related to the medical problem.     Past Surgical history:   Past Surgical History:   Procedure Laterality Date    BREAST REDUCTION SURGERY Right 8/17/2021    RIGHT ONCOPLASTIC BREAST REDUCTION, MATCHING LEFT BREAST REDUCTION AND EXCISION SQUAMOUS CELL CARCINOMA, RIGHT MID CHEST, RIGHT BREAST NEEDLE LOCALIZED LUMPECTOMY BLUE DYE INJECTION, RIGHT AXILLARY SENTINEL NODE EXCISION, POSSIBLE RIGHT AXILLARY DISECTION performed by Yuan Colindres MD at [facility], LAPAROSCOPIC N/A 6/20/2019    CHOLECYSTECTOMY LAPAROSCOPIC performed by Jackie Ozuna

## 2023-05-20 LAB
BACTERIA UR CULT: ABNORMAL
ORGANISM: ABNORMAL

## 2023-06-07 ENCOUNTER — TELEPHONE (OUTPATIENT)
Dept: BREAST CENTER | Age: 63
End: 2023-06-07

## 2023-06-07 DIAGNOSIS — Z01.818 PREOP TESTING: Primary | ICD-10-CM

## 2023-06-07 NOTE — TELEPHONE ENCOUNTER
RN received call from patient in regards to moving forward with breast MRI. Patient states she doesnt cycle and insurance is still [facility]. Patient reminded to get lab work drawn that is required to have test performed. RN advised patient that we would start the authorization process and once authorized [address] would be in contact to get MRI scheduled. Patient verbalized understanding. RN advised once scheduled for breast MRI to contact office and schedule follow up appointment.       Electronically signed by Jennifer Kraus RN on 6/7/23 at 11:50 AM EDT

## 2023-06-09 DIAGNOSIS — E11.9 TYPE 2 DIABETES MELLITUS WITHOUT COMPLICATION, WITHOUT LONG-TERM CURRENT USE OF INSULIN (HCC): Primary | ICD-10-CM

## 2023-06-21 ENCOUNTER — HOSPITAL ENCOUNTER (OUTPATIENT)
Age: 63
Discharge: HOME | End: 2023-06-21
Payer: MEDICAID

## 2023-06-21 DIAGNOSIS — Z01.818 PREOP TESTING: ICD-10-CM

## 2023-06-21 LAB
BUN SERPL-MCNC: 14 MG/DL (ref 6–23)
CREAT SERPL-MCNC: 1 MG/DL (ref 0.5–1)

## 2023-06-21 PROCEDURE — 36415 COLL VENOUS BLD VENIPUNCTURE: CPT

## 2023-06-21 PROCEDURE — 84520 ASSAY OF UREA NITROGEN: CPT

## 2023-06-21 PROCEDURE — 82565 ASSAY OF CREATININE: CPT

## 2023-06-24 ENCOUNTER — HOSPITAL ENCOUNTER (OUTPATIENT)
Dept: MRI IMAGING | Age: 63
End: 2023-06-24
Payer: MEDICAID

## 2023-06-24 DIAGNOSIS — R92.2 DENSE BREAST: ICD-10-CM

## 2023-06-24 DIAGNOSIS — R92.2 DENSE BREAST TISSUE ON MAMMOGRAM: ICD-10-CM

## 2023-06-24 DIAGNOSIS — Z85.3 PERSONAL HISTORY OF BREAST CANCER: ICD-10-CM

## 2023-06-24 DIAGNOSIS — Z98.890 H/O BREAST SURGERY: ICD-10-CM

## 2023-06-24 PROCEDURE — 6360000004 HC RX CONTRAST MEDICATION: Performed by: RADIOLOGY

## 2023-06-24 PROCEDURE — C8908 MRI W/O FOL W/CONT, BREAST,: HCPCS

## 2023-06-24 PROCEDURE — A9585 GADOBUTROL INJECTION: HCPCS | Performed by: RADIOLOGY

## 2023-06-24 RX ADMIN — GADOBUTROL 8 ML: 604.72 INJECTION INTRAVENOUS at 15:15

## 2023-07-05 ENCOUNTER — OFFICE VISIT (OUTPATIENT)
Dept: FAMILY MEDICINE CLINIC | Age: 63
End: 2023-07-05
Payer: MEDICARE

## 2023-07-05 VITALS
BODY MASS INDEX: 26.83 KG/M2 | HEART RATE: 80 BPM | DIASTOLIC BLOOD PRESSURE: 67 MMHG | TEMPERATURE: 98.1 F | WEIGHT: 177 LBS | RESPIRATION RATE: 20 BRPM | SYSTOLIC BLOOD PRESSURE: 105 MMHG | HEIGHT: 68 IN

## 2023-07-05 DIAGNOSIS — R35.0 FREQUENT URINATION: ICD-10-CM

## 2023-07-05 DIAGNOSIS — N30.01 ACUTE CYSTITIS WITH HEMATURIA: Primary | ICD-10-CM

## 2023-07-05 DIAGNOSIS — E11.9 TYPE 2 DIABETES MELLITUS WITHOUT COMPLICATION, WITHOUT LONG-TERM CURRENT USE OF INSULIN (HCC): ICD-10-CM

## 2023-07-05 LAB
BILIRUBIN, POC: NEGATIVE
BLOOD URINE, POC: NORMAL
CLARITY, POC: NORMAL
COLOR, POC: YELLOW
GLUCOSE URINE, POC: NORMAL
KETONES, POC: NEGATIVE
LEUKOCYTE EST, POC: NORMAL
NITRITE, POC: POSITIVE
PH, POC: 5
PROTEIN, POC: NORMAL
SPECIFIC GRAVITY, POC: >=1.03
UROBILINOGEN, POC: 0.2

## 2023-07-05 PROCEDURE — 1123F ACP DISCUSS/DSCN MKR DOCD: CPT | Performed by: NEUROMUSCULOSKELETAL MEDICINE & OMM

## 2023-07-05 PROCEDURE — 3052F HG A1C>EQUAL 8.0%<EQUAL 9.0%: CPT | Performed by: NEUROMUSCULOSKELETAL MEDICINE & OMM

## 2023-07-05 PROCEDURE — 81002 URINALYSIS NONAUTO W/O SCOPE: CPT | Performed by: NEUROMUSCULOSKELETAL MEDICINE & OMM

## 2023-07-05 PROCEDURE — 99214 OFFICE O/P EST MOD 30 MIN: CPT | Performed by: NEUROMUSCULOSKELETAL MEDICINE & OMM

## 2023-07-05 RX ORDER — NITROFURANTOIN 25; 75 MG/1; MG/1
100 CAPSULE ORAL 2 TIMES DAILY
Qty: 14 CAPSULE | Refills: 0 | Status: SHIPPED | OUTPATIENT
Start: 2023-07-05 | End: 2023-07-12

## 2023-07-05 RX ORDER — GLIPIZIDE 5 MG/1
5 TABLET ORAL 2 TIMES DAILY
Qty: 60 TABLET | Refills: 3 | Status: SHIPPED | OUTPATIENT
Start: 2023-07-05

## 2023-07-05 ASSESSMENT — ENCOUNTER SYMPTOMS
BLOOD IN STOOL: 0
ABDOMINAL PAIN: 0
SHORTNESS OF BREATH: 0
CHOKING: 0
ABDOMINAL DISTENTION: 0
DIARRHEA: 0
VOMITING: 0
CHEST TIGHTNESS: 0
CONSTIPATION: 0
COUGH: 0
NAUSEA: 0

## 2023-07-05 NOTE — PROGRESS NOTES
rub. No gallop. Pulmonary:      Effort: Pulmonary effort is normal. No respiratory distress. Breath sounds: Normal breath sounds. No stridor. No wheezing or rales. Abdominal:      General: Bowel sounds are normal. There is no distension. Palpations: Abdomen is soft. There is no mass. Musculoskeletal:      Right lower leg: No edema. Left lower leg: No edema. Lymphadenopathy:      Cervical: No cervical adenopathy. Skin:     Findings: No lesion or rash. Neurological:      General: No focal deficit present. Mental Status: She is alert and oriented to person, place, and time. Psychiatric:         Mood and Affect: Mood normal.         Behavior: Behavior normal.         Thought Content:  Thought content normal.         Judgment: Judgment normal.       Health Maintenance Due   Topic Date Due    Diabetic foot exam  Never done    HIV screen  Never done    Hepatitis C screen  Never done    DTaP/Tdap/Td vaccine (1 - Tdap) Never done    Shingles vaccine (1 of 2) Never done    Diabetic retinal exam  02/12/2020    COVID-19 Vaccine (3 - Moderna risk series) 05/19/2021    Diabetic Alb to Cr ratio (uACR) test  07/12/2022    Breast cancer screen  07/19/2023             Past Medical History:   Diagnosis Date    BRCA1 negative     BRCA2 negative     Breast cancer ([address])     Cancer ([address])     Diabetes mellitus ([address])     Diverticulitis     Frequent UTI     Hiatal hernia     History of therapeutic radiation     Hypertension     Kidney stones     Lymphedema     right breast    PONV (postoperative nausea and vomiting)     Thyroid disease         Past Surgical History:   Procedure Laterality Date    BREAST REDUCTION SURGERY Right 8/17/2021    RIGHT ONCOPLASTIC BREAST REDUCTION, MATCHING LEFT BREAST REDUCTION AND EXCISION SQUAMOUS CELL CARCINOMA, RIGHT MID CHEST, RIGHT BREAST NEEDLE LOCALIZED LUMPECTOMY BLUE DYE INJECTION, RIGHT AXILLARY SENTINEL NODE EXCISION, POSSIBLE RIGHT AXILLARY DISECTION performed by Shar Bruce

## 2023-07-08 LAB
BACTERIA UR CULT: ABNORMAL
ORGANISM: ABNORMAL

## 2023-07-13 ENCOUNTER — TELEPHONE (OUTPATIENT)
Dept: FAMILY MEDICINE CLINIC | Age: 63
End: 2023-07-13

## 2023-07-16 LAB
BACTERIA UR CULT: ABNORMAL
ORGANISM: ABNORMAL

## 2023-07-17 ENCOUNTER — TELEPHONE (OUTPATIENT)
Dept: FAMILY MEDICINE CLINIC | Age: 63
End: 2023-07-17

## 2023-07-17 DIAGNOSIS — N30.01 ACUTE CYSTITIS WITH HEMATURIA: Primary | ICD-10-CM

## 2023-07-17 DIAGNOSIS — C50.911 MALIGNANT NEOPLASM OF RIGHT BREAST IN FEMALE, ESTROGEN RECEPTOR POSITIVE, UNSPECIFIED SITE OF BREAST (HCC): ICD-10-CM

## 2023-07-17 DIAGNOSIS — Z17.0 MALIGNANT NEOPLASM OF RIGHT BREAST IN FEMALE, ESTROGEN RECEPTOR POSITIVE, UNSPECIFIED SITE OF BREAST (HCC): ICD-10-CM

## 2023-07-17 DIAGNOSIS — Z79.811 USE OF AROMATASE INHIBITORS: ICD-10-CM

## 2023-07-17 RX ORDER — LEVOFLOXACIN 500 MG/1
500 TABLET, FILM COATED ORAL DAILY
Qty: 7 TABLET | Refills: 0 | Status: SHIPPED | OUTPATIENT
Start: 2023-07-17 | End: 2023-07-24

## 2023-07-17 RX ORDER — ANASTROZOLE 1 MG/1
TABLET ORAL
Qty: 90 TABLET | Refills: 0 | OUTPATIENT
Start: 2023-07-17

## 2023-07-17 NOTE — TELEPHONE ENCOUNTER
Let patient know to stop the Macrobid. I called in and electronically sent Levaquin 500 mg 1 by mouth daily for 7 days dispense 7 with no refill. This should help her urinary symptoms based on culture and sensitivity results.     Dr. Kalie Murrieta

## 2023-07-21 ENCOUNTER — APPOINTMENT (OUTPATIENT)
Dept: ULTRASOUND IMAGING | Age: 63
End: 2023-07-21
Payer: MEDICAID

## 2023-07-21 ENCOUNTER — HOSPITAL ENCOUNTER (EMERGENCY)
Age: 63
Discharge: HOME | End: 2023-07-21
Payer: MEDICAID

## 2023-07-21 ENCOUNTER — HOSPITAL ENCOUNTER (OUTPATIENT)
Dept: INFUSION THERAPY | Age: 63
Discharge: HOME | End: 2023-07-21
Payer: MEDICAID

## 2023-07-21 VITALS
SYSTOLIC BLOOD PRESSURE: 118 MMHG | BODY MASS INDEX: 26.83 KG/M2 | HEIGHT: 68 IN | OXYGEN SATURATION: 96 % | DIASTOLIC BLOOD PRESSURE: 83 MMHG | WEIGHT: 177 LBS | TEMPERATURE: 98.5 F | RESPIRATION RATE: 16 BRPM | HEART RATE: 96 BPM

## 2023-07-21 DIAGNOSIS — Z79.811 USE OF AROMATASE INHIBITORS: ICD-10-CM

## 2023-07-21 DIAGNOSIS — M25.562 ARTHRALGIA OF LEFT KNEE: ICD-10-CM

## 2023-07-21 DIAGNOSIS — M25.562 ACUTE PAIN OF LEFT KNEE: Primary | ICD-10-CM

## 2023-07-21 LAB
ALBUMIN SERPL-MCNC: 4.3 G/DL (ref 3.5–5.2)
ALP SERPL-CCNC: 64 U/L (ref 35–104)
ALT SERPL-CCNC: 9 U/L (ref 0–32)
ANION GAP SERPL CALCULATED.3IONS-SCNC: 10 MMOL/L (ref 7–16)
AST SERPL-CCNC: 18 U/L (ref 0–31)
BASOPHILS # BLD: 0.05 K/UL (ref 0–0.2)
BASOPHILS NFR BLD: 1 % (ref 0–2)
BILIRUB SERPL-MCNC: 0.4 MG/DL (ref 0–1.2)
BUN SERPL-MCNC: 18 MG/DL (ref 6–23)
CALCIUM SERPL-MCNC: 9.3 MG/DL (ref 8.6–10.2)
CHLORIDE SERPL-SCNC: 106 MMOL/L (ref 98–107)
CO2 SERPL-SCNC: 23 MMOL/L (ref 22–29)
CREAT SERPL-MCNC: 0.9 MG/DL (ref 0.5–1)
EOSINOPHIL # BLD: 0.25 K/UL (ref 0.05–0.5)
EOSINOPHILS RELATIVE PERCENT: 5 % (ref 0–6)
ERYTHROCYTE [DISTWIDTH] IN BLOOD BY AUTOMATED COUNT: 12.9 % (ref 11.5–15)
GFR SERPL CREATININE-BSD FRML MDRD: >60 ML/MIN/1.73M2
GLUCOSE SERPL-MCNC: 153 MG/DL (ref 74–99)
HCT VFR BLD AUTO: 39.1 % (ref 34–48)
HGB BLD-MCNC: 12.8 G/DL (ref 11.5–15.5)
IMM GRANULOCYTES # BLD AUTO: <0.03 K/UL (ref 0–0.58)
IMM GRANULOCYTES NFR BLD: 0 % (ref 0–5)
LYMPHOCYTES NFR BLD: 1.74 K/UL (ref 1.5–4)
LYMPHOCYTES RELATIVE PERCENT: 36 % (ref 20–42)
MCH RBC QN AUTO: 31.7 PG (ref 26–35)
MCHC RBC AUTO-ENTMCNC: 32.7 G/DL (ref 32–34.5)
MCV RBC AUTO: 96.8 FL (ref 80–99.9)
MONOCYTES NFR BLD: 0.38 K/UL (ref 0.1–0.95)
MONOCYTES NFR BLD: 8 % (ref 2–12)
NEUTROPHILS NFR BLD: 49 % (ref 43–80)
NEUTS SEG NFR BLD: 2.35 K/UL (ref 1.8–7.3)
PLATELET # BLD AUTO: 291 K/UL (ref 130–450)
PMV BLD AUTO: 9.3 FL (ref 7–12)
POTASSIUM SERPL-SCNC: 4.2 MMOL/L (ref 3.5–5)
PROT SERPL-MCNC: 7 G/DL (ref 6.4–8.3)
RBC # BLD AUTO: 4.04 M/UL (ref 3.5–5.5)
SODIUM SERPL-SCNC: 139 MMOL/L (ref 132–146)
WBC OTHER # BLD: 4.8 K/UL (ref 4.5–11.5)

## 2023-07-21 PROCEDURE — 36415 COLL VENOUS BLD VENIPUNCTURE: CPT

## 2023-07-21 PROCEDURE — 99284 EMERGENCY DEPT VISIT MOD MDM: CPT

## 2023-07-21 PROCEDURE — 93971 EXTREMITY STUDY: CPT

## 2023-07-21 PROCEDURE — 80053 COMPREHEN METABOLIC PANEL: CPT

## 2023-07-21 PROCEDURE — 85027 COMPLETE CBC AUTOMATED: CPT

## 2023-07-21 RX ORDER — MELOXICAM 7.5 MG/1
7.5 TABLET ORAL DAILY
Qty: 10 TABLET | Refills: 0 | Status: SHIPPED | OUTPATIENT
Start: 2023-07-21 | End: 2023-08-24 | Stop reason: SINTOL

## 2023-07-21 ASSESSMENT — PAIN DESCRIPTION - FREQUENCY: FREQUENCY: CONTINUOUS

## 2023-07-21 ASSESSMENT — PAIN DESCRIPTION - LOCATION: LOCATION: LEG

## 2023-07-21 ASSESSMENT — PAIN SCALES - GENERAL: PAINLEVEL_OUTOF10: 5

## 2023-07-21 ASSESSMENT — PAIN - FUNCTIONAL ASSESSMENT
PAIN_FUNCTIONAL_ASSESSMENT: NONE - DENIES PAIN
PAIN_FUNCTIONAL_ASSESSMENT: 0-10

## 2023-07-21 ASSESSMENT — PAIN DESCRIPTION - DESCRIPTORS: DESCRIPTORS: STABBING;DISCOMFORT

## 2023-07-21 ASSESSMENT — PAIN DESCRIPTION - ONSET: ONSET: SUDDEN

## 2023-07-21 ASSESSMENT — PAIN DESCRIPTION - ORIENTATION: ORIENTATION: LEFT;LOWER;UPPER

## 2023-07-21 ASSESSMENT — PAIN DESCRIPTION - PAIN TYPE: TYPE: ACUTE PAIN

## 2023-07-21 NOTE — ED PROVIDER NOTES
Independent [facility] Visit. [address] [address], [facility] ENCOUNTER      Pt Name: Genia Carlos  MRN: [id number]  [facility] 1960  Date of evaluation: 7/21/2023  Provider: JEET Lynn - CHARLIE  PCP: Loli Alcantar DO  Note Started: 5:21 PM EDT 7/21/23    CHIEF COMPLAINT       Chief Complaint   Patient presents with    Swelling     Swelling to left leg. Started 2-3 days ago. Hx of blood clot years ago. HISTORY OF PRESENT ILLNESS: 1 or more Elements   History From: Patient  Limitations to history : None    Genia Carlos is a 70 y.o. female who presents to the emergency department with a 2 to 3-day history of swelling behind the left knee with pain and tenderness by the left knee that occurred after she was doing outside yard work. She had concern for a blood clot, states that she had a DVT multiple years ago, states that she does not remember when this was. She has not been on any oral anticoagulant therapy. She denies any recent travel, chest pain, shortness of breath. Nursing Notes were all reviewed and agreed with or any disagreements were addressed in the HPI. REVIEW OF SYSTEMS :    Positives and Pertinent negatives as per HPI.      SURGICAL HISTORY     Past Surgical History:   Procedure Laterality Date    BREAST REDUCTION SURGERY Right 8/17/2021    RIGHT ONCOPLASTIC BREAST REDUCTION, MATCHING LEFT BREAST REDUCTION AND EXCISION SQUAMOUS CELL CARCINOMA, RIGHT MID CHEST, RIGHT BREAST NEEDLE LOCALIZED LUMPECTOMY BLUE DYE INJECTION, RIGHT AXILLARY SENTINEL NODE EXCISION, POSSIBLE RIGHT AXILLARY DISECTION performed by Tony Gonzales MD at [address] [facility], LAPAROSCOPIC N/A 6/20/2019    CHOLECYSTECTOMY LAPAROSCOPIC performed by Zach Espinoza MD at [facility]      ECHO COMPL W DOP COLOR FLOW  8/15/2013         ENDOSCOPY, COLON, DIAGNOSTIC      HYSTERECTOMY (CERVIX STATUS UNKNOWN)      [facility]

## 2023-07-21 NOTE — DISCHARGE INSTRUCTIONS
Mobic once daily. Voltaren gel 4 times daily as needed. Follow-up with orthopedic should symptoms not improve. Your ultrasound was negative for DVT.

## 2023-07-25 ENCOUNTER — OFFICE VISIT (OUTPATIENT)
Dept: ONCOLOGY | Age: 63
End: 2023-07-25
Payer: MEDICAID

## 2023-07-25 VITALS
BODY MASS INDEX: 26.83 KG/M2 | OXYGEN SATURATION: 100 % | TEMPERATURE: 97.9 F | WEIGHT: 177 LBS | SYSTOLIC BLOOD PRESSURE: 135 MMHG | HEIGHT: 68 IN | DIASTOLIC BLOOD PRESSURE: 83 MMHG | HEART RATE: 88 BPM

## 2023-07-25 DIAGNOSIS — Z17.0 MALIGNANT NEOPLASM OF RIGHT BREAST IN FEMALE, ESTROGEN RECEPTOR POSITIVE, UNSPECIFIED SITE OF BREAST (HCC): ICD-10-CM

## 2023-07-25 DIAGNOSIS — Z79.811 USE OF AROMATASE INHIBITORS: ICD-10-CM

## 2023-07-25 DIAGNOSIS — C50.911 MALIGNANT NEOPLASM OF RIGHT BREAST IN FEMALE, ESTROGEN RECEPTOR POSITIVE, UNSPECIFIED SITE OF BREAST (HCC): ICD-10-CM

## 2023-07-25 PROCEDURE — 99213 OFFICE O/P EST LOW 20 MIN: CPT

## 2023-07-25 PROCEDURE — 99214 OFFICE O/P EST MOD 30 MIN: CPT | Performed by: INTERNAL MEDICINE

## 2023-07-25 PROCEDURE — 1123F ACP DISCUSS/DSCN MKR DOCD: CPT | Performed by: INTERNAL MEDICINE

## 2023-07-25 RX ORDER — VENLAFAXINE HYDROCHLORIDE 75 MG/1
75 CAPSULE, EXTENDED RELEASE ORAL DAILY
Qty: 30 CAPSULE | Refills: 0 | Status: SHIPPED | OUTPATIENT
Start: 2023-07-25 | End: 2023-08-24

## 2023-07-25 RX ORDER — ANASTROZOLE 1 MG/1
1 TABLET ORAL DAILY
Qty: 90 TABLET | Refills: 0 | Status: SHIPPED | OUTPATIENT
Start: 2023-07-25

## 2023-07-25 NOTE — PROGRESS NOTES
matching left breast reduction and excision squamous cell carcinoma, right mid chest with Dr Justice Jansen and right breast needle localized lumpectomy, blue dye injection, right axillary sentinel node excision, possible right axillary dissection with Dr Michele Donald on August 17, 2021  A. Left breast, reduction mammoplasty:   - Skin and benign breast tissue (288.8 g) showing stromal fibrosis/hyalinosis. B.  Skin, lesion of mid chest, excision:   - Focal residual squamous cell carcinoma in situ, margins free of tumor;   - Actinic keratosis;   - Dermal scar with chronic inflammation, consistent with changes of previous biopsy site. C.  [place] lymph node #1, biopsy:    - Two of two (2/2) nodes positive for metastatic carcinoma (micrometastasis), see comment. D.  [place] lymph node #2, biopsy:   - Benign node (1) showing fatty replacement. E.  [place] lymph node #3, biopsy:   - Benign node (1) showing fatty replacement. F.  Right breast, central, lumpectomy with needle localization:   - Invasive carcinoma of no special type (ductal), grade 3;   - Changes of previous biopsy site, see comment. G.  [place] lymph node #4, biopsy:   - Benign node showing prominent fatty replacement. H.  Right breast, reduction mammoplasty:   - Skin and benign breast tissue (79.6 g) showing stromal fibrosis/hyalinosis. Comment:A few small foci of metastatic carcinoma (</= 2 mm/each metastasis) in two sentinel lymph nodes in specimen C are noted histologically and further confirmed by immunostaining for pankeratin and GATA3. Comparing with the patient's previous core needle biopsy specimen from the same tumor on 07/16/2021 (see report EPS-), the tumor seen here is much more mitotic active (focally, 3-4 mitotic figures/hpf). Therefore, the provisional [place] score of this tumor is updated to 3+3+3=9 (grade 3) from originally reported 3+3+1=7 (grade 2).      CANCER CASE SUMMARY   Procedure: Excision   Specimen Laterality:

## 2023-08-11 ASSESSMENT — ENCOUNTER SYMPTOMS
SHORTNESS OF BREATH: 0
BACK PAIN: 0
CHEST TIGHTNESS: 0

## 2023-08-11 NOTE — PROGRESS NOTES
Subjective: This note was copied forward from the last encounter. Essential components for this patient record were reviewed and verified on this visit including:  recent hospitalizations, recent imaging, PMH, PSH, FH, SOC HX, Allergies, and Medications were reviewed and updated as appropriate. In addition, the assessment and plan were copied from prior office note and updated accordingly. Patient ID: Gaurav Ching is a 70 y.o. female. HPI   The lesion was located in the 8 o'clock position of the right breast     Breast cancer risk factors include mom breast cancer age 52, paternal aunt breast cancer, 48 paternal gm breast cancer age 48     06/16/2021 Bilateral Diagnostic Mammogram/Right Breast U/S:  Large irregular hypoechoic solid mass seen on ultrasound and mammogram at about the 8 to 9 o'clock position measuring 2.4 x 1.4 x 1.4 cm.       06/24/2021 Ultrasound guided core biopsy of right breast 8 o'clock mass and ribbonclip marker placement:  Right breast at 8:00, core needle biopsy: Invasive carcinoma of no special type (ductal), grade 2; see comment. Comment: The carcinoma displays highly infiltrative architectural features including single file and single cell patterns of invasion. Strong membranous E-cadherin expression is diffusely present in association with the invasive carcinoma, supportive of ductal origin. No in situ component is identified in the tissue sample. [place] histologic score for the invasive carcinoma corresponds to a tubule score 3, nuclear score 3, mitotic score 1, total score 7, grade 2. Intradepartmental consultation is obtained.      Breast Cancer Marker Studies:   Estrogen Receptors (ER):90%   Progesterone Receptors (MD): 30%   Her-2/stone (c-erb B-2) protein expression: Negative/0      CXR 07/06/2021:  No acute process     07/08/2021 MRI Bilateral Breast:  Irregular mass in the right breast 8 o'clock measuring up to 2.4 cm in size is consistent with biopsy proven

## 2023-08-18 ENCOUNTER — OFFICE VISIT (OUTPATIENT)
Dept: FAMILY MEDICINE CLINIC | Age: 63
End: 2023-08-18
Payer: MEDICARE

## 2023-08-18 VITALS
DIASTOLIC BLOOD PRESSURE: 77 MMHG | BODY MASS INDEX: 27.04 KG/M2 | SYSTOLIC BLOOD PRESSURE: 113 MMHG | OXYGEN SATURATION: 99 % | RESPIRATION RATE: 20 BRPM | HEART RATE: 103 BPM | TEMPERATURE: 97.3 F | WEIGHT: 178.4 LBS | HEIGHT: 68 IN

## 2023-08-18 DIAGNOSIS — C50.511 MALIGNANT NEOPLASM OF LOWER-OUTER QUADRANT OF RIGHT BREAST OF FEMALE, ESTROGEN RECEPTOR POSITIVE (HCC): ICD-10-CM

## 2023-08-18 DIAGNOSIS — E78.2 MIXED HYPERLIPIDEMIA: ICD-10-CM

## 2023-08-18 DIAGNOSIS — Z17.0 MALIGNANT NEOPLASM OF LOWER-OUTER QUADRANT OF RIGHT BREAST OF FEMALE, ESTROGEN RECEPTOR POSITIVE (HCC): ICD-10-CM

## 2023-08-18 DIAGNOSIS — Z12.11 SCREENING FOR MALIGNANT NEOPLASM OF COLON: ICD-10-CM

## 2023-08-18 DIAGNOSIS — E11.9 TYPE 2 DIABETES MELLITUS WITHOUT COMPLICATION, WITHOUT LONG-TERM CURRENT USE OF INSULIN (HCC): Primary | ICD-10-CM

## 2023-08-18 DIAGNOSIS — R25.2 MUSCLE CRAMPS: ICD-10-CM

## 2023-08-18 DIAGNOSIS — K21.9 GASTROESOPHAGEAL REFLUX DISEASE WITHOUT ESOPHAGITIS: ICD-10-CM

## 2023-08-18 LAB — HBA1C MFR BLD: 6.8 %

## 2023-08-18 PROCEDURE — 99214 OFFICE O/P EST MOD 30 MIN: CPT | Performed by: NEUROMUSCULOSKELETAL MEDICINE & OMM

## 2023-08-18 PROCEDURE — 3044F HG A1C LEVEL LT 7.0%: CPT | Performed by: NEUROMUSCULOSKELETAL MEDICINE & OMM

## 2023-08-18 PROCEDURE — 1123F ACP DISCUSS/DSCN MKR DOCD: CPT | Performed by: NEUROMUSCULOSKELETAL MEDICINE & OMM

## 2023-08-18 PROCEDURE — 83037 HB GLYCOSYLATED A1C HOME DEV: CPT | Performed by: NEUROMUSCULOSKELETAL MEDICINE & OMM

## 2023-08-18 RX ORDER — OMEPRAZOLE 20 MG/1
20 CAPSULE, DELAYED RELEASE ORAL
Qty: 30 CAPSULE | Refills: 5 | Status: SHIPPED | OUTPATIENT
Start: 2023-08-18

## 2023-08-18 ASSESSMENT — ENCOUNTER SYMPTOMS
VOMITING: 0
COUGH: 1
BLOOD IN STOOL: 0
DIARRHEA: 0
CHOKING: 0
ABDOMINAL DISTENTION: 0
NAUSEA: 0
CHEST TIGHTNESS: 0
SHORTNESS OF BREATH: 0
BACK PAIN: 0
ABDOMINAL PAIN: 0
CONSTIPATION: 0

## 2023-08-18 NOTE — PROGRESS NOTES
exam  02/12/2020    COVID-19 Vaccine (3 - Moderna risk series) 05/19/2021    Diabetic Alb to Cr ratio (uACR) test  07/12/2022    Flu vaccine (1) Never done    Breast cancer screen  07/19/2023    Annual Wellness Visit (AWV)  08/18/2023             Past Medical History:   Diagnosis Date    BRCA1 negative     BRCA2 negative     Breast cancer ([address])     Cancer ([address])     Diabetes mellitus ([address])     Diverticulitis     Frequent UTI     Hiatal hernia     History of therapeutic radiation     Hypertension     Kidney stones     Lymphedema     right breast    PONV (postoperative nausea and vomiting)     Thyroid disease         Past Surgical History:   Procedure Laterality Date    BREAST REDUCTION SURGERY Right 8/17/2021    RIGHT ONCOPLASTIC BREAST REDUCTION, MATCHING LEFT BREAST REDUCTION AND EXCISION SQUAMOUS CELL CARCINOMA, RIGHT MID CHEST, RIGHT BREAST NEEDLE LOCALIZED LUMPECTOMY BLUE DYE INJECTION, RIGHT AXILLARY SENTINEL NODE EXCISION, POSSIBLE RIGHT AXILLARY DISECTION performed by Nitza Rosado MD at [address], LAPAROSCOPIC N/A 6/20/2019    CHOLECYSTECTOMY LAPAROSCOPIC performed by Eduard Rodriguez MD at [address]      ECHO [address]  8/15/2013         ENDOSCOPY, COLON, DIAGNOSTIC      HYSTERECTOMY (CERVIX STATUS UNKNOWN)      RECTAL PROLAPSE REPAIR      US BREAST BIOPSY W LOC DEVICE 1ST LESION RIGHT Right 6/24/2021    US BREAST NEEDLE BIOPSY RIGHT 6/24/2021 [facility]        The 10-year ASCVD risk score (Reyna SNOW, et al., 2019) is: 6.8%    Values used to calculate the score:      Age: 70 years      Sex: Female      Is Non- : No      Diabetic: Yes      Tobacco smoker: No      Systolic Blood Pressure: 623 mmHg      Is BP treated: No      HDL Cholesterol: 64 mg/dL      Total Cholesterol: 152 mg/dL     Educational materials and/or home exercises printed for patient's review and were included inpatient instructions on his/her After Visit Summary and given

## 2023-08-24 ENCOUNTER — HOSPITAL ENCOUNTER (OUTPATIENT)
Dept: INFUSION THERAPY | Age: 63
Discharge: HOME | End: 2023-08-24

## 2023-08-24 ENCOUNTER — OFFICE VISIT (OUTPATIENT)
Dept: ONCOLOGY | Age: 63
End: 2023-08-24
Payer: MEDICARE

## 2023-08-24 VITALS
OXYGEN SATURATION: 98 % | WEIGHT: 177.9 LBS | TEMPERATURE: 97.2 F | HEIGHT: 68 IN | HEART RATE: 86 BPM | SYSTOLIC BLOOD PRESSURE: 126 MMHG | DIASTOLIC BLOOD PRESSURE: 78 MMHG | BODY MASS INDEX: 26.96 KG/M2

## 2023-08-24 DIAGNOSIS — C50.911 MALIGNANT NEOPLASM OF RIGHT BREAST IN FEMALE, ESTROGEN RECEPTOR POSITIVE, UNSPECIFIED SITE OF BREAST (HCC): Primary | ICD-10-CM

## 2023-08-24 DIAGNOSIS — Z79.811 USE OF AROMATASE INHIBITORS: ICD-10-CM

## 2023-08-24 DIAGNOSIS — Z17.0 MALIGNANT NEOPLASM OF RIGHT BREAST IN FEMALE, ESTROGEN RECEPTOR POSITIVE, UNSPECIFIED SITE OF BREAST (HCC): Primary | ICD-10-CM

## 2023-08-24 PROCEDURE — 1123F ACP DISCUSS/DSCN MKR DOCD: CPT | Performed by: INTERNAL MEDICINE

## 2023-08-24 PROCEDURE — 99214 OFFICE O/P EST MOD 30 MIN: CPT | Performed by: INTERNAL MEDICINE

## 2023-08-24 PROCEDURE — 99212 OFFICE O/P EST SF 10 MIN: CPT

## 2023-08-24 RX ORDER — LETROZOLE 2.5 MG/1
2.5 TABLET, FILM COATED ORAL DAILY
Qty: 30 TABLET | Refills: 3 | Status: SHIPPED | OUTPATIENT
Start: 2023-08-24

## 2023-08-24 NOTE — PROGRESS NOTES
Department of [address]  Attending Clinic Note    Reason for Visit: Follow-up on a patient with Right Breast Cancer    PCP:  Mercedes Vargas DO    History of Present Illness: The lesion was located in the 8 o'clock position of the right breast    Breast cancer risk factors include mom breast cancer age 52, paternal aunt breast cancer, 48 paternal gm breast cancer age 48    Bilateral Diagnostic Mammogram/Right Breast U/S on 06/16/2021:  Large irregular hypoechoic solid mass seen on ultrasound and mammogram at about the 8 to 9 o'clock position measuring 2.4 x 1.4 x 1.4 cm. 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 Comment: The carcinoma displays highly infiltrative architectural features including single file and single cell patterns of invasion. Strong membranous E-cadherin expression is diffusely present in association with the invasive carcinoma, supportive of ductal origin. No in situ component is identified in the tissue sample. Benedicto histologic score for the invasive carcinoma corresponds to a tubule score 3, nuclear score 3, mitotic score 1, total score 7, grade 2. Intradepartmental consultation is obtained. Breast Cancer Marker Studies:   Estrogen Receptors (ER):90%   Progesterone Receptors (MS): 30%   Her-2/stone (c-erb B-2) protein expression: Negative/0     CXR 07/06/2021:  No acute process    MRI Bilateral Breast 07/08/2021:  Irregular mass in the right breast 8 o'clock measuring up to 2.4 cm in size is consistent with biopsy proven neoplasm. Focal, heterogeneous non mass enhancement along the lateral aspect of the mass may be due to recent biopsy vs satellite lesion.   No LN  No evidence of neoplasm in the left breast    Right oncoplastic breast reduction, matching left breast reduction and

## 2023-08-28 ENCOUNTER — TELEPHONE (OUTPATIENT)
Dept: FAMILY MEDICINE CLINIC | Age: 63
End: 2023-08-28

## 2023-08-28 NOTE — TELEPHONE ENCOUNTER
Pt called because pioglitazone isn't covered by insurance. She wants to know if Dr wants to prescribe something else or increase her glipizide.     [address] [address] [facility] , [address] [place] [address] [place] [address]

## 2023-08-30 DIAGNOSIS — E11.9 TYPE 2 DIABETES MELLITUS WITHOUT COMPLICATION, WITHOUT LONG-TERM CURRENT USE OF INSULIN (HCC): Primary | ICD-10-CM

## 2023-08-30 NOTE — TELEPHONE ENCOUNTER
Since insurance will not cover Actos. I will put her on Jardiance 25 mg daily we will send this prescription in electronically to her pharmacy today Wednesday, August 30, 2023 at 7124. If there is a problem getting Jardiance filled please have patient notify us immediately. Patient is to continue same dose of Glucotrol.       Dr. Nick Allen

## 2023-08-30 NOTE — TELEPHONE ENCOUNTER
----- Message from Alana Vasquez MD sent at 6/16/2020 12:35 PM EDT -----  Regarding: RE: please review    Thanks for the heads up.     UGI is fine.      Please also get a CT abdomen non contrast so I can see the fascial defect on the hernia.    I am not convinced yet if she will be a patient I can operate on given the large hernia so I need more info please.    ----- Message -----  From: Adelina Santo PA  Sent: 6/16/2020  10:36 AM EDT  To: Alana Vasquez MD  Subject: please review                                    Please review Intake note when able.  Referred by Dr. Keysha Keenan.    hx lap converted to open melissa 4/2017, complicated by difficult intubation w/ postop hypoxia requiring prolonged ventilation in ICU.  Now w/ incisional hernia, s/p failed attempted repair 9/2017 d/t inability to intubate - was told she needs to lose weight prior to future attempts.      BMI 64 w/ large Incisional hernia of lower aspect of (R) subcostal incision.  No recent imaging.    Ordered UGI (rather than EGD) for preop eval given hx difficult intubation.  Any other preop requests /concerns at this time...  ?  Thank you!       Spoke w/ patient is going to get Jardiance and will continue w/ the Glucotrol.

## 2023-09-13 ENCOUNTER — OFFICE VISIT (OUTPATIENT)
Dept: BREAST CENTER | Age: 63
End: 2023-09-13
Payer: MEDICARE

## 2023-09-13 VITALS
BODY MASS INDEX: 26.67 KG/M2 | DIASTOLIC BLOOD PRESSURE: 74 MMHG | HEART RATE: 60 BPM | SYSTOLIC BLOOD PRESSURE: 120 MMHG | TEMPERATURE: 97.5 F | OXYGEN SATURATION: 95 % | WEIGHT: 176 LBS | RESPIRATION RATE: 16 BRPM | HEIGHT: 68 IN

## 2023-09-13 DIAGNOSIS — Z12.31 VISIT FOR SCREENING MAMMOGRAM: ICD-10-CM

## 2023-09-13 DIAGNOSIS — C50.911 MALIGNANT NEOPLASM OF RIGHT BREAST IN FEMALE, ESTROGEN RECEPTOR POSITIVE, UNSPECIFIED SITE OF BREAST (HCC): ICD-10-CM

## 2023-09-13 DIAGNOSIS — Z85.3 PERSONAL HISTORY OF BREAST CANCER: Primary | ICD-10-CM

## 2023-09-13 DIAGNOSIS — Z17.0 MALIGNANT NEOPLASM OF RIGHT BREAST IN FEMALE, ESTROGEN RECEPTOR POSITIVE, UNSPECIFIED SITE OF BREAST (HCC): ICD-10-CM

## 2023-09-13 PROCEDURE — 99213 OFFICE O/P EST LOW 20 MIN: CPT | Performed by: NURSE PRACTITIONER

## 2023-09-13 PROCEDURE — 1123F ACP DISCUSS/DSCN MKR DOCD: CPT | Performed by: NURSE PRACTITIONER

## 2023-09-13 RX ORDER — VENLAFAXINE HYDROCHLORIDE 75 MG/1
75 CAPSULE, EXTENDED RELEASE ORAL DAILY
Qty: 30 CAPSULE | Refills: 0 | Status: SHIPPED | OUTPATIENT
Start: 2023-09-13 | End: 2023-10-19 | Stop reason: SDUPTHER

## 2023-09-13 ASSESSMENT — ENCOUNTER SYMPTOMS: COUGH: 0

## 2023-10-11 ENCOUNTER — OFFICE VISIT (OUTPATIENT)
Dept: FAMILY MEDICINE CLINIC | Age: 63
End: 2023-10-11

## 2023-10-11 VITALS
WEIGHT: 172 LBS | OXYGEN SATURATION: 97 % | RESPIRATION RATE: 18 BRPM | HEIGHT: 68 IN | DIASTOLIC BLOOD PRESSURE: 83 MMHG | SYSTOLIC BLOOD PRESSURE: 132 MMHG | TEMPERATURE: 97.2 F | HEART RATE: 96 BPM | BODY MASS INDEX: 26.07 KG/M2

## 2023-10-11 DIAGNOSIS — R30.0 DYSURIA: ICD-10-CM

## 2023-10-11 DIAGNOSIS — R09.82 PND (POST-NASAL DRIP): ICD-10-CM

## 2023-10-11 DIAGNOSIS — R35.0 FREQUENT URINATION: ICD-10-CM

## 2023-10-11 DIAGNOSIS — J02.9 SORE THROAT: ICD-10-CM

## 2023-10-11 DIAGNOSIS — N30.01 ACUTE CYSTITIS WITH HEMATURIA: Primary | ICD-10-CM

## 2023-10-11 DIAGNOSIS — R09.81 NASAL CONGESTION: ICD-10-CM

## 2023-10-11 LAB
BILIRUBIN, POC: NEGATIVE
BLOOD URINE, POC: ABNORMAL
CLARITY, POC: ABNORMAL
COLOR, POC: YELLOW
GLUCOSE URINE, POC: ABNORMAL
INFLUENZA A ANTIGEN, POC: NEGATIVE
INFLUENZA B ANTIGEN, POC: NEGATIVE
KETONES, POC: NEGATIVE
LEUKOCYTE EST, POC: ABNORMAL
Lab: NORMAL
NITRITE, POC: POSITIVE
PERFORMING INSTRUMENT: NORMAL
PH, POC: 5.5
PROTEIN, POC: NEGATIVE
QC PASS/FAIL: NORMAL
S PYO AG THROAT QL: NORMAL
SARS-COV-2, POC: NORMAL
SPECIFIC GRAVITY, POC: 1.02
UROBILINOGEN, POC: ABNORMAL

## 2023-10-11 RX ORDER — NITROFURANTOIN 25; 75 MG/1; MG/1
100 CAPSULE ORAL 2 TIMES DAILY
Qty: 14 CAPSULE | Refills: 0 | Status: SHIPPED | OUTPATIENT
Start: 2023-10-11 | End: 2023-10-18

## 2023-10-11 RX ORDER — AZELASTINE 1 MG/ML
2 SPRAY, METERED NASAL 2 TIMES DAILY
Qty: 120 ML | Refills: 1 | Status: SHIPPED | OUTPATIENT
Start: 2023-10-11

## 2023-10-11 ASSESSMENT — ENCOUNTER SYMPTOMS
RHINORRHEA: 0
FACIAL SWELLING: 0
TROUBLE SWALLOWING: 0
SINUS PAIN: 0
SINUS PRESSURE: 0
CHEST TIGHTNESS: 0
BLOOD IN STOOL: 0
CHOKING: 0
VOICE CHANGE: 1
CONSTIPATION: 0
NAUSEA: 0
ABDOMINAL PAIN: 0
COUGH: 1
VOMITING: 0
DIARRHEA: 0
ABDOMINAL DISTENTION: 0
SHORTNESS OF BREATH: 0
SORE THROAT: 1

## 2023-10-14 LAB
CULTURE: ABNORMAL
SPECIMEN DESCRIPTION: ABNORMAL

## 2023-10-18 ENCOUNTER — OFFICE VISIT (OUTPATIENT)
Dept: FAMILY MEDICINE CLINIC | Age: 63
End: 2023-10-18

## 2023-10-18 VITALS
SYSTOLIC BLOOD PRESSURE: 117 MMHG | HEIGHT: 68 IN | WEIGHT: 172.4 LBS | DIASTOLIC BLOOD PRESSURE: 76 MMHG | RESPIRATION RATE: 18 BRPM | OXYGEN SATURATION: 99 % | TEMPERATURE: 97.6 F | HEART RATE: 91 BPM | BODY MASS INDEX: 26.13 KG/M2

## 2023-10-18 DIAGNOSIS — C50.911 MALIGNANT NEOPLASM OF RIGHT BREAST IN FEMALE, ESTROGEN RECEPTOR POSITIVE, UNSPECIFIED SITE OF BREAST (HCC): ICD-10-CM

## 2023-10-18 DIAGNOSIS — N30.91 CYSTITIS WITH HEMATURIA: Primary | ICD-10-CM

## 2023-10-18 DIAGNOSIS — Z17.0 MALIGNANT NEOPLASM OF RIGHT BREAST IN FEMALE, ESTROGEN RECEPTOR POSITIVE, UNSPECIFIED SITE OF BREAST (HCC): ICD-10-CM

## 2023-10-18 DIAGNOSIS — B35.1 ONYCHOMYCOSIS: ICD-10-CM

## 2023-10-18 LAB
BILIRUBIN, POC: NORMAL
BLOOD URINE, POC: NEGATIVE
CLARITY, POC: CLEAR
COLOR, POC: YELLOW
GLUCOSE URINE, POC: NORMAL
KETONES, POC: NEGATIVE
LEUKOCYTE EST, POC: NORMAL
NITRITE, POC: NEGATIVE
PH, POC: 5.5
PROTEIN, POC: NORMAL
SPECIFIC GRAVITY, POC: 1.02
UROBILINOGEN, POC: NORMAL

## 2023-10-18 ASSESSMENT — ENCOUNTER SYMPTOMS
BLOOD IN STOOL: 0
ABDOMINAL PAIN: 0
ABDOMINAL DISTENTION: 0
VOMITING: 0
COUGH: 0
NAUSEA: 0
CONSTIPATION: 0
CHEST TIGHTNESS: 0
DIARRHEA: 0
CHOKING: 0
SHORTNESS OF BREATH: 0

## 2023-10-18 NOTE — PROGRESS NOTES
Past Medical History:   Diagnosis Date    BRCA1 negative     BRCA2 negative     Breast cancer ([address])     Cancer ([address])     Diabetes mellitus ([address])     Diverticulitis     Frequent UTI     Hiatal hernia     History of therapeutic radiation     Hypertension     Kidney stones     Lymphedema     right breast    PONV (postoperative nausea and vomiting)     Thyroid disease         Past Surgical History:   Procedure Laterality Date    BREAST REDUCTION SURGERY Right 8/17/2021    RIGHT ONCOPLASTIC BREAST REDUCTION, MATCHING LEFT BREAST REDUCTION AND EXCISION SQUAMOUS CELL CARCINOMA, RIGHT MID CHEST, RIGHT BREAST NEEDLE LOCALIZED LUMPECTOMY BLUE DYE INJECTION, RIGHT AXILLARY SENTINEL NODE EXCISION, POSSIBLE RIGHT AXILLARY DISECTION performed by Viola Donohue MD at [email or web address] [facility], LAPAROSCOPIC N/A 6/20/2019    CHOLECYSTECTOMY LAPAROSCOPIC performed by Arturo Burton MD at [address] [facility]      ECHO COMPL [address]  8/15/2013         ENDOSCOPY, COLON, DIAGNOSTIC      HYSTERECTOMY (CERVIX STATUS UNKNOWN)      RECTAL PROLAPSE REPAIR      US BREAST BIOPSY W LOC DEVICE 1ST LESION RIGHT Right 6/24/2021    US BREAST NEEDLE BIOPSY RIGHT 6/24/2021 [facility]        The 10-year ASCVD risk score (Reyna SNOW, et al., 2019) is: 7.2%    Values used to calculate the score:      Age: 70 years      Sex: Female      Is Non- : No      Diabetic: Yes      Tobacco smoker: No      Systolic Blood Pressure: 895 mmHg      Is BP treated: No      HDL Cholesterol: 64 mg/dL      Total Cholesterol: 152 mg/dL     Educational materials and/or home exercises printed for patient's review and were included inpatient instructions on his/her After Visit Summary and given to patient at the end of visit.     regarding above diagnosis, including possible risks and complications,  especially if left uncontrolled.      Counseled regarding the possible side effects, risks, benefits and alternatives to

## 2023-10-19 ENCOUNTER — TELEPHONE (OUTPATIENT)
Dept: FAMILY MEDICINE CLINIC | Age: 63
End: 2023-10-19

## 2023-10-19 DIAGNOSIS — E78.2 MIXED HYPERLIPIDEMIA: ICD-10-CM

## 2023-10-19 RX ORDER — VENLAFAXINE HYDROCHLORIDE 75 MG/1
75 CAPSULE, EXTENDED RELEASE ORAL DAILY
Qty: 30 CAPSULE | Refills: 0 | Status: SHIPPED | OUTPATIENT
Start: 2023-10-19 | End: 2023-11-18

## 2023-10-23 RX ORDER — ROSUVASTATIN CALCIUM 20 MG/1
20 TABLET, COATED ORAL NIGHTLY
Qty: 90 TABLET | Refills: 1 | Status: SHIPPED | OUTPATIENT
Start: 2023-10-23

## 2023-10-27 ENCOUNTER — TELEPHONE (OUTPATIENT)
Dept: FAMILY MEDICINE CLINIC | Age: 63
End: 2023-10-27

## 2023-10-27 DIAGNOSIS — E11.9 TYPE 2 DIABETES MELLITUS WITHOUT COMPLICATION, WITHOUT LONG-TERM CURRENT USE OF INSULIN (HCC): ICD-10-CM

## 2023-10-27 RX ORDER — PIOGLITAZONEHYDROCHLORIDE 30 MG/1
30 TABLET ORAL DAILY
Qty: 90 TABLET | Refills: 1 | Status: SHIPPED | OUTPATIENT
Start: 2023-10-27 | End: 2024-04-24

## 2023-10-27 NOTE — TELEPHONE ENCOUNTER
Okay we will place order for Actos 30 mg once a day, not twice a day. This is due to insurance not paying for the medication at twice a day.

## 2023-10-30 DIAGNOSIS — N30.01 ACUTE CYSTITIS WITH HEMATURIA: ICD-10-CM

## 2023-10-30 RX ORDER — GLIPIZIDE 5 MG/1
5 TABLET ORAL 2 TIMES DAILY
Qty: 60 TABLET | Refills: 3 | Status: SHIPPED | OUTPATIENT
Start: 2023-10-30

## 2023-11-07 RX ORDER — VENLAFAXINE HYDROCHLORIDE 75 MG/1
75 CAPSULE, EXTENDED RELEASE ORAL DAILY
Qty: 30 CAPSULE | Refills: 0 | Status: SHIPPED | OUTPATIENT
Start: 2023-11-07

## 2023-11-28 ENCOUNTER — TELEPHONE (OUTPATIENT)
Dept: FAMILY MEDICINE CLINIC | Age: 63
End: 2023-11-28

## 2023-11-28 DIAGNOSIS — M10.9 GOUT, UNSPECIFIED CAUSE, UNSPECIFIED CHRONICITY, UNSPECIFIED SITE: ICD-10-CM

## 2023-11-28 RX ORDER — ALLOPURINOL 300 MG/1
TABLET ORAL
Qty: 45 TABLET | Refills: 1 | Status: SHIPPED | OUTPATIENT
Start: 2023-11-28

## 2023-12-04 ENCOUNTER — TELEPHONE (OUTPATIENT)
Dept: PODIATRY | Age: 63
End: 2023-12-04

## 2023-12-04 NOTE — TELEPHONE ENCOUNTER
The pt was called twice this PM. Once to cancel her appointment, as there was not internet/phone access in the office. The second call was made to reschedule her appointment. The pt did not answer these calls, and VM's were left with call back information.

## 2023-12-05 ASSESSMENT — ENCOUNTER SYMPTOMS
COUGH: 0
SHORTNESS OF BREATH: 0
BACK PAIN: 0
CHEST TIGHTNESS: 0

## 2023-12-05 NOTE — PROGRESS NOTES
superficial, deep or posterior cervical adenopathy. Left cervical: No superficial, deep or posterior cervical adenopathy. Upper Body:      Right upper body: No pectoral adenopathy. Left upper body: No pectoral adenopathy. Skin:     General: Skin is warm and dry. Coloration: Skin is not pale. Findings: No erythema or rash. Neurological:      Mental Status: She is alert and oriented to person, place, and time. Coordination: Coordination normal.   Psychiatric:         Behavior: Behavior normal.         Thought Content: Thought content normal.         Judgment: Judgment normal.       Assessment:    Aleksandar Fernández is a 70 y.o. extremely pleasant female presents for follow-up of her right breast invasive ductal carcinoma. She has a strong family history of breast cancer including her mother at age 52, a maternal aunt age 48, paternal aunt with breast cancer age 64, and a paternal grandmother age 48. She also has a family history of prostate cancer in her father, brother, and paternal uncle.    06/24/2021 ultrasound guided core biopsy of right breast 8 o'clock mass and ribbonclip marker placement  Right breast at 8:00, core needle biopsy: Invasive carcinoma of no special type (ductal), grade 2; see comment. Comment: The carcinoma displays highly infiltrative architectural features including single file and single cell patterns of invasion. Strong membranous E-cadherin expression is diffusely present in association with the invasive carcinoma, supportive of ductal origin. No in situ component is identified in the tissue sample. Benedicto histologic score for the invasive carcinoma corresponds to a tubule score 3, nuclear score 3, mitotic score 1, total score 7, grade 2. Intradepartmental consultation is obtained.      Breast Cancer Marker Studies:   Estrogen Receptors (ER):90%   Progesterone Receptors (MT): 30%   Her-2/stone (c-erb B-2) protein expression: Negative/0      07/08/2021 MRI

## 2023-12-13 ENCOUNTER — OFFICE VISIT (OUTPATIENT)
Dept: BREAST CENTER | Age: 63
End: 2023-12-13
Payer: MEDICARE

## 2023-12-13 ENCOUNTER — HOSPITAL ENCOUNTER (OUTPATIENT)
Dept: GENERAL RADIOLOGY | Age: 63
Discharge: HOME | End: 2023-12-15
Payer: MEDICARE

## 2023-12-13 VITALS — BODY MASS INDEX: 26.31 KG/M2 | WEIGHT: 173 LBS

## 2023-12-13 VITALS
WEIGHT: 167 LBS | SYSTOLIC BLOOD PRESSURE: 126 MMHG | BODY MASS INDEX: 26.21 KG/M2 | DIASTOLIC BLOOD PRESSURE: 84 MMHG | OXYGEN SATURATION: 97 % | HEIGHT: 67 IN | RESPIRATION RATE: 16 BRPM | HEART RATE: 86 BPM | TEMPERATURE: 98.4 F

## 2023-12-13 DIAGNOSIS — Z79.811 USE OF AROMATASE INHIBITORS: ICD-10-CM

## 2023-12-13 DIAGNOSIS — Z85.3 PERSONAL HISTORY OF BREAST CANCER: Primary | ICD-10-CM

## 2023-12-13 DIAGNOSIS — Z12.31 VISIT FOR SCREENING MAMMOGRAM: ICD-10-CM

## 2023-12-13 DIAGNOSIS — R92.2 DENSE BREAST: ICD-10-CM

## 2023-12-13 DIAGNOSIS — R92.30 DENSE BREAST: ICD-10-CM

## 2023-12-13 PROCEDURE — 99213 OFFICE O/P EST LOW 20 MIN: CPT | Performed by: NURSE PRACTITIONER

## 2023-12-13 PROCEDURE — 1123F ACP DISCUSS/DSCN MKR DOCD: CPT | Performed by: NURSE PRACTITIONER

## 2023-12-13 PROCEDURE — 77063 BREAST TOMOSYNTHESIS BI: CPT

## 2023-12-13 PROCEDURE — 77080 DXA BONE DENSITY AXIAL: CPT

## 2023-12-13 RX ORDER — OMEPRAZOLE 20 MG/1
CAPSULE, DELAYED RELEASE ORAL
COMMUNITY
Start: 2023-11-14

## 2023-12-13 ASSESSMENT — ENCOUNTER SYMPTOMS: GASTROINTESTINAL NEGATIVE: 1

## 2023-12-14 DIAGNOSIS — Z85.3 PERSONAL HISTORY OF BREAST CANCER: ICD-10-CM

## 2023-12-14 DIAGNOSIS — R92.8 OTHER ABNORMAL AND INCONCLUSIVE FINDINGS ON DIAGNOSTIC IMAGING OF BREAST: Primary | ICD-10-CM

## 2023-12-14 NOTE — PROGRESS NOTES
Called Estrellita Zarate to notify her of recommendations for additional left breast imaging. Orders placed. Will await repeat imaging.

## 2023-12-15 ENCOUNTER — TELEPHONE (OUTPATIENT)
Dept: GENERAL RADIOLOGY | Age: 63
End: 2023-12-15

## 2023-12-15 NOTE — TELEPHONE ENCOUNTER
Call to patient in reference to her mammogram performed at [facility] on December 13, 2023. Instructed patient that the radiologist has recommended some additional breast imaging, in order to make a final determination/result. Verbalizes understanding and is agreeable to proceed.

## 2023-12-20 DIAGNOSIS — R30.0 DYSURIA: ICD-10-CM

## 2023-12-22 LAB
CULTURE: ABNORMAL
SPECIMEN DESCRIPTION: ABNORMAL

## 2024-01-03 ENCOUNTER — HOSPITAL ENCOUNTER (OUTPATIENT)
Dept: GENERAL RADIOLOGY | Age: 64
End: 2024-01-03
Payer: MEDICARE

## 2024-01-03 ENCOUNTER — HOSPITAL ENCOUNTER (OUTPATIENT)
Dept: GENERAL RADIOLOGY | Age: 64
Discharge: HOME | End: 2024-01-05
Payer: MEDICARE

## 2024-01-03 DIAGNOSIS — R92.8 OTHER ABNORMAL AND INCONCLUSIVE FINDINGS ON DIAGNOSTIC IMAGING OF BREAST: ICD-10-CM

## 2024-01-03 DIAGNOSIS — Z85.3 PERSONAL HISTORY OF BREAST CANCER: ICD-10-CM

## 2024-01-03 PROCEDURE — G0279 TOMOSYNTHESIS, MAMMO: HCPCS

## 2024-01-04 ENCOUNTER — TELEPHONE (OUTPATIENT)
Dept: FAMILY MEDICINE CLINIC | Age: 64
End: 2024-01-04

## 2024-01-04 DIAGNOSIS — R30.0 DYSURIA: Primary | ICD-10-CM

## 2024-01-04 DIAGNOSIS — R30.0 DYSURIA: ICD-10-CM

## 2024-01-04 LAB
BILIRUBIN URINE: NEGATIVE
COLOR: YELLOW
COMMENT: ABNORMAL
GLUCOSE URINE: >=1000 MG/DL
KETONES, URINE: NEGATIVE MG/DL
LEUKOCYTE ESTERASE, URINE: NEGATIVE
NITRITE, URINE: NEGATIVE
PH UA: 5.5 (ref 5–9)
PROTEIN UA: NEGATIVE MG/DL
SPECIFIC GRAVITY UA: 1.01 (ref 1–1.03)
TURBIDITY: CLEAR
URINE HGB: NEGATIVE
UROBILINOGEN, URINE: 0.2 EU/DL (ref 0–1)

## 2024-01-04 PROCEDURE — 87086 URINE CULTURE/COLONY COUNT: CPT

## 2024-01-06 LAB
MICROORGANISM SPEC CULT: ABNORMAL
SPECIMEN DESCRIPTION: ABNORMAL

## 2024-02-23 DIAGNOSIS — C50.911 MALIGNANT NEOPLASM OF RIGHT BREAST IN FEMALE, ESTROGEN RECEPTOR POSITIVE, UNSPECIFIED SITE OF BREAST (HCC): ICD-10-CM

## 2024-02-23 DIAGNOSIS — E11.9 TYPE 2 DIABETES MELLITUS WITHOUT COMPLICATION, WITHOUT LONG-TERM CURRENT USE OF INSULIN (HCC): ICD-10-CM

## 2024-02-23 DIAGNOSIS — Z17.0 MALIGNANT NEOPLASM OF RIGHT BREAST IN FEMALE, ESTROGEN RECEPTOR POSITIVE, UNSPECIFIED SITE OF BREAST (HCC): ICD-10-CM

## 2024-02-23 RX ORDER — VENLAFAXINE HYDROCHLORIDE 150 MG/1
150 CAPSULE, EXTENDED RELEASE ORAL DAILY
Qty: 30 CAPSULE | Refills: 0 | OUTPATIENT
Start: 2024-02-23

## 2024-02-26 DIAGNOSIS — Z17.0 MALIGNANT NEOPLASM OF RIGHT BREAST IN FEMALE, ESTROGEN RECEPTOR POSITIVE, UNSPECIFIED SITE OF BREAST (HCC): ICD-10-CM

## 2024-02-26 DIAGNOSIS — C50.911 MALIGNANT NEOPLASM OF RIGHT BREAST IN FEMALE, ESTROGEN RECEPTOR POSITIVE, UNSPECIFIED SITE OF BREAST (HCC): ICD-10-CM

## 2024-02-26 RX ORDER — VENLAFAXINE HYDROCHLORIDE 150 MG/1
150 CAPSULE, EXTENDED RELEASE ORAL DAILY
Qty: 30 CAPSULE | Refills: 1 | Status: SHIPPED | OUTPATIENT
Start: 2024-02-26 | End: 2024-04-26

## 2024-03-08 ENCOUNTER — TELEPHONE (OUTPATIENT)
Dept: FAMILY MEDICINE CLINIC | Age: 64
End: 2024-03-08

## 2024-03-08 DIAGNOSIS — N30.01 ACUTE CYSTITIS WITH HEMATURIA: ICD-10-CM

## 2024-03-08 RX ORDER — GLIPIZIDE 5 MG/1
5 TABLET ORAL 2 TIMES DAILY
Qty: 180 TABLET | Refills: 1 | Status: SHIPPED | OUTPATIENT
Start: 2024-03-08

## 2024-03-08 NOTE — TELEPHONE ENCOUNTER
glipiZIDE (GLUCOTROL) 5 MG table   [facility] in [facility] ,  would like a 90 day supply if possible

## 2024-03-18 ENCOUNTER — TELEPHONE (OUTPATIENT)
Dept: FAMILY MEDICINE CLINIC | Age: 64
End: 2024-03-18

## 2024-03-18 NOTE — TELEPHONE ENCOUNTER
----- Message from Teri Browning sent at 3/18/2024  3:13 PM EDT -----  Subject: Message to Provider    QUESTIONS  Information for Provider? Patient wants to schedule with Dr. Hearn in   Podiatry asap. I AM NOT ABLE TO REACH THE . PLEASE CALL PATIENT   ASAP. SHE HAS BEEN TRYING TO SCHEDULE THIS FOR 2 MONTHS.  ---------------------------------------------------------------------------  --------------  CALL BACK INFO  [phone]; OK to leave message on voicemail  ---------------------------------------------------------------------------  --------------  SCRIPT ANSWERS  Relationship to Patient? Self

## 2024-03-20 ENCOUNTER — OFFICE VISIT (OUTPATIENT)
Dept: FAMILY MEDICINE CLINIC | Age: 64
End: 2024-03-20
Payer: MEDICARE

## 2024-03-20 VITALS
OXYGEN SATURATION: 97 % | BODY MASS INDEX: 25.43 KG/M2 | RESPIRATION RATE: 18 BRPM | HEIGHT: 67 IN | TEMPERATURE: 97.7 F | SYSTOLIC BLOOD PRESSURE: 104 MMHG | DIASTOLIC BLOOD PRESSURE: 71 MMHG | WEIGHT: 162 LBS | HEART RATE: 94 BPM

## 2024-03-20 DIAGNOSIS — R35.0 URINE FREQUENCY: ICD-10-CM

## 2024-03-20 DIAGNOSIS — M10.9 GOUT, UNSPECIFIED CAUSE, UNSPECIFIED CHRONICITY, UNSPECIFIED SITE: ICD-10-CM

## 2024-03-20 DIAGNOSIS — N30.01 ACUTE CYSTITIS WITH HEMATURIA: Primary | ICD-10-CM

## 2024-03-20 DIAGNOSIS — E78.2 MIXED HYPERLIPIDEMIA: ICD-10-CM

## 2024-03-20 DIAGNOSIS — E03.9 ACQUIRED HYPOTHYROIDISM: ICD-10-CM

## 2024-03-20 DIAGNOSIS — E11.9 TYPE 2 DIABETES MELLITUS WITHOUT COMPLICATION, WITHOUT LONG-TERM CURRENT USE OF INSULIN (HCC): ICD-10-CM

## 2024-03-20 DIAGNOSIS — N30.01 ACUTE CYSTITIS WITH HEMATURIA: ICD-10-CM

## 2024-03-20 DIAGNOSIS — F41.9 ANXIETY: ICD-10-CM

## 2024-03-20 LAB
BILIRUBIN, POC: NEGATIVE
BLOOD URINE, POC: ABNORMAL
CLARITY, POC: CLEAR
COLOR, POC: YELLOW
CREATININE URINE POCT: ABNORMAL
GLUCOSE URINE, POC: ABNORMAL
HBA1C MFR BLD: 7.7 %
KETONES, POC: NEGATIVE
LEUKOCYTE EST, POC: NEGATIVE
MICROALBUMIN/CREAT 24H UR: ABNORMAL MG/G{CREAT}
MICROALBUMIN/CREAT UR-RTO: ABNORMAL
NITRITE, POC: POSITIVE
PH, POC: 6
PROTEIN, POC: NEGATIVE
SPECIFIC GRAVITY, POC: 1.01
UROBILINOGEN, POC: ABNORMAL

## 2024-03-20 PROCEDURE — 99214 OFFICE O/P EST MOD 30 MIN: CPT | Performed by: NEUROMUSCULOSKELETAL MEDICINE & OMM

## 2024-03-20 PROCEDURE — 1123F ACP DISCUSS/DSCN MKR DOCD: CPT | Performed by: NEUROMUSCULOSKELETAL MEDICINE & OMM

## 2024-03-20 PROCEDURE — 3051F HG A1C>EQUAL 7.0%<8.0%: CPT | Performed by: NEUROMUSCULOSKELETAL MEDICINE & OMM

## 2024-03-20 PROCEDURE — 81002 URINALYSIS NONAUTO W/O SCOPE: CPT | Performed by: NEUROMUSCULOSKELETAL MEDICINE & OMM

## 2024-03-20 PROCEDURE — 83036 HEMOGLOBIN GLYCOSYLATED A1C: CPT | Performed by: NEUROMUSCULOSKELETAL MEDICINE & OMM

## 2024-03-20 PROCEDURE — 82044 UR ALBUMIN SEMIQUANTITATIVE: CPT | Performed by: NEUROMUSCULOSKELETAL MEDICINE & OMM

## 2024-03-20 RX ORDER — ROSUVASTATIN CALCIUM 20 MG/1
20 TABLET, COATED ORAL NIGHTLY
Qty: 90 TABLET | Refills: 1 | Status: SHIPPED | OUTPATIENT
Start: 2024-03-20

## 2024-03-20 RX ORDER — LANCETS 30 GAUGE
1 EACH MISCELLANEOUS DAILY
Qty: 100 EACH | Refills: 0 | Status: SHIPPED | OUTPATIENT
Start: 2024-03-20

## 2024-03-20 RX ORDER — CIPROFLOXACIN 500 MG/1
500 TABLET, FILM COATED ORAL 2 TIMES DAILY
Qty: 14 TABLET | Refills: 0 | Status: SHIPPED | OUTPATIENT
Start: 2024-03-20 | End: 2024-03-27

## 2024-03-20 RX ORDER — GLUCOSAMINE HCL/CHONDROITIN SU 500-400 MG
CAPSULE ORAL
Qty: 100 STRIP | Refills: 5 | Status: SHIPPED | OUTPATIENT
Start: 2024-03-20

## 2024-03-20 RX ORDER — LEVOTHYROXINE SODIUM 0.05 MG/1
50 TABLET ORAL DAILY
Qty: 90 TABLET | Refills: 3 | Status: SHIPPED | OUTPATIENT
Start: 2024-03-20

## 2024-03-20 RX ORDER — ALLOPURINOL 300 MG/1
TABLET ORAL
Qty: 45 TABLET | Refills: 1 | Status: SHIPPED | OUTPATIENT
Start: 2024-03-20

## 2024-03-20 ASSESSMENT — PATIENT HEALTH QUESTIONNAIRE - PHQ9
SUM OF ALL RESPONSES TO PHQ QUESTIONS 1-9: 0
SUM OF ALL RESPONSES TO PHQ9 QUESTIONS 1 & 2: 0
SUM OF ALL RESPONSES TO PHQ QUESTIONS 1-9: 0
2. FEELING DOWN, DEPRESSED OR HOPELESS: NOT AT ALL
1. LITTLE INTEREST OR PLEASURE IN DOING THINGS: NOT AT ALL

## 2024-03-20 ASSESSMENT — ENCOUNTER SYMPTOMS
ABDOMINAL DISTENTION: 0
SHORTNESS OF BREATH: 0
NAUSEA: 0
DIARRHEA: 0
CHOKING: 0
ANAL BLEEDING: 0
WHEEZING: 0
CONSTIPATION: 0
COUGH: 0
CHEST TIGHTNESS: 0
BLOOD IN STOOL: 0
VOMITING: 0
ABDOMINAL PAIN: 0

## 2024-03-20 NOTE — PROGRESS NOTES
right breast    PONV (postoperative nausea and vomiting)     Thyroid disease         Past Surgical History:   Procedure Laterality Date    BREAST REDUCTION SURGERY Right 8/17/2021    RIGHT ONCOPLASTIC BREAST REDUCTION, MATCHING LEFT BREAST REDUCTION AND EXCISION SQUAMOUS CELL CARCINOMA, RIGHT MID CHEST, RIGHT BREAST NEEDLE LOCALIZED LUMPECTOMY BLUE DYE INJECTION, RIGHT AXILLARY SENTINEL NODE EXCISION, POSSIBLE RIGHT AXILLARY DISECTION performed by Marina Carranza MD at [facility] [place]    CHOLECYSTECTOMY, LAPAROSCOPIC N/A 6/20/2019    CHOLECYSTECTOMY LAPAROSCOPIC performed by Erick Finley MD at [facility] OR    COLONOSCOPY      ECHO COMPL W DOP COLOR FLOW  8/15/2013         ENDOSCOPY, COLON, DIAGNOSTIC      HYSTERECTOMY (CERVIX STATUS UNKNOWN)      RECTAL PROLAPSE REPAIR      US BREAST BIOPSY W LOC DEVICE 1ST LESION RIGHT Right 6/24/2021    US BREAST NEEDLE BIOPSY RIGHT 6/24/2021 [facility]        The 10-year ASCVD risk score (Reyna SNOW, et al., 2019) is: 5.9%    Values used to calculate the score:      Age: 63 years      Sex: Female      Is Non- : No      Diabetic: Yes      Tobacco smoker: No      Systolic Blood Pressure: 104 mmHg      Is BP treated: No      HDL Cholesterol: 60 mg/dL      Total Cholesterol: 148 mg/dL     Educational materials and/or home exercises printed for patient's review and were included inpatient instructions on his/her After Visit Summary and given to patient at the end of visit.     regarding above diagnosis, including possible risks and complications,  especially if left uncontrolled.     Counseled regarding the possible side effects, risks, benefits and alternatives to treatment; patient and/or guardian verbalizes understanding, agrees, feels comfortable with and wishes to proceed withabove treatment plan.     Advised patient to call with any new medication issues, and read all Rx infofrom pharmacy to assure aware of all possible risks and side effects of medication

## 2024-03-23 LAB
CULTURE: ABNORMAL
SPECIMEN DESCRIPTION: ABNORMAL

## 2024-03-24 DIAGNOSIS — N30.01 ACUTE CYSTITIS WITH HEMATURIA: Primary | ICD-10-CM

## 2024-03-24 RX ORDER — LEVOFLOXACIN 500 MG/1
500 TABLET, FILM COATED ORAL DAILY
Qty: 10 TABLET | Refills: 0 | Status: SHIPPED | OUTPATIENT
Start: 2024-03-24 | End: 2024-04-03

## 2024-04-11 ENCOUNTER — ENROLLMENT (OUTPATIENT)
Dept: PHARMACY | Facility: CLINIC | Age: 64
End: 2024-04-11

## 2024-04-22 DIAGNOSIS — Z17.0 MALIGNANT NEOPLASM OF RIGHT BREAST IN FEMALE, ESTROGEN RECEPTOR POSITIVE, UNSPECIFIED SITE OF BREAST (HCC): ICD-10-CM

## 2024-04-22 DIAGNOSIS — C50.911 MALIGNANT NEOPLASM OF RIGHT BREAST IN FEMALE, ESTROGEN RECEPTOR POSITIVE, UNSPECIFIED SITE OF BREAST (HCC): ICD-10-CM

## 2024-05-01 DIAGNOSIS — Z17.0 MALIGNANT NEOPLASM OF RIGHT BREAST IN FEMALE, ESTROGEN RECEPTOR POSITIVE, UNSPECIFIED SITE OF BREAST (HCC): ICD-10-CM

## 2024-05-01 DIAGNOSIS — C50.911 MALIGNANT NEOPLASM OF RIGHT BREAST IN FEMALE, ESTROGEN RECEPTOR POSITIVE, UNSPECIFIED SITE OF BREAST (HCC): ICD-10-CM

## 2024-05-01 RX ORDER — VENLAFAXINE HYDROCHLORIDE 150 MG/1
150 CAPSULE, EXTENDED RELEASE ORAL DAILY
Qty: 30 CAPSULE | Refills: 1 | Status: SHIPPED | OUTPATIENT
Start: 2024-05-01 | End: 2024-06-30

## 2024-05-01 RX ORDER — VENLAFAXINE HYDROCHLORIDE 150 MG/1
150 CAPSULE, EXTENDED RELEASE ORAL DAILY
Qty: 30 CAPSULE | Refills: 0 | OUTPATIENT
Start: 2024-05-01

## 2024-06-14 NOTE — PROGRESS NOTES
Subjective:   This note was copied forward from the last encounter.  Essential components for this patient record were reviewed and verified on this visit including:  recent hospitalizations, recent imaging, PMH, PSH, FH, SOC HX, Allergies, and Medications were reviewed and updated as appropriate.  In addition, the assessment and plan were copied from prior office note and updated accordingly.     Patient ID: Yahir Abbasi is a 64 y.o. female.    HPI   The lesion was located in the 8 o'clock position of the right breast     Breast cancer risk factors include mom breast cancer age 47, paternal aunt breast cancer, 53 paternal gm breast cancer age 50     06/16/2021 Bilateral Diagnostic Mammogram/Right Breast U/S:  Large irregular hypoechoic solid mass seen on ultrasound and mammogram at about the 8 to 9 o'clock position measuring 2.4 x 1.4 x 1.4 cm.       06/24/2021 Ultrasound guided core biopsy of right breast 8 o'clock mass and ribbonclip marker placement:  Right breast at 8:00, core needle biopsy: Invasive carcinoma of no special type (ductal), grade 2; see comment.   Comment: The carcinoma displays highly infiltrative architectural features including single file and single cell patterns of invasion.   Strong membranous E-cadherin expression is diffusely present in association with the invasive carcinoma, supportive of ductal origin. No in situ component is identified in the tissue sample.   Ramseur histologic score for the invasive carcinoma corresponds to a tubule score 3, nuclear score 3, mitotic score 1, total score 7, grade 2.  Intradepartmental consultation is obtained.     Breast Cancer Marker Studies:   Estrogen Receptors (ER):90%   Progesterone Receptors (CA): 30%   Her-2/stone (c-erb B-2) protein expression: Negative/0      CXR 07/06/2021:  No acute process     07/08/2021 MRI Bilateral Breast:  Irregular mass in the right breast 8 o'clock measuring up to 2.4 cm in size is consistent with biopsy proven

## 2024-06-24 ENCOUNTER — TELEPHONE (OUTPATIENT)
Dept: FAMILY MEDICINE CLINIC | Age: 64
End: 2024-06-24

## 2024-06-24 DIAGNOSIS — C50.911 MALIGNANT NEOPLASM OF RIGHT BREAST IN FEMALE, ESTROGEN RECEPTOR POSITIVE, UNSPECIFIED SITE OF BREAST (HCC): ICD-10-CM

## 2024-06-24 DIAGNOSIS — E11.9 TYPE 2 DIABETES MELLITUS WITHOUT COMPLICATION, WITHOUT LONG-TERM CURRENT USE OF INSULIN (HCC): ICD-10-CM

## 2024-06-24 DIAGNOSIS — E78.2 MIXED HYPERLIPIDEMIA: ICD-10-CM

## 2024-06-24 DIAGNOSIS — E55.9 VITAMIN D DEFICIENCY: ICD-10-CM

## 2024-06-24 DIAGNOSIS — R73.03 PRE-DIABETES: Primary | ICD-10-CM

## 2024-06-24 DIAGNOSIS — Z17.0 MALIGNANT NEOPLASM OF RIGHT BREAST IN FEMALE, ESTROGEN RECEPTOR POSITIVE, UNSPECIFIED SITE OF BREAST (HCC): ICD-10-CM

## 2024-06-24 DIAGNOSIS — R53.83 FATIGUE, UNSPECIFIED TYPE: ICD-10-CM

## 2024-06-24 DIAGNOSIS — E03.9 ACQUIRED HYPOTHYROIDISM: ICD-10-CM

## 2024-06-24 RX ORDER — VENLAFAXINE HYDROCHLORIDE 150 MG/1
150 CAPSULE, EXTENDED RELEASE ORAL DAILY
Qty: 30 CAPSULE | Refills: 0 | Status: SHIPPED
Start: 2024-06-24 | End: 2024-06-27 | Stop reason: SDUPTHER

## 2024-06-24 NOTE — TELEPHONE ENCOUNTER
Pt called in needs fasting  bloodwork ordered before visit, husbands is ordered already. Please order

## 2024-06-25 ENCOUNTER — HOSPITAL ENCOUNTER (OUTPATIENT)
Age: 64
Discharge: HOME | End: 2024-06-25
Payer: MEDICARE

## 2024-06-25 DIAGNOSIS — E11.9 TYPE 2 DIABETES MELLITUS WITHOUT COMPLICATION, WITHOUT LONG-TERM CURRENT USE OF INSULIN (HCC): ICD-10-CM

## 2024-06-25 DIAGNOSIS — E55.9 VITAMIN D DEFICIENCY: ICD-10-CM

## 2024-06-25 DIAGNOSIS — E03.9 ACQUIRED HYPOTHYROIDISM: ICD-10-CM

## 2024-06-25 DIAGNOSIS — R53.83 FATIGUE, UNSPECIFIED TYPE: ICD-10-CM

## 2024-06-25 DIAGNOSIS — E78.2 MIXED HYPERLIPIDEMIA: ICD-10-CM

## 2024-06-25 LAB
25(OH)D3 SERPL-MCNC: 41.3 NG/ML (ref 30–100)
ALBUMIN SERPL-MCNC: 4.6 G/DL (ref 3.5–5.2)
ALP SERPL-CCNC: 77 U/L (ref 35–104)
ALT SERPL-CCNC: 13 U/L (ref 0–32)
ANION GAP SERPL CALCULATED.3IONS-SCNC: 13 MMOL/L (ref 7–16)
AST SERPL-CCNC: 15 U/L (ref 0–31)
BASOPHILS # BLD: 0.08 K/UL (ref 0–0.2)
BASOPHILS NFR BLD: 1 % (ref 0–2)
BILIRUB SERPL-MCNC: 0.5 MG/DL (ref 0–1.2)
BUN SERPL-MCNC: 18 MG/DL (ref 6–23)
CALCIUM SERPL-MCNC: 10 MG/DL (ref 8.6–10.2)
CHLORIDE SERPL-SCNC: 102 MMOL/L (ref 98–107)
CHOLEST SERPL-MCNC: 153 MG/DL
CO2 SERPL-SCNC: 25 MMOL/L (ref 22–29)
CREAT SERPL-MCNC: 1 MG/DL (ref 0.5–1)
EOSINOPHIL # BLD: 0.27 K/UL (ref 0.05–0.5)
EOSINOPHILS RELATIVE PERCENT: 3 % (ref 0–6)
ERYTHROCYTE [DISTWIDTH] IN BLOOD BY AUTOMATED COUNT: 11.9 % (ref 11.5–15)
GFR, ESTIMATED: 60 ML/MIN/1.73M2
GLUCOSE SERPL-MCNC: 188 MG/DL (ref 74–99)
HBA1C MFR BLD: 7.6 % (ref 4–5.6)
HCT VFR BLD AUTO: 43.6 % (ref 34–48)
HDLC SERPL-MCNC: 70 MG/DL
HGB BLD-MCNC: 14.7 G/DL (ref 11.5–15.5)
IMM GRANULOCYTES # BLD AUTO: 0.03 K/UL (ref 0–0.58)
IMM GRANULOCYTES NFR BLD: 0 % (ref 0–5)
LDLC SERPL CALC-MCNC: 55 MG/DL
LYMPHOCYTES NFR BLD: 2.49 K/UL (ref 1.5–4)
LYMPHOCYTES RELATIVE PERCENT: 28 % (ref 20–42)
MCH RBC QN AUTO: 31.2 PG (ref 26–35)
MCHC RBC AUTO-ENTMCNC: 33.7 G/DL (ref 32–34.5)
MCV RBC AUTO: 92.6 FL (ref 80–99.9)
MONOCYTES NFR BLD: 0.62 K/UL (ref 0.1–0.95)
MONOCYTES NFR BLD: 7 % (ref 2–12)
NEUTROPHILS NFR BLD: 61 % (ref 43–80)
NEUTS SEG NFR BLD: 5.48 K/UL (ref 1.8–7.3)
PLATELET # BLD AUTO: 308 K/UL (ref 130–450)
PMV BLD AUTO: 9.3 FL (ref 7–12)
POTASSIUM SERPL-SCNC: 4.4 MMOL/L (ref 3.5–5)
PROT SERPL-MCNC: 7.6 G/DL (ref 6.4–8.3)
RBC # BLD AUTO: 4.71 M/UL (ref 3.5–5.5)
SODIUM SERPL-SCNC: 140 MMOL/L (ref 132–146)
TRIGL SERPL-MCNC: 138 MG/DL
TSH SERPL DL<=0.05 MIU/L-ACNC: 3.26 UIU/ML (ref 0.27–4.2)
VLDLC SERPL CALC-MCNC: 28 MG/DL
WBC OTHER # BLD: 9 K/UL (ref 4.5–11.5)

## 2024-06-25 PROCEDURE — 82306 VITAMIN D 25 HYDROXY: CPT

## 2024-06-25 PROCEDURE — 36415 COLL VENOUS BLD VENIPUNCTURE: CPT

## 2024-06-25 PROCEDURE — 84443 ASSAY THYROID STIM HORMONE: CPT

## 2024-06-25 PROCEDURE — 83036 HEMOGLOBIN GLYCOSYLATED A1C: CPT

## 2024-06-25 PROCEDURE — 80061 LIPID PANEL: CPT

## 2024-06-25 PROCEDURE — 85025 COMPLETE CBC W/AUTO DIFF WBC: CPT

## 2024-06-25 PROCEDURE — 80053 COMPREHEN METABOLIC PANEL: CPT

## 2024-06-26 ENCOUNTER — HOSPITAL ENCOUNTER (OUTPATIENT)
Dept: GENERAL RADIOLOGY | Age: 64
Discharge: HOME | End: 2024-06-28
Payer: MEDICARE

## 2024-06-26 ENCOUNTER — OFFICE VISIT (OUTPATIENT)
Dept: BREAST CENTER | Age: 64
End: 2024-06-26
Payer: MEDICARE

## 2024-06-26 ENCOUNTER — HOSPITAL ENCOUNTER (OUTPATIENT)
Dept: INFUSION THERAPY | Age: 64
End: 2024-06-26

## 2024-06-26 VITALS
DIASTOLIC BLOOD PRESSURE: 76 MMHG | WEIGHT: 157 LBS | HEART RATE: 85 BPM | HEIGHT: 67 IN | SYSTOLIC BLOOD PRESSURE: 118 MMHG | TEMPERATURE: 97.8 F | BODY MASS INDEX: 24.64 KG/M2 | OXYGEN SATURATION: 98 % | RESPIRATION RATE: 14 BRPM

## 2024-06-26 DIAGNOSIS — Z12.31 VISIT FOR SCREENING MAMMOGRAM: Primary | ICD-10-CM

## 2024-06-26 DIAGNOSIS — Z85.3 PERSONAL HISTORY OF BREAST CANCER: ICD-10-CM

## 2024-06-26 DIAGNOSIS — I89.0 LYMPHEDEMA OF BREAST: ICD-10-CM

## 2024-06-26 DIAGNOSIS — R92.30 DENSE BREAST: ICD-10-CM

## 2024-06-26 PROCEDURE — 1123F ACP DISCUSS/DSCN MKR DOCD: CPT | Performed by: NURSE PRACTITIONER

## 2024-06-26 PROCEDURE — 99213 OFFICE O/P EST LOW 20 MIN: CPT | Performed by: NURSE PRACTITIONER

## 2024-06-26 PROCEDURE — 76641 ULTRASOUND BREAST COMPLETE: CPT

## 2024-06-27 ENCOUNTER — OFFICE VISIT (OUTPATIENT)
Dept: ONCOLOGY | Age: 64
End: 2024-06-27
Payer: MEDICARE

## 2024-06-27 VITALS
HEART RATE: 90 BPM | SYSTOLIC BLOOD PRESSURE: 127 MMHG | OXYGEN SATURATION: 97 % | WEIGHT: 157.2 LBS | TEMPERATURE: 97.3 F | DIASTOLIC BLOOD PRESSURE: 99 MMHG | HEIGHT: 67 IN | BODY MASS INDEX: 24.67 KG/M2

## 2024-06-27 DIAGNOSIS — Z79.811 USE OF AROMATASE INHIBITORS: Primary | ICD-10-CM

## 2024-06-27 DIAGNOSIS — Z17.0 MALIGNANT NEOPLASM OF RIGHT BREAST IN FEMALE, ESTROGEN RECEPTOR POSITIVE, UNSPECIFIED SITE OF BREAST (HCC): ICD-10-CM

## 2024-06-27 DIAGNOSIS — C50.911 MALIGNANT NEOPLASM OF RIGHT BREAST IN FEMALE, ESTROGEN RECEPTOR POSITIVE, UNSPECIFIED SITE OF BREAST (HCC): ICD-10-CM

## 2024-06-27 PROCEDURE — 99212 OFFICE O/P EST SF 10 MIN: CPT | Performed by: INTERNAL MEDICINE

## 2024-06-27 RX ORDER — LETROZOLE 2.5 MG/1
2.5 TABLET, FILM COATED ORAL DAILY
Qty: 90 TABLET | Refills: 3 | Status: SHIPPED | OUTPATIENT
Start: 2024-06-27

## 2024-06-27 RX ORDER — VENLAFAXINE HYDROCHLORIDE 150 MG/1
150 CAPSULE, EXTENDED RELEASE ORAL DAILY
Qty: 30 CAPSULE | Refills: 0 | Status: SHIPPED | OUTPATIENT
Start: 2024-06-27

## 2024-06-27 NOTE — PROGRESS NOTES
therapy.  I recommended changing the endocrine therapy to Femara, she is tolerating it well, since her last visit she had a skin cancer removed.    Review of Systems;  CONSTITUTIONAL: No fever chills. Fair appetite. Mild fatigue.  ENMT: Eyes: No diplopia; Nose: No epistaxis. Mouth: No sore throat.  RESPIRATORY: No hemoptysis, shortness of breath, cough.   CARDIOVASCULAR: No chest pain, palpitations.  GASTROINTESTINAL: No nausea/vomiting, abdominal pain  GENITOURINARY: No dysuria, urinary frequency, hematuria.  NEURO: No syncope, presyncope, pos for headache.  MSK: Mild arthralgias.  Remainder:  ROS NEGATIVE    Past Medical History:      Diagnosis Date    BRCA1 negative     BRCA2 negative     Breast cancer (HCC)     Cancer (HCC)     Diabetes mellitus (HCC)     Diverticulitis     Frequent UTI     Hiatal hernia     History of therapeutic radiation     Hypertension     Kidney stones     Lymphedema     right breast    PONV (postoperative nausea and vomiting)     Squamous cell carcinoma, arm     left lower and right upper    Thyroid disease      Medications:  Reviewed and reconciled.    Allergies:  Allergies   Allergen Reactions    Flagyl [Metronidazole] Rash    Omeprazole Diarrhea and Nausea And Vomiting     As well as dizziness    Vicodin [Hydrocodone-Acetaminophen] Nausea And Vomiting     Past Surgical History:   Procedure Laterality Date    BREAST REDUCTION SURGERY Right 08/17/2021    RIGHT ONCOPLASTIC BREAST REDUCTION, MATCHING LEFT BREAST REDUCTION AND EXCISION SQUAMOUS CELL CARCINOMA, RIGHT MID CHEST, RIGHT BREAST NEEDLE LOCALIZED LUMPECTOMY BLUE DYE INJECTION, RIGHT AXILLARY SENTINEL NODE EXCISION, POSSIBLE RIGHT AXILLARY DISECTION performed by Marina Carranza MD at [facility] OR    CHOLECYSTECTOMY, LAPAROSCOPIC N/A 06/20/2019    CHOLECYSTECTOMY LAPAROSCOPIC performed by Erick Finley MD at [facility] OR    COLONOSCOPY      ECHO COMPL W DOP COLOR FLOW  08/15/2013         ENDOSCOPY, COLON, DIAGNOSTIC      HYSTERECTOMY (CERVIX

## 2024-07-09 ENCOUNTER — EVALUATION (OUTPATIENT)
Dept: OCCUPATIONAL THERAPY | Age: 64
End: 2024-07-09
Payer: MEDICARE

## 2024-07-09 DIAGNOSIS — I89.0 LYMPHEDEMA OF BREAST: Primary | ICD-10-CM

## 2024-07-09 DIAGNOSIS — Z85.3 PERSONAL HISTORY OF BREAST CANCER: ICD-10-CM

## 2024-07-09 PROCEDURE — 97165 OT EVAL LOW COMPLEX 30 MIN: CPT | Performed by: OCCUPATIONAL THERAPIST

## 2024-07-09 NOTE — PROGRESS NOTES
OCCUPATIONAL THERAPY INITIAL LYMPHEDEMA EVALUATION    [facility] OCCUPATIONAL THERAPY-LYMPHED[place] [address]  Phone: [phone]; Fax: [phone]      Date:  2024  Initial Evaluation Date: 2024         Evaluating Therapist: Patti Cleaning, PINKY/L, CLT    Patient Name:  Yahir Abbasi    :  1960    Restrictions/Precautions:  R UE, fall risk  Diagnosis:   Lymphedema of breast (I89.0)  Personal history of breast cancer (Z85.3)       Date of Surgery/Injury: none recent-RIGHT ONCOPLASTIC BREAST REDUCTION, MATCHING LEFT BREAST REDUCTION AND EXCISION SQUAMOUS CELL CARCINOMA, RIGHT MID CHEST, RIGHT BREAST NEEDLE LOCALIZED LUMPECTOMY BLUE DYE INJECTION, RIGHT AXILLARY SENTINEL NODE EXCISION, POSSIBLE RIGHT AXILLARY DISECTION-2021      Insurance/Certification information:    Payor: [facility] MEDICARE [[id number]]  Plan: [employer] MEDICARE ADVANTAGE HMO [[id number]]  ID: [id number]  Plan of care signed (Y/N): N  Visit# / total visits: evaluation    Referring Practitioner/NPI#:    Pao Hannah APRN - CNP NPI: [id number]     Specific Practitioner Orders: Evaluation and treatment; Patient known to you; referral back for persistent lymphedema of the right breast and right UE.     Assessment of current deficits   [x]Pain  []Skin Integrity   [x]Lymphedema   []Functional transfers/mobility   []ADLs   []Strength    []Cognition  []IADLs   []Safety Awareness   []  Motor Endurance    []Fine Motor Coordination   []Balance   []Vision/perception  []Sensation []Gross Motor Coordination  [x]ROM     OT PLAN OF CARE   OT POC based on physician orders, patient diagnosis and results of clinical assessment    Frequency/Duration: 1-2x/week for 12 treatment sessions from 24 through 10/1/24   Projected units: 48  Approved days/units: 20 visits per year    Specific OT Treatment to include:     Plan of Care: [id number], [id number], [id number],

## 2024-07-16 ENCOUNTER — TREATMENT (OUTPATIENT)
Dept: OCCUPATIONAL THERAPY | Age: 64
End: 2024-07-16
Payer: MEDICARE

## 2024-07-16 DIAGNOSIS — I89.0 LYMPHEDEMA OF BREAST: Primary | ICD-10-CM

## 2024-07-16 PROCEDURE — 97140 MANUAL THERAPY 1/> REGIONS: CPT | Performed by: OCCUPATIONAL THERAPIST

## 2024-07-16 PROCEDURE — 97110 THERAPEUTIC EXERCISES: CPT | Performed by: OCCUPATIONAL THERAPIST

## 2024-07-16 PROCEDURE — 97535 SELF CARE MNGMENT TRAINING: CPT | Performed by: OCCUPATIONAL THERAPIST

## 2024-07-16 NOTE — PROGRESS NOTES
OCCUPATIONAL THERAPY LYMPHEDEMA TREATMENT NOTE    [facility] OCCUPATIONAL THERAPY-[place]MPHED[place] [address]  Phone: [phone]; Fax: [phone]    Date:  2024    Initial Evaluation Date: 2024                       Evaluating Therapist: Patti Cleaning, PINKY/L, CLT     Patient Name:  Yahir Abbasi                       :  1960     Restrictions/Precautions:  R UE, fall risk  Diagnosis:   Lymphedema of breast (I89.0)  Personal history of breast cancer (Z85.3)                                                        Date of Surgery/Injury: none recent-RIGHT ONCOPLASTIC BREAST REDUCTION, MATCHING LEFT BREAST REDUCTION AND EXCISION SQUAMOUS CELL CARCINOMA, RIGHT MID CHEST, RIGHT BREAST NEEDLE LOCALIZED LUMPECTOMY BLUE DYE INJECTION, RIGHT AXILLARY SENTINEL NODE EXCISION, POSSIBLE RIGHT AXILLARY DISECTION-2021       Insurance/Certification information:    Payor: [facility] MEDICARE [[id number]]  Plan: [facility] MEDICARE ADVANTAGE HMO [[id number]]  ID: [id number]  Plan of care signed (Y/N): YES-24  Visit# / total visits: evaluation + 1     Referring Practitioner/NPI#:    Poa Hannah APRN - CNP NPI: [id number]      Specific Practitioner Orders: Evaluation and treatment; Patient known to you; referral back for persistent lymphedema of the right breast and right UE.      Assessment of current deficits   [x]Pain  []Skin Integrity   [x]Lymphedema   []Functional transfers/mobility   []ADLs   []Strength    []Cognition  []IADLs   []Safety Awareness   []  Motor Endurance    []Fine Motor Coordination   []Balance   []Vision/perception  []Sensation []Gross Motor Coordination  [x]ROM      OT PLAN OF CARE   OT POC based on physician orders, patient diagnosis and results of clinical assessment     Frequency/Duration: 1-2x/week for 12 treatment sessions from 24 through 10/1/24   Projected units: 48  Approved days/units: 20 visits

## 2024-07-30 ENCOUNTER — TREATMENT (OUTPATIENT)
Dept: OCCUPATIONAL THERAPY | Age: 64
End: 2024-07-30
Payer: MEDICARE

## 2024-07-30 DIAGNOSIS — I89.0 LYMPHEDEMA OF BREAST: Primary | ICD-10-CM

## 2024-07-30 PROCEDURE — 97140 MANUAL THERAPY 1/> REGIONS: CPT | Performed by: OCCUPATIONAL THERAPIST

## 2024-07-30 PROCEDURE — 97535 SELF CARE MNGMENT TRAINING: CPT | Performed by: OCCUPATIONAL THERAPIST

## 2024-07-30 NOTE — PROGRESS NOTES
OCCUPATIONAL THERAPY LYMPHEDEMA TREATMENT NOTE    [facility] OCCUPATIONAL THERAPY-[place]MPHED[place] [address]  Phone: [phone]; Fax: [phone]    Date:  2024    Initial Evaluation Date: 2024                       Evaluating Therapist: Patti Cleaning, PINKY/L, CLT     Patient Name:  Yahir Abbasi                       :  1960     Restrictions/Precautions:  R UE, fall risk  Diagnosis:   Lymphedema of breast (I89.0)  Personal history of breast cancer (Z85.3)                                                        Date of Surgery/Injury: none recent-RIGHT ONCOPLASTIC BREAST REDUCTION, MATCHING LEFT BREAST REDUCTION AND EXCISION SQUAMOUS CELL CARCINOMA, RIGHT MID CHEST, RIGHT BREAST NEEDLE LOCALIZED LUMPECTOMY BLUE DYE INJECTION, RIGHT AXILLARY SENTINEL NODE EXCISION, POSSIBLE RIGHT AXILLARY DISECTION-2021       Insurance/Certification information:    Payor: [facility] MEDICARE [[id number]]  Plan: [facility] MEDICARE ADVANTAGE HMO [[id number]]  ID: [id number]  Plan of care signed (Y/N): YES-24  Visit# / total visits: evaluation + 2     Referring Practitioner/NPI#:    Pao Hannah APRN - CNP NPI: [id number]      Specific Practitioner Orders: Evaluation and treatment; Patient known to you; referral back for persistent lymphedema of the right breast and right UE.      Assessment of current deficits   [x]Pain  []Skin Integrity   [x]Lymphedema   []Functional transfers/mobility   []ADLs   []Strength    []Cognition  []IADLs   []Safety Awareness   []  Motor Endurance    []Fine Motor Coordination   []Balance   []Vision/perception  []Sensation []Gross Motor Coordination  [x]ROM      OT PLAN OF CARE   OT POC based on physician orders, patient diagnosis and results of clinical assessment     Frequency/Duration: 1-2x/week for 12 treatment sessions from 24 through 10/1/24   Projected units: 48  Approved days/units: 20 visits

## 2024-08-05 ENCOUNTER — OFFICE VISIT (OUTPATIENT)
Dept: FAMILY MEDICINE CLINIC | Age: 64
End: 2024-08-05
Payer: MEDICARE

## 2024-08-05 VITALS
TEMPERATURE: 97.3 F | HEIGHT: 67 IN | HEART RATE: 90 BPM | RESPIRATION RATE: 18 BRPM | DIASTOLIC BLOOD PRESSURE: 69 MMHG | WEIGHT: 156.8 LBS | BODY MASS INDEX: 24.61 KG/M2 | OXYGEN SATURATION: 98 % | SYSTOLIC BLOOD PRESSURE: 106 MMHG

## 2024-08-05 DIAGNOSIS — E55.9 VITAMIN D DEFICIENCY: ICD-10-CM

## 2024-08-05 DIAGNOSIS — E78.2 MIXED HYPERLIPIDEMIA: ICD-10-CM

## 2024-08-05 DIAGNOSIS — Z23 NEED FOR PROPHYLACTIC VACCINATION AND INOCULATION AGAINST VARICELLA: ICD-10-CM

## 2024-08-05 DIAGNOSIS — F41.9 ANXIETY: ICD-10-CM

## 2024-08-05 DIAGNOSIS — Z00.00 MEDICARE ANNUAL WELLNESS VISIT, SUBSEQUENT: Primary | ICD-10-CM

## 2024-08-05 DIAGNOSIS — E11.9 TYPE 2 DIABETES MELLITUS WITHOUT COMPLICATION, WITHOUT LONG-TERM CURRENT USE OF INSULIN (HCC): ICD-10-CM

## 2024-08-05 DIAGNOSIS — Z72.89 OTHER PROBLEMS RELATED TO LIFESTYLE: ICD-10-CM

## 2024-08-05 DIAGNOSIS — E03.9 ACQUIRED HYPOTHYROIDISM: ICD-10-CM

## 2024-08-05 DIAGNOSIS — K21.9 GASTROESOPHAGEAL REFLUX DISEASE WITHOUT ESOPHAGITIS: ICD-10-CM

## 2024-08-05 DIAGNOSIS — Z11.59 NEED FOR HEPATITIS C SCREENING TEST: ICD-10-CM

## 2024-08-05 PROCEDURE — 3051F HG A1C>EQUAL 7.0%<8.0%: CPT | Performed by: NEUROMUSCULOSKELETAL MEDICINE & OMM

## 2024-08-05 PROCEDURE — G0439 PPPS, SUBSEQ VISIT: HCPCS | Performed by: NEUROMUSCULOSKELETAL MEDICINE & OMM

## 2024-08-05 PROCEDURE — 1123F ACP DISCUSS/DSCN MKR DOCD: CPT | Performed by: NEUROMUSCULOSKELETAL MEDICINE & OMM

## 2024-08-05 RX ORDER — OMEPRAZOLE 20 MG/1
CAPSULE, DELAYED RELEASE ORAL
Qty: 30 CAPSULE | Refills: 3 | Status: SHIPPED | OUTPATIENT
Start: 2024-08-05

## 2024-08-05 SDOH — ECONOMIC STABILITY: FOOD INSECURITY: WITHIN THE PAST 12 MONTHS, YOU WORRIED THAT YOUR FOOD WOULD RUN OUT BEFORE YOU GOT MONEY TO BUY MORE.: NEVER TRUE

## 2024-08-05 SDOH — ECONOMIC STABILITY: INCOME INSECURITY: HOW HARD IS IT FOR YOU TO PAY FOR THE VERY BASICS LIKE FOOD, HOUSING, MEDICAL CARE, AND HEATING?: NOT HARD AT ALL

## 2024-08-05 SDOH — ECONOMIC STABILITY: FOOD INSECURITY: WITHIN THE PAST 12 MONTHS, THE FOOD YOU BOUGHT JUST DIDN'T LAST AND YOU DIDN'T HAVE MONEY TO GET MORE.: NEVER TRUE

## 2024-08-05 ASSESSMENT — PATIENT HEALTH QUESTIONNAIRE - PHQ9
SUM OF ALL RESPONSES TO PHQ QUESTIONS 1-9: 0
SUM OF ALL RESPONSES TO PHQ9 QUESTIONS 1 & 2: 0
1. LITTLE INTEREST OR PLEASURE IN DOING THINGS: NOT AT ALL
2. FEELING DOWN, DEPRESSED OR HOPELESS: NOT AT ALL

## 2024-08-05 ASSESSMENT — LIFESTYLE VARIABLES
HOW OFTEN DO YOU HAVE A DRINK CONTAINING ALCOHOL: NEVER
HOW MANY STANDARD DRINKS CONTAINING ALCOHOL DO YOU HAVE ON A TYPICAL DAY: PATIENT DOES NOT DRINK

## 2024-08-05 NOTE — PROGRESS NOTES
Medicare Annual Wellness Visit    Yahir Abbasi is here for Medicare AWV    Assessment & Plan   Medicare annual wellness visit, subsequent  Type 2 diabetes mellitus without complication, without long-term current use of insulin (HCC)  Acquired hypothyroidism  Mixed hyperlipidemia  Anxiety  Gastroesophageal reflux disease without esophagitis  -     omeprazole (PRILOSEC) 20 MG delayed release capsule; TAKE 1 CAPSULE BY MOUTH ONCE DAILY IN THE MORNING BEFORE BREAKFAST, Disp-30 capsule, R-3Normal  Vitamin D deficiency  Need for hepatitis C screening test  -     Hepatitis C Antibody; Future  Need for prophylactic vaccination and inoculation against varicella  -     zoster recombinant adjuvanted vaccine (SHINGRIX) 50 MCG/0.5ML SUSR injection; Inject 0.5 mLs into the muscle once for 1 dose, Disp-0.5 mL, R-0Print  Other problems related to lifestyle  -     Hepatitis C Antibody; Future    Recommendations for Preventive Services Due: see orders and patient instructions/AVS.  Recommended screening schedule for the next 5-10 years is provided to the patient in written form: see Patient Instructions/AVS.     Return in 3 months (on 11/5/2024) for Medicare Annual Wellness Visit in 1 year, to monitor medical conditions.     Subjective   The following acute and/or chronic problems were also addressed today:  Patient doing ok, had skin cancer removed from [facility].    Patient's complete Health Risk Assessment and screening values have been reviewed and are found in Flowsheets. The following problems were reviewed today and where indicated follow up appointments were made and/or referrals ordered.    Positive Risk Factor Screenings with Interventions:                Inactivity:  On average, how many days per week do you engage in moderate to strenuous exercise (like a brisk walk)?: 0 days (!) Abnormal  On average, how many minutes do you engage in exercise at this level?: 0 min  Interventions:  Patient declined any

## 2024-08-05 NOTE — PATIENT INSTRUCTIONS
Learning About Being Active as an Older Adult  Why is being active important as you get older?     Being active is one of the best things you can do for your health. And it's never too late to start. Being active--or getting active, if you aren't already--has definite benefits. It can:  Give you more energy,  Keep your mind sharp.  Improve balance to reduce your risk of falls.  Help you manage chronic illness with fewer medicines.  No matter how old you are, how fit you are, or what health problems you have, there is a form of activity that will work for you. And the more physical activity you can do, the better your overall health will be.  What kinds of activity can help you stay healthy?  Being more active will make your daily activities easier. Physical activity includes planned exercise and things you do in daily life. There are four types of activity:  Aerobic.  Doing aerobic activity makes your heart and lungs strong.  Includes walking, dancing, and gardening.  Aim for at least 2½ hours spread throughout the week.  It improves your energy and can help you sleep better.  Muscle-strengthening.  This type of activity can help maintain muscle and strengthen bones.  Includes climbing stairs, using resistance bands, and lifting or carrying heavy loads.  Aim for at least twice a week.  It can help protect the knees and other joints.  Stretching.  Stretching gives you better range of motion in joints and muscles.  Includes upper arm stretches, calf stretches, and gentle yoga.  Aim for at least twice a week, preferably after your muscles are warmed up from other activities.  It can help you function better in daily life.  Balancing.  This helps you stay coordinated and have good posture.  Includes heel-to-toe walking, rita chi, and certain types of yoga.  Aim for at least 3 days a week.  It can reduce your risk of falling.  Even if you have a hard time meeting the recommendations, it's better to be more active

## 2024-08-14 DIAGNOSIS — E11.9 TYPE 2 DIABETES MELLITUS WITHOUT COMPLICATION, WITHOUT LONG-TERM CURRENT USE OF INSULIN (HCC): ICD-10-CM

## 2024-08-15 ENCOUNTER — TELEPHONE (OUTPATIENT)
Dept: OCCUPATIONAL THERAPY | Age: 64
End: 2024-08-15

## 2024-08-15 NOTE — TELEPHONE ENCOUNTER
[facility] OCCUPATIONAL THERAPY   [address] [facility] [address]  [place] [address]  Dept: [phone]  Loc: [phone]   [facility] Fax: [phone]    Cancellation/No Show Note      Date:  8/15/2024    Patient Name:  Yahir Abbasi     :  1960         PT ID: [id number]    Total missed visits including today: 1       Total number of no shows: 0    For today's appointment patient:   [x]  Cancelled   []  Rescheduled appointment   []  No-show     Reason given by patient:   [x]  Patient ill   []  Conflicting appointment   []  No transportation   []  Conflict with work   []  No reason given   []  Other:    Comments:                    Comments:      Electronically signed by:  Patti Cleaning OTR/ELAINE SEVERINO

## 2024-08-16 ENCOUNTER — OFFICE VISIT (OUTPATIENT)
Dept: PRIMARY CARE CLINIC | Age: 64
End: 2024-08-16
Payer: MEDICARE

## 2024-08-16 VITALS
WEIGHT: 156 LBS | DIASTOLIC BLOOD PRESSURE: 77 MMHG | BODY MASS INDEX: 24.48 KG/M2 | TEMPERATURE: 97.8 F | OXYGEN SATURATION: 96 % | SYSTOLIC BLOOD PRESSURE: 117 MMHG | HEART RATE: 110 BPM | HEIGHT: 67 IN

## 2024-08-16 DIAGNOSIS — J01.90 ACUTE BACTERIAL SINUSITIS: Primary | ICD-10-CM

## 2024-08-16 DIAGNOSIS — R05.9 COUGH IN ADULT: ICD-10-CM

## 2024-08-16 DIAGNOSIS — J02.9 SORE THROAT: ICD-10-CM

## 2024-08-16 DIAGNOSIS — B96.89 ACUTE BACTERIAL SINUSITIS: Primary | ICD-10-CM

## 2024-08-16 LAB
INFLUENZA A ANTIGEN, POC: NEGATIVE
INFLUENZA B ANTIGEN, POC: NEGATIVE
LOT EXPIRE DATE: NORMAL
LOT KIT NUMBER: NORMAL
S PYO AG THROAT QL: NORMAL
SARS-COV-2, POC: NORMAL
VALID INTERNAL CONTROL: NORMAL
VENDOR AND KIT NAME POC: NORMAL

## 2024-08-16 PROCEDURE — 87880 STREP A ASSAY W/OPTIC: CPT | Performed by: NURSE PRACTITIONER

## 2024-08-16 PROCEDURE — 99213 OFFICE O/P EST LOW 20 MIN: CPT | Performed by: NURSE PRACTITIONER

## 2024-08-16 PROCEDURE — 1123F ACP DISCUSS/DSCN MKR DOCD: CPT | Performed by: NURSE PRACTITIONER

## 2024-08-16 PROCEDURE — 87428 SARSCOV & INF VIR A&B AG IA: CPT | Performed by: NURSE PRACTITIONER

## 2024-08-16 RX ORDER — BROMPHENIRAMINE MALEATE, PSEUDOEPHEDRINE HYDROCHLORIDE, AND DEXTROMETHORPHAN HYDROBROMIDE 2; 30; 10 MG/5ML; MG/5ML; MG/5ML
5 SYRUP ORAL 4 TIMES DAILY PRN
Qty: 118 ML | Refills: 0 | Status: SHIPPED | OUTPATIENT
Start: 2024-08-16

## 2024-08-16 RX ORDER — PREDNISONE 10 MG/1
10 TABLET ORAL 2 TIMES DAILY
Qty: 10 TABLET | Refills: 0 | Status: SHIPPED | OUTPATIENT
Start: 2024-08-16 | End: 2024-08-21

## 2024-08-16 RX ORDER — CEFDINIR 300 MG/1
300 CAPSULE ORAL 2 TIMES DAILY
Qty: 20 CAPSULE | Refills: 0 | Status: SHIPPED | OUTPATIENT
Start: 2024-08-16 | End: 2024-08-26

## 2024-08-16 NOTE — PROGRESS NOTES
Chief Complaint:   Pharyngitis (Since yesterday ), Generalized Body Aches, and Headache      History of Present Illness   Source of history provided by:  patient.      Yahir Abbasi is a 64 y.o. old female with a past medical history of:   Past Medical History:   Diagnosis Date    BRCA1 negative     BRCA2 negative     Breast cancer (HCC)     Cancer (HCC)     Diabetes mellitus (HCC)     Diverticulitis     Frequent UTI     Hiatal hernia     History of therapeutic radiation     Hypertension     Kidney stones     Lymphedema     right breast    PONV (postoperative nausea and vomiting)     Squamous cell carcinoma, arm     left lower and right upper    Thyroid disease         Pt presents to the [facility] with a cough/congestion/sinus pressure/sore throat/body aches/fevers for the past few days.  States the cough is non productive.   Subjective fever noted.    Denies any N/V/D, difficulty swallowing, rashes, abdominal pain, CP, progressive SOB, dizziness, or lethargy.         ROS    Unless otherwise stated in this report or unable to obtain because of the patient's clinical or mental status as evidenced by the medical record, this patients's positive and negative responses for Review of Systems, constitutional, psych, eyes, ENT, cardiovascular, respiratory, gastrointestinal, neurological, genitourinary, musculoskeletal, integument systems and systems related to the presenting problem are either stated in the preceding or were not pertinent or were negative for the symptoms and/or complaints related to the medical problem.    Past Surgical History:  has a past surgical history that includes Hysterectomy; Rectal prolapse repair; ECHO Compl W Dop Color Flow (08/15/2013); Colonoscopy; Endoscopy, colon, diagnostic; Cholecystectomy, laparoscopic (N/A, 06/20/2019); US BREAST BIOPSY W LOC DEVICE 1ST LESION RIGHT (Right, 06/24/2021); Breast reduction surgery (Right, 08/17/2021); and Squamous cell carcinoma excision

## 2024-08-26 ENCOUNTER — TELEPHONE (OUTPATIENT)
Dept: INFUSION THERAPY | Age: 64
End: 2024-08-26

## 2024-08-26 NOTE — TELEPHONE ENCOUNTER
This nurse spoke with patient regarding her side effects from her Femara possibly or her Effexor isn't working anymore. Patient stated she is having headaches, hot flashes and mood swings, feels edgy and brain fog. Discussed with Dr. Rob Brandt , she recommended having patient come in for office visit, labs to check her out. This nurse spoke with Patrizia who is calling her and making an appointment.

## 2024-08-27 DIAGNOSIS — Z17.0 MALIGNANT NEOPLASM OF RIGHT BREAST IN FEMALE, ESTROGEN RECEPTOR POSITIVE, UNSPECIFIED SITE OF BREAST (HCC): ICD-10-CM

## 2024-08-27 DIAGNOSIS — C50.911 MALIGNANT NEOPLASM OF RIGHT BREAST IN FEMALE, ESTROGEN RECEPTOR POSITIVE, UNSPECIFIED SITE OF BREAST (HCC): ICD-10-CM

## 2024-08-27 RX ORDER — VENLAFAXINE HYDROCHLORIDE 150 MG/1
150 CAPSULE, EXTENDED RELEASE ORAL DAILY
Qty: 30 CAPSULE | Refills: 0 | Status: SHIPPED
Start: 2024-08-27 | End: 2024-08-29 | Stop reason: SDUPTHER

## 2024-08-29 ENCOUNTER — HOSPITAL ENCOUNTER (OUTPATIENT)
Dept: INFUSION THERAPY | Age: 64
Discharge: HOME | End: 2024-08-29
Payer: MEDICARE

## 2024-08-29 ENCOUNTER — OFFICE VISIT (OUTPATIENT)
Dept: ONCOLOGY | Age: 64
End: 2024-08-29
Payer: MEDICARE

## 2024-08-29 VITALS
TEMPERATURE: 97.5 F | BODY MASS INDEX: 22.77 KG/M2 | RESPIRATION RATE: 18 BRPM | HEART RATE: 100 BPM | SYSTOLIC BLOOD PRESSURE: 124 MMHG | DIASTOLIC BLOOD PRESSURE: 87 MMHG | WEIGHT: 145.4 LBS | OXYGEN SATURATION: 98 %

## 2024-08-29 DIAGNOSIS — C50.911 MALIGNANT NEOPLASM OF RIGHT BREAST IN FEMALE, ESTROGEN RECEPTOR POSITIVE, UNSPECIFIED SITE OF BREAST (HCC): ICD-10-CM

## 2024-08-29 DIAGNOSIS — Z79.811 USE OF AROMATASE INHIBITORS: Primary | ICD-10-CM

## 2024-08-29 DIAGNOSIS — C50.911 MALIGNANT NEOPLASM OF RIGHT BREAST IN FEMALE, ESTROGEN RECEPTOR POSITIVE, UNSPECIFIED SITE OF BREAST (HCC): Primary | ICD-10-CM

## 2024-08-29 DIAGNOSIS — Z17.0 MALIGNANT NEOPLASM OF RIGHT BREAST IN FEMALE, ESTROGEN RECEPTOR POSITIVE, UNSPECIFIED SITE OF BREAST (HCC): Primary | ICD-10-CM

## 2024-08-29 DIAGNOSIS — Z17.0 MALIGNANT NEOPLASM OF RIGHT BREAST IN FEMALE, ESTROGEN RECEPTOR POSITIVE, UNSPECIFIED SITE OF BREAST (HCC): ICD-10-CM

## 2024-08-29 LAB
ALBUMIN SERPL-MCNC: 4.4 G/DL (ref 3.5–5.2)
ALP SERPL-CCNC: 77 U/L (ref 35–104)
ALT SERPL-CCNC: 15 U/L (ref 0–32)
ANION GAP SERPL CALCULATED.3IONS-SCNC: 13 MMOL/L (ref 7–16)
AST SERPL-CCNC: 15 U/L (ref 0–31)
BASOPHILS # BLD: 0.04 K/UL (ref 0–0.2)
BASOPHILS NFR BLD: 1 % (ref 0–2)
BILIRUB SERPL-MCNC: 0.5 MG/DL (ref 0–1.2)
BUN SERPL-MCNC: 19 MG/DL (ref 6–23)
CALCIUM SERPL-MCNC: 9.4 MG/DL (ref 8.6–10.2)
CHLORIDE SERPL-SCNC: 103 MMOL/L (ref 98–107)
CO2 SERPL-SCNC: 22 MMOL/L (ref 22–29)
CREAT SERPL-MCNC: 0.9 MG/DL (ref 0.5–1)
EOSINOPHIL # BLD: 0.21 K/UL (ref 0.05–0.5)
EOSINOPHILS RELATIVE PERCENT: 3 % (ref 0–6)
ERYTHROCYTE [DISTWIDTH] IN BLOOD BY AUTOMATED COUNT: 11.6 % (ref 11.5–15)
GFR, ESTIMATED: 69 ML/MIN/1.73M2
GLUCOSE SERPL-MCNC: 190 MG/DL (ref 74–99)
HCT VFR BLD AUTO: 44 % (ref 34–48)
HGB BLD-MCNC: 14.8 G/DL (ref 11.5–15.5)
IMM GRANULOCYTES # BLD AUTO: 0.03 K/UL (ref 0–0.58)
IMM GRANULOCYTES NFR BLD: 0 % (ref 0–5)
LYMPHOCYTES NFR BLD: 2.08 K/UL (ref 1.5–4)
LYMPHOCYTES RELATIVE PERCENT: 25 % (ref 20–42)
MCH RBC QN AUTO: 31.4 PG (ref 26–35)
MCHC RBC AUTO-ENTMCNC: 33.6 G/DL (ref 32–34.5)
MCV RBC AUTO: 93.4 FL (ref 80–99.9)
MONOCYTES NFR BLD: 0.54 K/UL (ref 0.1–0.95)
MONOCYTES NFR BLD: 6 % (ref 2–12)
NEUTROPHILS NFR BLD: 66 % (ref 43–80)
NEUTS SEG NFR BLD: 5.6 K/UL (ref 1.8–7.3)
PLATELET # BLD AUTO: 307 K/UL (ref 130–450)
PMV BLD AUTO: 9.4 FL (ref 7–12)
POTASSIUM SERPL-SCNC: 4.4 MMOL/L (ref 3.5–5)
PROT SERPL-MCNC: 7.4 G/DL (ref 6.4–8.3)
RBC # BLD AUTO: 4.71 M/UL (ref 3.5–5.5)
SODIUM SERPL-SCNC: 138 MMOL/L (ref 132–146)
WBC OTHER # BLD: 8.5 K/UL (ref 4.5–11.5)

## 2024-08-29 PROCEDURE — 80053 COMPREHEN METABOLIC PANEL: CPT

## 2024-08-29 PROCEDURE — 36415 COLL VENOUS BLD VENIPUNCTURE: CPT

## 2024-08-29 PROCEDURE — 85025 COMPLETE CBC W/AUTO DIFF WBC: CPT

## 2024-08-29 PROCEDURE — 99212 OFFICE O/P EST SF 10 MIN: CPT

## 2024-08-29 RX ORDER — VENLAFAXINE HYDROCHLORIDE 150 MG/1
150 CAPSULE, EXTENDED RELEASE ORAL DAILY
Qty: 30 CAPSULE | Refills: 3 | Status: SHIPPED | OUTPATIENT
Start: 2024-08-29

## 2024-08-29 RX ORDER — EXEMESTANE 25 MG/1
25 TABLET ORAL DAILY
Qty: 30 TABLET | Refills: 3 | Status: SHIPPED | OUTPATIENT
Start: 2024-08-29

## 2024-08-29 NOTE — PROGRESS NOTES
excision squamous cell carcinoma, right mid chest with Dr Ren and right breast needle localized lumpectomy, blue dye injection, right axillary sentinel node excision, possible right axillary dissection with Dr Carranza on August 17, 2021  A.  Left breast, reduction mammoplasty:   - Skin and benign breast tissue (288.8 g) showing stromal fibrosis/hyalinosis.   B.  Skin, lesion of mid chest, excision:   - Focal residual squamous cell carcinoma in situ, margins free of tumor;   - Actinic keratosis;   - Dermal scar with chronic inflammation, consistent with changes of previous biopsy site.   C.  [place] lymph node #1, biopsy:    - Two of two (2/2) nodes positive for metastatic carcinoma (micrometastasis), see comment.   D.  [place] lymph node #2, biopsy:   - Benign node (1) showing fatty replacement.   E.  [place] lymph node #3, biopsy:   - Benign node (1) showing fatty replacement.   F.  Right breast, central, lumpectomy with needle localization:   - Invasive carcinoma of no special type (ductal), grade 3;   - Changes of previous biopsy site, see comment.   G.  [place] lymph node #4, biopsy:   - Benign node showing prominent fatty replacement.   H.  Right breast, reduction mammoplasty:   - Skin and benign breast tissue (79.6 g) showing stromal fibrosis/hyalinosis.     Comment:A few small foci of metastatic carcinoma (</= 2 mm/each metastasis) in two sentinel lymph nodes in specimen C are noted histologically and further confirmed by immunostaining for pankeratin and GATA3.     Comparing with the patient's previous core needle biopsy specimen from the same tumor on 07/16/2021 (see report HES-), the tumor seen here is much more mitotic active (focally, 3-4 mitotic figures/hpf). Therefore, the provisional Hitchins score of this tumor is updated to 3+3+3=9 (grade 3) from originally reported 3+3+1=7 (grade 2).     CANCER CASE SUMMARY   Procedure: Excision   Specimen Laterality: Right   TUMOR   Tumor Site: Central  grade 3;   - Changes of previous biopsy site, see comment.   G.  [place] lymph node #4, biopsy:   - Benign node showing prominent fatty replacement.   H.  Right breast, reduction mammoplasty:   - Skin and benign breast tissue (79.6 g) showing stromal fibrosis/hyalinosis.     Comment:A few small foci of metastatic carcinoma (</= 2 mm/each metastasis) in two sentinel lymph nodes in specimen C are noted histologically and further confirmed by immunostaining for pankeratin and GATA3.     Comparing with the patient's previous core needle biopsy specimen from the same tumor on 07/16/2021 (see report HES-), the tumor seen here is much more mitotic active (focally, 3-4 mitotic figures/hpf). Therefore, the provisional Parks score of this tumor is updated to 3+3+3=9 (grade 3) from originally reported 3+3+1=7 (grade 2).     CANCER CASE SUMMARY   Procedure: Excision   Specimen Laterality: Right   TUMOR   Tumor Site: Central   Histologic Type: Invasive carcinoma of no special type   Histologic Grade: Parks Histologic Score   Glandular/tubular differentiation: Score 3   Nuclear pleomorphism: Score 3   Mitotic rate: Score 3   Overall grade: Grade 3 (scores of 9)   Tumor Size: 2.2 x 2.2 x 1.4 cm   Tumor Focality: Single focus of invasive carcinoma   Ductal Carcinoma In Situ (DCIS): Not identified   Lobular Carcinoma In Situ (LCIS): Not identified   Lymph-Vascular Invasion: Not identified   Dermal lymphovascular invasion: No skin present   Microcalcifications: Not identified   Treatment effect: No known presurgical therapy   MARGINS   All margins negative for invasive carcinoma   Distance from invasive carcinoma to closest margin: 1 mm   Closest margins to invasive carcinoma: medial (slide F1) and posterior (slide F2)   REGIONAL LYMPH NODES   Tumor present in regional lymph nodes   Number of lymph nodes with macrometastasis (>2 mm): 0   Number of lymph nodes with micrometasteses (0.2-2 mm): 2   Number of lymph

## 2024-09-06 DIAGNOSIS — N30.01 ACUTE CYSTITIS WITH HEMATURIA: ICD-10-CM

## 2024-09-08 RX ORDER — GLIPIZIDE 5 MG/1
5 TABLET ORAL 2 TIMES DAILY
Qty: 180 TABLET | Refills: 1 | Status: SHIPPED | OUTPATIENT
Start: 2024-09-08

## 2024-10-28 ENCOUNTER — TELEPHONE (OUTPATIENT)
Dept: FAMILY MEDICINE CLINIC | Age: 64
End: 2024-10-28

## 2024-10-28 DIAGNOSIS — E78.2 MIXED HYPERLIPIDEMIA: ICD-10-CM

## 2024-10-28 DIAGNOSIS — E78.2 MIXED HYPERLIPIDEMIA: Primary | ICD-10-CM

## 2024-10-28 RX ORDER — ROSUVASTATIN CALCIUM 20 MG/1
20 TABLET, COATED ORAL NIGHTLY
Qty: 90 TABLET | Refills: 3 | Status: SHIPPED | OUTPATIENT
Start: 2024-10-28

## 2024-10-28 NOTE — TELEPHONE ENCOUNTER
Last Appointment:  Visit date not found  Future Appointments   Date Time Provider Department Center   11/6/2024  1:15 PM Francis Crespo DO [place] [facility] DEP   12/19/2024 12:30 PM SEYZ ABDU SARITHA RM 1 [facility] A[facility]U [facility]   12/19/2024  1:30 PM Pao Hannah APRN - CNP [facility]   12/23/2024  1:30 PM Brionna Fabian MD [facility]   8/6/2025  1:00 PM Francis Crespo DO [place] [facility] DEP

## 2024-11-06 ENCOUNTER — OFFICE VISIT (OUTPATIENT)
Dept: FAMILY MEDICINE CLINIC | Age: 64
End: 2024-11-06

## 2024-11-06 VITALS
BODY MASS INDEX: 24.83 KG/M2 | HEART RATE: 88 BPM | SYSTOLIC BLOOD PRESSURE: 106 MMHG | WEIGHT: 158.2 LBS | DIASTOLIC BLOOD PRESSURE: 69 MMHG | OXYGEN SATURATION: 98 % | HEIGHT: 67 IN | RESPIRATION RATE: 20 BRPM | TEMPERATURE: 97.1 F

## 2024-11-06 DIAGNOSIS — R82.998 LEUKOCYTES IN URINE: ICD-10-CM

## 2024-11-06 DIAGNOSIS — N30.01 ACUTE CYSTITIS WITH HEMATURIA: Primary | ICD-10-CM

## 2024-11-06 DIAGNOSIS — R30.0 DYSURIA: ICD-10-CM

## 2024-11-06 DIAGNOSIS — E11.9 TYPE 2 DIABETES MELLITUS WITHOUT COMPLICATION, WITHOUT LONG-TERM CURRENT USE OF INSULIN (HCC): ICD-10-CM

## 2024-11-06 DIAGNOSIS — B35.1 ONYCHOMYCOSIS: ICD-10-CM

## 2024-11-06 PROBLEM — E11.65 TYPE 2 DIABETES MELLITUS WITH HYPERGLYCEMIA (HCC): Status: ACTIVE | Noted: 2024-11-06

## 2024-11-06 LAB
BILIRUBIN, POC: NEGATIVE
BLOOD URINE, POC: ABNORMAL
CLARITY, POC: ABNORMAL
COLOR, POC: YELLOW
GLUCOSE URINE, POC: ABNORMAL MG/DL
HBA1C MFR BLD: 8 %
KETONES, POC: NEGATIVE MG/DL
LEUKOCYTE EST, POC: ABNORMAL
NITRITE, POC: POSITIVE
PH, POC: 6
PROTEIN, POC: NEGATIVE MG/DL
SPECIFIC GRAVITY, POC: 1.01
UROBILINOGEN, POC: ABNORMAL MG/DL

## 2024-11-06 RX ORDER — GLIPIZIDE 10 MG/1
10 TABLET ORAL 2 TIMES DAILY
Qty: 180 TABLET | Refills: 5 | Status: SHIPPED | OUTPATIENT
Start: 2024-11-06 | End: 2025-02-04

## 2024-11-06 RX ORDER — LETROZOLE 2.5 MG/1
2.5 TABLET, FILM COATED ORAL DAILY
COMMUNITY
Start: 2024-10-05

## 2024-11-06 RX ORDER — CIPROFLOXACIN 500 MG/1
500 TABLET, FILM COATED ORAL 2 TIMES DAILY
Qty: 14 TABLET | Refills: 0 | Status: SHIPPED | OUTPATIENT
Start: 2024-11-06 | End: 2024-11-13

## 2024-11-06 ASSESSMENT — ENCOUNTER SYMPTOMS
CONSTIPATION: 0
BLOOD IN STOOL: 0
BACK PAIN: 0
NAUSEA: 0
COUGH: 0
DIARRHEA: 0
ABDOMINAL PAIN: 0
SHORTNESS OF BREATH: 0
VOMITING: 0
CHOKING: 0
CHEST TIGHTNESS: 0
WHEEZING: 0

## 2024-11-06 NOTE — PROGRESS NOTES
Yahir Abbasi (:  1960) is a 64 y.o. female, Established patient, here for evaluation of the following chief complaint(s):  Diabetes (3 mo follow up/), Dysuria, and Back Pain         Assessment & Plan  1. Urinary Tract Infection.  Urinalysis revealed trace leukocytes, positive nitrites, and trace blood. A culture and sensitivity test will be conducted. Cipro will be prescribed, and the culture and sensitivity results will confirm the appropriateness of this antibiotic. She is advised to drink plenty of water while taking the antibiotic.    2. Diabetes Mellitus.  Her hemoglobin A1c level today was 8. The dosage of glipizide will be increased to 10 mg twice daily. She is advised to take one dose in the morning and another at dinnertime, rather than both doses simultaneously. She will continue taking Jardiance 25 mg daily.    3. Onychomycosis.  A referral will be made to Dr. Francois for further evaluation and treatment of the blackened toenails.    Follow-up  Return in 1 month for follow up.    Results  Laboratory Studies  Urinalysis showed trace leukocytes, positive nitrites and trace amount of blood. Hemoglobin A1c was 8.  1. Acute cystitis with hematuria  Comments:  Urinalysis showed signs of infection we will place on Cipro 500 mg twice a day for 7 days await urine culture and sensitivity results.  Orders:  -     glipiZIDE (GLUCOTROL) 10 MG tablet; Take 1 tablet by mouth 2 times daily, Disp-180 tablet, R-5Normal  2. Type 2 diabetes mellitus without complication, without long-term current use of insulin ([facility])  Comments:  Chronic condition adequately controlled A1c increased to 8.0 today, previously 7.6.  I increased glipizide to 10 mg twice a day will recheck A1c in 3 months  Orders:  -     POCT glycosylated hemoglobin (Hb A1C)  -     glipiZIDE (GLUCOTROL) 10 MG tablet; Take 1 tablet by mouth 2 times daily, Disp-180 tablet, R-5Normal  3. Dysuria  -     POCT Urinalysis no Micro  -     Culture, Urine;

## 2024-11-08 LAB
CULTURE: ABNORMAL
SPECIMEN DESCRIPTION: ABNORMAL

## 2024-12-15 NOTE — PROGRESS NOTES
Recommendation from radiology notes that she may benefit from intermittent screening breast MRI.   12/20/2021 Right breast US: Benign, BI-RADS 2.  07/19/2022 bilateral screening mammogram: Benign, BI-RADS 2.  01/20/2023 bilateral complete breast ultrasounds: Benign, BI-RADS 2.  06/24/2023 MRI of the bilateral breast: Benign, BI-RADS 2.  12/13/2023 bilateral screening mammogram*: Indeterminate left breast asymmetry.  BI-RADS 0  01/03/2024 left diagnostic mammogram: Benign, BI-RADS 2.  06/26/2024 bilateral complete breast ultrasound: Negative, BI-RADS 1   12/19/2024 bilateral screening mammogram: Benign, BI-RADS 2    Key clinical points:  02/28/2022 clinical follow up is without evidence of recurrent disease.  She has postsurgical changes bilaterally, right more than left.  We reviewed NCCN guidelines for breast cancer follow-up on this visit.  We also discussed BSE and the importance of breast massage in detail.  She has skin thickening and areas of fat necrosis of the right breast as well as decreased range of motion of the right upper extremity.  Will consult occupational therapy for further evaluation and recommendations.  She is tolerating her Arimidex well.  Discussed limiting alcohol and importance of vitamin D and calcium daily.  Will plan to see in July with bilateral screening mammogram same day.    07/19/2022   Clinically, she is without evidence of recurrent disease.  She has significant post surgical changes bilaterally.  Right breast lymphedema is improved post occupational therapy.  At this time, we will plan to see her back in 6 months with complete breast ultrasounds.  Will revisit issue of breast MRI on that visit.  We reviewed the importance of breast self-examination and calling in the event she notices any change.  We will follow-up with occupational therapy as directed.  To start Effexor tonight for AI induced mood swings and follow-up with medical oncology in 1 month.    01/20/2023 Clinical

## 2024-12-19 ENCOUNTER — OFFICE VISIT (OUTPATIENT)
Dept: BREAST CENTER | Age: 64
End: 2024-12-19
Payer: MEDICARE

## 2024-12-19 ENCOUNTER — HOSPITAL ENCOUNTER (OUTPATIENT)
Dept: GENERAL RADIOLOGY | Age: 64
Discharge: HOME | End: 2024-12-21
Payer: MEDICARE

## 2024-12-19 VITALS
TEMPERATURE: 97 F | BODY MASS INDEX: 24.64 KG/M2 | WEIGHT: 157 LBS | SYSTOLIC BLOOD PRESSURE: 128 MMHG | RESPIRATION RATE: 14 BRPM | DIASTOLIC BLOOD PRESSURE: 70 MMHG | OXYGEN SATURATION: 98 % | HEIGHT: 67 IN | HEART RATE: 87 BPM

## 2024-12-19 DIAGNOSIS — Z12.31 VISIT FOR SCREENING MAMMOGRAM: ICD-10-CM

## 2024-12-19 DIAGNOSIS — R92.30 DENSE BREAST: ICD-10-CM

## 2024-12-19 DIAGNOSIS — I89.0 LYMPHEDEMA OF BREAST: ICD-10-CM

## 2024-12-19 DIAGNOSIS — Z01.818 PREOP TESTING: ICD-10-CM

## 2024-12-19 DIAGNOSIS — Z85.3 PERSONAL HISTORY OF BREAST CANCER: Primary | ICD-10-CM

## 2024-12-19 PROCEDURE — 77063 BREAST TOMOSYNTHESIS BI: CPT

## 2024-12-19 PROCEDURE — 99213 OFFICE O/P EST LOW 20 MIN: CPT | Performed by: NURSE PRACTITIONER

## 2024-12-19 RX ORDER — CALCIUM CARBONATE 500(1250)
500 TABLET ORAL DAILY
COMMUNITY

## 2024-12-19 ASSESSMENT — ENCOUNTER SYMPTOMS: COUGH: 0

## 2024-12-23 ENCOUNTER — OFFICE VISIT (OUTPATIENT)
Dept: ONCOLOGY | Age: 64
End: 2024-12-23
Payer: MEDICARE

## 2024-12-23 ENCOUNTER — HOSPITAL ENCOUNTER (OUTPATIENT)
Dept: INFUSION THERAPY | Age: 64
Discharge: HOME | End: 2024-12-23
Payer: MEDICARE

## 2024-12-23 VITALS
HEIGHT: 67 IN | WEIGHT: 158 LBS | OXYGEN SATURATION: 96 % | SYSTOLIC BLOOD PRESSURE: 139 MMHG | TEMPERATURE: 97.8 F | HEART RATE: 84 BPM | DIASTOLIC BLOOD PRESSURE: 81 MMHG | BODY MASS INDEX: 24.8 KG/M2

## 2024-12-23 DIAGNOSIS — Z79.811 USE OF AROMATASE INHIBITORS: ICD-10-CM

## 2024-12-23 DIAGNOSIS — Z17.0 MALIGNANT NEOPLASM OF RIGHT BREAST IN FEMALE, ESTROGEN RECEPTOR POSITIVE, UNSPECIFIED SITE OF BREAST (HCC): Primary | ICD-10-CM

## 2024-12-23 DIAGNOSIS — C50.911 MALIGNANT NEOPLASM OF RIGHT BREAST IN FEMALE, ESTROGEN RECEPTOR POSITIVE, UNSPECIFIED SITE OF BREAST (HCC): Primary | ICD-10-CM

## 2024-12-23 LAB
ALBUMIN SERPL-MCNC: 4.6 G/DL (ref 3.5–5.2)
ALP SERPL-CCNC: 75 U/L (ref 35–104)
ALT SERPL-CCNC: 13 U/L (ref 0–32)
ANION GAP SERPL CALCULATED.3IONS-SCNC: 11 MMOL/L (ref 7–16)
AST SERPL-CCNC: 16 U/L (ref 0–31)
BASOPHILS # BLD: 0.06 K/UL (ref 0–0.2)
BASOPHILS NFR BLD: 1 % (ref 0–2)
BILIRUB SERPL-MCNC: 0.4 MG/DL (ref 0–1.2)
BUN SERPL-MCNC: 17 MG/DL (ref 6–23)
CALCIUM SERPL-MCNC: 9.4 MG/DL (ref 8.6–10.2)
CHLORIDE SERPL-SCNC: 102 MMOL/L (ref 98–107)
CO2 SERPL-SCNC: 24 MMOL/L (ref 22–29)
CREAT SERPL-MCNC: 1 MG/DL (ref 0.5–1)
EOSINOPHIL # BLD: 0.32 K/UL (ref 0.05–0.5)
EOSINOPHILS RELATIVE PERCENT: 5 % (ref 0–6)
ERYTHROCYTE [DISTWIDTH] IN BLOOD BY AUTOMATED COUNT: 11.4 % (ref 11.5–15)
GFR, ESTIMATED: 63 ML/MIN/1.73M2
GLUCOSE SERPL-MCNC: 220 MG/DL (ref 74–99)
HCT VFR BLD AUTO: 47.2 % (ref 34–48)
HGB BLD-MCNC: 15.5 G/DL (ref 11.5–15.5)
IMM GRANULOCYTES # BLD AUTO: <0.03 K/UL (ref 0–0.58)
IMM GRANULOCYTES NFR BLD: 0 % (ref 0–5)
LYMPHOCYTES NFR BLD: 2.71 K/UL (ref 1.5–4)
LYMPHOCYTES RELATIVE PERCENT: 41 % (ref 20–42)
MCH RBC QN AUTO: 31.4 PG (ref 26–35)
MCHC RBC AUTO-ENTMCNC: 32.8 G/DL (ref 32–34.5)
MCV RBC AUTO: 95.7 FL (ref 80–99.9)
MONOCYTES NFR BLD: 0.51 K/UL (ref 0.1–0.95)
MONOCYTES NFR BLD: 8 % (ref 2–12)
NEUTROPHILS NFR BLD: 45 % (ref 43–80)
NEUTS SEG NFR BLD: 2.95 K/UL (ref 1.8–7.3)
PLATELET # BLD AUTO: 329 K/UL (ref 130–450)
PMV BLD AUTO: 9.6 FL (ref 7–12)
POTASSIUM SERPL-SCNC: 4 MMOL/L (ref 3.5–5)
PROT SERPL-MCNC: 7.8 G/DL (ref 6.4–8.3)
RBC # BLD AUTO: 4.93 M/UL (ref 3.5–5.5)
SODIUM SERPL-SCNC: 137 MMOL/L (ref 132–146)
WBC OTHER # BLD: 6.6 K/UL (ref 4.5–11.5)

## 2024-12-23 PROCEDURE — 99214 OFFICE O/P EST MOD 30 MIN: CPT | Performed by: INTERNAL MEDICINE

## 2024-12-23 PROCEDURE — 85025 COMPLETE CBC W/AUTO DIFF WBC: CPT

## 2024-12-23 PROCEDURE — 1160F RVW MEDS BY RX/DR IN RCRD: CPT | Performed by: INTERNAL MEDICINE

## 2024-12-23 PROCEDURE — 36415 COLL VENOUS BLD VENIPUNCTURE: CPT

## 2024-12-23 PROCEDURE — 1123F ACP DISCUSS/DSCN MKR DOCD: CPT | Performed by: INTERNAL MEDICINE

## 2024-12-23 PROCEDURE — 99212 OFFICE O/P EST SF 10 MIN: CPT

## 2024-12-23 PROCEDURE — 1159F MED LIST DOCD IN RCRD: CPT | Performed by: INTERNAL MEDICINE

## 2024-12-23 PROCEDURE — 80053 COMPREHEN METABOLIC PANEL: CPT

## 2024-12-23 RX ORDER — EXEMESTANE 25 MG/1
25 TABLET ORAL DAILY
Qty: 90 TABLET | Refills: 3 | Status: SHIPPED | OUTPATIENT
Start: 2024-12-23

## 2024-12-23 NOTE — PROGRESS NOTES
ultrasound done on 6/27/2020, which I had reviewed, revealing asymmetry is related to surgery, there was no evidence of malignancy.      DEXA scan was done on 12/14/2023, had revealed osteopenia, recommended calcium and vitamin D, she will have a repeat DEXA scan done in December 2025.    Labs reviewed, blood sugar is 220 otherwise unremarkable.    Genetics: My-Risk Negative. No clinically significant mutation identified.   No VUS identified.     RTC in 6 months    ELIZABETH PANG MD   HEMATOLOGY/MEDICAL ONCOLOGY  [facility]  [address] [place] [id number]  Dept: [phone]  Loc: [phone]

## 2025-01-17 ENCOUNTER — TELEPHONE (OUTPATIENT)
Dept: FAMILY MEDICINE CLINIC | Age: 65
End: 2025-01-17

## 2025-01-17 DIAGNOSIS — M10.9 GOUT, UNSPECIFIED CAUSE, UNSPECIFIED CHRONICITY, UNSPECIFIED SITE: ICD-10-CM

## 2025-01-17 RX ORDER — ALLOPURINOL 300 MG/1
TABLET ORAL
Qty: 45 TABLET | Refills: 1 | Status: SHIPPED | OUTPATIENT
Start: 2025-01-17

## 2025-01-17 NOTE — TELEPHONE ENCOUNTER
Name of Medication(s) Requested:  Requested Prescriptions      No prescriptions requested or ordered in this encounter   Allopurinol    Medication is on current medication list Yes    Dosage and directions were verified? Yes    Quantity verified: 90 day supply     Pharmacy Verified?  Yes    Last Appointment:  11/6/2024    Future appts:  Future Appointments   Date Time Provider Department Center   2/3/2025 11:30 AM Josep Hearn DPM [place] [facility]   7/21/2025  8:50 AM [facility] LABS ROOM MEDICAL ONCOLOGY [facility] MED ONC [place]   7/21/2025  9:45 AM Brionna Fabian MD [facility] MedONC [facility]   8/6/2025  1:00 PM Francis Crespo DO [place] [facility] ECC DEP        (If no appt send self scheduling link. .REFILLAPPT)  Scheduling request sent?     [] Yes  [x] No    Does patient need updated?  [] Yes  [x] No

## 2025-01-20 DIAGNOSIS — C50.911 MALIGNANT NEOPLASM OF RIGHT BREAST IN FEMALE, ESTROGEN RECEPTOR POSITIVE, UNSPECIFIED SITE OF BREAST (HCC): ICD-10-CM

## 2025-01-20 DIAGNOSIS — Z17.0 MALIGNANT NEOPLASM OF RIGHT BREAST IN FEMALE, ESTROGEN RECEPTOR POSITIVE, UNSPECIFIED SITE OF BREAST (HCC): ICD-10-CM

## 2025-01-20 RX ORDER — VENLAFAXINE HYDROCHLORIDE 150 MG/1
150 CAPSULE, EXTENDED RELEASE ORAL DAILY
Qty: 30 CAPSULE | Refills: 0 | Status: SHIPPED | OUTPATIENT
Start: 2025-01-20

## 2025-01-24 ENCOUNTER — OFFICE VISIT (OUTPATIENT)
Dept: PRIMARY CARE CLINIC | Age: 65
End: 2025-01-24

## 2025-01-24 VITALS
HEIGHT: 67 IN | SYSTOLIC BLOOD PRESSURE: 125 MMHG | HEART RATE: 97 BPM | TEMPERATURE: 97.9 F | WEIGHT: 159 LBS | BODY MASS INDEX: 24.96 KG/M2 | DIASTOLIC BLOOD PRESSURE: 80 MMHG | OXYGEN SATURATION: 96 %

## 2025-01-24 DIAGNOSIS — N39.0 URINARY TRACT INFECTION IN FEMALE: Primary | ICD-10-CM

## 2025-01-24 LAB
BILIRUBIN, POC: ABNORMAL
BLOOD URINE, POC: ABNORMAL
CLARITY, POC: ABNORMAL
COLOR, POC: ABNORMAL
GLUCOSE URINE, POC: ABNORMAL MG/DL
KETONES, POC: NEGATIVE MG/DL
LEUKOCYTE EST, POC: ABNORMAL
NITRITE, POC: NEGATIVE
PH, POC: 6
PROTEIN, POC: ABNORMAL MG/DL
SPECIFIC GRAVITY, POC: 1.02
UROBILINOGEN, POC: 0.2 MG/DL

## 2025-01-24 RX ORDER — CEFDINIR 300 MG/1
300 CAPSULE ORAL 2 TIMES DAILY
Qty: 20 CAPSULE | Refills: 0 | Status: SHIPPED | OUTPATIENT
Start: 2025-01-24 | End: 2025-02-03

## 2025-01-24 RX ORDER — CEFTRIAXONE 500 MG/1
500 INJECTION, POWDER, FOR SOLUTION INTRAMUSCULAR; INTRAVENOUS ONCE
Status: COMPLETED | OUTPATIENT
Start: 2025-01-24 | End: 2025-01-24

## 2025-01-24 RX ADMIN — CEFTRIAXONE 500 MG: 500 INJECTION, POWDER, FOR SOLUTION INTRAMUSCULAR; INTRAVENOUS at 11:46

## 2025-01-24 NOTE — PROGRESS NOTES
Chief Complaint:   Urinary Tract Infection      History of Present Illness   Source of history provided by:  patient.      Yahir Abbasi is a 64 y.o. old female who has a past medical history of:   Past Medical History:   Diagnosis Date    BRCA1 negative     BRCA2 negative     Breast cancer (HCC)     Cancer (HCC)     Diabetes mellitus (HCC)     Diverticulitis     Frequent UTI     Hiatal hernia     History of therapeutic radiation     Hypertension     Kidney stones     Lymphedema     right breast    PONV (postoperative nausea and vomiting)     Squamous cell carcinoma, arm     left lower and right upper    Thyroid disease         Pt presents to the [facility] for frequency/suprapubic pain.  Pt states the symptoms have progressed over the past few days.   No flank pain.  Denies any vaginal discharge, vaginal bleeding, vomiting, diarrhea, or lethargy.   No LMP recorded (lmp unknown). Patient has had a hysterectomy.  Pt is tolerating PO but appetite is decreased. Pt does have a h/o UTIs. Most recently treated on 11/6/24 urine culture revealed > 100,000 E Coli.       ROS    Unless otherwise stated in this report or unable to obtain because of the patient's clinical or mental status as evidenced by the medical record, this patients's positive and negative responses for Review of Systems, constitutional, psych, eyes, ENT, cardiovascular, respiratory, gastrointestinal, neurological, genitourinary, musculoskeletal, integument systems and systems related to the presenting problem are either stated in the preceding or were not pertinent or were negative for the symptoms and/or complaints related to the medical problem.    Past Surgical history:   Past Surgical History:   Procedure Laterality Date    BREAST REDUCTION SURGERY Right 08/17/2021    RIGHT ONCOPLASTIC BREAST REDUCTION, MATCHING LEFT BREAST REDUCTION AND EXCISION SQUAMOUS CELL CARCINOMA, RIGHT MID CHEST, RIGHT BREAST NEEDLE LOCALIZED LUMPECTOMY BLUE DYE INJECTION,

## 2025-01-26 LAB
CULTURE: ABNORMAL
SPECIMEN DESCRIPTION: ABNORMAL

## 2025-01-27 LAB
CULTURE: ABNORMAL
SPECIMEN DESCRIPTION: ABNORMAL

## 2025-02-03 ENCOUNTER — OFFICE VISIT (OUTPATIENT)
Dept: PODIATRY | Age: 65
End: 2025-02-03
Payer: MEDICARE

## 2025-02-03 VITALS — BODY MASS INDEX: 24.96 KG/M2 | HEIGHT: 67 IN | WEIGHT: 159 LBS

## 2025-02-03 DIAGNOSIS — E11.9 TYPE 2 DIABETES MELLITUS WITHOUT COMPLICATION, WITHOUT LONG-TERM CURRENT USE OF INSULIN (HCC): Primary | ICD-10-CM

## 2025-02-03 DIAGNOSIS — B35.1 TINEA UNGUIUM: ICD-10-CM

## 2025-02-03 PROCEDURE — 99203 OFFICE O/P NEW LOW 30 MIN: CPT | Performed by: PODIATRIST

## 2025-02-03 PROCEDURE — 3052F HG A1C>EQUAL 8.0%<EQUAL 9.0%: CPT | Performed by: PODIATRIST

## 2025-02-03 PROCEDURE — 1123F ACP DISCUSS/DSCN MKR DOCD: CPT | Performed by: PODIATRIST

## 2025-02-03 RX ORDER — TERBINAFINE HYDROCHLORIDE 250 MG/1
250 TABLET ORAL DAILY
Qty: 90 TABLET | Refills: 0 | Status: SHIPPED | OUTPATIENT
Start: 2025-02-03 | End: 2025-05-04

## 2025-02-03 NOTE — PROGRESS NOTES
Patient presents today as a new patient referred by Francis Crespo DO for fungal toenails and diabetes.  Type 2 diabetic.  They were last seen by PCP, Francis Crespo DO, on 11/06/2024.    Electronically signed by Janeth Hooks MA on 2/3/2025 at 11:52 AM      CC:    Issues with toenails both feet history of nail fungus as well as history of diabetes    HPI:   This 64-year-old female patient history of diabetes referred me today foot ankle exam.  Here today diabetic foot ankle exam with history of nail fungus.  No open skin lesions or abrasions.  I would like to discuss treatment options for nail fungus.  Denies recent treatment.  Denies any numbness or tingling of her feet today.  No additional pedal complaints at this time.    ROS:  Const: Denies constitutional symptoms  Musculo: Denies symptoms other than stated above  Skin: Denies symptoms other than stated above       Current Outpatient Medications:     terbinafine (LAMISIL) 250 MG tablet, Take 1 tablet by mouth daily, Disp: 90 tablet, Rfl: 0    venlafaxine (EFFEXOR XR) 150 MG extended release capsule, Take 1 capsule by mouth once daily, Disp: 30 capsule, Rfl: 0    allopurinol (ZYLOPRIM) 300 MG tablet, Take 1/2 (one-half) tablet by mouth once daily, Disp: 45 tablet, Rfl: 1    exemestane (AROMASIN) 25 MG tablet, Take 1 tablet by mouth daily, Disp: 90 tablet, Rfl: 3    calcium carbonate (OSCAL) 500 MG TABS tablet, Take 1 tablet by mouth daily, Disp: , Rfl:     Multiple Vitamins-Minerals (HAIR SKIN & NAILS PO), Take by mouth, Disp: , Rfl:     glipiZIDE (GLUCOTROL) 10 MG tablet, Take 1 tablet by mouth 2 times daily, Disp: 180 tablet, Rfl: 5    rosuvastatin (CRESTOR) 20 MG tablet, Take 1 tablet by mouth nightly, Disp: 90 tablet, Rfl: 3    empagliflozin (JARDIANCE) 25 MG tablet, Take 1 tablet by mouth daily, Disp: 30 tablet, Rfl: 5    omeprazole (PRILOSEC) 20 MG delayed release capsule, TAKE 1 CAPSULE BY MOUTH ONCE DAILY IN THE MORNING BEFORE BREAKFAST,

## 2025-02-12 ENCOUNTER — OFFICE VISIT (OUTPATIENT)
Dept: FAMILY MEDICINE CLINIC | Age: 65
End: 2025-02-12

## 2025-02-12 VITALS
HEART RATE: 84 BPM | WEIGHT: 159.2 LBS | SYSTOLIC BLOOD PRESSURE: 115 MMHG | HEIGHT: 67 IN | DIASTOLIC BLOOD PRESSURE: 74 MMHG | TEMPERATURE: 96.8 F | OXYGEN SATURATION: 98 % | RESPIRATION RATE: 18 BRPM | BODY MASS INDEX: 24.99 KG/M2

## 2025-02-12 DIAGNOSIS — R30.0 BURNING WITH URINATION: ICD-10-CM

## 2025-02-12 DIAGNOSIS — E11.65 TYPE 2 DIABETES MELLITUS WITH HYPERGLYCEMIA, WITHOUT LONG-TERM CURRENT USE OF INSULIN (HCC): ICD-10-CM

## 2025-02-12 DIAGNOSIS — R82.90 BAD ODOR OF URINE: Primary | ICD-10-CM

## 2025-02-12 LAB
BILIRUBIN, POC: NEGATIVE
BLOOD URINE, POC: NORMAL
CLARITY, POC: NORMAL
COLOR, POC: YELLOW
GLUCOSE URINE, POC: >=1000 MG/DL
HBA1C MFR BLD: 8.2 %
KETONES, POC: NEGATIVE MG/DL
LEUKOCYTE EST, POC: NEGATIVE
NITRITE, POC: NEGATIVE
PH, POC: 5.5
PROTEIN, POC: NEGATIVE MG/DL
SPECIFIC GRAVITY, POC: 1.01
UROBILINOGEN, POC: 0.2 MG/DL

## 2025-02-12 RX ORDER — PHENAZOPYRIDINE HYDROCHLORIDE 100 MG/1
100 TABLET, FILM COATED ORAL 3 TIMES DAILY PRN
Qty: 9 TABLET | Refills: 0 | Status: SHIPPED | OUTPATIENT
Start: 2025-02-12 | End: 2025-02-15

## 2025-02-12 RX ORDER — DULAGLUTIDE 0.75 MG/.5ML
0.75 INJECTION, SOLUTION SUBCUTANEOUS WEEKLY
Qty: 4 ADJUSTABLE DOSE PRE-FILLED PEN SYRINGE | Refills: 5 | Status: SHIPPED | OUTPATIENT
Start: 2025-02-12

## 2025-02-12 SDOH — ECONOMIC STABILITY: FOOD INSECURITY: WITHIN THE PAST 12 MONTHS, YOU WORRIED THAT YOUR FOOD WOULD RUN OUT BEFORE YOU GOT MONEY TO BUY MORE.: NEVER TRUE

## 2025-02-12 SDOH — ECONOMIC STABILITY: FOOD INSECURITY: WITHIN THE PAST 12 MONTHS, THE FOOD YOU BOUGHT JUST DIDN'T LAST AND YOU DIDN'T HAVE MONEY TO GET MORE.: NEVER TRUE

## 2025-02-12 ASSESSMENT — ENCOUNTER SYMPTOMS
VOMITING: 0
CHOKING: 0
ABDOMINAL PAIN: 0
CHEST TIGHTNESS: 0
DIARRHEA: 0
SHORTNESS OF BREATH: 0
BLOOD IN STOOL: 0
COUGH: 0
CONSTIPATION: 0
NAUSEA: 0
BACK PAIN: 0

## 2025-02-12 ASSESSMENT — PATIENT HEALTH QUESTIONNAIRE - PHQ9
SUM OF ALL RESPONSES TO PHQ QUESTIONS 1-9: 0
SUM OF ALL RESPONSES TO PHQ9 QUESTIONS 1 & 2: 0
1. LITTLE INTEREST OR PLEASURE IN DOING THINGS: NOT AT ALL
SUM OF ALL RESPONSES TO PHQ QUESTIONS 1-9: 0
2. FEELING DOWN, DEPRESSED OR HOPELESS: NOT AT ALL
SUM OF ALL RESPONSES TO PHQ QUESTIONS 1-9: 0
SUM OF ALL RESPONSES TO PHQ QUESTIONS 1-9: 0

## 2025-02-12 NOTE — PROGRESS NOTES
Yahir Abbasi (:  1960) is a 64 y.o. female, Established patient, here for evaluation of the following chief complaint(s):  burning with urination and urine odor         Assessment & Plan  1. Urinary Tract Infection.  She reports burning with urination and a bad odor to her urine, which started approximately 10 days ago. She was previously treated with Omnicef 300 mg twice a day for 7 days and a Rocephin 500 mg intramuscular shot. Despite initial improvement, symptoms have returned. Physical examination reveals right lower quadrant tenderness to palpation. A urine culture will be sent for further analysis. Pyridium 200 mg will be prescribed, to be taken three times daily for 3 days, to alleviate the burning sensation. She is advised that Pyridium may cause her urine and eye drainage to turn orange. She is also advised to increase her fluid intake, including water and cranberry juice, to help flush out the infection.    2. Diabetes Mellitus.  Her A1c level has increased from 8.0 in 2024 to 8.2 currently. She has been compliant with her medications and diet but reports occasional high blood sugar levels around 180. She will continue her current medications, Jardiance and Glucotrol. Additionally, Trulicity 0.75 mg once weekly will be introduced to help lower her A1c. She is advised to monitor for potential side effects, including gastrointestinal upset, nausea, and vomiting, and to report any issues.    Follow-up  The patient will follow up in 4 weeks.    PROCEDURE  The patient received a Rocephin 500 mg intramuscular injection at a walk-in clinic approximately 10 days ago.    Results  Laboratory Studies  Urine test shows trace amount of blood, no signs of infection. A1c is 8.2.  1. Bad odor of urine  Comments:  UA negative nitrates, leukocytes, and protein. Slight blood will send urine culture and sensitivity.  Pyridium 100 mg 1 by mouth tid as directed.  Orders:  -     POCT Urinalysis no Micro  -

## 2025-02-15 LAB
CULTURE: ABNORMAL
SPECIMEN DESCRIPTION: ABNORMAL

## 2025-02-18 DIAGNOSIS — E11.9 TYPE 2 DIABETES MELLITUS WITHOUT COMPLICATION, WITHOUT LONG-TERM CURRENT USE OF INSULIN (HCC): ICD-10-CM

## 2025-02-18 DIAGNOSIS — Z17.0 MALIGNANT NEOPLASM OF RIGHT BREAST IN FEMALE, ESTROGEN RECEPTOR POSITIVE, UNSPECIFIED SITE OF BREAST (HCC): ICD-10-CM

## 2025-02-18 DIAGNOSIS — C50.911 MALIGNANT NEOPLASM OF RIGHT BREAST IN FEMALE, ESTROGEN RECEPTOR POSITIVE, UNSPECIFIED SITE OF BREAST (HCC): ICD-10-CM

## 2025-02-18 RX ORDER — VENLAFAXINE HYDROCHLORIDE 150 MG/1
150 CAPSULE, EXTENDED RELEASE ORAL DAILY
Qty: 30 CAPSULE | Refills: 0 | Status: SHIPPED | OUTPATIENT
Start: 2025-02-18

## 2025-02-18 NOTE — TELEPHONE ENCOUNTER
Name of Medication(s) Requested:  Requested Prescriptions      No prescriptions requested or ordered in this encounter   Jardiance     Medication is on current medication list Yes    Dosage and directions were verified? Yes    Quantity verified: 30 day supply     Pharmacy Verified?  Yes send to [facility]     Last Appointment:  2/12/2025    Future appts:  Future Appointments   Date Time Provider Department Center   3/12/2025  3:45 PM Francis Crespo DO [place] [facility] DEP   4/21/2025 11:15 AM Josep Hearn DPM [facility] [place]   7/21/2025  8:50 AM [facility] LABS ROOM MEDICAL ONCOLOGY [facility] MED ONC [place]   7/21/2025  9:45 AM Brionna Fabian MD [facility]   8/6/2025  1:00 PM Francis Crespo DO [place] [facility] DEP        (If no appt send self scheduling link. .REFILLAPPT)  Scheduling request sent?     [] Yes  [x] No    Does patient need updated?  [] Yes  [x] No

## 2025-03-19 DIAGNOSIS — Z17.0 MALIGNANT NEOPLASM OF RIGHT BREAST IN FEMALE, ESTROGEN RECEPTOR POSITIVE, UNSPECIFIED SITE OF BREAST: ICD-10-CM

## 2025-03-19 DIAGNOSIS — C50.911 MALIGNANT NEOPLASM OF RIGHT BREAST IN FEMALE, ESTROGEN RECEPTOR POSITIVE, UNSPECIFIED SITE OF BREAST: ICD-10-CM

## 2025-03-19 RX ORDER — VENLAFAXINE HYDROCHLORIDE 150 MG/1
150 CAPSULE, EXTENDED RELEASE ORAL DAILY
Qty: 30 CAPSULE | Refills: 0 | Status: SHIPPED | OUTPATIENT
Start: 2025-03-19

## 2025-04-04 ENCOUNTER — OFFICE VISIT (OUTPATIENT)
Dept: PRIMARY CARE CLINIC | Age: 65
End: 2025-04-04

## 2025-04-04 VITALS
DIASTOLIC BLOOD PRESSURE: 77 MMHG | BODY MASS INDEX: 24.17 KG/M2 | HEIGHT: 67 IN | HEART RATE: 77 BPM | TEMPERATURE: 97.2 F | OXYGEN SATURATION: 98 % | WEIGHT: 154 LBS | RESPIRATION RATE: 18 BRPM | SYSTOLIC BLOOD PRESSURE: 122 MMHG

## 2025-04-04 DIAGNOSIS — N39.0 URINARY TRACT INFECTION IN FEMALE: Primary | ICD-10-CM

## 2025-04-04 DIAGNOSIS — J02.9 SORE THROAT: ICD-10-CM

## 2025-04-04 LAB
BILIRUBIN, POC: NORMAL
BLOOD URINE, POC: NORMAL
CLARITY, POC: NORMAL
COLOR, POC: NORMAL
GLUCOSE URINE, POC: 500 MG/DL
KETONES, POC: NORMAL MG/DL
LEUKOCYTE EST, POC: NORMAL
NITRITE, POC: POSITIVE
PH, POC: 6
PROTEIN, POC: 30 MG/DL
S PYO AG THROAT QL: NORMAL
SPECIFIC GRAVITY, POC: 1.02
UROBILINOGEN, POC: 0.2 MG/DL

## 2025-04-04 RX ORDER — CEFDINIR 300 MG/1
300 CAPSULE ORAL 2 TIMES DAILY
Qty: 20 CAPSULE | Refills: 0 | Status: SHIPPED | OUTPATIENT
Start: 2025-04-04 | End: 2025-04-14

## 2025-04-04 NOTE — PROGRESS NOTES
Chief Complaint:   Pharyngitis, burning with urination, and Urinary Frequency      History of Present Illness   Source of history provided by:  patient.      Yahir Abbasi is a 64 y.o. old female with a past medical history of:   Past Medical History:   Diagnosis Date    BRCA1 negative     BRCA2 negative     Breast cancer     Cancer (HCC)     Diabetes mellitus (HCC)     Diverticulitis     Frequent UTI     Hiatal hernia     History of therapeutic radiation     Hypertension     Kidney stones     Lymphedema     right breast    PONV (postoperative nausea and vomiting)     Squamous cell carcinoma, arm     left lower and right upper    Thyroid disease        Pt [facility] with urinary frequency/dysuria and sore throat  for the past few days.  No fever present today. Pt is tolerating liquids but decreased for solids. Pt does have a h/o UTIs    Denies any N/V/D, abdominal pain, difficulty swallowing, congestion, cough, rashes, hematuria, flank pain, CP, progressive SOB, dizziness, or lethargy.         ROS    Unless otherwise stated in this report or unable to obtain because of the patient's clinical or mental status as evidenced by the medical record, this patients's positive and negative responses for Review of Systems, constitutional, psych, eyes, ENT, cardiovascular, respiratory, gastrointestinal, neurological, genitourinary, musculoskeletal, integument systems and systems related to the presenting problem are either stated in the preceding or were not pertinent or were negative for the symptoms and/or complaints related to the medical problem.    Past Surgical History:  has a past surgical history that includes Hysterectomy; Rectal prolapse repair; ECHO Compl W Dop Color Flow (08/15/2013); Colonoscopy; Endoscopy, colon, diagnostic; Cholecystectomy, laparoscopic (N/A, 06/20/2019); US BREAST BIOPSY W LOC DEVICE 1ST LESION RIGHT (Right, 06/24/2021); Breast reduction surgery (Right, 08/17/2021); and

## 2025-04-06 LAB
CULTURE: ABNORMAL
SPECIMEN DESCRIPTION: ABNORMAL

## 2025-04-07 ENCOUNTER — RESULTS FOLLOW-UP (OUTPATIENT)
Dept: PRIMARY CARE CLINIC | Age: 65
End: 2025-04-07

## 2025-04-21 DIAGNOSIS — Z17.0 MALIGNANT NEOPLASM OF RIGHT BREAST IN FEMALE, ESTROGEN RECEPTOR POSITIVE, UNSPECIFIED SITE OF BREAST (HCC): ICD-10-CM

## 2025-04-21 DIAGNOSIS — C50.911 MALIGNANT NEOPLASM OF RIGHT BREAST IN FEMALE, ESTROGEN RECEPTOR POSITIVE, UNSPECIFIED SITE OF BREAST (HCC): ICD-10-CM

## 2025-04-21 RX ORDER — VENLAFAXINE HYDROCHLORIDE 150 MG/1
150 CAPSULE, EXTENDED RELEASE ORAL DAILY
Qty: 30 CAPSULE | Refills: 0 | Status: SHIPPED | OUTPATIENT
Start: 2025-04-21

## 2025-04-30 ENCOUNTER — TELEPHONE (OUTPATIENT)
Dept: FAMILY MEDICINE CLINIC | Age: 65
End: 2025-04-30

## 2025-04-30 NOTE — TELEPHONE ENCOUNTER
Pt calling to let you know that she is going to stop taking Trulicity she has been taking it for 6 weeks and is having what she called gastritis     When message has been addressed, please route message to office pool not to original sender.

## 2025-05-02 DIAGNOSIS — E03.9 ACQUIRED HYPOTHYROIDISM: ICD-10-CM

## 2025-05-02 RX ORDER — LEVOTHYROXINE SODIUM 50 UG/1
50 TABLET ORAL DAILY
Qty: 90 TABLET | Refills: 1 | Status: SHIPPED | OUTPATIENT
Start: 2025-05-02

## 2025-05-02 NOTE — TELEPHONE ENCOUNTER
Name of Medication(s) Requested:    levothyroxine (SYNTHROID) 50 MCG table       Medication is on current medication list Yes    Dosage and directions were verified? Yes    Quantity verified: 90 day supply     Pharmacy Verified?  Yes [facility]    Last Appointment:  2/12/2025    Future appts:  Future Appointments   Date Time Provider Department Center   7/7/2025 10:00 AM Josep Hearn DPM [facility]   7/21/2025  8:50 AM [facility] LABS ROOM MEDICAL ONCOLOGY [place] MED ONC [place]   7/21/2025  9:45 AM Brinona Fabian MD [facility] MedONC [facility]   8/6/2025  1:00 PM Francis Crespo DO [place] [facility] ECC DEP        (If no appt send self scheduling link. .REFILLAPPT)  Scheduling request sent?     [] Yes  [x] No    Does patient need updated?  [] Yes  [x] No

## 2025-05-13 ENCOUNTER — TELEPHONE (OUTPATIENT)
Dept: FAMILY MEDICINE CLINIC | Age: 65
End: 2025-05-13

## 2025-05-13 NOTE — TELEPHONE ENCOUNTER
Patient did not follow-up with her 1 month follow-up when I seen her in February, 2025.  On that office visit, I started her on Trulicity 0.75 mg subcu weekly.  I will need to see her in the office within the next 1 to 2 weeks to discuss endocrinology referral if needed.

## 2025-05-13 NOTE — TELEPHONE ENCOUNTER
Pt is requesting referral to [facility]. Pt would like a call when referral is placed in chart. Pt said she has a Dx of DM and she has hx of medications not working.

## 2025-05-14 NOTE — TELEPHONE ENCOUNTER
Called PT, no answer. Left VM to schedule an appointment w/I the next 1-2 weeks to discuss referral to Endo

## 2025-05-22 ENCOUNTER — TELEPHONE (OUTPATIENT)
Age: 65
End: 2025-05-22

## 2025-05-22 NOTE — TELEPHONE ENCOUNTER
RN attempted to contact patient to discuss moving forward with breast MRI. RN reached patient voicemail and left detailed message of why was calling and office number for patient to call office back.    Electronically signed by Liliana Marquez RN on 5/22/25 at 9:20 AM EDT

## 2025-05-22 NOTE — TELEPHONE ENCOUNTER
----- Message from DIANN AGUSTIN RN sent at 12/19/2024  2:09 PM EST -----  Patient dues for breast MRI 6/19/25 or after. Call patient verify insurance is correct in chart and remind to get lab work done.       Electronically signed by Liliana Marquez RN on 12/19/24 at 2:10 PM EST

## 2025-05-23 ENCOUNTER — OFFICE VISIT (OUTPATIENT)
Dept: FAMILY MEDICINE CLINIC | Age: 65
End: 2025-05-23

## 2025-05-23 VITALS
RESPIRATION RATE: 19 BRPM | WEIGHT: 150 LBS | BODY MASS INDEX: 23.54 KG/M2 | TEMPERATURE: 97.3 F | HEIGHT: 67 IN | DIASTOLIC BLOOD PRESSURE: 72 MMHG | OXYGEN SATURATION: 97 % | SYSTOLIC BLOOD PRESSURE: 120 MMHG | HEART RATE: 101 BPM

## 2025-05-23 DIAGNOSIS — E78.2 MIXED HYPERLIPIDEMIA: ICD-10-CM

## 2025-05-23 DIAGNOSIS — Z17.0 MALIGNANT NEOPLASM OF RIGHT BREAST IN FEMALE, ESTROGEN RECEPTOR POSITIVE, UNSPECIFIED SITE OF BREAST (HCC): ICD-10-CM

## 2025-05-23 DIAGNOSIS — F41.9 ANXIETY: ICD-10-CM

## 2025-05-23 DIAGNOSIS — C50.911 MALIGNANT NEOPLASM OF RIGHT BREAST IN FEMALE, ESTROGEN RECEPTOR POSITIVE, UNSPECIFIED SITE OF BREAST (HCC): ICD-10-CM

## 2025-05-23 DIAGNOSIS — E11.65 TYPE 2 DIABETES MELLITUS WITH HYPERGLYCEMIA, WITHOUT LONG-TERM CURRENT USE OF INSULIN (HCC): Primary | ICD-10-CM

## 2025-05-23 DIAGNOSIS — E55.9 VITAMIN D DEFICIENCY: ICD-10-CM

## 2025-05-23 DIAGNOSIS — E03.9 ACQUIRED HYPOTHYROIDISM: ICD-10-CM

## 2025-05-23 LAB — HBA1C MFR BLD: 6.7 %

## 2025-05-23 RX ORDER — VENLAFAXINE HYDROCHLORIDE 150 MG/1
150 CAPSULE, EXTENDED RELEASE ORAL DAILY
Qty: 30 CAPSULE | Refills: 0 | Status: SHIPPED | OUTPATIENT
Start: 2025-05-23

## 2025-05-23 ASSESSMENT — ENCOUNTER SYMPTOMS
ABDOMINAL PAIN: 1
NAUSEA: 1
VOMITING: 1
CONSTIPATION: 0
WHEEZING: 0
BACK PAIN: 0
BLOOD IN STOOL: 0
CHOKING: 0
CHEST TIGHTNESS: 0
SHORTNESS OF BREATH: 0
COUGH: 0
DIARRHEA: 0

## 2025-05-23 NOTE — PROGRESS NOTES
Yahir Abbasi (:  1960) is a 64 y.o. female, Established patient, here for evaluation of the following chief complaint(s):  Diabetes, Hyperlipidemia, and Hypothyroidism         Assessment & Plan  1. Type 2 Diabetes Mellitus.  - A1c level has improved significantly from 8.2 in 2025 to 6.7 currently.  - Experienced adverse effects with Trulicity, including stomach upset, vomiting, and diarrhea, leading to discontinuation.  - Currently on Jardiance 25 mg daily and Glipizide 10 mg twice a day. Advised to monitor blood glucose levels before meals, specifically breakfast and dinner.  - Prescription for Rybelsus will be provided, starting at a low dose of either 3 mg or 7 mg daily. Fasting blood work will be conducted one week prior to the next visit to assess liver and kidney function, cholesterol levels, vitamin D level, and TSH.    2. Hyperlipidemia.  - Currently on Crestor 20 mg daily.  - Physical exam findings include regular rate and rhythm without murmurs, rubs, or gallops.  - Fasting blood work will be conducted one week prior to the next visit to assess cholesterol levels.  - Continue current medication regimen.    3. Hypothyroidism.  - Currently on Synthroid 50 mcg daily.  - Physical exam findings include neck supple with no adenopathy.  - Fasting blood work will be conducted one week prior to the next visit to assess TSH levels.  - Continue current medication regimen.    4. Breast Cancer in Remission.  - Breast cancer is in remission and patient sees a specialist annually.  - No physical exam findings indicating recurrence.  - Reviewed records and confirmed remission status.  - Continue annual specialist visits.    Follow-up  The patient will follow up in 6 weeks.    Results  Labs   - A1c: 6.7  1. Type 2 diabetes mellitus with hyperglycemia, without long-term current use of insulin (HCC)  -     POCT glycosylated hemoglobin (Hb A1C)  -     Semaglutide 7 MG TABS; Take 7 mg by mouth daily, Disp-30

## 2025-06-03 NOTE — TELEPHONE ENCOUNTER
Pts most recent visit was 5/23/25 pt called in requesting referral be sent to Endo. Pt would like a call when referral has been sent.

## 2025-06-04 ENCOUNTER — TELEPHONE (OUTPATIENT)
Age: 65
End: 2025-06-04

## 2025-06-04 DIAGNOSIS — E11.65 TYPE 2 DIABETES MELLITUS WITH HYPERGLYCEMIA, WITHOUT LONG-TERM CURRENT USE OF INSULIN (HCC): Primary | ICD-10-CM

## 2025-06-04 NOTE — TELEPHONE ENCOUNTER
RN attempted to contact patient to discuss moving forward with breast MRI. RN reached patient voicemail and left detailed message of why was calling and office number for patient to call office back.      Electronically signed by Liliana Marquez RN on 6/4/25 at 9:19 AM EDT

## 2025-06-09 ENCOUNTER — HOSPITAL ENCOUNTER (EMERGENCY)
Age: 65
Discharge: HOME | End: 2025-06-10
Attending: EMERGENCY MEDICINE
Payer: MEDICARE

## 2025-06-09 ENCOUNTER — APPOINTMENT (OUTPATIENT)
Dept: CT IMAGING | Age: 65
End: 2025-06-09
Payer: MEDICARE

## 2025-06-09 VITALS
OXYGEN SATURATION: 97 % | BODY MASS INDEX: 23.02 KG/M2 | DIASTOLIC BLOOD PRESSURE: 70 MMHG | TEMPERATURE: 97.7 F | SYSTOLIC BLOOD PRESSURE: 100 MMHG | RESPIRATION RATE: 16 BRPM | WEIGHT: 147 LBS | HEART RATE: 115 BPM

## 2025-06-09 DIAGNOSIS — R19.7 DIARRHEA, UNSPECIFIED TYPE: ICD-10-CM

## 2025-06-09 DIAGNOSIS — K80.20 GALLSTONES: ICD-10-CM

## 2025-06-09 DIAGNOSIS — N30.00 ACUTE CYSTITIS WITHOUT HEMATURIA: Primary | ICD-10-CM

## 2025-06-09 DIAGNOSIS — K52.9 GASTROENTERITIS: ICD-10-CM

## 2025-06-09 DIAGNOSIS — N20.0 KIDNEY STONE: ICD-10-CM

## 2025-06-09 PROCEDURE — 83880 ASSAY OF NATRIURETIC PEPTIDE: CPT

## 2025-06-09 PROCEDURE — 84484 ASSAY OF TROPONIN QUANT: CPT

## 2025-06-09 PROCEDURE — 93005 ELECTROCARDIOGRAM TRACING: CPT | Performed by: EMERGENCY MEDICINE

## 2025-06-09 PROCEDURE — 81001 URINALYSIS AUTO W/SCOPE: CPT

## 2025-06-09 PROCEDURE — 83605 ASSAY OF LACTIC ACID: CPT

## 2025-06-09 PROCEDURE — 80053 COMPREHEN METABOLIC PANEL: CPT

## 2025-06-09 PROCEDURE — 71045 X-RAY EXAM CHEST 1 VIEW: CPT

## 2025-06-09 PROCEDURE — 99285 EMERGENCY DEPT VISIT HI MDM: CPT

## 2025-06-09 PROCEDURE — 82150 ASSAY OF AMYLASE: CPT

## 2025-06-09 PROCEDURE — 82550 ASSAY OF CK (CPK): CPT

## 2025-06-09 PROCEDURE — 83735 ASSAY OF MAGNESIUM: CPT

## 2025-06-09 PROCEDURE — 85610 PROTHROMBIN TIME: CPT

## 2025-06-09 PROCEDURE — 82248 BILIRUBIN DIRECT: CPT

## 2025-06-09 PROCEDURE — 83690 ASSAY OF LIPASE: CPT

## 2025-06-09 PROCEDURE — 74176 CT ABD & PELVIS W/O CONTRAST: CPT

## 2025-06-09 PROCEDURE — 85027 COMPLETE CBC AUTOMATED: CPT

## 2025-06-09 RX ORDER — ONDANSETRON 2 MG/ML
4 INJECTION INTRAMUSCULAR; INTRAVENOUS ONCE
Status: COMPLETED | OUTPATIENT
Start: 2025-06-09 | End: 2025-06-10

## 2025-06-09 RX ORDER — 0.9 % SODIUM CHLORIDE 0.9 %
1000 INTRAVENOUS SOLUTION INTRAVENOUS ONCE
Status: COMPLETED | OUTPATIENT
Start: 2025-06-09 | End: 2025-06-10

## 2025-06-09 ASSESSMENT — PAIN DESCRIPTION - ORIENTATION: ORIENTATION: RIGHT;LEFT

## 2025-06-09 ASSESSMENT — PAIN - FUNCTIONAL ASSESSMENT
PAIN_FUNCTIONAL_ASSESSMENT: PREVENTS OR INTERFERES WITH MANY ACTIVE NOT PASSIVE ACTIVITIES
PAIN_FUNCTIONAL_ASSESSMENT: 0-10

## 2025-06-09 ASSESSMENT — PAIN DESCRIPTION - LOCATION: LOCATION: ABDOMEN

## 2025-06-09 ASSESSMENT — PAIN SCALES - GENERAL: PAINLEVEL_OUTOF10: 5

## 2025-06-09 ASSESSMENT — PAIN DESCRIPTION - PAIN TYPE: TYPE: ACUTE PAIN

## 2025-06-10 PROBLEM — K80.20 GALLSTONES: Status: ACTIVE | Noted: 2025-06-10

## 2025-06-10 PROBLEM — N20.0 KIDNEY STONE: Status: ACTIVE | Noted: 2025-06-10

## 2025-06-10 PROBLEM — R19.7 DIARRHEA: Status: ACTIVE | Noted: 2025-06-10

## 2025-06-10 LAB
ALBUMIN SERPL-MCNC: 4.5 G/DL (ref 3.5–5.2)
ALP SERPL-CCNC: 74 U/L (ref 35–104)
ALT SERPL-CCNC: 12 U/L (ref 0–32)
AMYLASE SERPL-CCNC: 93 U/L (ref 20–100)
ANION GAP SERPL CALCULATED.3IONS-SCNC: 14 MMOL/L (ref 7–16)
AST SERPL-CCNC: 18 U/L (ref 0–31)
BACTERIA URNS QL MICRO: ABNORMAL
BILIRUB DIRECT SERPL-MCNC: <0.2 MG/DL (ref 0–0.3)
BILIRUB INDIRECT SERPL-MCNC: NORMAL MG/DL (ref 0–1)
BILIRUB SERPL-MCNC: 0.4 MG/DL (ref 0–1.2)
BILIRUB UR QL STRIP: NEGATIVE
BNP SERPL-MCNC: 37 PG/ML (ref 0–125)
BUN SERPL-MCNC: 21 MG/DL (ref 6–23)
CALCIUM SERPL-MCNC: 9.5 MG/DL (ref 8.6–10.2)
CHLORIDE SERPL-SCNC: 103 MMOL/L (ref 98–107)
CK SERPL-CCNC: 115 U/L (ref 20–180)
CLARITY UR: ABNORMAL
CO2 SERPL-SCNC: 21 MMOL/L (ref 22–29)
COLOR UR: YELLOW
CREAT SERPL-MCNC: 1 MG/DL (ref 0.5–1)
EKG ATRIAL RATE: 78 BPM
EKG P AXIS: 40 DEGREES
EKG P-R INTERVAL: 138 MS
EKG Q-T INTERVAL: 398 MS
EKG QRS DURATION: 82 MS
EKG QTC CALCULATION (BAZETT): 453 MS
EKG R AXIS: -8 DEGREES
EKG T AXIS: 22 DEGREES
EKG VENTRICULAR RATE: 78 BPM
ERYTHROCYTE [DISTWIDTH] IN BLOOD BY AUTOMATED COUNT: 11.9 % (ref 11.5–15)
GFR, ESTIMATED: 62 ML/MIN/1.73M2
GLUCOSE SERPL-MCNC: 124 MG/DL (ref 74–99)
GLUCOSE UR STRIP-MCNC: >=1000 MG/DL
HCT VFR BLD AUTO: 44 % (ref 34–48)
HGB BLD-MCNC: 14.8 G/DL (ref 11.5–15.5)
HGB UR QL STRIP.AUTO: ABNORMAL
INR PPP: 1
KETONES UR STRIP-MCNC: ABNORMAL MG/DL
LACTATE BLDV-SCNC: 1.2 MMOL/L (ref 0.5–2.2)
LEUKOCYTE ESTERASE UR QL STRIP: ABNORMAL
LIPASE SERPL-CCNC: 50 U/L (ref 13–60)
MAGNESIUM SERPL-MCNC: 2.1 MG/DL (ref 1.6–2.6)
MCH RBC QN AUTO: 31.6 PG (ref 26–35)
MCHC RBC AUTO-ENTMCNC: 33.6 G/DL (ref 32–34.5)
MCV RBC AUTO: 94 FL (ref 80–99.9)
NITRITE UR QL STRIP: POSITIVE
PH UR STRIP: 5.5 [PH] (ref 5–8)
PLATELET # BLD AUTO: 335 K/UL (ref 130–450)
PMV BLD AUTO: 9 FL (ref 7–12)
POTASSIUM SERPL-SCNC: 3.9 MMOL/L (ref 3.5–5)
PROT SERPL-MCNC: 7.6 G/DL (ref 6.4–8.3)
PROT UR STRIP-MCNC: ABNORMAL MG/DL
PROTHROMBIN TIME: 11.2 SEC (ref 9.3–12.4)
RBC # BLD AUTO: 4.68 M/UL (ref 3.5–5.5)
RBC #/AREA URNS HPF: ABNORMAL /HPF
SODIUM SERPL-SCNC: 138 MMOL/L (ref 132–146)
SP GR UR STRIP: 1.02 (ref 1–1.03)
TROPONIN I SERPL HS-MCNC: 8 NG/L (ref 0–14)
UROBILINOGEN UR STRIP-ACNC: 0.2 EU/DL (ref 0–1)
WBC #/AREA URNS HPF: ABNORMAL /HPF
WBC OTHER # BLD: 14.3 K/UL (ref 4.5–11.5)

## 2025-06-10 PROCEDURE — 96375 TX/PRO/DX INJ NEW DRUG ADDON: CPT

## 2025-06-10 PROCEDURE — 6360000002 HC RX W HCPCS: Performed by: EMERGENCY MEDICINE

## 2025-06-10 PROCEDURE — 2500000003 HC RX 250 WO HCPCS: Performed by: EMERGENCY MEDICINE

## 2025-06-10 PROCEDURE — 6370000000 HC RX 637 (ALT 250 FOR IP): Performed by: EMERGENCY MEDICINE

## 2025-06-10 PROCEDURE — 93010 ELECTROCARDIOGRAM REPORT: CPT | Performed by: INTERNAL MEDICINE

## 2025-06-10 PROCEDURE — 96361 HYDRATE IV INFUSION ADD-ON: CPT

## 2025-06-10 PROCEDURE — 2580000003 HC RX 258: Performed by: EMERGENCY MEDICINE

## 2025-06-10 PROCEDURE — 96374 THER/PROPH/DIAG INJ IV PUSH: CPT

## 2025-06-10 RX ORDER — ONDANSETRON 4 MG/1
4 TABLET, FILM COATED ORAL EVERY 8 HOURS PRN
Qty: 12 TABLET | Refills: 0 | Status: SHIPPED | OUTPATIENT
Start: 2025-06-10

## 2025-06-10 RX ORDER — FAMOTIDINE 20 MG/1
20 TABLET, FILM COATED ORAL 2 TIMES DAILY
Qty: 60 TABLET | Refills: 3 | Status: SHIPPED | OUTPATIENT
Start: 2025-06-10 | End: 2025-06-10

## 2025-06-10 RX ORDER — CEFDINIR 300 MG/1
300 CAPSULE ORAL 2 TIMES DAILY
Qty: 14 CAPSULE | Refills: 0 | Status: SHIPPED | OUTPATIENT
Start: 2025-06-10 | End: 2025-06-17

## 2025-06-10 RX ORDER — VANCOMYCIN HYDROCHLORIDE 250 MG/1
250 CAPSULE ORAL 4 TIMES DAILY
Qty: 28 CAPSULE | Refills: 0 | Status: SHIPPED | OUTPATIENT
Start: 2025-06-10 | End: 2025-06-17

## 2025-06-10 RX ORDER — FAMOTIDINE 20 MG/1
20 TABLET, FILM COATED ORAL 2 TIMES DAILY
Qty: 14 TABLET | Refills: 0 | Status: SHIPPED | OUTPATIENT
Start: 2025-06-10 | End: 2025-06-17

## 2025-06-10 RX ORDER — CEFDINIR 300 MG/1
300 CAPSULE ORAL ONCE
Status: COMPLETED | OUTPATIENT
Start: 2025-06-10 | End: 2025-06-10

## 2025-06-10 RX ORDER — LOPERAMIDE HYDROCHLORIDE 2 MG/1
4 CAPSULE ORAL ONCE
Status: COMPLETED | OUTPATIENT
Start: 2025-06-10 | End: 2025-06-10

## 2025-06-10 RX ADMIN — ONDANSETRON 4 MG: 2 INJECTION, SOLUTION INTRAMUSCULAR; INTRAVENOUS at 00:03

## 2025-06-10 RX ADMIN — SODIUM CHLORIDE 1000 ML: 0.9 INJECTION, SOLUTION INTRAVENOUS at 00:04

## 2025-06-10 RX ADMIN — LOPERAMIDE HYDROCHLORIDE 4 MG: 2 CAPSULE ORAL at 01:48

## 2025-06-10 RX ADMIN — FAMOTIDINE 20 MG: 10 INJECTION, SOLUTION INTRAVENOUS at 00:05

## 2025-06-10 RX ADMIN — CEFDINIR 300 MG: 300 CAPSULE ORAL at 01:48

## 2025-06-10 ASSESSMENT — PAIN SCALES - GENERAL: PAINLEVEL_OUTOF10: 0

## 2025-06-10 ASSESSMENT — PAIN - FUNCTIONAL ASSESSMENT: PAIN_FUNCTIONAL_ASSESSMENT: NONE - DENIES PAIN

## 2025-06-10 NOTE — ED PROVIDER NOTES
process.         CT ABDOMEN PELVIS WO CONTRAST Additional Contrast? None   Final Result   1. Liquid contents through the entire colon suggesting nonspecific mild   gastroenteritis.   2. Small burden left greater than right non obstructing nephrolithiasis.   3. Cholelithiasis.   4. Tiny, fatty umbilical hernia.               EKG Interpretation  Interpreted by emergency department physician    Rhythm: normal sinus   Rate: normal  Axis: normal  Conduction: normal  ST Segments: no acute change  T Waves: no acute change    Clinical Impression: no acute changes  Comparison to prior EKG: no previous EKG and None      ------------------------- NURSING NOTES AND VITALS REVIEWED ---------------------------   The nursing notes within the ED encounter and vital signs as below have been reviewed by myself.  /70   Pulse (!) 115   Temp 97.7 °F (36.5 °C) (Oral)   Resp 16   Wt 66.7 kg (147 lb)   LMP  (LMP Unknown)   SpO2 97%   BMI 23.02 kg/m²   Oxygen Saturation Interpretation: Normal    The patient’s available past medical records and past encounters were reviewed.        ------------------------------ ED COURSE/MEDICAL DECISION MAKING----------------------  Medications   cefdinir (OMNICEF) capsule 300 mg (has no administration in time range)   vancomycin (VANCOCIN) 50 mg/mL oral solution 125 mg (has no administration in time range)   loperamide (IMODIUM) capsule 4 mg (has no administration in time range)   sodium chloride 0.9 % bolus 1,000 mL (1,000 mLs IntraVENous New Bag 6/10/25 0004)   ondansetron (ZOFRAN) injection 4 mg (4 mg IntraVENous Given 6/10/25 0003)   famotidine (PEPCID) 20 MG/2ML 20 mg in sodium chloride (PF) 0.9 % 10 mL injection (20 mg IntraVENous Given 6/10/25 0005)             Medical Decision Making:       Yahir Abbasi is a 65 y.o. female presenting to the ED for diarrhea or dysuria abdominal pain, beginning several days ago.  The complaint has been persistent, moderate in severity, and worsened by

## 2025-06-12 ENCOUNTER — TELEPHONE (OUTPATIENT)
Age: 65
End: 2025-06-12

## 2025-06-12 NOTE — TELEPHONE ENCOUNTER
Patient called to give us an update on her recent health status. She is due for a breast MRI in June. She states she has been having issues with kidney stones, and problems with her bile duct and pancreas. She also reports having colitis and gastritis in the recent time. She would like to let NIKKI Smiht know this update as she feels its best to wait on the breast MRI due to her health issues. I informed the patient that we would call her back once provider reviews information.

## 2025-06-16 ENCOUNTER — TELEPHONE (OUTPATIENT)
Age: 65
End: 2025-06-16

## 2025-06-16 NOTE — TELEPHONE ENCOUNTER
Left message with call back number. Per DANIEL Hannah, patient can schedule her next follow up for December. She needs a mammogram same day prior to her appointment.    ESTABLISHED: 6 month follow up - personal history breast cancer -- BILATERAL SCREENING MAMMOGRAM PRIOR

## 2025-06-17 DIAGNOSIS — Z17.0 MALIGNANT NEOPLASM OF RIGHT BREAST IN FEMALE, ESTROGEN RECEPTOR POSITIVE, UNSPECIFIED SITE OF BREAST (HCC): ICD-10-CM

## 2025-06-17 DIAGNOSIS — C50.911 MALIGNANT NEOPLASM OF RIGHT BREAST IN FEMALE, ESTROGEN RECEPTOR POSITIVE, UNSPECIFIED SITE OF BREAST (HCC): ICD-10-CM

## 2025-06-17 RX ORDER — VENLAFAXINE HYDROCHLORIDE 150 MG/1
150 CAPSULE, EXTENDED RELEASE ORAL DAILY
Qty: 30 CAPSULE | Refills: 0 | Status: SHIPPED | OUTPATIENT
Start: 2025-06-17

## 2025-06-23 ENCOUNTER — TELEPHONE (OUTPATIENT)
Age: 65
End: 2025-06-23

## 2025-06-23 NOTE — TELEPHONE ENCOUNTER
----- Message from DIANN RUIZ RN sent at 6/16/2025 10:17 AM EDT -----  Check to see if patient returned call to schedule OV and mammogram. If not, try calling again.

## 2025-06-23 NOTE — TELEPHONE ENCOUNTER
Left second message with call back number. Per DANIEL Hannah, patient can schedule her next follow up for December. She needs a mammogram same day prior to her appointment.     ESTABLISHED: 6 month follow up - personal history breast cancer -- BILATERAL SCREENING MAMMOGRAM PRIOR

## 2025-07-07 ENCOUNTER — TELEPHONE (OUTPATIENT)
Age: 65
End: 2025-07-07

## 2025-07-07 NOTE — TELEPHONE ENCOUNTER
Third attempt made to schedule a 6 month follow up office visit and mammogram. Left message with call back number. Will also send a letter to listed address being that we have not heard back.      ESTABLISHED: 6 month follow up - personal history breast cancer -- BILATERAL SCREENING MAMMOGRAM PRIOR

## 2025-07-17 DIAGNOSIS — Z17.0 MALIGNANT NEOPLASM OF RIGHT BREAST IN FEMALE, ESTROGEN RECEPTOR POSITIVE, UNSPECIFIED SITE OF BREAST (HCC): ICD-10-CM

## 2025-07-17 DIAGNOSIS — C50.911 MALIGNANT NEOPLASM OF RIGHT BREAST IN FEMALE, ESTROGEN RECEPTOR POSITIVE, UNSPECIFIED SITE OF BREAST (HCC): ICD-10-CM

## 2025-07-17 RX ORDER — VENLAFAXINE HYDROCHLORIDE 150 MG/1
150 CAPSULE, EXTENDED RELEASE ORAL DAILY
Qty: 30 CAPSULE | Refills: 0 | Status: SHIPPED | OUTPATIENT
Start: 2025-07-17

## 2025-07-21 ENCOUNTER — OFFICE VISIT (OUTPATIENT)
Age: 65
End: 2025-07-21
Payer: MEDICARE

## 2025-07-21 ENCOUNTER — TELEPHONE (OUTPATIENT)
Dept: CASE MANAGEMENT | Age: 65
End: 2025-07-21

## 2025-07-21 ENCOUNTER — HOSPITAL ENCOUNTER (OUTPATIENT)
Dept: INFUSION THERAPY | Age: 65
Discharge: HOME | End: 2025-07-21
Payer: MEDICARE

## 2025-07-21 VITALS
TEMPERATURE: 96.9 F | BODY MASS INDEX: 23.2 KG/M2 | OXYGEN SATURATION: 97 % | HEART RATE: 70 BPM | WEIGHT: 147.8 LBS | DIASTOLIC BLOOD PRESSURE: 77 MMHG | SYSTOLIC BLOOD PRESSURE: 114 MMHG | HEIGHT: 67 IN

## 2025-07-21 DIAGNOSIS — C50.911 MALIGNANT NEOPLASM OF RIGHT BREAST IN FEMALE, ESTROGEN RECEPTOR POSITIVE, UNSPECIFIED SITE OF BREAST (HCC): Primary | ICD-10-CM

## 2025-07-21 DIAGNOSIS — C50.911 MALIGNANT NEOPLASM OF RIGHT BREAST IN FEMALE, ESTROGEN RECEPTOR POSITIVE, UNSPECIFIED SITE OF BREAST (HCC): ICD-10-CM

## 2025-07-21 DIAGNOSIS — Z17.0 MALIGNANT NEOPLASM OF RIGHT BREAST IN FEMALE, ESTROGEN RECEPTOR POSITIVE, UNSPECIFIED SITE OF BREAST (HCC): ICD-10-CM

## 2025-07-21 DIAGNOSIS — Z17.0 MALIGNANT NEOPLASM OF RIGHT BREAST IN FEMALE, ESTROGEN RECEPTOR POSITIVE, UNSPECIFIED SITE OF BREAST (HCC): Primary | ICD-10-CM

## 2025-07-21 DIAGNOSIS — Z78.0 POSTMENOPAUSAL: Primary | ICD-10-CM

## 2025-07-21 DIAGNOSIS — Z79.811 USE OF AROMATASE INHIBITORS: ICD-10-CM

## 2025-07-21 LAB
ALBUMIN SERPL-MCNC: 4.3 G/DL (ref 3.5–5.2)
ALP SERPL-CCNC: 66 U/L (ref 35–104)
ALT SERPL-CCNC: 13 U/L (ref 0–35)
ANION GAP SERPL CALCULATED.3IONS-SCNC: 13 MMOL/L (ref 7–16)
AST SERPL-CCNC: 18 U/L (ref 0–35)
BASOPHILS # BLD: 0.08 K/UL (ref 0–0.2)
BASOPHILS NFR BLD: 1 % (ref 0–2)
BILIRUB SERPL-MCNC: 0.6 MG/DL (ref 0–1.2)
BUN SERPL-MCNC: 20 MG/DL (ref 8–23)
CALCIUM SERPL-MCNC: 9.1 MG/DL (ref 8.8–10.2)
CHLORIDE SERPL-SCNC: 104 MMOL/L (ref 98–107)
CO2 SERPL-SCNC: 21 MMOL/L (ref 22–29)
CREAT SERPL-MCNC: 0.9 MG/DL (ref 0.5–1)
EOSINOPHIL # BLD: 0.31 K/UL (ref 0.05–0.5)
EOSINOPHILS RELATIVE PERCENT: 4 % (ref 0–6)
ERYTHROCYTE [DISTWIDTH] IN BLOOD BY AUTOMATED COUNT: 11.7 % (ref 11.5–15)
GFR, ESTIMATED: 69 ML/MIN/1.73M2
GLUCOSE SERPL-MCNC: 163 MG/DL (ref 74–99)
HCT VFR BLD AUTO: 43.3 % (ref 34–48)
HGB BLD-MCNC: 14.7 G/DL (ref 11.5–15.5)
IMM GRANULOCYTES # BLD AUTO: 0.03 K/UL (ref 0–0.58)
IMM GRANULOCYTES NFR BLD: 0 % (ref 0–5)
LYMPHOCYTES NFR BLD: 3.65 K/UL (ref 1.5–4)
LYMPHOCYTES RELATIVE PERCENT: 42 % (ref 20–42)
MCH RBC QN AUTO: 32 PG (ref 26–35)
MCHC RBC AUTO-ENTMCNC: 33.9 G/DL (ref 32–34.5)
MCV RBC AUTO: 94.1 FL (ref 80–99.9)
MONOCYTES NFR BLD: 0.69 K/UL (ref 0.1–0.95)
MONOCYTES NFR BLD: 8 % (ref 2–12)
NEUTROPHILS NFR BLD: 46 % (ref 43–80)
NEUTS SEG NFR BLD: 4 K/UL (ref 1.8–7.3)
PLATELET # BLD AUTO: 325 K/UL (ref 130–450)
PMV BLD AUTO: 9.5 FL (ref 7–12)
POTASSIUM SERPL-SCNC: 4.2 MMOL/L (ref 3.5–5.1)
PROT SERPL-MCNC: 7.4 G/DL (ref 6.4–8.3)
RBC # BLD AUTO: 4.6 M/UL (ref 3.5–5.5)
SODIUM SERPL-SCNC: 138 MMOL/L (ref 136–145)
WBC OTHER # BLD: 8.8 K/UL (ref 4.5–11.5)

## 2025-07-21 PROCEDURE — 80053 COMPREHEN METABOLIC PANEL: CPT

## 2025-07-21 PROCEDURE — 36415 COLL VENOUS BLD VENIPUNCTURE: CPT

## 2025-07-21 PROCEDURE — 85025 COMPLETE CBC W/AUTO DIFF WBC: CPT

## 2025-07-21 PROCEDURE — 1125F AMNT PAIN NOTED PAIN PRSNT: CPT | Performed by: INTERNAL MEDICINE

## 2025-07-21 PROCEDURE — 99214 OFFICE O/P EST MOD 30 MIN: CPT | Performed by: INTERNAL MEDICINE

## 2025-07-21 PROCEDURE — 1160F RVW MEDS BY RX/DR IN RCRD: CPT | Performed by: INTERNAL MEDICINE

## 2025-07-21 PROCEDURE — 1123F ACP DISCUSS/DSCN MKR DOCD: CPT | Performed by: INTERNAL MEDICINE

## 2025-07-21 PROCEDURE — 1159F MED LIST DOCD IN RCRD: CPT | Performed by: INTERNAL MEDICINE

## 2025-07-21 RX ORDER — EXEMESTANE 25 MG/1
25 TABLET ORAL DAILY
Qty: 90 TABLET | Refills: 3 | Status: SHIPPED | OUTPATIENT
Start: 2025-07-21

## 2025-07-21 RX ORDER — COLESTIPOL HYDROCHLORIDE 1 G/1
1 TABLET ORAL 2 TIMES DAILY
COMMUNITY
Start: 2025-07-08

## 2025-07-21 NOTE — PROGRESS NOTES
Department of [facility] Med Oncology  Attending Clinic Note    Reason for Visit: Follow-up on a patient with Right Breast Cancer    PCP:  Francis Crespo DO    History of Present Illness:  The lesion was located in the 8 o'clock position of the right breast    Breast cancer risk factors include mom breast cancer age 47, paternal aunt breast cancer, 53 paternal gm breast cancer age 50    Bilateral Diagnostic Mammogram/Right Breast U/S on 06/16/2021:  Large irregular hypoechoic solid mass seen on ultrasound and mammogram at about the 8 to 9 o'clock position measuring 2.4 x 1.4 x 1.4 cm.      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   Comment: The carcinoma displays highly infiltrative architectural features including single file and single cell patterns of invasion.   Strong membranous E-cadherin expression is diffusely present in association with the invasive carcinoma, supportive of ductal origin. No in situ component is identified in the tissue sample.   Benedicto histologic score for the invasive carcinoma corresponds to a tubule score 3, nuclear score 3, mitotic score 1, total score 7, grade 2.   Intradepartmental consultation is obtained.     Breast Cancer Marker Studies:   Estrogen Receptors (ER):90%   Progesterone Receptors (MS): 30%   Her-2/stone (c-erb B-2) protein expression: Negative/0     CXR 07/06/2021:  No acute process    MRI Bilateral Breast 07/08/2021:  Irregular mass in the right breast 8 o'clock measuring up to 2.4 cm in size is consistent with biopsy proven neoplasm.  Focal, heterogeneous non mass enhancement along the lateral aspect of the mass may be due to recent biopsy vs satellite lesion.  No LN  No evidence of neoplasm in the left breast    Right oncoplastic breast reduction, matching left breast reduction and

## 2025-07-21 NOTE — TELEPHONE ENCOUNTER
Completed patient's Breast Cancer Index test requisition on the [employer] portal per Dr. Rob Brandt's order.  Patient's insurance card, face sheet, recent office note, and pathology report uploaded to the [employer] portal with test order.  Copy of order placed in patient's Epic chart.  EMILEE BrownN, BSW, RN, OCN  Oncology Nurse Navigator

## 2025-08-01 ENCOUNTER — HOSPITAL ENCOUNTER (OUTPATIENT)
Dept: LAB | Age: 65
Discharge: HOME | End: 2025-08-01
Payer: MEDICARE

## 2025-08-01 DIAGNOSIS — E11.65 TYPE 2 DIABETES MELLITUS WITH HYPERGLYCEMIA, WITHOUT LONG-TERM CURRENT USE OF INSULIN (HCC): ICD-10-CM

## 2025-08-01 DIAGNOSIS — E03.9 ACQUIRED HYPOTHYROIDISM: ICD-10-CM

## 2025-08-01 DIAGNOSIS — E55.9 VITAMIN D DEFICIENCY: ICD-10-CM

## 2025-08-01 DIAGNOSIS — E78.2 MIXED HYPERLIPIDEMIA: ICD-10-CM

## 2025-08-01 LAB
25(OH)D3 SERPL-MCNC: 37 NG/ML (ref 30–100)
ALBUMIN SERPL-MCNC: 4.7 G/DL (ref 3.5–5.2)
ALP SERPL-CCNC: 78 U/L (ref 35–104)
ALT SERPL-CCNC: 11 U/L (ref 0–35)
ANION GAP SERPL CALCULATED.3IONS-SCNC: 13 MMOL/L (ref 7–16)
AST SERPL-CCNC: 20 U/L (ref 0–35)
BASOPHILS # BLD: 0.07 K/UL (ref 0–0.2)
BASOPHILS NFR BLD: 1 % (ref 0–2)
BILIRUB SERPL-MCNC: 0.4 MG/DL (ref 0–1.2)
BUN SERPL-MCNC: 21 MG/DL (ref 8–23)
CALCIUM SERPL-MCNC: 9.4 MG/DL (ref 8.8–10.2)
CHLORIDE SERPL-SCNC: 103 MMOL/L (ref 98–107)
CHOLEST SERPL-MCNC: 130 MG/DL
CO2 SERPL-SCNC: 23 MMOL/L (ref 22–29)
CREAT SERPL-MCNC: 1 MG/DL (ref 0.5–1)
EOSINOPHIL # BLD: 0.34 K/UL (ref 0.05–0.5)
EOSINOPHILS RELATIVE PERCENT: 4 % (ref 0–6)
ERYTHROCYTE [DISTWIDTH] IN BLOOD BY AUTOMATED COUNT: 11.5 % (ref 11.5–15)
GFR, ESTIMATED: 63 ML/MIN/1.73M2
GLUCOSE SERPL-MCNC: 188 MG/DL (ref 74–99)
HCT VFR BLD AUTO: 42.4 % (ref 34–48)
HDLC SERPL-MCNC: 63 MG/DL
HGB BLD-MCNC: 14.3 G/DL (ref 11.5–15.5)
IMM GRANULOCYTES # BLD AUTO: <0.03 K/UL (ref 0–0.58)
IMM GRANULOCYTES NFR BLD: 0 % (ref 0–5)
LDLC SERPL CALC-MCNC: 44 MG/DL
LYMPHOCYTES NFR BLD: 2.38 K/UL (ref 1.5–4)
LYMPHOCYTES RELATIVE PERCENT: 31 % (ref 20–42)
MCH RBC QN AUTO: 32 PG (ref 26–35)
MCHC RBC AUTO-ENTMCNC: 33.7 G/DL (ref 32–34.5)
MCV RBC AUTO: 94.9 FL (ref 80–99.9)
MONOCYTES NFR BLD: 0.49 K/UL (ref 0.1–0.95)
MONOCYTES NFR BLD: 6 % (ref 2–12)
NEUTROPHILS NFR BLD: 57 % (ref 43–80)
NEUTS SEG NFR BLD: 4.37 K/UL (ref 1.8–7.3)
PLATELET # BLD AUTO: 294 K/UL (ref 130–450)
PMV BLD AUTO: 9.4 FL (ref 7–12)
POTASSIUM SERPL-SCNC: 4.4 MMOL/L (ref 3.5–5.1)
PROT SERPL-MCNC: 7.3 G/DL (ref 6.4–8.3)
RBC # BLD AUTO: 4.47 M/UL (ref 3.5–5.5)
SODIUM SERPL-SCNC: 139 MMOL/L (ref 136–145)
SURGICAL PATHOLOGY REPORT: NORMAL
TRIGL SERPL-MCNC: 115 MG/DL
TSH SERPL DL<=0.05 MIU/L-ACNC: 3.12 UIU/ML (ref 0.27–4.2)
VLDLC SERPL CALC-MCNC: 23 MG/DL
WBC OTHER # BLD: 7.7 K/UL (ref 4.5–11.5)

## 2025-08-01 PROCEDURE — 80053 COMPREHEN METABOLIC PANEL: CPT

## 2025-08-01 PROCEDURE — 80061 LIPID PANEL: CPT

## 2025-08-01 PROCEDURE — 84443 ASSAY THYROID STIM HORMONE: CPT

## 2025-08-01 PROCEDURE — 36415 COLL VENOUS BLD VENIPUNCTURE: CPT

## 2025-08-01 PROCEDURE — 85025 COMPLETE CBC W/AUTO DIFF WBC: CPT

## 2025-08-01 PROCEDURE — 82306 VITAMIN D 25 HYDROXY: CPT

## 2025-08-04 ENCOUNTER — TELEPHONE (OUTPATIENT)
Dept: CASE MANAGEMENT | Age: 65
End: 2025-08-04

## 2025-08-05 PROBLEM — L30.9 ECZEMA: Status: ACTIVE | Noted: 2024-04-08

## 2025-08-05 PROBLEM — M85.88 OSTEOPENIA OF LUMBAR SPINE: Status: ACTIVE | Noted: 2023-06-09

## 2025-08-05 PROBLEM — Z85.828 HISTORY OF MALIGNANT NEOPLASM OF SKIN: Status: ACTIVE | Noted: 2024-04-08

## 2025-08-05 PROBLEM — L57.0 ACTINIC KERATOSIS: Status: ACTIVE | Noted: 2024-04-05

## 2025-08-05 PROBLEM — Z86.0100 HISTORY OF COLON POLYPS: Status: ACTIVE | Noted: 2023-06-09

## 2025-08-05 PROBLEM — M19.90 ARTHRITIS: Status: ACTIVE | Noted: 2024-04-08

## 2025-08-05 PROBLEM — J30.2 SEASONAL ALLERGIES: Status: ACTIVE | Noted: 2023-06-09

## 2025-08-05 PROBLEM — E55.9 VITAMIN D DEFICIENCY: Status: ACTIVE | Noted: 2023-06-09

## 2025-08-05 PROBLEM — C44.621 SQUAMOUS CELL CARCINOMA OF UPPER EXTREMITY: Status: ACTIVE | Noted: 2024-05-06

## 2025-08-05 PROBLEM — F41.1 GENERALIZED ANXIETY DISORDER: Status: ACTIVE | Noted: 2023-06-09

## 2025-08-05 PROBLEM — F33.0 MILD EPISODE OF RECURRENT MAJOR DEPRESSIVE DISORDER: Chronic | Status: ACTIVE | Noted: 2023-06-09

## 2025-08-05 PROBLEM — L72.9 FOLLICULAR CYST OF SKIN AND SUBCUTANEOUS TISSUE: Status: ACTIVE | Noted: 2024-04-05

## 2025-08-05 PROBLEM — I89.0 LYMPHEDEMA OF RIGHT UPPER EXTREMITY: Status: ACTIVE | Noted: 2023-06-09

## 2025-08-05 PROBLEM — L81.9 DISORDER OF PIGMENTATION: Status: ACTIVE | Noted: 2024-04-05

## 2025-08-06 ENCOUNTER — OFFICE VISIT (OUTPATIENT)
Dept: FAMILY MEDICINE CLINIC | Age: 65
End: 2025-08-06
Payer: MEDICARE

## 2025-08-06 VITALS
SYSTOLIC BLOOD PRESSURE: 116 MMHG | OXYGEN SATURATION: 98 % | DIASTOLIC BLOOD PRESSURE: 74 MMHG | HEIGHT: 67 IN | HEART RATE: 88 BPM | RESPIRATION RATE: 17 BRPM | TEMPERATURE: 97.2 F | WEIGHT: 150 LBS | BODY MASS INDEX: 23.54 KG/M2

## 2025-08-06 DIAGNOSIS — F41.9 ANXIETY: ICD-10-CM

## 2025-08-06 DIAGNOSIS — E78.2 MIXED HYPERLIPIDEMIA: ICD-10-CM

## 2025-08-06 DIAGNOSIS — E11.65 TYPE 2 DIABETES MELLITUS WITH HYPERGLYCEMIA, WITHOUT LONG-TERM CURRENT USE OF INSULIN (HCC): ICD-10-CM

## 2025-08-06 DIAGNOSIS — E55.9 VITAMIN D DEFICIENCY: ICD-10-CM

## 2025-08-06 DIAGNOSIS — M10.9 GOUT, UNSPECIFIED CAUSE, UNSPECIFIED CHRONICITY, UNSPECIFIED SITE: ICD-10-CM

## 2025-08-06 DIAGNOSIS — Z00.00 MEDICARE ANNUAL WELLNESS VISIT, SUBSEQUENT: Primary | ICD-10-CM

## 2025-08-06 PROCEDURE — 1160F RVW MEDS BY RX/DR IN RCRD: CPT | Performed by: NEUROMUSCULOSKELETAL MEDICINE & OMM

## 2025-08-06 PROCEDURE — 3044F HG A1C LEVEL LT 7.0%: CPT | Performed by: NEUROMUSCULOSKELETAL MEDICINE & OMM

## 2025-08-06 PROCEDURE — 1123F ACP DISCUSS/DSCN MKR DOCD: CPT | Performed by: NEUROMUSCULOSKELETAL MEDICINE & OMM

## 2025-08-06 PROCEDURE — G0439 PPPS, SUBSEQ VISIT: HCPCS | Performed by: NEUROMUSCULOSKELETAL MEDICINE & OMM

## 2025-08-06 PROCEDURE — 1159F MED LIST DOCD IN RCRD: CPT | Performed by: NEUROMUSCULOSKELETAL MEDICINE & OMM

## 2025-08-06 RX ORDER — ALLOPURINOL 300 MG/1
TABLET ORAL
Qty: 45 TABLET | Refills: 1 | Status: SHIPPED | OUTPATIENT
Start: 2025-08-06

## 2025-08-06 SDOH — HEALTH STABILITY: PHYSICAL HEALTH: ON AVERAGE, HOW MANY DAYS PER WEEK DO YOU ENGAGE IN MODERATE TO STRENUOUS EXERCISE (LIKE A BRISK WALK)?: 4 DAYS

## 2025-08-06 SDOH — HEALTH STABILITY: PHYSICAL HEALTH: ON AVERAGE, HOW MANY MINUTES DO YOU ENGAGE IN EXERCISE AT THIS LEVEL?: 60 MIN

## 2025-08-06 ASSESSMENT — PATIENT HEALTH QUESTIONNAIRE - PHQ9
6. FEELING BAD ABOUT YOURSELF - OR THAT YOU ARE A FAILURE OR HAVE LET YOURSELF OR YOUR FAMILY DOWN: NOT AT ALL
SUM OF ALL RESPONSES TO PHQ QUESTIONS 1-9: 0
9. THOUGHTS THAT YOU WOULD BE BETTER OFF DEAD, OR OF HURTING YOURSELF: NOT AT ALL
2. FEELING DOWN, DEPRESSED OR HOPELESS: NOT AT ALL
5. POOR APPETITE OR OVEREATING: NOT AT ALL
10. IF YOU CHECKED OFF ANY PROBLEMS, HOW DIFFICULT HAVE THESE PROBLEMS MADE IT FOR YOU TO DO YOUR WORK, TAKE CARE OF THINGS AT HOME, OR GET ALONG WITH OTHER PEOPLE: NOT DIFFICULT AT ALL
1. LITTLE INTEREST OR PLEASURE IN DOING THINGS: NOT AT ALL
4. FEELING TIRED OR HAVING LITTLE ENERGY: NOT AT ALL
3. TROUBLE FALLING OR STAYING ASLEEP: NOT AT ALL
SUM OF ALL RESPONSES TO PHQ QUESTIONS 1-9: 0
7. TROUBLE CONCENTRATING ON THINGS, SUCH AS READING THE NEWSPAPER OR WATCHING TELEVISION: NOT AT ALL
8. MOVING OR SPEAKING SO SLOWLY THAT OTHER PEOPLE COULD HAVE NOTICED. OR THE OPPOSITE, BEING SO FIGETY OR RESTLESS THAT YOU HAVE BEEN MOVING AROUND A LOT MORE THAN USUAL: NOT AT ALL

## 2025-08-06 ASSESSMENT — LIFESTYLE VARIABLES
HOW OFTEN DO YOU HAVE SIX OR MORE DRINKS ON ONE OCCASION: 1
HOW MANY STANDARD DRINKS CONTAINING ALCOHOL DO YOU HAVE ON A TYPICAL DAY: PATIENT DOES NOT DRINK
HOW MANY STANDARD DRINKS CONTAINING ALCOHOL DO YOU HAVE ON A TYPICAL DAY: 0
HOW OFTEN DO YOU HAVE A DRINK CONTAINING ALCOHOL: NEVER
HOW OFTEN DO YOU HAVE A DRINK CONTAINING ALCOHOL: 1

## 2025-08-17 DIAGNOSIS — Z17.0 MALIGNANT NEOPLASM OF RIGHT BREAST IN FEMALE, ESTROGEN RECEPTOR POSITIVE, UNSPECIFIED SITE OF BREAST (HCC): ICD-10-CM

## 2025-08-17 DIAGNOSIS — C50.911 MALIGNANT NEOPLASM OF RIGHT BREAST IN FEMALE, ESTROGEN RECEPTOR POSITIVE, UNSPECIFIED SITE OF BREAST (HCC): ICD-10-CM

## 2025-08-18 RX ORDER — VENLAFAXINE HYDROCHLORIDE 150 MG/1
150 CAPSULE, EXTENDED RELEASE ORAL DAILY
Qty: 30 CAPSULE | Refills: 0 | Status: SHIPPED | OUTPATIENT
Start: 2025-08-18

## (undated) DEVICE — SHEET,DRAPE,53X77,STERILE: Brand: MEDLINE

## (undated) DEVICE — BRA SURG L BGE MARENA FR SNAP CMFRT WR

## (undated) DEVICE — SET ENDO INSTR RED YEL LAPAROSCOPIC

## (undated) DEVICE — PMI PTFE COATED LAPAROSCOPIC WIRE L-HOOK 44 CM: Brand: PMI

## (undated) DEVICE — 20 ML SYRINGE REGULAR TIP: Brand: MONOJECT

## (undated) DEVICE — SHEET,DRAPE,40X58,STERILE: Brand: MEDLINE

## (undated) DEVICE — SKIN AFFIX SURG ADHESIVE 72/CS 0.55ML: Brand: MEDLINE

## (undated) DEVICE — Device

## (undated) DEVICE — TOWEL,OR,DSP,ST,BLUE,DLX,10/PK,8PK/CS: Brand: MEDLINE

## (undated) DEVICE — SET INSTRUMENT MINOR PLASTICS

## (undated) DEVICE — PATIENT RETURN ELECTRODE, SINGLE-USE, CONTACT QUALITY MONITORING, ADULT, WITH 9FT CORD, FOR PATIENTS WEIGING OVER 33LBS. (15KG): Brand: MEGADYNE

## (undated) DEVICE — TUBING, SUCTION, 3/16" X 12', STRAIGHT: Brand: MEDLINE

## (undated) DEVICE — APPLIER CLP M L L11.4IN DIA10MM ENDOSCP ROT MULT FOR LIG

## (undated) DEVICE — 3M™ STERI-STRIP™ REINFORCED ADHESIVE SKIN CLOSURES, R1548, 1 IN X 5 IN (25 MM X 125 MM), 4 STRIPS/ENVELOPE: Brand: 3M™ STERI-STRIP™

## (undated) DEVICE — COOK ENDOSCOPIC CHOLANGIOGRAPHY SET: Brand: COOK

## (undated) DEVICE — MARKER,SKIN,WI/RULER AND LABELS: Brand: MEDLINE

## (undated) DEVICE — PACK SURG LAP CHOLE CUSTOM

## (undated) DEVICE — INTENDED FOR TISSUE SEPARATION, AND OTHER PROCEDURES THAT REQUIRE A SHARP SURGICAL BLADE TO PUNCTURE OR CUT.: Brand: BARD-PARKER ® STAINLESS STEEL BLADES

## (undated) DEVICE — CLOTH SURG PREP PREOPERATIVE CHLORHEXIDINE GLUC 2% READYPREP

## (undated) DEVICE — SET ENDO INSTR LAPAROSCOPIC INCISIONAL

## (undated) DEVICE — BANDAGE,GAUZE,4.5"X4.1YD,STERILE,LF: Brand: MEDLINE

## (undated) DEVICE — PLUMEPORT LAPAROSCOPIC SMOKE FILTRATION DEVICE: Brand: PLUMEPORT ACTIV

## (undated) DEVICE — MEDIA CONTRAST ISOVUE GLASS VIALS 250 50ML

## (undated) DEVICE — STANDARD HYPODERMIC NEEDLE,POLYPROPYLENE HUB: Brand: MONOJECT

## (undated) DEVICE — MARKER SURG MARGIN STD 6 CLR INK ASST CORR CLP

## (undated) DEVICE — PLASMABLADE PS210-030S 3.0S LOCK: Brand: PLASMABLADE™

## (undated) DEVICE — SET INST MINOR PROCEDURE

## (undated) DEVICE — GARMENT,MEDLINE,DVT,INT,CALF,MED, GEN2: Brand: MEDLINE

## (undated) DEVICE — ELECTRODE PT RET AD L9FT HI MOIST COND ADH HYDRGEL CORDED

## (undated) DEVICE — GOWN,SIRUS,FABRNF,L,20/CS: Brand: MEDLINE

## (undated) DEVICE — TUBING SUCT 12FR MAL ALUM SHFT FN CAP VENT UNIV CONN W/ OBT

## (undated) DEVICE — GOWN,SIRUS,NONRNF,SETINSLV,XL,20/CS: Brand: MEDLINE

## (undated) DEVICE — LAPAROSCOPIC SCISSORS: Brand: EPIX LAPAROSCOPIC SCISSORS

## (undated) DEVICE — APPLICATOR MEDICATED 26 CC SOLUTION HI LT ORNG CHLORAPREP

## (undated) DEVICE — DRAPE 64X41IN RADIOLOGY C ARM EQUIP STER

## (undated) DEVICE — TUBING SUCTION 15FT 3MM 32CM SILICONE

## (undated) DEVICE — TOWEL,OR,DSP,ST,BLUE,STD,6/PK,12PK/CS: Brand: MEDLINE

## (undated) DEVICE — ADHESIVE SKIN CLSR 0.7ML TOP DERMBND ADV

## (undated) DEVICE — PMI PTFE COATED LAPAROSCOPIC WIRE L-HOOK 33 CM: Brand: PMI

## (undated) DEVICE — SYRINGE IRRIG 60ML SFT PLIABLE BLB EZ TO GRP 1 HND USE W/

## (undated) DEVICE — AGENT HEMSTAT 5GM ARISTA AH

## (undated) DEVICE — DRAPE,REIN 53X77,STERILE: Brand: MEDLINE

## (undated) DEVICE — TROCAR: Brand: KII FIOS FIRST ENTRY

## (undated) DEVICE — DOUBLE BASIN SET: Brand: MEDLINE INDUSTRIES, INC.

## (undated) DEVICE — STAPLER SKIN SQ 30 ABSRB STPL DISP INSORB

## (undated) DEVICE — STERILE POLYISOPRENE POWDER-FREE SURGICAL GLOVES: Brand: PROTEXIS

## (undated) DEVICE — TROCAR: Brand: KII SLEEVE

## (undated) DEVICE — [HIGH FLOW INSUFFLATOR,  DO NOT USE IF PACKAGE IS DAMAGED,  KEEP DRY,  KEEP AWAY FROM SUNLIGHT,  PROTECT FROM HEAT AND RADIOACTIVE SOURCES.]: Brand: PNEUMOSURE

## (undated) DEVICE — SOLUTION IV 1000ML LAC RINGERS PH 6.5 INJ USP VIAFLX PLAS

## (undated) DEVICE — TRAY PROCED PLASTIC CUSTOME SOFT TISS

## (undated) DEVICE — CHLORAPREP 26ML ORANGE

## (undated) DEVICE — STANDARD HYPODERMIC NEEDLE,ALUMINUM HUB: Brand: MONOJECT

## (undated) DEVICE — CONTROL SYRINGE LUER-LOCK TIP: Brand: MONOJECT

## (undated) DEVICE — GLOVE ORANGE PI 7 1/2   MSG9075

## (undated) DEVICE — APPLIER LIG CLP M L11IN TI STR RNG HNDL FOR 20 CLP DISP

## (undated) DEVICE — PROBE GAM TRUNODE DISP EACH

## (undated) DEVICE — SURGICAL PROCEDURE PACK BASIC

## (undated) DEVICE — COVER,LIGHT HANDLE,FLX,1/PK: Brand: MEDLINE INDUSTRIES, INC.

## (undated) DEVICE — DRIP REDUCTION MANIFOLD

## (undated) DEVICE — CAMERA STRYKER 1488 HD GEN

## (undated) DEVICE — SYRINGE MED 10ML TRNSLUC BRL PLUNG BLK MRK POLYPR CTRL

## (undated) DEVICE — INSUFFLATION NEEDLE TO ESTABLISH PNEUMOPERITONEUM.: Brand: INSUFFLATION NEEDLE

## (undated) DEVICE — DRAPE THER FLUID WARMING 66X44 IN FLAT SLUSH DBL DISC ORS

## (undated) DEVICE — PACK,UNIVERSAL,NO GOWNS: Brand: MEDLINE

## (undated) DEVICE — NEEDLE HYPO 27GA L1.25IN GRY POLYPR HUB S STL REG BVL STR

## (undated) DEVICE — STAPLER SKIN L39MM DIA0.53MM CRWN 5.7MM S STL FIX HD PROX

## (undated) DEVICE — SPONGE LAP W18XL18IN WHT COT 4 PLY FLD STRUNG RADPQ DISP ST

## (undated) DEVICE — STRIP SKIN CLSR W1XL5IN NYL REINF CURAD

## (undated) DEVICE — GLOVE SURG L12IN SZ 65FNGR THK94MIL TRNSLUC YEL LTX